# Patient Record
Sex: FEMALE | Race: WHITE | NOT HISPANIC OR LATINO | Employment: OTHER | URBAN - METROPOLITAN AREA
[De-identification: names, ages, dates, MRNs, and addresses within clinical notes are randomized per-mention and may not be internally consistent; named-entity substitution may affect disease eponyms.]

---

## 2018-03-12 ENCOUNTER — APPOINTMENT (OUTPATIENT)
Dept: CT IMAGING | Facility: HOSPITAL | Age: 83
End: 2018-03-12

## 2018-03-12 ENCOUNTER — APPOINTMENT (OUTPATIENT)
Dept: GENERAL RADIOLOGY | Facility: HOSPITAL | Age: 83
End: 2018-03-12

## 2018-03-12 ENCOUNTER — APPOINTMENT (OUTPATIENT)
Dept: ULTRASOUND IMAGING | Facility: HOSPITAL | Age: 83
End: 2018-03-12

## 2018-03-12 ENCOUNTER — HOSPITAL ENCOUNTER (INPATIENT)
Facility: HOSPITAL | Age: 83
LOS: 11 days | Discharge: NURSING, REHAB OR LONG-TERM CARE | End: 2018-03-23
Attending: EMERGENCY MEDICINE | Admitting: HOSPITALIST

## 2018-03-12 DIAGNOSIS — Z74.09 IMPAIRED FUNCTIONAL MOBILITY, BALANCE, GAIT, AND ENDURANCE: ICD-10-CM

## 2018-03-12 DIAGNOSIS — F03.91 DEMENTIA WITH BEHAVIORAL DISTURBANCE, UNSPECIFIED DEMENTIA TYPE: ICD-10-CM

## 2018-03-12 DIAGNOSIS — N28.9 RENAL INSUFFICIENCY: ICD-10-CM

## 2018-03-12 DIAGNOSIS — E86.0 DEHYDRATION: Primary | ICD-10-CM

## 2018-03-12 PROBLEM — E87.1 HYPONATREMIA: Status: ACTIVE | Noted: 2018-03-12

## 2018-03-12 PROBLEM — I10 BENIGN ESSENTIAL HYPERTENSION: Status: ACTIVE | Noted: 2018-03-12

## 2018-03-12 PROBLEM — N18.9 ACUTE ON CHRONIC RENAL FAILURE (HCC): Status: ACTIVE | Noted: 2018-03-12

## 2018-03-12 PROBLEM — N17.9 ACUTE ON CHRONIC RENAL FAILURE (HCC): Status: ACTIVE | Noted: 2018-03-12

## 2018-03-12 PROBLEM — R63.4 WEIGHT LOSS, ABNORMAL: Status: ACTIVE | Noted: 2018-03-12

## 2018-03-12 PROBLEM — E11.9 TYPE 2 DIABETES MELLITUS WITHOUT COMPLICATION (HCC): Status: ACTIVE | Noted: 2018-03-12

## 2018-03-12 PROBLEM — R13.10 TROUBLE SWALLOWING: Status: ACTIVE | Noted: 2018-03-12

## 2018-03-12 PROBLEM — E87.6 HYPOKALEMIA: Status: ACTIVE | Noted: 2018-03-12

## 2018-03-12 LAB
ALBUMIN SERPL-MCNC: 4.1 G/DL (ref 3.5–5.2)
ALBUMIN/GLOB SERPL: 1 G/DL
ALP SERPL-CCNC: 97 U/L (ref 39–117)
ALT SERPL W P-5'-P-CCNC: 20 U/L (ref 1–33)
ANION GAP SERPL CALCULATED.3IONS-SCNC: 29.4 MMOL/L
AST SERPL-CCNC: 27 U/L (ref 1–32)
BACTERIA UR QL AUTO: NORMAL /HPF
BASOPHILS # BLD AUTO: 0.01 10*3/MM3 (ref 0–0.2)
BASOPHILS NFR BLD AUTO: 0.1 % (ref 0–1.5)
BILIRUB SERPL-MCNC: 0.8 MG/DL (ref 0.1–1.2)
BILIRUB UR QL STRIP: NEGATIVE
BUN BLD-MCNC: 87 MG/DL (ref 8–23)
BUN/CREAT SERPL: 33.7 (ref 7–25)
CALCIUM SPEC-SCNC: 9.9 MG/DL (ref 8.6–10.5)
CHLORIDE SERPL-SCNC: 74 MMOL/L (ref 98–107)
CLARITY UR: CLEAR
CO2 SERPL-SCNC: 28.6 MMOL/L (ref 22–29)
COLOR UR: YELLOW
CREAT BLD-MCNC: 2.58 MG/DL (ref 0.57–1)
DEPRECATED RDW RBC AUTO: 46.9 FL (ref 37–54)
EOSINOPHIL # BLD AUTO: 0 10*3/MM3 (ref 0–0.7)
EOSINOPHIL NFR BLD AUTO: 0 % (ref 0.3–6.2)
ERYTHROCYTE [DISTWIDTH] IN BLOOD BY AUTOMATED COUNT: 13.8 % (ref 11.7–13)
GFR SERPL CREATININE-BSD FRML MDRD: 18 ML/MIN/1.73
GLOBULIN UR ELPH-MCNC: 4.1 GM/DL
GLUCOSE BLD-MCNC: 318 MG/DL (ref 65–99)
GLUCOSE UR STRIP-MCNC: NEGATIVE MG/DL
HCT VFR BLD AUTO: 50.3 % (ref 35.6–45.5)
HGB BLD-MCNC: 17.2 G/DL (ref 11.9–15.5)
HGB UR QL STRIP.AUTO: ABNORMAL
HYALINE CASTS UR QL AUTO: NORMAL /LPF
IMM GRANULOCYTES # BLD: 0.02 10*3/MM3 (ref 0–0.03)
IMM GRANULOCYTES NFR BLD: 0.1 % (ref 0–0.5)
KETONES UR QL STRIP: ABNORMAL
LEUKOCYTE ESTERASE UR QL STRIP.AUTO: NEGATIVE
LYMPHOCYTES # BLD AUTO: 0.8 10*3/MM3 (ref 0.9–4.8)
LYMPHOCYTES NFR BLD AUTO: 5.4 % (ref 19.6–45.3)
MCH RBC QN AUTO: 32.1 PG (ref 26.9–32)
MCHC RBC AUTO-ENTMCNC: 34.2 G/DL (ref 32.4–36.3)
MCV RBC AUTO: 93.8 FL (ref 80.5–98.2)
MONOCYTES # BLD AUTO: 0.61 10*3/MM3 (ref 0.2–1.2)
MONOCYTES NFR BLD AUTO: 4.1 % (ref 5–12)
NEUTROPHILS # BLD AUTO: 13.4 10*3/MM3 (ref 1.9–8.1)
NEUTROPHILS NFR BLD AUTO: 90.3 % (ref 42.7–76)
NITRITE UR QL STRIP: NEGATIVE
PH UR STRIP.AUTO: 5.5 [PH] (ref 5–8)
PLATELET # BLD AUTO: 293 10*3/MM3 (ref 140–500)
PMV BLD AUTO: 11.3 FL (ref 6–12)
POTASSIUM BLD-SCNC: 3 MMOL/L (ref 3.5–5.2)
PROT SERPL-MCNC: 8.2 G/DL (ref 6–8.5)
PROT UR QL STRIP: NEGATIVE
RBC # BLD AUTO: 5.36 10*6/MM3 (ref 3.9–5.2)
RBC # UR: NORMAL /HPF
REF LAB TEST METHOD: NORMAL
SODIUM BLD-SCNC: 132 MMOL/L (ref 136–145)
SP GR UR STRIP: 1.01 (ref 1–1.03)
SQUAMOUS #/AREA URNS HPF: NORMAL /HPF
TROPONIN T SERPL-MCNC: 0.02 NG/ML (ref 0–0.03)
URATE SERPL-MCNC: 22.7 MG/DL (ref 2.4–5.7)
UROBILINOGEN UR QL STRIP: ABNORMAL
WBC NRBC COR # BLD: 14.84 10*3/MM3 (ref 4.5–10.7)
WBC UR QL AUTO: NORMAL /HPF

## 2018-03-12 PROCEDURE — 84550 ASSAY OF BLOOD/URIC ACID: CPT | Performed by: HOSPITALIST

## 2018-03-12 PROCEDURE — 81001 URINALYSIS AUTO W/SCOPE: CPT | Performed by: EMERGENCY MEDICINE

## 2018-03-12 PROCEDURE — 99285 EMERGENCY DEPT VISIT HI MDM: CPT

## 2018-03-12 PROCEDURE — 80053 COMPREHEN METABOLIC PANEL: CPT | Performed by: EMERGENCY MEDICINE

## 2018-03-12 PROCEDURE — 76775 US EXAM ABDO BACK WALL LIM: CPT

## 2018-03-12 PROCEDURE — 84484 ASSAY OF TROPONIN QUANT: CPT | Performed by: EMERGENCY MEDICINE

## 2018-03-12 PROCEDURE — 85025 COMPLETE CBC W/AUTO DIFF WBC: CPT | Performed by: EMERGENCY MEDICINE

## 2018-03-12 PROCEDURE — 70450 CT HEAD/BRAIN W/O DYE: CPT

## 2018-03-12 PROCEDURE — 93010 ELECTROCARDIOGRAM REPORT: CPT | Performed by: INTERNAL MEDICINE

## 2018-03-12 PROCEDURE — 71046 X-RAY EXAM CHEST 2 VIEWS: CPT

## 2018-03-12 PROCEDURE — 93005 ELECTROCARDIOGRAM TRACING: CPT | Performed by: EMERGENCY MEDICINE

## 2018-03-12 RX ORDER — ASPIRIN 81 MG/1
81 TABLET ORAL DAILY
Status: DISCONTINUED | OUTPATIENT
Start: 2018-03-12 | End: 2018-03-23 | Stop reason: HOSPADM

## 2018-03-12 RX ORDER — BISACODYL 10 MG
10 SUPPOSITORY, RECTAL RECTAL DAILY PRN
Status: DISCONTINUED | OUTPATIENT
Start: 2018-03-12 | End: 2018-03-23 | Stop reason: HOSPADM

## 2018-03-12 RX ORDER — ACETAMINOPHEN 325 MG/1
650 TABLET ORAL EVERY 4 HOURS PRN
Status: DISCONTINUED | OUTPATIENT
Start: 2018-03-12 | End: 2018-03-23 | Stop reason: HOSPADM

## 2018-03-12 RX ORDER — LEVOTHYROXINE SODIUM 0.05 MG/1
50 TABLET ORAL DAILY
Status: DISCONTINUED | OUTPATIENT
Start: 2018-03-12 | End: 2018-03-23 | Stop reason: HOSPADM

## 2018-03-12 RX ORDER — SODIUM CHLORIDE 0.9 % (FLUSH) 0.9 %
1-10 SYRINGE (ML) INJECTION AS NEEDED
Status: DISCONTINUED | OUTPATIENT
Start: 2018-03-12 | End: 2018-03-23 | Stop reason: HOSPADM

## 2018-03-12 RX ORDER — BISACODYL 5 MG/1
5 TABLET, DELAYED RELEASE ORAL DAILY PRN
Status: DISCONTINUED | OUTPATIENT
Start: 2018-03-12 | End: 2018-03-23 | Stop reason: HOSPADM

## 2018-03-12 RX ORDER — ONDANSETRON 2 MG/ML
4 INJECTION INTRAMUSCULAR; INTRAVENOUS EVERY 6 HOURS PRN
Status: DISCONTINUED | OUTPATIENT
Start: 2018-03-12 | End: 2018-03-23 | Stop reason: HOSPADM

## 2018-03-12 RX ORDER — SODIUM CHLORIDE 9 MG/ML
100 INJECTION, SOLUTION INTRAVENOUS CONTINUOUS
Status: DISCONTINUED | OUTPATIENT
Start: 2018-03-12 | End: 2018-03-13

## 2018-03-12 RX ORDER — MEMANTINE HYDROCHLORIDE 10 MG/1
10 TABLET ORAL DAILY
Status: DISCONTINUED | OUTPATIENT
Start: 2018-03-12 | End: 2018-03-23 | Stop reason: HOSPADM

## 2018-03-12 RX ORDER — DONEPEZIL HYDROCHLORIDE 10 MG/1
10 TABLET, FILM COATED ORAL NIGHTLY
COMMUNITY
End: 2022-08-02

## 2018-03-12 RX ORDER — INDAPAMIDE 2.5 MG/1
2.5 TABLET, FILM COATED ORAL EVERY MORNING
Status: DISCONTINUED | OUTPATIENT
Start: 2018-03-13 | End: 2018-03-13

## 2018-03-12 RX ORDER — OXYBUTYNIN CHLORIDE 5 MG/1
5 TABLET, EXTENDED RELEASE ORAL
COMMUNITY
End: 2018-03-23 | Stop reason: HOSPADM

## 2018-03-12 RX ORDER — DONEPEZIL HYDROCHLORIDE 10 MG/1
10 TABLET, FILM COATED ORAL NIGHTLY
Status: DISCONTINUED | OUTPATIENT
Start: 2018-03-12 | End: 2018-03-23 | Stop reason: HOSPADM

## 2018-03-12 RX ORDER — POTASSIUM CHLORIDE 20 MEQ/1
20 TABLET, EXTENDED RELEASE ORAL 3 TIMES DAILY
COMMUNITY
End: 2018-03-23 | Stop reason: HOSPADM

## 2018-03-12 RX ORDER — ONDANSETRON 4 MG/1
4 TABLET, ORALLY DISINTEGRATING ORAL EVERY 6 HOURS PRN
Status: DISCONTINUED | OUTPATIENT
Start: 2018-03-12 | End: 2018-03-23 | Stop reason: HOSPADM

## 2018-03-12 RX ORDER — ONDANSETRON 4 MG/1
4 TABLET, FILM COATED ORAL EVERY 6 HOURS PRN
Status: DISCONTINUED | OUTPATIENT
Start: 2018-03-12 | End: 2018-03-23 | Stop reason: HOSPADM

## 2018-03-12 RX ADMIN — SODIUM CHLORIDE 1000 ML: 9 INJECTION, SOLUTION INTRAVENOUS at 14:19

## 2018-03-12 RX ADMIN — SODIUM CHLORIDE 100 ML/HR: 9 INJECTION, SOLUTION INTRAVENOUS at 18:46

## 2018-03-12 NOTE — NURSING NOTE
Called Dr. Zamora regarding low potassium level. He wanted to just watch it and not treat it for now since creatinine elevated.

## 2018-03-12 NOTE — H&P
Name: Sam Flores ADMIT: 3/12/2018   : 1934  PCP: Joann Prabhakar MD    MRN: [id number] LOS: 0 days   AGE/SEX: 83 y.o. female  ROOM: P491/1     Chief Complaint   Patient presents with   • Altered Mental Status     FAMILY REPORTS PT IS BECOMING MORE CONFUSED, NOT EATING AND UNABLE TO CONTROL HER BLADDER; PT WITH NO COMPLAINTS AND DOES NOT KNOW WHY SHE IS HERE       Subjective   Patient is a 83 y.o. female who presents to [facility] with the above chief complaint.  She is known to our service from past admissions for palpitations osteoporosis with vertebral compression fractures.  She presents today for dehydration and weight loss.  Patient has dementia and can't really tell me why she's come to the hospital most of the history is obtained from her son is at the bedside.  Essentially her great niece came to the house to check on her today and noticed that she was very weak and more confused.  She had trouble getting up and moving around.  She also had mean anything since yesterday.  She's been incontinent and not really able to do some of her ADLs and not nearly as independent and she's been over the last couple years over the last week this is greatly declined.  His been no fevers or chills she's lost about 10 pounds in the last 2 weeks.  Family's been increasingly concerned over the last couple months regarding her poor by mouth intake.  The patient herself says she just can't swallow things.  She can't tell if it hurts when she swallows or there is pain there or she feels like she is choking when she swallows she says she just doesn't remember.  She is a little bit of a cough right now it's nonproductive denies any fevers or chills.    Altered Mental Status         Past Medical History:   Diagnosis Date   • Dementia    • Diabetes mellitus    • Disease of thyroid gland    • Emphysema of lung    • Hyperlipidemia    • Hypertension    • Lumbar compression fracture 2016   •  Osteoporosis    • S/P kyphoplasty 2016    L1 and L4, 2016     Past Surgical History:   Procedure Laterality Date   •  SECTION     • KYPHOPLASTY      T12 and L2   • KYPHOPLASTY N/A 2016    Procedure: KYPHOPLASTY LUMBAR 1 AND LUMBAR 4;  Surgeon: Triston Marinelli IV, MD;  Location: [facility] ;  Service:    • MASTECTOMY       History reviewed. No pertinent family history.  Social History   Substance Use Topics   • Smoking status: Never Smoker   • Smokeless tobacco: Not on file   • Alcohol use No     Prescriptions Prior to Admission   Medication Sig Dispense Refill Last Dose   • aspirin 81 MG EC tablet Take 81 mg by mouth Daily.   2016 at Unknown time   • donepezil (ARICEPT) 10 MG tablet Take 10 mg by mouth Every Night.      • indapamide (LOZOL) 2.5 MG tablet Take 2.5 mg by mouth Every Morning.   2016 at Unknown time   • levothyroxine (SYNTHROID, LEVOTHROID) 50 MCG tablet Take 50 mcg by mouth Daily.   2016 at Unknown time   • Linaclotide (LINZESS) 145 MCG capsule Take 145 mcg by mouth Every Morning.   2016 at Unknown time   • memantine (NAMENDA) 10 MG tablet Take 10 mg by mouth 2 (Two) Times a Day.   2016 at Unknown time   • oxybutynin XL (DITROPAN-XL) 5 MG 24 hr tablet Take 5 mg by mouth every night at bedtime.      • potassium chloride (KLOR-CON) 20 MEQ CR tablet Take 20 mEq by mouth 3 (Three) Times a Day.      • simvastatin (ZOCOR) 20 MG tablet Take 20 mg by mouth Every Night.   2016 at Unknown time   • potassium chloride (K-DUR) 10 MEQ CR tablet Take 10 mEq by mouth 3 (Three) Times a Day.   2016 at Unknown time   • SITagliptin (JANUVIA) 50 MG tablet Take 50 mg by mouth Daily.   2016 at Unknown time     Allergies:  Penicillins    Review of Systems   Unable to perform ROS: Dementia        Objective    Vital Signs  Temp:  [96.8 °F (36 °C)-97.4 °F (36.3 °C)] 96.8 °F (36 °C)  Heart Rate:  [74-84] 77  Resp:  [16-18] 18  BP:  (110-119)/(65-71) 110/65  SpO2:  [94 %-100 %] 100 %  on   ;   Device (Oxygen Therapy): room air  Body mass index is 18.18 kg/m².    Physical Exam   Constitutional: No distress.   Frail elderly cachectic   HENT:   Head: Normocephalic and atraumatic.   Nose: Nose normal.   Eyes: Conjunctivae and EOM are normal.   Neck: Neck supple. No JVD present.   Cardiovascular: Normal rate, regular rhythm and normal heart sounds.    Pulmonary/Chest: Effort normal and breath sounds normal. No respiratory distress.   Abdominal: Soft. Bowel sounds are normal. She exhibits no distension.   Musculoskeletal: Normal range of motion. She exhibits no edema.   Neurological: She is alert.   Skin: Skin is warm and dry. Capillary refill takes less than 2 seconds.   Psychiatric: She has a normal mood and affect. Her behavior is normal.       Results Review:   I reviewed the patient's new clinical results.    Results from last 7 days  Lab Units 03/12/18  1304   WBC 10*3/mm3 14.84*   HEMOGLOBIN g/dL 17.2*   PLATELETS 10*3/mm3 293     Results from last 7 days  Lab Units 03/12/18  1304   SODIUM mmol/L 132*   POTASSIUM mmol/L 3.0*   CHLORIDE mmol/L 74*   CO2 mmol/L 28.6   BUN mg/dL 87*   CREATININE mg/dL 2.58*   GLUCOSE mg/dL 318*   ALBUMIN g/dL 4.10   BILIRUBIN mg/dL 0.8   ALK PHOS U/L 97   AST (SGOT) U/L 27   ALT (SGPT) U/L 20   Estimated Creatinine Clearance: 10.6 mL/min (by C-G formula based on SCr of 2.58 mg/dL (H)).  Results from last 7 days  Lab Units 03/12/18  1304   TROPONIN T ng/mL 0.020         Invalid input(s): LDLCALC  Results from last 7 days  Lab Units 03/12/18  1418   NITRITE UA  Negative   WBC UA /HPF 0-2   BACTERIA UA /HPF None Seen   SQUAM EPITHEL UA /HPF 0-2     US Renal Bilateral   Final Result   No hydronephrosis or echogenic nephrolithiasis.           This report was finalized on 3/12/2018 4:28 PM by Dr. Blaise Witt MD.          CT Head Without Contrast   Final Result   1.  There is no evidence for acute intracranial  pathology.   2.  Changes of inflammatory paranasal sinus disease along with   postsurgical changes within the paranasal sinuses are as discussed in   detail above.       Radiation dose reduction techniques were utilized, including automated   exposure control and exposure modulation based on body size.       This report was finalized on 3/12/2018 2:59 PM by Dr. Elbert Astorga MD.          XR Chest 2 View   Final Result   Small likely atelectasis or scar at the left base. Tortuous   aorta. Follow-up as clinically indicated.       This report was finalized on 3/12/2018 1:24 PM by Dr. Blaise Witt MD.            Assessment/Plan     Active Problems:    Dehydration    Acute on chronic renal failure    Type 2 diabetes mellitus without complication    Benign essential hypertension    Weight loss, abnormal    Trouble swallowing      Assessment & Plan  At this point she's got acute on chronic renal failure with dehydration.  We'll start her on IV fluids and aggressively hydrate.  She failed her bedside swallow study in the emergency room and has been having swallowing issues at home.  Her dementia makes it difficult to figure out exactly what's going on with her swallowing issues.  We'll have speech therapy see her and determine a diet.  Until then we'll keep her nothing by mouth.  She's lost some weight but compared to her last hospitalization about 2 years ago she's only down 3 pounds but according to family she's lost nearly 10 last 2 weeks.  This likely secondary to poor oral intake as well as other issues.  She does have a history cancer in the past there hasn't been any signs or symptoms of this presently except for the weight loss.  Workup for renal failure and have nephrology evaluate.  We'll have speech therapy see for the swallowing issues and possibly get GI involved to do a barium esophagram if needed.  Otherwise she has response to IV fluids and physical therapy and follow-up.  We discussed her advanced  directives and goals of care.  Her goals of care in line with quality and comfort.  She wishes to be a DNR with no aggressive measures.  No plans for artificial nutrition.  Only thing she okayed was blood products and antibiotics if needed.      I discussed the patients findings and my recommendations with patient, family and nursing staff.    Raciel Zamora MD  [facility]  03/12/18  4:42 PM

## 2018-03-12 NOTE — PLAN OF CARE
Problem: Patient Care Overview  Goal: Plan of Care Review  Outcome: Ongoing (interventions implemented as appropriate)   03/12/18 1540   Coping/Psychosocial   Plan of Care Reviewed With patient;family   Plan of Care Review   Progress no change   OTHER   Outcome Summary pt admitted to [facility]. pt started on IV fluids. pt confused and forgetful, but pleasant. pt asking for water. pt unable to drink fluids until speech sees. pt has a nonproductive cough. denies pain. hold on k+ replacement d/t renal fx for now.     Goal: Individualization and Mutuality  Outcome: Ongoing (interventions implemented as appropriate)    Goal: Discharge Needs Assessment  Outcome: Ongoing (interventions implemented as appropriate)    Goal: Interprofessional Rounds/Family Conf  Outcome: Ongoing (interventions implemented as appropriate)      Problem: Fall Risk (Adult)  Goal: Identify Related Risk Factors and Signs and Symptoms  Outcome: Ongoing (interventions implemented as appropriate)    Goal: Absence of Fall  Outcome: Ongoing (interventions implemented as appropriate)      Problem: Skin Injury Risk (Adult)  Goal: Identify Related Risk Factors and Signs and Symptoms  Outcome: Ongoing (interventions implemented as appropriate)    Goal: Skin Health and Integrity  Outcome: Ongoing (interventions implemented as appropriate)      Problem: Fluid Volume Deficit (Adult)  Goal: Identify Related Risk Factors and Signs and Symptoms  Outcome: Ongoing (interventions implemented as appropriate)    Goal: Optimal Fluid Balance  Outcome: Ongoing (interventions implemented as appropriate)

## 2018-03-12 NOTE — ED PROVIDER NOTES
EMERGENCY DEPARTMENT ENCOUNTER       CHIEF COMPLAINT  Chief Complaint: Altered Mental Status  History given by: Paramedics, Patient  History limited by: Patient's mental status change   Room Number:   PMD: Joann Prabhakar MD      HPI:  Per paramedics per family, pt has h/o dementia. Pt presents to the ED with confusion worsening from pt's baseline over the past several days. Pt has also had decreased appetite/PO intake and has been incontinent of urine. Pt denies chest pain, dyspnea, headache, and focal weakness/numbness. The exact time of onset of pt's symptoms is unknown. There are no other complaints at this time.         Altered Mental Status   Presenting symptoms: confusion    Severity:  Moderate  Most recent episode:  Today  Episode history:  Continuous  Duration: worsening from baseline over the past several days; the exact time of onset of pt's symptoms is unknown.  Timing:  Constant  Progression:  Worsening  Context: dementia    Associated symptoms: bladder incontinence    Associated symptoms: no headaches and no weakness          PAST MEDICAL HISTORY  Active Ambulatory Problems     Diagnosis Date Noted   • Lumbar compression fracture 2016   • Osteoporosis 2016     Resolved Ambulatory Problems     Diagnosis Date Noted   • Fall 2016   • Acute kidney injury superimposed on chronic kidney disease 2016   • Rhabdomyolysis 2016   • Slow transit constipation 2016   • S/P kyphoplasty 2016     Past Medical History:   Diagnosis Date   • Dementia    • Diabetes mellitus    • Disease of thyroid gland    • Emphysema of lung    • Hyperlipidemia    • Hypertension    • Lumbar compression fracture 2016   • Osteoporosis    • S/P kyphoplasty 2016         PAST SURGICAL HISTORY  Past Surgical History:   Procedure Laterality Date   •  SECTION     • KYPHOPLASTY      T12 and L2   • KYPHOPLASTY N/A 2016    Procedure: KYPHOPLASTY LUMBAR 1 AND LUMBAR 4;   Surgeon: Triston Marinelli IV, MD;  Location: [facility] OR 18/19;  Service:    • MASTECTOMY           FAMILY HISTORY  History reviewed. No pertinent family history.      SOCIAL HISTORY  Social History     Social History   • Marital status:      Spouse name: N/A   • Number of children: N/A   • Years of education: N/A     Occupational History   • Not on file.     Social History Main Topics   • Smoking status: Never Smoker   • Smokeless tobacco: Not on file   • Alcohol use No   • Drug use: Unknown   • Sexual activity: Not on file     Other Topics Concern   • Not on file     Social History Narrative   • No narrative on file         ALLERGIES  Penicillins        REVIEW OF SYSTEMS  Review of Systems   Unable to perform ROS: Mental status change   Constitutional: Positive for appetite change (decreased appetite/PO intake).   Respiratory: Negative for shortness of breath.    Cardiovascular: Negative for chest pain.   Genitourinary: Positive for bladder incontinence.   Neurological: Negative for weakness, numbness and headaches.   Psychiatric/Behavioral: Positive for confusion.            PHYSICAL EXAM  ED Triage Vitals   Temp Heart Rate Resp BP SpO2   03/12/18 1238 03/12/18 1230 03/12/18 1238 03/12/18 1230 03/12/18 1230   97.4 °F (36.3 °C) 84 16 114/70 96 %      Temp src Heart Rate Source Patient Position BP Location FiO2 (%)   03/12/18 1238 03/12/18 1512 03/12/18 1402 03/12/18 1402 --   Oral Monitor Lying Right arm          Physical Exam   Constitutional: No distress.   HENT:   Head: Normocephalic.   Mouth/Throat: Mucous membranes are dry.   Eyes: EOM are normal. Pupils are equal, round, and reactive to light.   Neck: Normal range of motion. Neck supple.   Cardiovascular: Normal rate, regular rhythm and normal heart sounds.    Pulmonary/Chest: Effort normal and breath sounds normal. No respiratory distress. She has no decreased breath sounds. She has no wheezes. She has no rhonchi. She has no rales.   Abdominal:  Soft. There is no tenderness. There is no rebound and no guarding.   Musculoskeletal: Normal range of motion.   Pt moves all extremities.    Neurological: She is alert. She has normal sensation. She is disoriented (to place and time).   Skin: Skin is warm and dry.   Psychiatric: Mood and affect normal.   Nursing note and vitals reviewed.          LAB RESULTS  Recent Results (from the past 24 hour(s))   Comprehensive Metabolic Panel    Collection Time: 03/12/18  1:04 PM   Result Value Ref Range    Glucose 318 (H) 65 - 99 mg/dL    BUN 87 (H) 8 - 23 mg/dL    Creatinine 2.58 (H) 0.57 - 1.00 mg/dL    Sodium 132 (L) 136 - 145 mmol/L    Potassium 3.0 (L) 3.5 - 5.2 mmol/L    Chloride 74 (L) 98 - 107 mmol/L    CO2 28.6 22.0 - 29.0 mmol/L    Calcium 9.9 8.6 - 10.5 mg/dL    Total Protein 8.2 6.0 - 8.5 g/dL    Albumin 4.10 3.50 - 5.20 g/dL    ALT (SGPT) 20 1 - 33 U/L    AST (SGOT) 27 1 - 32 U/L    Alkaline Phosphatase 97 39 - 117 U/L    Total Bilirubin 0.8 0.1 - 1.2 mg/dL    eGFR Non African Amer 18 (L) >60 mL/min/1.73    Globulin 4.1 gm/dL    A/G Ratio 1.0 g/dL    BUN/Creatinine Ratio 33.7 (H) 7.0 - 25.0    Anion Gap 29.4 mmol/L   Troponin    Collection Time: 03/12/18  1:04 PM   Result Value Ref Range    Troponin T 0.020 0.000 - 0.030 ng/mL   CBC Auto Differential    Collection Time: 03/12/18  1:04 PM   Result Value Ref Range    WBC 14.84 (H) 4.50 - 10.70 10*3/mm3    RBC 5.36 (H) 3.90 - 5.20 10*6/mm3    Hemoglobin 17.2 (H) 11.9 - 15.5 g/dL    Hematocrit 50.3 (H) 35.6 - 45.5 %    MCV 93.8 80.5 - 98.2 fL    MCH 32.1 (H) 26.9 - 32.0 pg    MCHC 34.2 32.4 - 36.3 g/dL    RDW 13.8 (H) 11.7 - 13.0 %    RDW-SD 46.9 37.0 - 54.0 fl    MPV 11.3 6.0 - 12.0 fL    Platelets 293 140 - 500 10*3/mm3    Neutrophil % 90.3 (H) 42.7 - 76.0 %    Lymphocyte % 5.4 (L) 19.6 - 45.3 %    Monocyte % 4.1 (L) 5.0 - 12.0 %    Eosinophil % 0.0 (L) 0.3 - 6.2 %    Basophil % 0.1 0.0 - 1.5 %    Immature Grans % 0.1 0.0 - 0.5 %    Neutrophils, Absolute 13.40 (H)  1.90 - 8.10 10*3/mm3    Lymphocytes, Absolute 0.80 (L) 0.90 - 4.80 10*3/mm3    Monocytes, Absolute 0.61 0.20 - 1.20 10*3/mm3    Eosinophils, Absolute 0.00 0.00 - 0.70 10*3/mm3    Basophils, Absolute 0.01 0.00 - 0.20 10*3/mm3    Immature Grans, Absolute 0.02 0.00 - 0.03 10*3/mm3   Urinalysis With / Culture If Indicated - Urine, Catheter    Collection Time: 03/12/18  2:18 PM   Result Value Ref Range    Color, UA Yellow Yellow, Straw    Appearance, UA Clear Clear    pH, UA 5.5 5.0 - 8.0    Specific Gravity, UA 1.015 1.005 - 1.030    Glucose, UA Negative Negative    Ketones, UA 15 mg/dL (1+) (A) Negative    Bilirubin, UA Negative Negative    Blood, UA Trace (A) Negative    Protein, UA Negative Negative    Leuk Esterase, UA Negative Negative    Nitrite, UA Negative Negative    Urobilinogen, UA 1.0 E.U./dL 0.2 - 1.0 E.U./dL   Urinalysis, Microscopic Only - Urine, Clean Catch    Collection Time: 03/12/18  2:18 PM   Result Value Ref Range    RBC, UA 0-2 None Seen, 0-2 /HPF    WBC, UA 0-2 None Seen, 0-2 /HPF    Bacteria, UA None Seen None Seen /HPF    Squamous Epithelial Cells, UA 0-2 None Seen, 0-2 /HPF    Hyaline Casts, UA 3-6 None Seen /LPF    Methodology Automated Microscopy        Ordered the above labs and reviewed the results.        RADIOLOGY  CT Head Without Contrast   Preliminary Result   1.  There is no evidence for acute intracranial pathology.   2.  Changes of inflammatory paranasal sinus disease along with   postsurgical changes within the paranasal sinuses are as discussed in   detail above.       Radiation dose reduction techniques were utilized, including automated   exposure control and exposure modulation based on body size.    Interpreted by radiologist. Discussed with radiologist. Independently viewed by me.             XR Chest 2 View   Final Result   Small likely atelectasis or scar at the left base. Tortuous   aorta. Follow-up as clinically indicated.       This report was finalized on 3/12/2018 1:24  PM by Dr. Blaise Witt MD.    Interpreted by radiologist. Independently viewed by me.                Ordered the above noted radiological studies. Reviewed by me in PACS.       PROCEDURES  Procedures    EKG:           EKG time: 13:38  Rhythm/Rate: NSR rate 76  1st degree AV block  QRS, axis: Normal QRS   ST and T waves: Normal ST     Interpreted Contemporaneously by me, independently viewed  Similar compared to prior 11/30/16          PROGRESS AND CONSULTS  ED Course   Comment By Time   2:35 PM  Patient with dementia that is now not eating or drinking.  Appears dehydrated here with elevated BUN.  Given fluids.  Family understands that disposition may need to be to someplace other than home. Reji Tello MD 03/12 1435     12:43 PM:  Blood work, UA, CXR, CT Head, and EKG ordered for further evaluation. Pt is not a candidate for tPA as the exact time of onset of pt's symptoms is unknown.     2:16 PM:  Rechecked pt. Pt's family is at pt's bedside. Informed pt's family that pt's creatinine is 2.58 and BUN is 87 today. Pt appears to be dehydrated. Pt's CT Head is negative acute. Awaiting UA results. However, need for pt's admission to the hospital for further evaluation and management of pt's renal insufficiency. Pt's family agrees with plan. Decision time to admit: Now.     2:18 PM:  Placed call to A for admission. IV fluids ordered to hydrate pt.     2:34 PM:  Discussed case with Dr. Zamora, hospitalist. He will admit pt to a med/surg bed.               MEDICAL DECISION MAKING        MDM  Number of Diagnoses or Management Options     Amount and/or Complexity of Data Reviewed  Clinical lab tests: ordered and reviewed (BUN is 87. Creatinine is 2.58. )  Tests in the radiology section of CPT®: ordered and reviewed (CT Head:  1.  There is no evidence for acute intracranial pathology.  2.  Changes of inflammatory paranasal sinus disease along with  postsurgical changes within the paranasal sinuses are as  discussed in  detail above.)  Tests in the medicine section of CPT®: ordered and reviewed (EKG interpreted.   )  Discussion of test results with the performing providers: yes (CT Head results d/w radiologist.   )  Obtain history from someone other than the patient: yes (Paramedics provide significant history. )  Discuss the patient with other providers: yes (Case d/w Dr. Zamora, hospitalist, who will admit pt to a med/surg bed.   )    Patient Progress  Patient progress: stable             DIAGNOSIS  Final diagnoses:   Dehydration   Renal insufficiency   Dementia with behavioral disturbance, unspecified dementia type         DISPOSITION  Pt admitted to med/surg.      ADMISSION    Discussed treatment plan and reason for admission with pt/family and admitting physician.  Pt/family voiced understanding of the plan for admission for further testing/treatment as needed.                 Latest Documented Vital Signs:  As of 3:14 PM  BP- 110/65 HR- 77 Temp- 96.8 °F (36 °C) (Tympanic) O2 sat- 100%        --  Documentation assistance provided by carter Oswald for Dr. Elisha MD.  Information recorded by the scribe was done at my direction and has been verified and validated by me.            Rosita Oswald  03/12/18 1525       Reji Tello MD  03/12/18 1527

## 2018-03-12 NOTE — PROGRESS NOTES
Clinical Pharmacy Services: Medication History    Sam Flores is a 83 y.o. female presenting to [facility] for   Chief Complaint   Patient presents with   • Altered Mental Status     FAMILY REPORTS PT IS BECOMING MORE CONFUSED, NOT EATING AND UNABLE TO CONTROL HER BLADDER; PT WITH NO COMPLAINTS AND DOES NOT KNOW WHY SHE IS HERE       She  has a past medical history of Dementia; Diabetes mellitus; Disease of thyroid gland; Emphysema of lung; Hyperlipidemia; Hypertension; Lumbar compression fracture (11/30/2016); Osteoporosis; and S/P kyphoplasty (12/5/2016).    Allergies as of 03/12/2018 - Reviewed 03/12/2018   Allergen Reaction Noted   • Penicillins  11/30/2016       Medication information was obtained from: Family, medication list  Pharmacy and Phone Number: [facility]    Prior to Admission Medications     Prescriptions Last Dose Informant Patient Reported? Taking?    aspirin 81 MG EC tablet  Family Member Yes Yes    Take 81 mg by mouth Daily.    donepezil (ARICEPT) 10 MG tablet  Family Member Yes Yes    Take 10 mg by mouth Every Night.    indapamide (LOZOL) 2.5 MG tablet  Family Member Yes Yes    Take 2.5 mg by mouth Every Morning.    levothyroxine (SYNTHROID, LEVOTHROID) 50 MCG tablet  Family Member Yes Yes    Take 50 mcg by mouth Daily.    Linaclotide (LINZESS) 145 MCG capsule  Family Member Yes Yes    Take 145 mcg by mouth Every Morning.    memantine (NAMENDA) 10 MG tablet  Family Member Yes Yes    Take 10 mg by mouth 2 (Two) Times a Day.    oxybutynin XL (DITROPAN-XL) 5 MG 24 hr tablet  Family Member Yes Yes    Take 5 mg by mouth every night at bedtime.    potassium chloride (K-DUR) 10 MEQ CR tablet  Family Member Yes No    Take 10 mEq by mouth 3 (Three) Times a Day.    potassium chloride (KLOR-CON) 20 MEQ CR tablet  Family Member Yes Yes    Take 20 mEq by mouth 3 (Three) Times a Day.    simvastatin (ZOCOR) 20 MG tablet  Family Member Yes Yes    Take 20 mg by mouth Every Night.    SITagliptin  (JANUVIA) 50 MG tablet  Family Member Yes No    Take 50 mg by mouth Daily.            Medication notes: Removed Januvia and added Donepezil per medication list provided by family.    This medication list is complete to the best of my knowledge as of 3/12/2018    Please call if questions.    Kristine Corcoran, Medication History Technician  3/12/2018 4:21 PM

## 2018-03-13 ENCOUNTER — APPOINTMENT (OUTPATIENT)
Dept: GENERAL RADIOLOGY | Facility: HOSPITAL | Age: 83
End: 2018-03-13

## 2018-03-13 LAB
ANION GAP SERPL CALCULATED.3IONS-SCNC: 18.3 MMOL/L
ANION GAP SERPL CALCULATED.3IONS-SCNC: 20.2 MMOL/L
BUN BLD-MCNC: 60 MG/DL (ref 8–23)
BUN BLD-MCNC: 69 MG/DL (ref 8–23)
BUN/CREAT SERPL: 32.8 (ref 7–25)
BUN/CREAT SERPL: 37.1 (ref 7–25)
CALCIUM SPEC-SCNC: 8.5 MG/DL (ref 8.6–10.5)
CALCIUM SPEC-SCNC: 8.9 MG/DL (ref 8.6–10.5)
CHLORIDE SERPL-SCNC: 89 MMOL/L (ref 98–107)
CHLORIDE SERPL-SCNC: 90 MMOL/L (ref 98–107)
CO2 SERPL-SCNC: 29.8 MMOL/L (ref 22–29)
CO2 SERPL-SCNC: 30.7 MMOL/L (ref 22–29)
CREAT BLD-MCNC: 1.83 MG/DL (ref 0.57–1)
CREAT BLD-MCNC: 1.86 MG/DL (ref 0.57–1)
CREAT UR-MCNC: 25.5 MG/DL
DEPRECATED RDW RBC AUTO: 45.1 FL (ref 37–54)
EOSINOPHIL SPEC QL MICRO: 0 % EOS/100 CELLS (ref 0–0)
ERYTHROCYTE [DISTWIDTH] IN BLOOD BY AUTOMATED COUNT: 13.3 % (ref 11.7–13)
GFR SERPL CREATININE-BSD FRML MDRD: 26 ML/MIN/1.73
GFR SERPL CREATININE-BSD FRML MDRD: 26 ML/MIN/1.73
GLUCOSE BLD-MCNC: 123 MG/DL (ref 65–99)
GLUCOSE BLD-MCNC: 237 MG/DL (ref 65–99)
HCT VFR BLD AUTO: 43.8 % (ref 35.6–45.5)
HGB BLD-MCNC: 14.5 G/DL (ref 11.9–15.5)
MAGNESIUM SERPL-MCNC: 2.3 MG/DL (ref 1.6–2.4)
MCH RBC QN AUTO: 31 PG (ref 26.9–32)
MCHC RBC AUTO-ENTMCNC: 33.1 G/DL (ref 32.4–36.3)
MCV RBC AUTO: 93.8 FL (ref 80.5–98.2)
OSMOLALITY UR: 384 MOSM/KG
PHOSPHATE SERPL-MCNC: 2.8 MG/DL (ref 2.5–4.5)
PLATELET # BLD AUTO: 253 10*3/MM3 (ref 140–500)
PMV BLD AUTO: 10.8 FL (ref 6–12)
POTASSIUM BLD-SCNC: 2.2 MMOL/L (ref 3.5–5.2)
POTASSIUM BLD-SCNC: 2.5 MMOL/L (ref 3.5–5.2)
RBC # BLD AUTO: 4.67 10*6/MM3 (ref 3.9–5.2)
SODIUM BLD-SCNC: 138 MMOL/L (ref 136–145)
SODIUM BLD-SCNC: 140 MMOL/L (ref 136–145)
SODIUM UR-SCNC: 79 MMOL/L
UUN 24H UR-MCNC: 443 MG/DL
WBC NRBC COR # BLD: 14.25 10*3/MM3 (ref 4.5–10.7)

## 2018-03-13 PROCEDURE — 25810000003 SODIUM CHLORIDE 0.9 % WITH KCL 20 MEQ 20-0.9 MEQ/L-% SOLUTION: Performed by: INTERNAL MEDICINE

## 2018-03-13 PROCEDURE — 84100 ASSAY OF PHOSPHORUS: CPT | Performed by: HOSPITALIST

## 2018-03-13 PROCEDURE — 84300 ASSAY OF URINE SODIUM: CPT | Performed by: HOSPITALIST

## 2018-03-13 PROCEDURE — 25010000002 POTASSIUM CHLORIDE PER 2 MEQ OF POTASSIUM: Performed by: HOSPITALIST

## 2018-03-13 PROCEDURE — 25010000003 POTASSIUM CHLORIDE 10 MEQ/100ML SOLUTION: Performed by: HOSPITALIST

## 2018-03-13 PROCEDURE — 83935 ASSAY OF URINE OSMOLALITY: CPT | Performed by: HOSPITALIST

## 2018-03-13 PROCEDURE — 80048 BASIC METABOLIC PNL TOTAL CA: CPT | Performed by: HOSPITALIST

## 2018-03-13 PROCEDURE — 74230 X-RAY XM SWLNG FUNCJ C+: CPT

## 2018-03-13 PROCEDURE — 87205 SMEAR GRAM STAIN: CPT | Performed by: HOSPITALIST

## 2018-03-13 PROCEDURE — 82570 ASSAY OF URINE CREATININE: CPT | Performed by: HOSPITALIST

## 2018-03-13 PROCEDURE — 80048 BASIC METABOLIC PNL TOTAL CA: CPT | Performed by: INTERNAL MEDICINE

## 2018-03-13 PROCEDURE — 92611 MOTION FLUOROSCOPY/SWALLOW: CPT | Performed by: SPEECH-LANGUAGE PATHOLOGIST

## 2018-03-13 PROCEDURE — 97162 PT EVAL MOD COMPLEX 30 MIN: CPT

## 2018-03-13 PROCEDURE — 85027 COMPLETE CBC AUTOMATED: CPT | Performed by: HOSPITALIST

## 2018-03-13 PROCEDURE — 84540 ASSAY OF URINE/UREA-N: CPT | Performed by: HOSPITALIST

## 2018-03-13 PROCEDURE — 92610 EVALUATE SWALLOWING FUNCTION: CPT | Performed by: SPEECH-LANGUAGE PATHOLOGIST

## 2018-03-13 PROCEDURE — 83735 ASSAY OF MAGNESIUM: CPT | Performed by: HOSPITALIST

## 2018-03-13 PROCEDURE — 97110 THERAPEUTIC EXERCISES: CPT

## 2018-03-13 PROCEDURE — 36415 COLL VENOUS BLD VENIPUNCTURE: CPT | Performed by: HOSPITALIST

## 2018-03-13 RX ORDER — POTASSIUM CHLORIDE 7.45 MG/ML
10 INJECTION INTRAVENOUS
Status: COMPLETED | OUTPATIENT
Start: 2018-03-13 | End: 2018-03-13

## 2018-03-13 RX ORDER — SODIUM CHLORIDE AND POTASSIUM CHLORIDE 150; 900 MG/100ML; MG/100ML
100 INJECTION, SOLUTION INTRAVENOUS CONTINUOUS
Status: DISCONTINUED | OUTPATIENT
Start: 2018-03-13 | End: 2018-03-13

## 2018-03-13 RX ORDER — SODIUM CHLORIDE AND POTASSIUM CHLORIDE 150; 900 MG/100ML; MG/100ML
50 INJECTION, SOLUTION INTRAVENOUS CONTINUOUS
Status: DISCONTINUED | OUTPATIENT
Start: 2018-03-13 | End: 2018-03-15

## 2018-03-13 RX ADMIN — POTASSIUM CHLORIDE AND SODIUM CHLORIDE 100 ML/HR: 900; 150 INJECTION, SOLUTION INTRAVENOUS at 17:14

## 2018-03-13 RX ADMIN — POTASSIUM CHLORIDE 10 MEQ: 2 INJECTION, SOLUTION, CONCENTRATE INTRAVENOUS at 12:38

## 2018-03-13 RX ADMIN — SODIUM CHLORIDE 100 ML/HR: 9 INJECTION, SOLUTION INTRAVENOUS at 07:42

## 2018-03-13 RX ADMIN — POTASSIUM CHLORIDE 10 MEQ: 2 INJECTION, SOLUTION, CONCENTRATE INTRAVENOUS at 10:31

## 2018-03-13 RX ADMIN — POTASSIUM CHLORIDE 10 MEQ: 2 INJECTION, SOLUTION, CONCENTRATE INTRAVENOUS at 14:07

## 2018-03-13 RX ADMIN — BARIUM SULFATE 65 ML: 960 POWDER, FOR SUSPENSION ORAL at 15:05

## 2018-03-13 RX ADMIN — BARIUM SULFATE 4 ML: 980 POWDER, FOR SUSPENSION ORAL at 15:05

## 2018-03-13 RX ADMIN — POTASSIUM CHLORIDE 10 MEQ: 2 INJECTION, SOLUTION, CONCENTRATE INTRAVENOUS at 16:18

## 2018-03-13 RX ADMIN — DONEPEZIL HYDROCHLORIDE 10 MG: 10 TABLET, FILM COATED ORAL at 22:27

## 2018-03-13 NOTE — PLAN OF CARE
Problem: Patient Care Overview  Goal: Plan of Care Review   03/13/18 7747   Coping/Psychosocial   Plan of Care Reviewed With patient   OTHER   Outcome Summary Patient is a pleasant 83 y.o. female who presents with impaired functional mobility and endurance. Admitted worsening confusion and dehydration. Patient lives at home alone, uses a SPC and RW, and no prior use of AD. Assist x 1 required for all mobility. Ambulated 10' with RW- rest breaks required as patient fatigued quickly. Patient will benefit from skilled PT services acutely to address deficits as able and improve level of independence.

## 2018-03-13 NOTE — CONSULTS
Adult Nutrition  Assessment/PES    Patient Name:  Sam Flores  YOB: 1934  MRN: [id number]  Admit Date:  3/12/2018    Assessment Date:  3/13/2018    Comments:  Nursing consult due to weight loss and poor appetite.     Pt seen & assessed - MSA completed, meets criteria for severe malnutrition based on physical exam, weight status/loss and energy intake.     Pt agreeable to anything chocolate as far as supplements, and will start these as soon as PO diet ordered. She is hungry and thirsty, and eager to start eating. VFSS pending to determine safest diet.     RD following.      Reason for Assessment    Reason For Assessment nurse/nurse practitioner consult    Diagnosis neurologic conditions;other (see comments)   dehydration    Identified At Risk by Screening Criteria difficulty chewing/swallowing           Adult Nutrition Assessment     Row Name 03/13/18 1043       Nutrition Prescription PO    Current PO Diet NPO;Other (comment)   ice chips ok'd by SLP       Nutrient/Fluid Evaluation    Additional Documentation Fluid Intake Evaluation (Group)       Fluid Intake Evaluation    IV Fluid (mL) 2400       Malnutrition Severity Assessment    Malnutrition Type Chronic Illness Malnutrition       Physical Signs of Malnutrition (Chronic)    Muscle Wasting Severe   temporal scooping, prominent clavicle and acronmion bones; depressed area between thumb & forefinger; very thin posterior calf region    Fat Loss Severe   orbital region depression, upper arm under tricep with loose skin and evaulators fingers touched when skin pinched. Deferred thoracic/lumbar eval.     Fluid Accumulation None       Weight Status (Chronic)    BMI Mild (<19)    Weight Loss Severe (>5% / 1 mo)   9% wt loss over 2 weeks    Energy Intake Severe (< or equal to 50% / > or equal to 1 mo)       Criteria Met (Must meet criteria for severity in at least 2 of these categories: M Wasting, Fat Loss, Fluid, Secondary Signs, Wt. Status, Intake)     Patient meets criteria for  Severe malnutrition    Row Name 03/13/18 1041       Physical Findings    Overall Physical Appearance generalized wasting;loss of subcutaneous fat;loss of muscle mass;underweight    Skin poor skin integrity/turgor;other (see comments)   no pressure injury       Calculation Measurements    Weight Used For Calculations 41.3 kg (91 lb)       Estimated/Assessed Needs    Additional Documentation Fluid Requirements (Group);Protein Requirements (Group);Calorie Requirements (Group)       Calorie Requirements    Estimated Calorie Need Method kcal/kg    Estimated Calorie Requirement Comment 5607-4704 kcal       KCAL/KG    14 Kcal/Kg (kcal) 577.88    15 Kcal/Kg (kcal) 619.16    18 Kcal/Kg (kcal) 742.99    20 Kcal/Kg (kcal) 825.54    25 Kcal/Kg (kcal) 1031.93    30 Kcal/Kg (kcal) 1238.31    35 Kcal/Kg (kcal) 1444.7    40 Kcal/Kg (kcal) 1651.08    45 Kcal/Kg (kcal) 1857.47    50 Kcal/Kg (kcal) 2063.85       [place]-St. Jeor Equation    RMR ([place]-St. Jeor Equation) 773.4       Protein Requirements    Est Protein Requirement Amount (gms/kg) 1.2 gm protein    Estimated Protein Requirements (gms/day) 49.53       Fluid Requirements    Estimated Fluid Requirements (mL/day) 1236    Estimated Fluid Requirement Method RDA Method    RDA Method (mL) 1236    Lit-Segar Method (over 20 kg) 2325.54    Row Name 03/13/18 1032       Labs/Procedures/Meds    Lab Results Reviewed reviewed, pertinent    Lab Results Comments BMP c/w dehydration    Row Name 03/13/18 1031       Admit Weight    Admit Weight 41.3 kg (91 lb)       Usual Body Weight (UBW)    Usual Body Weight 45.4 kg (100 lb)   pt confirms    % of Usual Body Weight Assessment 85-95% - mild deficit    Weight Loss unintentional    Weight Loss Time Frame 2 weeks (~ 10 lb)       Body Mass Index (BMI)    BMI Assessment BMI 18.5-24.9: normal    Row Name 03/13/18 1028       Nutrition/Diet History    Typical Food/Fluid Intake Not eating or drinking well with  progressive swallowing difficulty over past 2 weeks per family info. Failed nsg BSE in ER. SLP eval just done at bedside per RN and VFSS to be done later today. Has some esophageal issues.     Food Preferences Likes sweets, chocolate especially. Pt says she's so hungry, anything sounds good right now.     Supplemental Drinks/Foods/Additives Willing to drink ritu shakes - will get Ensure enlive & Mighty shakes sent up when NPO lifted    Vitamin/Mineral/Herbal Supplements none    Functional Status not able to prepare meals    Factors Affecting Nutritional Intake difficulty/impaired swallowing;impaired cognitive status/motor control;other (see comments)   pt has underlying dementia; noted that they do not want aggressive tx, including artificial nutrition    Row Name 03/13/18 1026                          Row Name 03/13/18 0541       Anthropometrics    Weight 41.7 kg (91 lb 14.4 oz)          Problem/Interventions:        Problem 1     Row Name 03/13/18 1054       Nutrition Diagnoses Problem 1    Problem 1 Malnutrition    Etiology (related to) Medical Diagnosis;Functional Diagnosis    Neurological Dementia    Functional Diagnosis Dysphagia    Signs/Symptoms (evidenced by) SLP/Swallow eval;Unintended Weight Change;BMI    BMI 18 - 18.9    Unintended Weight Change Loss    Number of Pounds Lost 10 lb    Weight loss time period 2 wk    Swallow eval status Pending    Type of SLP Evaluation VFSS                    Intervention Goal     Row Name 03/13/18 1055       Intervention Goal    General Maintain nutrition    PO Initiate feeding;Tolerate PO;PO intake (%)    PO Intake % 65 %    Weight Appropriate weight gain            Nutrition Intervention     Row Name 03/13/18 1055       Nutrition Intervention    RD/Tech Action Interview for preference;Encourage intake;Care plan reviewd;Follow Tx progress;Recommend/ordered    Recommended/Ordered Supplement            Nutrition Prescription     Row Name 03/13/18 1057       Nutrition  Prescription PO    PO Prescription Begin/change supplement    Supplement Ensure Enlive;Mighty Shake   chocolate!    Supplement Frequency 2 times a day    New PO Prescription Ordered? Yes   will send once cleared for PO diet            Education/Evaluation     Row Name 03/13/18 1056       Education    Education Will Instruct as appropriate       Monitor/Evaluation    Monitor Per protocol        Electronically signed by:  Kaycee Story RD  03/13/18 10:56 AM

## 2018-03-13 NOTE — THERAPY EVALUATION
Acute Care - Speech Language Pathology   Swallow Initial Evaluation [facility]     Patient Name: Sam Flores  : 1934  MRN: [id number]  Today's Date: 3/13/2018        Referring Physician: Noah      Admit Date: 3/12/2018    SPEECH-LANGUAGE PATHOLOGY EVALUATION - SWALLOW - completed at 1000  Subjective: The patient was seen on this date for a Clinical Swallow evaluation.  Patient was alert and cooperative.  Objective: Textures given included ice, thin liquid and puree consistency.  Assessment: Difficulties were noted with thin liquid and puree consistency.  Approximately 5 minutes following 3 bites of puree pt with wet cough, question esophageal backflow.  Observations:  throat clear and cough  SLP Findings:  Patient presents with suspected oropharyngeal dysphagia, with esophageal component.   Recommendations: Diet Textures: NPO.  Medications should be taken by alternate means. May have ice between meals after oral care, under staff or family supervision and with the recommended strategies for safe swallowing.   Recommended Strategies:  Oral care PRN.  Other Recommended Evaluations: VFSS          Visit Dx:     ICD-10-CM ICD-9-CM   1. Dehydration E86.0 276.51   2. Renal insufficiency N28.9 593.9   3. Dementia with behavioral disturbance, unspecified dementia type F03.91 294.21   4. Impaired functional mobility, balance, gait, and endurance Z74.09 V49.89     Patient Active Problem List   Diagnosis   • Lumbar compression fracture   • Osteoporosis   • Dehydration   • Acute on chronic renal failure   • Type 2 diabetes mellitus without complication   • Benign essential hypertension   • Weight loss, abnormal   • Trouble swallowing   • Hyponatremia   • Hypokalemia     Past Medical History:   Diagnosis Date   • Dementia    • Diabetes mellitus    • Disease of thyroid gland    • Emphysema of lung    • Hyperlipidemia    • Hypertension    • Lumbar compression fracture 2016   • Osteoporosis    • S/P  kyphoplasty 2016    L1 and L4, 2016     Past Surgical History:   Procedure Laterality Date   •  SECTION     • KYPHOPLASTY      T12 and L2   • KYPHOPLASTY N/A 2016    Procedure: KYPHOPLASTY LUMBAR 1 AND LUMBAR 4;  Surgeon: Triston Marinelli IV, MD;  Location: [facility] ;  Service:    • MASTECTOMY            SWALLOW EVALUATION (last 72 hours)      SLP Adult Swallow Evaluation     Row Name 18 1000                   Rehab Evaluation    Document Type evaluation  -SA        Symptoms Noted During/After Treatment none  -SA           General Information    Patient Profile Reviewed yes  -SA        Pertinent History Of Current Problem --   not eating.  dementia, GERD. pt c/o food sticking  -SA        Current Method of Nutrition NPO  -SA        Prior Level of Function-Swallowing no diet consistency restrictions;concerns regarding malnutrition  -SA        Plans/Goals Discussed with patient  -SA        Barriers to Rehab cognitive status  -SA        Patient's Goals for Discharge return to PO diet  -SA           Pain Assessment    Additional Documentation Pain Scale: Numbers Pre/Post-Treatment (Group)  -SA           Pain Scale: Numbers Pre/Post-Treatment    Pain Scale: Numbers, Pretreatment 0/10 - no pain  -SA           Oral Motor and Function    Dentition Assessment natural, present and adequate  -SA        Secretion Management WNL/WFL  -SA           Oral Musculature and Cranial Nerve Assessment    Oral Motor General Assessment generalized oral motor weakness  -SA           Clinical Impression    SLP Swallowing Diagnosis suspected pharyngeal dysfunction;other (see comments)   ? esophageal dysfxn  -SA        Functional Impact risk of aspiration/pneumonia;risk of malnutrition;risk of dehydration  -SA        Rehab Potential/Prognosis, Swallowing adequate, monitor progress closely  -SA        Criteria for Skilled Therapeutic Interventions Met demonstrates skilled criteria  -SA            Recommendations    Therapy Frequency (SLP) PRN  -        Predicted Duration Therapy Intervention (Days) until discharge  -        SLP Diet Recommendation NPO  -        Recommended Diagnostics VFSS (MBS)  -        SLP Rec. for Method of Medication Administration meds via alternate route  -        Anticipated Dischage Disposition unknown  -          User Key  (r) = Recorded By, (t) = Taken By, (c) = Cosigned By    Initials Name Effective Dates    SA Yaneli Hatfield MS CCC-SLP 03/07/18 -         EDUCATION  The patient has been educated in the following areas:   Dysphagia (Swallowing Impairment) NPO rationale.    SLP Recommendation and Plan  SLP Swallowing Diagnosis: suspected pharyngeal dysfunction, other (see comments) (? esophageal dysfxn)  SLP Diet Recommendation: NPO           Recommended Diagnostics: VFSS ([facility]  Criteria for Skilled Therapeutic Interventions Met: demonstrates skilled criteria  Anticipated Dischage Disposition: unknown  Rehab Potential/Prognosis, Swallowing: adequate, monitor progress closely  Therapy Frequency (SLP): PRN  Predicted Duration Therapy Intervention (Days): until discharge       Plan of Care Reviewed With: patient  Plan of Care Review  Plan of Care Reviewed With: patient  Outcome Summary: BSE showing s/s w/ thin.  Pt c/o 'sticking', ? esophageal problems.  VFSS today at 1430           SLP Outcome Measures (last 72 hours)      SLP Outcome Measures     Row Name 03/13/18 1000             SLP Outcome Measures    Outcome Measure Used? Adult NOMS  [facility]         FCM Scores    Swallowing FCM Score 1  -        User Key  (r) = Recorded By, (t) = Taken By, (c) = Cosigned By    Initials Name Effective Dates    SA Yaneli Hatfield MS CCC-SLP 03/07/18 -            Time Calculation:         Time Calculation- SLP     Row Name 03/13/18 1643             Time Calculation- SLP    SLP Start Time 1000  -      SLP Stop Time 1045  -      SLP Time Calculation (min) 45 min  -      SLP Received  On 03/13/18  -        User Key  (r) = Recorded By, (t) = Taken By, (c) = Cosigned By    Initials Name Provider Type    SA Yaneli Hatfield MS CCC-SLP Speech and Language Pathologist          Therapy Charges for Today     Code Description Service Date Service Provider Modifiers Qty    [id number]  ST EVAL ORAL PHARYNG SWALLOW 3 3/13/2018 Yaneli Hatfield MS CCC-SLP  1               Yaneli Hatfield MS CCC-SLP  3/13/2018

## 2018-03-13 NOTE — PROGRESS NOTES
"     LOS: 1 day     Name: Sam Flores  Age/Sex: 83 y.o. female  :  1934        PCP: Joann Prabhakar MD    Subjective   \" I Feel fine honey can I go home\"  No issues overnight rested well still NPO awaitng speech eval.    General: No Fever or Chills, Cardiac: No Chest Pain or Palpitations, Resp: No Cough or SOA, GI: No Nausea, Vomiting, or Diarrhea and Other: No bleeding      aspirin 81 mg Oral Daily   donepezil 10 mg Oral Nightly   indapamide 2.5 mg Oral QAM   levothyroxine 50 mcg Oral Daily   memantine 10 mg Oral Daily       sodium chloride 100 mL/hr Last Rate: 100 mL/hr (18 0742)       Objective   Vital Signs  Temp:  [96.8 °F (36 °C)-97.5 °F (36.4 °C)] 97.4 °F (36.3 °C)  Heart Rate:  [63-84] 64  Resp:  [16-18] 16  BP: ()/(49-71) 102/60  Body mass index is 18.56 kg/m².    Intake/Output Summary (Last 24 hours) at 18 0810  Last data filed at 18 0637   Gross per 24 hour   Intake             1534 ml   Output                0 ml   Net             1534 ml       Physical Exam   Constitutional: No distress.   Frail elderly    HENT:   Head: Normocephalic and atraumatic.   Eyes: Conjunctivae and EOM are normal.   Neck: Neck supple. No JVD present.   Cardiovascular: Normal rate, regular rhythm and normal heart sounds.    Pulmonary/Chest: Effort normal and breath sounds normal. No respiratory distress.   Abdominal: Soft. Bowel sounds are normal. She exhibits no distension.   Musculoskeletal: Normal range of motion. She exhibits no edema or deformity.   Neurological: She is alert.   Skin: Skin is warm and dry.   Psychiatric: She has a normal mood and affect. Her behavior is normal.   Nursing note and vitals reviewed.        Results Review:       I reviewed the patient's new clinical results.    Results from last 7 days  Lab Units 18  0613 18  1304   WBC 10*3/mm3 14.25* 14.84*   HEMOGLOBIN g/dL 14.5 17.2*   PLATELETS 10*3/mm3 253 293       Results from last 7 days  Lab " Units 03/13/18  0737 03/13/18  0613 03/12/18  1304   SODIUM mmol/L 140  --  132*   POTASSIUM mmol/L 2.2*  --  3.0*   CHLORIDE mmol/L 90*  --  74*   CO2 mmol/L 29.8*  --  28.6   BUN mg/dL 69*  --  87*   CREATININE mg/dL 1.86*  --  2.58*   CALCIUM mg/dL 8.5*  --  9.9   MAGNESIUM mg/dL  --  2.3  --    PHOSPHORUS mg/dL  --  2.8  --    Estimated Creatinine Clearance: 10.9 mL/min (by C-G formula based on SCr of 2.58 mg/dL (H)).  Lab Results   Component Value Date    HGBA1C 8.2 (H) 09/22/2014   No results found for: POCGLU      Assessment/Plan   Active Problems:    Dehydration    Acute on chronic renal failure    Type 2 diabetes mellitus without complication    Benign essential hypertension    Weight loss, abnormal    Trouble swallowing    Hyponatremia    Hypokalemia      PLAN  - uric acid elevated consistent with significant volume depletion, Cr improving with fluids  - FeNa 6%, don't think this is accurate would expect with significant vol depletion urine sodium would be quite low  - replace K, Na has normalized  - await slp eval, may need GI but given her frailty not sure how aggressive to be here.  Likely to need some form of imaging to evaluate      Disposition  Probably SNF at [place], but if unable to swallow safetly could be hospice vs skilled palliative at [place]      Raciel Zamora MD  [facility]  03/13/18  8:10 AM

## 2018-03-13 NOTE — PLAN OF CARE
Problem: Nutrition, Imbalanced: Inadequate Oral Intake (Adult)  Goal: Identify Related Risk Factors and Signs and Symptoms  Outcome: Ongoing (interventions implemented as appropriate)   03/13/18 1101   Nutrition, Imbalanced: Inadequate Oral Intake (Adult)   Related Risk Factors (Nutrition Imbalance, Inadequate Oral Intake) appetite decreased;chronic illness/infection;eating difficulty   Signs and Symptoms (Nutrition Imbalance, Inadequate Oral Intake: Signs and Symptoms) loss of subcutaneous fat;muscle mass/strength decreased;weakness/lethargy;weight decreased (percent weight loss, percent usual body weight, body mass index less than 18.5) (Adults)     Goal: Improved Oral Intake  Outcome: Ongoing (interventions implemented as appropriate)   03/13/18 1101   Nutrition, Imbalanced: Inadequate Oral Intake (Adult)   Improved Oral Intake making progress toward outcome     Goal: Prevent Further Weight Loss  Outcome: Ongoing (interventions implemented as appropriate)   03/13/18 1101   Nutrition, Imbalanced: Inadequate Oral Intake (Adult)   Prevent Further Weight Loss making progress toward outcome

## 2018-03-13 NOTE — MBS/VFSS/FEES
Acute Care - Speech Language Pathology   Swallow Initial Evaluation [facility]     Patient Name: Sam Flores  : 1934  MRN: [id number]  Today's Date: 3/13/2018        Referring Physician: Noah      Admit Date: 3/12/2018  SPEECH-LANGUAGE PATHOLOGY EVALUTION - VFSS  Subjective: The patient was seen on this date for a VFSS(Videofluoroscopic Swallowing Study).  Patient was alert and cooperative.    Objective: Risks/benefits were reviewed with the patient, and consent was obtained. The study was completed with SLP present and Radiologist review. The patient was seen in lateral view(s). Textures given included thin liquid, nectar thick liquid, puree consistency, mechanical soft consistency and regular consistency.  Assessment: Pt demonstrates a mild oropharyngeal dysphagia.  Premature spillage with no penetration or aspiration.  Reduced anterior hyoid movement with prominent cricopharyngeus noted.  Delayed completion of deflection of tip of epiglottis.  Trace to mild diffuse pharyngeal residue observed.  Esophageal scan showed slow clearance of esophagus with characteristics of diffuse esophageal spasms.    SLP Findings: Patient presents with mild oropharyngeal dysphagia with esophageal component  Recommendations: Diet Textures: thin liquid, mechanical soft consistency with no mixed textures food. Medications should be taken whole or crushed with thin liquids or puree.   Recommended Strategies: Upright for PO, small bites and sips, may use straw and supervision with all PO. Oral care before breakfast, after all meals and PRN.  Consider multiple small meals.  If coughing during/after meals noted, consider dedicated esophageal studies.          Visit Dx:     ICD-10-CM ICD-9-CM   1. Dehydration E86.0 276.51   2. Renal insufficiency N28.9 593.9   3. Dementia with behavioral disturbance, unspecified dementia type F03.91 294.21   4. Impaired functional mobility, balance, gait, and endurance Z74.09 V49.89      Patient Active Problem List   Diagnosis   • Lumbar compression fracture   • Osteoporosis   • Dehydration   • Acute on chronic renal failure   • Type 2 diabetes mellitus without complication   • Benign essential hypertension   • Weight loss, abnormal   • Trouble swallowing   • Hyponatremia   • Hypokalemia     Past Medical History:   Diagnosis Date   • Dementia    • Diabetes mellitus    • Disease of thyroid gland    • Emphysema of lung    • Hyperlipidemia    • Hypertension    • Lumbar compression fracture 2016   • Osteoporosis    • S/P kyphoplasty 2016    L1 and L4, 2016     Past Surgical History:   Procedure Laterality Date   •  SECTION     • KYPHOPLASTY      T12 and L2   • KYPHOPLASTY N/A 2016    Procedure: KYPHOPLASTY LUMBAR 1 AND LUMBAR 4;  Surgeon: Triston Marinelli IV, MD;  Location: [facility] ;  Service:    • MASTECTOMY            SWALLOW EVALUATION (last 72 hours)      SLP Adult Swallow Evaluation     Row Name 18 1530 18 1000                Rehab Evaluation    Document Type evaluation  -SA evaluation  -SA       Symptoms Noted During/After Treatment none  -SA none  -SA          General Information    Patient Profile Reviewed yes  -SA yes  -SA       Pertinent History Of Current Problem --   not eating, c/o food sticking  -SA --   not eating.  dementia, GERD. pt c/o food sticking  -SA       Current Method of Nutrition NPO  -SA NPO  -SA       Prior Level of Function-Swallowing  -- no diet consistency restrictions;concerns regarding malnutrition  -SA       Plans/Goals Discussed with patient  -SA patient  -SA       Barriers to Rehab cognitive status  -SA cognitive status  -SA       Patient's Goals for Discharge return to PO diet  -SA return to PO diet  -SA          Pain Assessment    Additional Documentation Pain Scale: Numbers Pre/Post-Treatment (Group)  -SA Pain Scale: Numbers Pre/Post-Treatment (Group)  -SA          Pain Scale: Numbers Pre/Post-Treatment     Pain Scale: Numbers, Pretreatment 0/10 - no pain  -SA 0/10 - no pain  -SA          Oral Motor and Function    Dentition Assessment  -- natural, present and adequate  -       Secretion Management  -- WNL/WFL  -          Oral Musculature and Cranial Nerve Assessment    Oral Motor General Assessment  -- generalized oral motor weakness  -          SLP Communication to Radiology    Summary Statement Pt demonstrates a mild oropharyngeal dysphagia.  Premature spillage with no penetration or aspiration.  Reduced anterior hyoid movement with prominent cricopharyngeus noted.  Delayed completion of deflection of tip of epiglottis.  Trace to mild diffuse pharyngeal residue observed.  Esophageal scan showed slow clearance of esophagus with characteristics of diffuse esophageal spasms.   -  --          Clinical Impression    SLP Swallowing Diagnosis  -- suspected pharyngeal dysfunction;other (see comments)   ? esophageal dysfxn  -       Functional Impact  -- risk of aspiration/pneumonia;risk of malnutrition;risk of dehydration  -       Rehab Potential/Prognosis, Swallowing  -- adequate, monitor progress closely  -       Criteria for Skilled Therapeutic Interventions Met  -- demonstrates skilled criteria  -          Recommendations    Therapy Frequency (SLP)  -- PRN  -       Predicted Duration Therapy Intervention (Days)  -- until discharge  -       SLP Diet Recommendation  -- NPO  -       Recommended Diagnostics  -- VFSS (MBS)  -       SLP Rec. for Method of Medication Administration  -- meds via alternate route  -       Anticipated Dischage Disposition  -- unknown  -         User Key  (r) = Recorded By, (t) = Taken By, (c) = Cosigned By    Initials Name Effective Dates     Yaneli Hatfield MS CCC-SLP 03/07/18 -         EDUCATION  The patient has been educated in the following areas:   Dysphagia (Swallowing Impairment) Modified Diet Instruction.    SLP Recommendation and Plan  SLP Swallowing  Diagnosis: suspected pharyngeal dysfunction, other (see comments) (? esophageal dysfxn)  SLP Diet Recommendation: NPO           Recommended Diagnostics: VFSS (MBS)  Criteria for Skilled Therapeutic Interventions Met: demonstrates skilled criteria  Anticipated Dischage Disposition: unknown  Rehab Potential/Prognosis, Swallowing: adequate, monitor progress closely  Therapy Frequency (SLP): PRN  Predicted Duration Therapy Intervention (Days): until discharge       Plan of Care Reviewed With: patient  Plan of Care Review  Plan of Care Reviewed With: patient  Outcome Summary: VFSS completed.  No penetration/aspiration.  Trace- mild residue.  Slow clearance of esophagus, ? diffuse spasms.  Rec mech soft/thin liquids when upright.           SLP Outcome Measures (last 72 hours)      SLP Outcome Measures     Row Name 03/13/18 1530 03/13/18 1000          SLP Outcome Measures    Outcome Measure Used? Adult NOMS  -SA Adult NOMS  -SA        FCM Scores    Swallowing FCM Score 4  -SA 1  -SA       User Key  (r) = Recorded By, (t) = Taken By, (c) = Cosigned By    Initials Name Effective Dates     Yaneli Hatfield MS CCC-SLP 03/07/18 -            Time Calculation:         Time Calculation- SLP     Row Name 03/13/18 1657 03/13/18 1643          Time Calculation- SLP    SLP Start Time 1445  -SA 1000  -SA     SLP Stop Time 1545  -SA 1045  -SA     SLP Time Calculation (min) 60 min  -SA 45 min  -SA     SLP Received On 03/13/18  - 03/13/18  -       User Key  (r) = Recorded By, (t) = Taken By, (c) = Cosigned By    Initials Name Provider Type     Yaneli Hatfield MS CCC-SLP Speech and Language Pathologist          Therapy Charges for Today     Code Description Service Date Service Provider Modifiers Qty    08577486814 HC ST EVAL ORAL PHARYNG SWALLOW 3 3/13/2018 Yaneli Hatfield MS CCC-SLP GN 1    63234603833 HC ST MOTION FLUORO EVAL SWALLOW 4 3/13/2018 Yaneli Hatfield MS CCC-SLP GN 1               Yaneli Hatfield MS CCC-SLP  3/13/2018

## 2018-03-13 NOTE — CONSULTS
Referring Provider: Dr. Zamora  Reason for Consultation: [place]    Subjective     Chief complaint   Chief Complaint   Patient presents with   • Altered Mental Status     FAMILY REPORTS PT IS BECOMING MORE CONFUSED, NOT EATING AND UNABLE TO CONTROL HER BLADDER; PT WITH NO COMPLAINTS AND DOES NOT KNOW WHY SHE IS HERE       History of present illness:    83-year-old woman with history of dementia who was admitted with worsening renal function and generalized weakness.  According to her and to her son she has not been eating well.  She denied vomiting or diarrhea.  She is not hungry.  He has no particular renal history in the past.  No history of stones or gross hematuria.  She is feeling still about the same.  She is being evaluated for dysphagia.  Her complaints are nonspecific.  Particularly she denied shortness of breath, chest pain and abdominal pain.  No family history of kidney disease.    Past Medical History:   Diagnosis Date   • Dementia    • Diabetes mellitus    • Disease of thyroid gland    • Emphysema of lung    • Hyperlipidemia    • Hypertension    • Lumbar compression fracture 2016   • Osteoporosis    • S/P kyphoplasty 2016    L1 and L4, 2016     Past Surgical History:   Procedure Laterality Date   •  SECTION     • KYPHOPLASTY      T12 and L2   • KYPHOPLASTY N/A 2016    Procedure: KYPHOPLASTY LUMBAR 1 AND LUMBAR 4;  Surgeon: Triston Marinelli IV, MD;  Location: [facility] OR ;  Service:    • MASTECTOMY       History reviewed. No pertinent family history.    Social History   Substance Use Topics   • Smoking status: Never Smoker   • Smokeless tobacco: Never Used   • Alcohol use No     Prescriptions Prior to Admission   Medication Sig Dispense Refill Last Dose   • aspirin 81 MG EC tablet Take 81 mg by mouth Daily.   3/12/2018 at Unknown time   • donepezil (ARICEPT) 10 MG tablet Take 10 mg by mouth Every Night.   Past Week at Unknown time   • indapamide (LOZOL) 2.5 MG  "tablet Take 2.5 mg by mouth Every Morning.   Past Week at Unknown time   • levothyroxine (SYNTHROID, LEVOTHROID) 50 MCG tablet Take 50 mcg by mouth Daily.   Past Week at Unknown time   • Linaclotide (LINZESS) 145 MCG capsule Take 145 mcg by mouth Every Morning.   Past Week at Unknown time   • memantine (NAMENDA) 10 MG tablet Take 10 mg by mouth 2 (Two) Times a Day.   Past Week at Unknown time   • oxybutynin XL (DITROPAN-XL) 5 MG 24 hr tablet Take 5 mg by mouth every night at bedtime.   Past Week at Unknown time   • potassium chloride (KLOR-CON) 20 MEQ CR tablet Take 20 mEq by mouth 3 (Three) Times a Day.   Past Week at Unknown time   • simvastatin (ZOCOR) 20 MG tablet Take 20 mg by mouth Every Night.   3/11/2018 at Unknown time   • potassium chloride (K-DUR) 10 MEQ CR tablet Take 20 mEq by mouth 3 (Three) Times a Day.   11/30/2016 at Unknown time   • SITagliptin (JANUVIA) 50 MG tablet Take 50 mg by mouth Daily.   Unknown at Unknown time     Allergies:  Penicillins    Review of Systems  Review of system is negative other than what's mentioned in the history of present illness.  I'm not sure her history is as tolerated due to her dementia.    Objective     Vital Signs  Temp:  [96.7 °F (35.9 °C)-97.5 °F (36.4 °C)] 96.7 °F (35.9 °C)  Heart Rate:  [63-69] 69  Resp:  [16] 16  BP: ()/(49-63) 116/62    Flowsheet Rows    Flowsheet Row First Filed Value   Admission Height 149.9 cm (59\") Documented at 03/12/2018 1238   Admission Weight 40.8 kg (90 lb) Documented at 03/12/2018 1305           I/O this shift:  In: -   Out: 310 [Urine:310]  I/O last 3 completed shifts:  In: 1534 [I.V.:1534]  Out: -     Intake/Output Summary (Last 24 hours) at 03/13/18 1606  Last data filed at 03/13/18 0901   Gross per 24 hour   Intake             1534 ml   Output              310 ml   Net             1224 ml       Physical Exam:  No distress.  Sitting at bedside.  Conversant and pleasant.  Oral mucosa is moist.  No JVD or lymphadenopathy.  " Having bouts of cough with white sputum production.  Chest coarse bilaterally.  No wheezing.  Heart is regular rate and rhythm.  Markedly kyphotic spine.  Abdomen is soft, nontender, nondistended.  I did not appreciate any organomegaly.  Extremities without edema or calf tenderness.  Skin is warm and dry.    Results Review:  Results for orders placed or performed during the hospital encounter of 03/12/18   Eosinophil Smear - Urine, Urine, Clean Catch   Result Value Ref Range    Eosinophil Smear 0 0 - 0 % EOS/100 Cells   Comprehensive Metabolic Panel   Result Value Ref Range    Glucose 318 (H) 65 - 99 mg/dL    BUN 87 (H) 8 - 23 mg/dL    Creatinine 2.58 (H) 0.57 - 1.00 mg/dL    Sodium 132 (L) 136 - 145 mmol/L    Potassium 3.0 (L) 3.5 - 5.2 mmol/L    Chloride 74 (L) 98 - 107 mmol/L    CO2 28.6 22.0 - 29.0 mmol/L    Calcium 9.9 8.6 - 10.5 mg/dL    Total Protein 8.2 6.0 - 8.5 g/dL    Albumin 4.10 3.50 - 5.20 g/dL    ALT (SGPT) 20 1 - 33 U/L    AST (SGOT) 27 1 - 32 U/L    Alkaline Phosphatase 97 39 - 117 U/L    Total Bilirubin 0.8 0.1 - 1.2 mg/dL    eGFR Non African Amer 18 (L) >60 mL/min/1.73    Globulin 4.1 gm/dL    A/G Ratio 1.0 g/dL    BUN/Creatinine Ratio 33.7 (H) 7.0 - 25.0    Anion Gap 29.4 mmol/L   Troponin   Result Value Ref Range    Troponin T 0.020 0.000 - 0.030 ng/mL   Urinalysis With / Culture If Indicated - Urine, Catheter   Result Value Ref Range    Color, UA Yellow Yellow, Straw    Appearance, UA Clear Clear    pH, UA 5.5 5.0 - 8.0    Specific Gravity, UA 1.015 1.005 - 1.030    Glucose, UA Negative Negative    Ketones, UA 15 mg/dL (1+) (A) Negative    Bilirubin, UA Negative Negative    Blood, UA Trace (A) Negative    Protein, UA Negative Negative    Leuk Esterase, UA Negative Negative    Nitrite, UA Negative Negative    Urobilinogen, UA 1.0 E.U./dL 0.2 - 1.0 E.U./dL   CBC Auto Differential   Result Value Ref Range    WBC 14.84 (H) 4.50 - 10.70 10*3/mm3    RBC 5.36 (H) 3.90 - 5.20 10*6/mm3    Hemoglobin  17.2 (H) 11.9 - 15.5 g/dL    Hematocrit 50.3 (H) 35.6 - 45.5 %    MCV 93.8 80.5 - 98.2 fL    MCH 32.1 (H) 26.9 - 32.0 pg    MCHC 34.2 32.4 - 36.3 g/dL    RDW 13.8 (H) 11.7 - 13.0 %    RDW-SD 46.9 37.0 - 54.0 fl    MPV 11.3 6.0 - 12.0 fL    Platelets 293 140 - 500 10*3/mm3    Neutrophil % 90.3 (H) 42.7 - 76.0 %    Lymphocyte % 5.4 (L) 19.6 - 45.3 %    Monocyte % 4.1 (L) 5.0 - 12.0 %    Eosinophil % 0.0 (L) 0.3 - 6.2 %    Basophil % 0.1 0.0 - 1.5 %    Immature Grans % 0.1 0.0 - 0.5 %    Neutrophils, Absolute 13.40 (H) 1.90 - 8.10 10*3/mm3    Lymphocytes, Absolute 0.80 (L) 0.90 - 4.80 10*3/mm3    Monocytes, Absolute 0.61 0.20 - 1.20 10*3/mm3    Eosinophils, Absolute 0.00 0.00 - 0.70 10*3/mm3    Basophils, Absolute 0.01 0.00 - 0.20 10*3/mm3    Immature Grans, Absolute 0.02 0.00 - 0.03 10*3/mm3   Urinalysis, Microscopic Only - Urine, Clean Catch   Result Value Ref Range    RBC, UA 0-2 None Seen, 0-2 /HPF    WBC, UA 0-2 None Seen, 0-2 /HPF    Bacteria, UA None Seen None Seen /HPF    Squamous Epithelial Cells, UA 0-2 None Seen, 0-2 /HPF    Hyaline Casts, UA 3-6 None Seen /LPF    Methodology Automated Microscopy    Creatinine, Urine, Random - Urine, Clean Catch   Result Value Ref Range    Creatinine, Urine 25.5 mg/dL   Sodium, Urine, Random - Urine, Clean Catch   Result Value Ref Range    Sodium, Urine 79 mmol/L   Osmolality, Urine - Urine, Clean Catch   Result Value Ref Range    Osmolality, Urine 384 mOsm/kg   Urea Nitrogen, Urine - Urine, Clean Catch   Result Value Ref Range    Urea Nitrogen, Urine 443 mg/dL   Uric Acid   Result Value Ref Range    Uric Acid 22.7 (H) 2.4 - 5.7 mg/dL   Basic Metabolic Panel   Result Value Ref Range    Glucose 123 (H) 65 - 99 mg/dL    BUN 69 (H) 8 - 23 mg/dL    Creatinine 1.86 (H) 0.57 - 1.00 mg/dL    Sodium 140 136 - 145 mmol/L    Potassium 2.2 (C) 3.5 - 5.2 mmol/L    Chloride 90 (L) 98 - 107 mmol/L    CO2 29.8 (H) 22.0 - 29.0 mmol/L    Calcium 8.5 (L) 8.6 - 10.5 mg/dL    eGFR Non African  Amer 26 (L) >60 mL/min/1.73    BUN/Creatinine Ratio 37.1 (H) 7.0 - 25.0    Anion Gap 20.2 mmol/L   CBC (No Diff)   Result Value Ref Range    WBC 14.25 (H) 4.50 - 10.70 10*3/mm3    RBC 4.67 3.90 - 5.20 10*6/mm3    Hemoglobin 14.5 11.9 - 15.5 g/dL    Hematocrit 43.8 35.6 - 45.5 %    MCV 93.8 80.5 - 98.2 fL    MCH 31.0 26.9 - 32.0 pg    MCHC 33.1 32.4 - 36.3 g/dL    RDW 13.3 (H) 11.7 - 13.0 %    RDW-SD 45.1 37.0 - 54.0 fl    MPV 10.8 6.0 - 12.0 fL    Platelets 253 140 - 500 10*3/mm3   Magnesium   Result Value Ref Range    Magnesium 2.3 1.6 - 2.4 mg/dL   Phosphorus   Result Value Ref Range    Phosphorus 2.8 2.5 - 4.5 mg/dL     Imaging Results (last 72 hours)     Procedure Component Value Units Date/Time    FL Video Swallow With Speech [[id number]] Updated:  03/13/18 1518    US Renal Bilateral [[id number]] Collected:  03/12/18 1627     Updated:  03/12/18 1631    Narrative:       US RENAL BILATERAL-     INDICATIONS: Acute kidney injury     TECHNIQUE: ULTRASOUND OF THE KIDNEYS AND URINARY BLADDER.     COMPARISON: None available           FINDINGS:     Assessment is limited by body habitus     The right kidney measures 7.3 centimeters, the left kidney measures 7.2  centimeters.     No renal lesion is identified. No hydronephrosis or echogenic  nephrolithiasis.     Left ureteral jet was observed. No right ureteral jet was observed  during the exam. The urinary bladder otherwise appears unremarkable.       Impression:       No hydronephrosis or echogenic nephrolithiasis.        This report was finalized on 3/12/2018 4:28 PM by Dr. Blaise Witt MD.       CT Head Without Contrast [[id number]] Collected:  03/12/18 1422     Updated:  03/12/18 1502    Narrative:       CT SCAN OF THE HEAD WITHOUT CONTRAST     HISTORY:  Confusion.     CT scan of the head was obtained with 3 mm axial images. No intravenous  contrast was administered.     FINDINGS:     The ventricles, sulci, and cisterns are age appropriate. There are mild  changes  of chronic small vessel ischemic phenomena. Atherosclerotic  changes are appreciated within the intracranial vasculature.     The patient is status post bilateral partial ethmoidectomies.  Additionally, the patient is status post bilateral sphenoid osteotomies.  Mucus retention cyst is identified within the right ethmoid air cells.  Mucosal thickening is identified within the left maxillary sinus. The  patient is status post bilateral medial maxillectomies.       Impression:       1.  There is no evidence for acute intracranial pathology.  2.  Changes of inflammatory paranasal sinus disease along with  postsurgical changes within the paranasal sinuses are as discussed in  detail above.     Radiation dose reduction techniques were utilized, including automated  exposure control and exposure modulation based on body size.     This report was finalized on 3/12/2018 2:59 PM by Dr. Elbert Astorga MD.       XR Chest 2 View [[id number]] Collected:  03/12/18 1322     Updated:  03/12/18 1327    Narrative:       XR CHEST 2 VW-     HISTORY: Female who is 83 years-old,  short of breath     TECHNIQUE: Frontal and lateral views of the chest     COMPARISON: 11/30/2016     FINDINGS: Heart size is normal. Aorta is tortuous, calcified. Pulmonary  vasculature is unremarkable. Small likely atelectasis or scar at the  left base. No focal pulmonary consolidation, pleural effusion, or  pneumothorax. A T6 compression fracture is a change from the prior exam,  age-indeterminate, correlate clinically (if further evaluation is  indicated, MRI could be considered). Chronic changes of the spine are  also demonstrated.       Impression:       Small likely atelectasis or scar at the left base. Tortuous  aorta. Follow-up as clinically indicated.     This report was finalized on 3/12/2018 1:24 PM by Dr. Blaise Witt MD.                 aspirin 81 mg Oral Daily   donepezil 10 mg Oral Nightly   levothyroxine 50 mcg Oral Daily   memantine 10 mg  Oral Daily       custom IV infusion builder        Assessment/Plan     Active Problems:    Dehydration    Acute on chronic renal failure    Type 2 diabetes mellitus without complication    Benign essential hypertension    Weight loss, abnormal    Trouble swallowing    Hyponatremia    Hypokalemia    - Acute kidney injury most likely secondary to volume depletion and poor by mouth intake.  Seem to be already better with IV fluid.  I went ahead and change her IV fluid to normal saline with 20 meq potassium chloride at 100 cc an hour.  We'll reevaluate in a.m.  Renal ultrasound noted.  Urinalysis with some ketones.  -Severe hypokalemia, worse with indapamide, on hold.  Potassium is being replaced.  Magnesium was okay.  BMP tonight at 5 PM recheck in a.m.   - Bronchitis, per primary.  - Poor by mouth intake and generalized weakness, per primary.  - Dysphagia.  - Dementia.  Further recommendations depend on her progress.  Discussed with her son at bedside.  Will follow.  Thank you very much.    Robin Youssef MD  03/13/18  4:06 PM

## 2018-03-13 NOTE — PLAN OF CARE
Problem: Patient Care Overview  Goal: Plan of Care Review  Outcome: Ongoing (interventions implemented as appropriate)   03/13/18 1801   Coping/Psychosocial   Plan of Care Reviewed With patient   Plan of Care Review   Progress no change   OTHER   Outcome Summary Pt is pleasantly confused. Pt had family visiting intermittently throughout the day. Diet advanced per SLP recommendations post video swallow evaluation. IV fluids adjusted and four runs of potassium given with AM potassium critical. Pt having low urine output despite IV fluids, but PVR and bladder scan readings not meeting urinary retention guidelines per the algorithm. Will continue to monitor.      Goal: Individualization and Mutuality  Outcome: Ongoing (interventions implemented as appropriate)    Goal: Discharge Needs Assessment  Outcome: Ongoing (interventions implemented as appropriate)    Goal: Interprofessional Rounds/Family Conf  Outcome: Ongoing (interventions implemented as appropriate)      Problem: Fall Risk (Adult)  Goal: Identify Related Risk Factors and Signs and Symptoms  Outcome: Outcome(s) achieved Date Met: 03/13/18    Goal: Absence of Fall  Outcome: Ongoing (interventions implemented as appropriate)      Problem: Skin Injury Risk (Adult)  Goal: Identify Related Risk Factors and Signs and Symptoms  Outcome: Outcome(s) achieved Date Met: 03/13/18    Goal: Skin Health and Integrity  Outcome: Ongoing (interventions implemented as appropriate)      Problem: Fluid Volume Deficit (Adult)  Goal: Identify Related Risk Factors and Signs and Symptoms  Outcome: Outcome(s) achieved Date Met: 03/13/18    Goal: Optimal Fluid Balance  Outcome: Ongoing (interventions implemented as appropriate)      Problem: Nutrition, Imbalanced: Inadequate Oral Intake (Adult)  Goal: Identify Related Risk Factors and Signs and Symptoms  Outcome: Outcome(s) achieved Date Met: 03/13/18    Goal: Improved Oral Intake  Outcome: Ongoing (interventions implemented as  appropriate)    Goal: Prevent Further Weight Loss  Outcome: Ongoing (interventions implemented as appropriate)

## 2018-03-13 NOTE — PROGRESS NOTES
Malnutrition Severity Assessment    Patient Name:  Sam Flores  YOB: 1934  MRN: [id number]  Admit Date:  3/12/2018    Patient meets criteria for : Severe malnutrition    Comments:  Meets criteria for severe malnutrition based on nutrition focus physical exam as detailed below, weight status (down 9% in 2 weeks) and energy intake. Family reported very little PO food/fluid over 2 weeks; there is some swallowing impairment noted which is being worked up. SLP to perform VFSS later today. Pt tells me it feels like something is stuck in the bottom of her throat or top of chest that she can't get up.     RD to follow. Full assessment in separate note.     Malnutrition Type: Chronic Illness Malnutrition     Malnutrition Type (last 8 hours)      Malnutrition Severity Assessment     Row Name 03/13/18 1043       Malnutrition Severity Assessment    Malnutrition Type Chronic Illness Malnutrition    Row Name 03/13/18 1043       Physical Signs of Malnutrition (Chronic)    Muscle Wasting Severe   temporal scooping, prominent clavicle and acronmion bones; depressed area between thumb & forefinger; very thin posterior calf region    Fat Loss Severe   orbital region depression, upper arm under tricep with loose skin and evaulators fingers touched when skin pinched. Deferred thoracic/lumbar eval.     Fluid Accumulation None    Row Name 03/13/18 1043       Weight Status (Chronic)    BMI Mild (<19)    Weight Loss Severe (>5% / 1 mo)   9% wt loss over 2 weeks    Row Name 03/13/18 1043       Energy Intake Status (Chronic)    Energy Intake Severe (< or equal to 50% / > or equal to 1 mo)    Row Name 03/13/18 1043       Criteria Met (Must meet criteria for severity in at least 2 of these categories: M Wasting, Fat Loss, Fluid, Secondary Signs, Wt. Status, Intake)    Patient meets criteria for  Severe malnutrition          Electronically signed by:  Kaycee Story RD  03/13/18 10:50 AM

## 2018-03-13 NOTE — PROGRESS NOTES
Discharge Planning Assessment  [facility]     Patient Name: Sam Flores  MRN: [id number]  Today's Date: 3/13/2018    Admit Date: 3/12/2018          Discharge Needs Assessment     Row Name 03/13/18 1728       Transition Planning    Patient/Family Anticipated Services at Transition skilled nursing    Transportation Anticipated --   the daughter states she can  her if appropriate or have her go by ambulance       Discharge Needs Assessment    Concerns to be Addressed basic needs;decision making;discharge planning;home safety    Discharge Facility/Level of Care Needs nursing facility, skilled   [facility] is the family preference    Offered/Gave Vendor List yes    Current Discharge Risk lives alone    Discharge Coordination/Progress [facility] skilled bed at discharge    Row Name 03/13/18 1720       Living Environment    Lives With alone    Current Living Arrangements home/apartment/condo   condo on main floor    Duration at Residence long time    Primary Care Provided by self   some family is over there every day at least two hours a day    Provides Primary Care For no one, unable/limited ability to care for self    Caregiving Concerns not eating or drinking started a couple weeks ago    Family Caregiver if Needed child(yecenia), adult    Family Caregiver Names all of the family    Quality of Family Relationships helpful;involved;supportive    Able to Return to Prior Arrangements yes    Living Arrangement Comments family requesting [facility] skilled rehab       Discharge Needs Assessment    Equipment Currently Used at Home walker, standard;walker, rolling;cane, quad;shower chair;commode;grab bar;glucometer    Equipment Needed After Discharge none            Discharge Plan     Row Name 03/13/18 173       Plan    Plan [facility] skilled bed by either ambulance vs. private auto    Patient/Family in Agreement with Plan yes    Plan Comments Spoke with Mariela Wukayla/daughter/POA by phone [phone] and she  states that they would like for their mother to go to [facility] for short term rehab. Reviewed the face sheet with the daughter also and made the corrections to Cesar Flores/son/POA also [phone]. Placed call to Goldie with [facility] and she will evaluate and follow. Javier RN, CCP.        Destination     Service Request Status Selected Specialties Address Phone Number Fax Number    [facility] Pending - Request Sent N/A [address] [place] [address] [phone]      Durable Medical Equipment     No service coordination in this encounter.      Dialysis/Infusion     No service coordination in this encounter.      Home Medical Care     No service coordination in this encounter.      Social Care     No service coordination in this encounter.                Demographic Summary     Row Name 03/13/18 1719       General Information    Admission Type inpatient    Arrived From home    Required Notices Provided Important Message from Medicare    Referral Source admission list    Reason for Consult discharge planning;decision making    Preferred Language English       Contact Information    Permission Granted to Share Info With     Contact Information Obtained for             Functional Status     Row Name 03/13/18 1730       Functional Status    Usual Activity Tolerance moderate   only uses a quad cane at home per daughter    Current Activity Tolerance fair       Functional Status, IADL    Medications assistive equipment and person    Meal Preparation completely dependent    Housekeeping completely dependent    Laundry completely dependent    Shopping completely dependent    IADL Comments The family assist her in her bath and bringing meals daily. The family also keeps her condo clean. MAGALY Lance       Mental Status    General Appearance WDL WDL       Mental Status Summary    Recent Changes in Mental Status/Cognitive Functioning unable  to assess            Psychosocial    No documentation.           Abuse/Neglect    No documentation.           Legal     Row Name 03/13/18 1734       Financial/Legal    Source of Income social security    Who Manages Finances if Patient Unable The patient has two POA's Mariela Tucker/daughter/POA and Cesar Flores/son/POA. Requested a copy of the POA document. MAGALY Lance RN, CCP            Substance Abuse    No documentation.           Patient Forms     Row Name 03/13/18 1734       Patient Forms    Provider Choice List Delivered    Delivered to Support person   daughter/Mariela          Eulalia Lance RN

## 2018-03-13 NOTE — PLAN OF CARE
Problem: Patient Care Overview  Goal: Plan of Care Review   03/13/18 1642   Coping/Psychosocial   Plan of Care Reviewed With patient   OTHER   Outcome Summary BSE showing s/s w/ thin. Pt c/o 'sticking', ? esophageal problems. VFSS today at 1430

## 2018-03-13 NOTE — THERAPY EVALUATION
Acute Care - Physical Therapy Initial Evaluation  [facility]     Patient Name: Sam Flores  : 1934  MRN: [id number]  Today's Date: 3/13/2018      Date of Referral to PT: 18  Referring Physician: Noah      Admit Date: 3/12/2018    Visit Dx:     ICD-10-CM ICD-9-CM   1. Dehydration E86.0 276.51   2. Renal insufficiency N28.9 593.9   3. Dementia with behavioral disturbance, unspecified dementia type F03.91 294.21   4. Impaired functional mobility, balance, gait, and endurance Z74.09 V49.89     Patient Active Problem List   Diagnosis   • Lumbar compression fracture   • Osteoporosis   • Dehydration   • Acute on chronic renal failure   • Type 2 diabetes mellitus without complication   • Benign essential hypertension   • Weight loss, abnormal   • Trouble swallowing   • Hyponatremia   • Hypokalemia     Past Medical History:   Diagnosis Date   • Dementia    • Diabetes mellitus    • Disease of thyroid gland    • Emphysema of lung    • Hyperlipidemia    • Hypertension    • Lumbar compression fracture 2016   • Osteoporosis    • S/P kyphoplasty 2016    L1 and L4, 2016     Past Surgical History:   Procedure Laterality Date   •  SECTION     • KYPHOPLASTY      T12 and L2   • KYPHOPLASTY N/A 2016    Procedure: KYPHOPLASTY LUMBAR 1 AND LUMBAR 4;  Surgeon: Triston Marinelli IV, MD;  Location: [facility] ;  Service:    • MASTECTOMY          PT ASSESSMENT (last 72 hours)      Physical Therapy Evaluation     Row Name 18 1400          PT Evaluation Time/Intention    Subjective Information complains of;weakness;fatigue  -MA     Document Type evaluation  -MA     Mode of Treatment physical therapy  -MA     Patient Effort adequate  -MA     Symptoms Noted During/After Treatment fatigue  -MA     Row Name 18 1400          General Information    Patient Profile Reviewed? yes  -MA     Referring Physician Noah  -MA     Patient Observations alert;agree to therapy  -MA      General Observations of Patient Supine in bed with HOB elevated, exit alarm, no acute distress noted at rest  -MA     Prior Level of Function independent:;all household mobility  -MA     Equipment Currently Used at Home --   Patient unsure if she uses a SPC or RW  -MA     Pertinent History of Current Functional Problem Admission for worsening confusion and dehydration  -MA     Existing Precautions/Restrictions fall  -MA     Limitations/Impairments safety/cognitive  -MA     Risks Reviewed patient:  -MA     Benefits Reviewed patient:  -MA     Barriers to Rehab cognitive status;previous functional deficit  -MA     Row Name 03/13/18 1400          Cognitive Assessment/Intervention- PT/OT    Orientation Status (Cognition) oriented to;person;place   dementia  -MA     Follows Commands (Cognition) follows one step commands;50-74% accuracy;delayed response/completion;increased processing time needed;initiation impaired;repetition of directions required;physical/tactile prompts required  -MA     Safety Deficit (Cognitive) at risk behavior observed;awareness of need for assistance  -MA     Personal Safety Interventions fall prevention program maintained;gait belt;nonskid shoes/slippers when out of bed  -MA     Row Name 03/13/18 1400          Safety Issues, Functional Mobility    Safety Issues Affecting Function (Mobility) at risk behavior observed;awareness of need for assistance  -MA     Impairments Affecting Function (Mobility) strength;endurance/activity tolerance;balance  -MA     Row Name 03/13/18 1400          Bed Mobility Assessment/Treatment    Bed Mobility Assessment/Treatment supine-sit;sit-supine  -MA     Supine-Sit [place] (Bed Mobility) minimum assist (75% patient effort)  -MA     Sit-Supine [place] (Bed Mobility) minimum assist (75% patient effort)  -MA     Bed Mobility, Safety Issues decreased use of arms for pushing/pulling;decreased use of legs for bridging/pushing;impaired trunk control for bed  mobility  -MA     Assistive Device (Bed Mobility) bed rails;head of bed elevated  -MA     Row Name 03/13/18 1400          Transfer Assessment/Treatment    Transfer Assessment/Treatment sit-stand transfer;stand-sit transfer  -MA     Comment (Transfers) Cues for hand placement  -MA     Sit-Stand [place] (Transfers) contact guard;minimum assist (75% patient effort)  -MA     Stand-Sit [place] (Transfers) contact guard  -MA     Row Name 03/13/18 1400          Sit-Stand Transfer    Assistive Device (Sit-Stand Transfers) walker, front-wheeled  -MA     Row Name 03/13/18 1400          Stand-Sit Transfer    Assistive Device (Stand-Sit Transfers) walker, front-wheeled  -MA     Row Name 03/13/18 1400          Gait/Stairs Assessment/Training    [place] Level (Gait) minimum assist (75% patient effort)  -MA     Assistive Device (Gait) walker, front-wheeled  -MA     Distance in Feet (Gait) 10   3 trials  -MA     Pattern (Gait) swing-to  -MA     Deviations/Abnormal Patterns (Gait) base of support, narrow;gait speed decreased;olvin decreased  -MA     Comment (Gait/Stairs) Distance limited 2' fatigue- 3 rest breaks required during ambulation, fatigues quickly  -MA     Row Name 03/13/18 1400          General ROM    GENERAL ROM COMMENTS B LE WFL  -MA     Row Name 03/13/18 1400          General Assessment (Manual Muscle Testing)    Comment, General Manual Muscle Testing (MMT) Assessment B LE MMT 4-/5 grossly  -MA     Row Name 03/13/18 1400          Therapeutic Exercise    Therapeutic Exercise seated, lower extremities  -MA     Row Name 03/13/18 1400          Lower Extremity Seated Therapeutic Exercise    Performed, Seated Lower Extremity (Therapeutic Exercise) LAQ (long arc quad), knee extension;hip flexion/extension  -MA     Sets/Reps Detail, Seated Lower Extremity (Therapeutic Exercise) 10  -MA     Row Name 03/13/18 1400          Balance    Balance static standing balance;static sitting balance  -MA     Row Name  03/13/18 1400          Static Sitting Balance    Level of [place] (Unsupported Sitting, Static Balance) contact guard assist  -MA     Sitting Position (Unsupported Sitting, Static Balance) sitting on edge of bed  -MA     Level of [place] (Supported Sitting, Static Balance) contact guard assist  -MA     Sitting Position (Supported Sitting, Static Balance) sitting on edge of bed  -MA     Row Name 03/13/18 1400          Static Standing Balance    Level of [place] (Supported Standing, Static Balance) minimal assist, 75% patient effort  -MA     Time Able to Maintain Position (Supported Standing, Static Balance) 30 to 45 seconds  -MA     Row Name 03/13/18 1400          Vision Assessment/Intervention    Visual Impairment/Limitations WFL  -MA     Row Name 03/13/18 1400          Pain Assessment    Additional Documentation --   denies  -MA     Row Name 03/13/18 1400          Plan of Care Review    Plan of Care Reviewed With parent  -MA     Row Name 03/13/18 1400          Physical Therapy Clinical Impression    Date of Referral to PT 03/13/18  -MA     PT Diagnosis (PT Clinical Impression) impaired funct mobility  -MA     Criteria for Skilled Interventions Met (PT Clinical Impression) yes;treatment indicated  -MA     Pathology/Pathophysiology Noted (Describe Specifically for Each System) musculoskeletal  -MA     Impairments Found (describe specific impairments) aerobic capacity/endurance;ergonomics and body mechanics;gait, locomotion, and balance  -MA     Rehab Potential (PT Clinical Summary) fair, will monitor progress closely  -MA     Care Plan Review (PT) patient/other agree to care plan  -MA     Row Name 03/13/18 1400          Vital Signs    O2 Delivery Pre Treatment room air  -MA     O2 Delivery Post Treatment room air  -MA     Row Name 03/13/18 1400          Physical Therapy Goals    Bed Mobility Goal Selection (PT) bed mobility, PT goal 1  -MA     Transfer Goal Selection (PT) transfer, PT goal 1  -MA      Gait Training Goal Selection (PT) gait training, PT goal 1  -MA     Row Name 03/13/18 1400          Bed Mobility Goal 1 (PT)    Activity/Assistive Device (Bed Mobility Goal 1, PT) bed mobility activities, all  -MA     [place] Level/Cues Needed (Bed Mobility Goal 1, PT) independent  -MA     Time Frame (Bed Mobility Goal 1, PT) 1 week  -MA     Row Name 03/13/18 1400          Transfer Goal 1 (PT)    Activity/Assistive Device (Transfer Goal 1, PT) transfers, all  -MA     [place] Level/Cues Needed (Transfer Goal 1, PT) supervision required  -MA     Time Frame (Transfer Goal 1, PT) 1 week  -MA     Row Name 03/13/18 1400          Gait Training Goal 1 (PT)    [place] Level (Gait Training Goal 1, PT) supervision required  -MA     Distance (Gait Goal 1, PT) 150  -MA     Time Frame (Gait Training Goal 1, PT) 1 week  -MA     Row Name 03/13/18 1400          Positioning and Restraints    Pre-Treatment Position in bed  -MA     Post Treatment Position other   with transporter  -MA       User Key  (r) = Recorded By, (t) = Taken By, (c) = Cosigned By    Initials Name Provider Type    MARBELLA Stout, PT Physical Therapist          Physical Therapy Education     Title: PT OT SLP Therapies (Active)     Topic: Physical Therapy (Active)     Point: Mobility training (Active)    Learning Progress Summary     Learner Status Readiness Method Response Comment Documented by    Patient Active Acceptance E NR  MA 03/13/18 1441          Point: Body mechanics (Active)    Learning Progress Summary     Learner Status Readiness Method Response Comment Documented by    Patient Active Acceptance E NR  MA 03/13/18 1441          Point: Precautions (Active)    Learning Progress Summary     Learner Status Readiness Method Response Comment Documented by    Patient Active Acceptance E NR  MA 03/13/18 1441                      User Key     Initials Effective Dates Name Provider Type [facility] 03/07/18 -  Angelica Stout, PT  Physical Therapist PT                PT Recommendation and Plan  Planned Therapy Interventions (PT Eval): balance training, bed mobility training, gait training, home exercise program, patient/family education, postural re-education, strengthening, transfer training  Therapy Frequency (PT Clinical Impression): daily  Plan of Care Reviewed With: patient  Outcome Summary: Patient is a pleasant 83 y.o. female who presents with impaired functional mobility and endurance. Admitted worsening confusion and dehydration. Patient lives at home alone, uses a SPC and RW, and no prior use of AD. Assist x 1 required for all mobility. Ambulated 10' with RW- rest breaks required as patient fatigued quickly. Patient will benefit from skilled PT services acutely to address deficits as able and improve level of independence.  Plan of Care Reviewed With: patient          Outcome Measures     Row Name 03/13/18 1400             How much help from another person do you currently need...    Turning from your back to your side while in flat bed without using bedrails? 3  -MA      Moving from lying on back to sitting on the side of a flat bed without bedrails? 3  -MA      Moving to and from a bed to a chair (including a wheelchair)? 3  -MA      Standing up from a chair using your arms (e.g., wheelchair, bedside chair)? 3  -MA      Climbing 3-5 steps with a railing? 2  -MA      To walk in hospital room? 2  -MA      AM-PAC 6 Clicks Score 16  -MA         Functional Assessment    Outcome Measure Options AM-PAC 6 Clicks Basic Mobility (PT)  -MA        User Key  (r) = Recorded By, (t) = Taken By, (c) = Cosigned By    Initials Name Provider Type    MARBELLA Stout, PT Physical Therapist           Time Calculation:         PT Charges     Row Name 03/13/18 1426             Time Calculation    Start Time 1410  -MA      Stop Time 1426  -MA      Time Calculation (min) 16 min  -MA      PT Received On 03/13/18  -MA      PT - Next Appointment  03/14/18  -MA      PT Goal Re-Cert Due Date 03/20/18  -MA        User Key  (r) = Recorded By, (t) = Taken By, (c) = Cosigned By    Initials Name Provider Type    MARBELLA Stout PT Physical Therapist          Therapy Charges for Today     Code Description Service Date Service Provider Modifiers Qty    89702898032 HC PT EVAL MOD COMPLEXITY 2 3/13/2018 Angelica Stout, PT GP 1    66653016165 HC PT THER PROC EA 15 MIN 3/13/2018 Angelica Stout, PT GP 1          PT G-Codes  Outcome Measure Options: AM-PAC 6 Clicks Basic Mobility (PT)      Angelica Stout, PT  3/13/2018

## 2018-03-13 NOTE — PLAN OF CARE
Problem: Patient Care Overview  Goal: Plan of Care Review   03/13/18 4111   Coping/Psychosocial   Plan of Care Reviewed With patient   OTHER   Outcome Summary VFSS completed. No penetration/aspiration. Trace- mild residue. Slow clearance of esophagus, ? diffuse spasms. Rec mech soft/thin liquids when upright.

## 2018-03-13 NOTE — PLAN OF CARE
Problem: Patient Care Overview  Goal: Plan of Care Review  Outcome: Ongoing (interventions implemented as appropriate)   03/12/18 1859 03/12/18 2105 03/13/18 0553   Coping/Psychosocial   Plan of Care Reviewed With --  patient;family --    Plan of Care Review   Progress no change --  --    OTHER   Outcome Summary --  --  Patient is up with assist x2. Patient is pleasantly confused. Patient is NPO until speech sees her. No complaints. Patient was incontinent of stools. Hold K+ replacement d/t renal function levels for now. Will continue to monitor vital signs, labs and comfort.       Problem: Skin Injury Risk (Adult)  Goal: Identify Related Risk Factors and Signs and Symptoms  Outcome: Ongoing (interventions implemented as appropriate)    Goal: Skin Health and Integrity  Outcome: Ongoing (interventions implemented as appropriate)      Problem: Fluid Volume Deficit (Adult)  Goal: Identify Related Risk Factors and Signs and Symptoms  Outcome: Ongoing (interventions implemented as appropriate)    Goal: Optimal Fluid Balance  Outcome: Ongoing (interventions implemented as appropriate)

## 2018-03-13 NOTE — DISCHARGE PLACEMENT REQUEST
"Lisa Flores (83 y.o. Female)     Date of Birth Social Security Number Address Home Phone MRN    1934  [address] [address] [phone] [id number]    Jewish Marital Status          None        Admission Date Admission Type Admitting Provider Attending Provider Department, Room/Bed    3/12/18 Emergency Raciel Zamora MD Richards, Stephen J, MD [address], P491/1    Discharge Date Discharge Disposition Discharge Destination                       Attending Provider:  Raciel Zamora MD    Allergies:  Penicillins    Isolation:  None   Infection:  None   Code Status:  Conditional    Ht:  149.9 cm (59\")   Wt:  41.7 kg (91 lb 14.4 oz)    Admission Cmt:  None   Principal Problem:  None                Active Insurance as of 3/12/2018     Primary Coverage     Payor Plan Insurance Group Employer/Plan Group    MEDICARE MEDICARE A & B      Payor Plan Address Payor Plan Phone Number Effective From Effective To    [address] [phone] 10/1/1999     [place] [address]       Subscriber Name Subscriber Birth Date Member ID       LISA FLORES 1934 [id number]           Secondary Coverage     Payor Plan Insurance Group Employer/Plan Group    ANTHEM BLUE CROSS ANTHEM BLUE CROSS BLUE SHIELD [facility] [id number]     Payor Plan Address Payor Plan Phone Number Effective From Effective To    [address] [phone] 10/1/2015     [place] [address]       Subscriber Name Subscriber Birth Date Member ID       LISA FLORES 1934 [id number]                 Emergency Contacts      (Rel.) Home Phone Work Phone Mobile Phone    Mariela Tucker (Daughter) [phone] -- [phone]    MarkTrevon [phone] -- [phone]    MarkCesar dick [phone] -- [phone]              "

## 2018-03-14 ENCOUNTER — APPOINTMENT (OUTPATIENT)
Dept: GENERAL RADIOLOGY | Facility: HOSPITAL | Age: 83
End: 2018-03-14
Attending: HOSPITALIST

## 2018-03-14 LAB
ALBUMIN SERPL-MCNC: 2.3 G/DL (ref 3.5–5.2)
ANION GAP SERPL CALCULATED.3IONS-SCNC: 12.4 MMOL/L
BASOPHILS # BLD AUTO: 0.01 10*3/MM3 (ref 0–0.2)
BASOPHILS NFR BLD AUTO: 0.1 % (ref 0–1.5)
BUN BLD-MCNC: 42 MG/DL (ref 8–23)
BUN/CREAT SERPL: 30.2 (ref 7–25)
CALCIUM SPEC-SCNC: 7.6 MG/DL (ref 8.6–10.5)
CHLORIDE SERPL-SCNC: 94 MMOL/L (ref 98–107)
CO2 SERPL-SCNC: 27.6 MMOL/L (ref 22–29)
CREAT BLD-MCNC: 1.39 MG/DL (ref 0.57–1)
DEPRECATED RDW RBC AUTO: 45.4 FL (ref 37–54)
EOSINOPHIL # BLD AUTO: 0.07 10*3/MM3 (ref 0–0.7)
EOSINOPHIL NFR BLD AUTO: 0.7 % (ref 0.3–6.2)
ERYTHROCYTE [DISTWIDTH] IN BLOOD BY AUTOMATED COUNT: 13.4 % (ref 11.7–13)
GFR SERPL CREATININE-BSD FRML MDRD: 36 ML/MIN/1.73
GLUCOSE BLD-MCNC: 131 MG/DL (ref 65–99)
HCT VFR BLD AUTO: 36.1 % (ref 35.6–45.5)
HGB BLD-MCNC: 12.1 G/DL (ref 11.9–15.5)
IMM GRANULOCYTES # BLD: 0.02 10*3/MM3 (ref 0–0.03)
IMM GRANULOCYTES NFR BLD: 0.2 % (ref 0–0.5)
LYMPHOCYTES # BLD AUTO: 1.71 10*3/MM3 (ref 0.9–4.8)
LYMPHOCYTES NFR BLD AUTO: 18 % (ref 19.6–45.3)
MAGNESIUM SERPL-MCNC: 1.8 MG/DL (ref 1.6–2.4)
MCH RBC QN AUTO: 31.3 PG (ref 26.9–32)
MCHC RBC AUTO-ENTMCNC: 33.5 G/DL (ref 32.4–36.3)
MCV RBC AUTO: 93.3 FL (ref 80.5–98.2)
MONOCYTES # BLD AUTO: 0.83 10*3/MM3 (ref 0.2–1.2)
MONOCYTES NFR BLD AUTO: 8.7 % (ref 5–12)
NEUTROPHILS # BLD AUTO: 6.87 10*3/MM3 (ref 1.9–8.1)
NEUTROPHILS NFR BLD AUTO: 72.3 % (ref 42.7–76)
PHOSPHATE SERPL-MCNC: 1.9 MG/DL (ref 2.5–4.5)
PLATELET # BLD AUTO: 189 10*3/MM3 (ref 140–500)
PMV BLD AUTO: 10.7 FL (ref 6–12)
POTASSIUM BLD-SCNC: 2.1 MMOL/L (ref 3.5–5.2)
POTASSIUM BLD-SCNC: 3.8 MMOL/L (ref 3.5–5.2)
RBC # BLD AUTO: 3.87 10*6/MM3 (ref 3.9–5.2)
SODIUM BLD-SCNC: 134 MMOL/L (ref 136–145)
WBC NRBC COR # BLD: 9.51 10*3/MM3 (ref 4.5–10.7)

## 2018-03-14 PROCEDURE — 84132 ASSAY OF SERUM POTASSIUM: CPT | Performed by: HOSPITALIST

## 2018-03-14 PROCEDURE — 71045 X-RAY EXAM CHEST 1 VIEW: CPT

## 2018-03-14 PROCEDURE — 97110 THERAPEUTIC EXERCISES: CPT

## 2018-03-14 PROCEDURE — 25810000003 SODIUM CHLORIDE 0.9 % WITH KCL 20 MEQ 20-0.9 MEQ/L-% SOLUTION: Performed by: INTERNAL MEDICINE

## 2018-03-14 PROCEDURE — 80069 RENAL FUNCTION PANEL: CPT | Performed by: INTERNAL MEDICINE

## 2018-03-14 PROCEDURE — 85025 COMPLETE CBC W/AUTO DIFF WBC: CPT | Performed by: HOSPITALIST

## 2018-03-14 PROCEDURE — 83735 ASSAY OF MAGNESIUM: CPT | Performed by: INTERNAL MEDICINE

## 2018-03-14 RX ORDER — POTASSIUM CHLORIDE 1.5 G/1.77G
40 POWDER, FOR SOLUTION ORAL AS NEEDED
Status: DISCONTINUED | OUTPATIENT
Start: 2018-03-14 | End: 2018-03-18

## 2018-03-14 RX ORDER — POTASSIUM CHLORIDE 7.45 MG/ML
10 INJECTION INTRAVENOUS
Status: DISCONTINUED | OUTPATIENT
Start: 2018-03-14 | End: 2018-03-14

## 2018-03-14 RX ORDER — POTASSIUM CHLORIDE 750 MG/1
40 CAPSULE, EXTENDED RELEASE ORAL AS NEEDED
Status: DISCONTINUED | OUTPATIENT
Start: 2018-03-14 | End: 2018-03-18

## 2018-03-14 RX ORDER — POTASSIUM CHLORIDE 7.45 MG/ML
10 INJECTION INTRAVENOUS
Status: DISCONTINUED | OUTPATIENT
Start: 2018-03-14 | End: 2018-03-18

## 2018-03-14 RX ADMIN — POTASSIUM CHLORIDE 40 MEQ: 750 CAPSULE, EXTENDED RELEASE ORAL at 16:00

## 2018-03-14 RX ADMIN — POTASSIUM CHLORIDE 40 MEQ: 750 CAPSULE, EXTENDED RELEASE ORAL at 18:48

## 2018-03-14 RX ADMIN — ASPIRIN 81 MG: 81 TABLET ORAL at 10:30

## 2018-03-14 RX ADMIN — DONEPEZIL HYDROCHLORIDE 10 MG: 10 TABLET, FILM COATED ORAL at 20:18

## 2018-03-14 RX ADMIN — POTASSIUM CHLORIDE 40 MEQ: 750 CAPSULE, EXTENDED RELEASE ORAL at 11:01

## 2018-03-14 RX ADMIN — LEVOTHYROXINE SODIUM 50 MCG: 50 TABLET ORAL at 10:29

## 2018-03-14 RX ADMIN — POTASSIUM CHLORIDE AND SODIUM CHLORIDE 100 ML/HR: 900; 150 INJECTION, SOLUTION INTRAVENOUS at 03:59

## 2018-03-14 RX ADMIN — MEMANTINE HYDROCHLORIDE 10 MG: 10 TABLET, FILM COATED ORAL at 10:29

## 2018-03-14 NOTE — PROGRESS NOTES
Discharge Planning Assessment  [facility]     Patient Name: Sam Flores  MRN: [id number]  Today's Date: 3/14/2018    Admit Date: 3/12/2018          Discharge Needs Assessment    No documentation.             Discharge Plan     Row Name 03/14/18 1611       Plan    Plan Comments Will need to call Trevon Flores if discharged on Friday and she does not qualify for an ambulance. MAGALY Lance RN, CCP.     Row Name 03/14/18 141       Plan    Plan Comments Per Goldie with [facility] they can accept on Friday to a skilled bed. MAGALY Lance RN, CCP        Destination - Selection Complete     Service Request Status Selected Specialties Address Phone Number Fax Number    [facility] Selected Skilled Nursing Facility [address] [address] [phone]      Durable Medical Equipment     No service coordination in this encounter.      Dialysis/Infusion     No service coordination in this encounter.      Home Medical Care     No service coordination in this encounter.      Social Care     No service coordination in this encounter.                Demographic Summary    No documentation.           Functional Status    No documentation.           Psychosocial    No documentation.           Abuse/Neglect    No documentation.           Legal    No documentation.           Substance Abuse    No documentation.           Patient Forms    No documentation.         Eulalia Lance RN

## 2018-03-14 NOTE — PROGRESS NOTES
Adult Nutrition  Assessment/PES    Patient Name:  Sam Flores  YOB: 1934  MRN: [id number]  Admit Date:  3/12/2018    Assessment Date:  3/14/2018    Comments:  Initiated supplements pt agreed to try on 3/13 now that diet advanced. Still not eating much but fluid intake improving. Continue to monitor.           Adult Nutrition Assessment     Row Name 03/14/18 1100       Reason for Assessment    Reason For Assessment follow-up protocol       Nutrition/Diet History    Typical Food/Fluid Intake per graphics, didn't eat much of the solids off her meal trays last 2 meals    Supplemental Drinks/Foods/Additives will start chocolate flavored supplements per pt request/agreement now that diet initiated       Diagnostic Tests/Procedures    Diagnostic Test/Procedure Reviewed reviewed, pertinent    Diagnostic Test/Procedures Comments VFSS done 3/13 after my consult - diet upgraded, tolerating/eating well       Physical Findings    Overall Physical Appearance generalized wasting;loss of muscle mass;loss of subcutaneous fat    Skin poor skin integrity/turgor       Nutrition Prescription PO    Current PO Diet Soft Texture    Texture Ground    Common Modifiers Cardiac;Consistent Carbohydrate       PO Evaluation    % PO Intake --   good fluid intake noted but not eating much solid food          Problem/Interventions:  malnutrition          Intervention Goal     Row Name 03/14/18 1132       Intervention Goal    General Maintain nutrition    PO Tolerate PO;Increase intake;PO intake (%)    PO Intake % 65 %    Weight Appropriate weight gain              Nutrition Prescription     Row Name 03/14/18 1132       Nutrition Prescription PO    PO Prescription Begin/change supplement    Supplement Ensure Enlive;Mighty Shake    Supplement Frequency 2 times a day    New PO Prescription Ordered? Yes            Education/Evaluation     Row Name 03/14/18 1132       Monitor/Evaluation    Monitor Per protocol        Electronically  signed by:  Kaycee Story RD  03/14/18 11:32 AM

## 2018-03-14 NOTE — PROGRESS NOTES
"DAILY PROGRESS NOTE  [facility]    Patient Identification:  Name: Sam Flores  Age: 83 y.o.  Sex: female  :  1934  MRN: [id number]         Primary Care Physician: Joann Prabhakar MD    Subjective:  Interval History:She feels better today.    Objective:    Scheduled Meds:  aspirin 81 mg Oral Daily   donepezil 10 mg Oral Nightly   levothyroxine 50 mcg Oral Daily   memantine 10 mg Oral Daily     Continuous Infusions:  sodium chloride 0.9 % with KCl 20 mEq 100 mL/hr Last Rate: Stopped (18 0400)       Vital signs in last 24 hours:  Temp:  [96.6 °F (35.9 °C)-98.6 °F (37 °C)] 96.6 °F (35.9 °C)  Heart Rate:  [56-73] 56  Resp:  [14-18] 14  BP: ()/(41-76) 100/76    Intake/Output:    Intake/Output Summary (Last 24 hours) at 18 1136  Last data filed at 18 1100   Gross per 24 hour   Intake           4298.6 ml   Output                0 ml   Net           4298.6 ml       Exam:  /76 (BP Location: Right arm, Patient Position: Sitting)   Pulse 56   Temp 96.6 °F (35.9 °C) (Oral)   Resp 14   Ht 149.9 cm (59\")   Wt 41.7 kg (91 lb 14.4 oz)   SpO2 95%   BMI 18.56 kg/m²     General Appearance:    Alert, cooperative, no distress   Head:    Normocephalic, without obvious abnormality, atraumatic   Eyes:       Throat:   Lips, tongue, gums normal   Neck:   Supple, symmetrical, trachea midline, no JVD   Lungs:     rhonchi bilaterally, respirations unlabored   Chest Wall:    No tenderness or deformity    Heart:    Regular rate and rhythm, S1 and S2 normal, no murmur,no  Rub or gallop   Abdomen:     Soft, non-tender, bowel sounds active, no masses, no organomegaly    Extremities:   Extremities normal, atraumatic, no cyanosis or edema   Pulses:      Skin:   Skin is warm and dry,  no rashes or palpable lesions   Neurologic:   no focal deficits noted      [unfilled]  Data Review:    Results from last 7 days  Lab Units 18  0552 18  1753 18  0737   SODIUM " mmol/L 134* 138 140   POTASSIUM mmol/L 2.1* 2.5* 2.2*   CHLORIDE mmol/L 94* 89* 90*   CO2 mmol/L 27.6 30.7* 29.8*   BUN mg/dL 42* 60* 69*   CREATININE mg/dL 1.39* 1.83* 1.86*   GLUCOSE mg/dL 131* 237* 123*   CALCIUM mg/dL 7.6* 8.9 8.5*       Results from last 7 days  Lab Units 03/14/18  0552 03/13/18  0613 03/12/18  1304   WBC 10*3/mm3 9.51 14.25* 14.84*   HEMOGLOBIN g/dL 12.1 14.5 17.2*   HEMATOCRIT % 36.1 43.8 50.3*   PLATELETS 10*3/mm3 189 253 293               Lab Results  Lab Value Date/Time   TROPONINT 0.020 03/12/2018 1304   TROPONINT <0.010 11/30/2016 1946           Results from last 7 days  Lab Units 03/12/18  1304   ALK PHOS U/L 97   BILIRUBIN mg/dL 0.8   ALT (SGPT) U/L 20   AST (SGOT) U/L 27             No results found for: POCGLU        Patient Active Problem List   Diagnosis Code   • Lumbar compression fracture S32.000A   • Osteoporosis M81.0   • Dehydration E86.0   • Acute on chronic renal failure N17.9, N18.9   • Type 2 diabetes mellitus without complication E11.9   • Benign essential hypertension I10   • Weight loss, abnormal R63.4   • Trouble swallowing R13.10   • Hyponatremia E87.1   • Hypokalemia E87.6       Assessment:  Active Hospital Problems (** Indicates Principal Problem)    Diagnosis Date Noted   • Dehydration [E86.0] 03/12/2018   • Acute on chronic renal failure [N17.9, N18.9] 03/12/2018   • Type 2 diabetes mellitus without complication [E11.9] 03/12/2018   • Benign essential hypertension [I10] 03/12/2018   • Weight loss, abnormal [R63.4] 03/12/2018   • Trouble swallowing [R13.10] 03/12/2018   • Hyponatremia [E87.1] 03/12/2018   • Hypokalemia [E87.6] 03/12/2018      Resolved Hospital Problems    Diagnosis Date Noted Date Resolved   No resolved problems to display.       Plan:  Continue IVF, replace K.  Dysphagia diet. ??? Vestal poor. Repeat CXR.    Kp Sky MD  3/14/2018  11:36 AM

## 2018-03-14 NOTE — PROGRESS NOTES
Discharge Planning Assessment  [facility]     Patient Name: Sam Flores  MRN: [id number]  Today's Date: 3/14/2018    Admit Date: 3/12/2018          Discharge Needs Assessment    No documentation.             Discharge Plan     Row Name 03/14/18 1411       Plan    Plan Comments Per Goldie with [facility] they can accept on Friday to a skilled bed. MAGALY Lance RN, CCP        Destination - Selection Complete     Service Request Status Selected Specialties Address Phone Number Fax Number    [facility] Selected Skilled Nursing Facility [address] [address] [phone]      Durable Medical Equipment     No service coordination in this encounter.      Dialysis/Infusion     No service coordination in this encounter.      Home Medical Care     No service coordination in this encounter.      Social Care     No service coordination in this encounter.                Demographic Summary    No documentation.           Functional Status    No documentation.           Psychosocial    No documentation.           Abuse/Neglect    No documentation.           Legal    No documentation.           Substance Abuse    No documentation.           Patient Forms    No documentation.         Eulalia Lance RN

## 2018-03-14 NOTE — PLAN OF CARE
Problem: Nutrition, Imbalanced: Inadequate Oral Intake (Adult)  Intervention: Promote/Optimize Nutrition   03/14/18 1133   Nutrition Interventions   Oral Nutrition Promotion calorie dense liquids provided

## 2018-03-14 NOTE — PROGRESS NOTES
"   LOS: 2 days    Patient Care Team:  Joann Prabhakar MD as PCP - General (Family Medicine)    Chief Complaint:    Chief Complaint   Patient presents with   • Altered Mental Status     FAMILY REPORTS PT IS BECOMING MORE CONFUSED, NOT EATING AND UNABLE TO CONTROL HER BLADDER; PT WITH NO COMPLAINTS AND DOES NOT KNOW WHY SHE IS HERE     Follow UP Matthew  Subjective     Interval History:     Follow up Matthew. Confused.  Eating better.  Had mashed potatoes, meat, ensure. Congested cough.     Review of Systems:   Not reliable.     Objective     Vital Signs  Temp:  [96.6 °F (35.9 °C)-98.6 °F (37 °C)] 96.6 °F (35.9 °C)  Heart Rate:  [56-73] 56  Resp:  [14-18] 14  BP: ()/(41-76) 100/76    Flowsheet Rows    Flowsheet Row First Filed Value   Admission Height 149.9 cm (59\") Documented at 03/12/2018 1238   Admission Weight 40.8 kg (90 lb) Documented at 03/12/2018 1305          I/O this shift:  In: 360 [P.O.:360]  Out: -   I/O last 3 completed shifts:  In: 5472.6 [P.O.:2020; I.V.:3452.6]  Out: 310 [Urine:310]    Intake/Output Summary (Last 24 hours) at 03/14/18 1342  Last data filed at 03/14/18 1100   Gross per 24 hour   Intake           4298.6 ml   Output                0 ml   Net           4298.6 ml       Physical Exam:  Elderly Frail WF.  Sitting in chair.  Heart RRR no s3 or rub. Lungs upper airway rhonchi, no wheezes.  Abd + bs soft, nontender. Ext trace edema lower ext.       Results Review:      Results from last 7 days  Lab Units 03/14/18  0552 03/13/18  1753 03/13/18  0737 03/12/18  1304   SODIUM mmol/L 134* 138 140 132*   POTASSIUM mmol/L 2.1* 2.5* 2.2* 3.0*   CHLORIDE mmol/L 94* 89* 90* 74*   CO2 mmol/L 27.6 30.7* 29.8* 28.6   BUN mg/dL 42* 60* 69* 87*   CREATININE mg/dL 1.39* 1.83* 1.86* 2.58*   CALCIUM mg/dL 7.6* 8.9 8.5* 9.9   BILIRUBIN mg/dL  --   --   --  0.8   ALK PHOS U/L  --   --   --  97   ALT (SGPT) U/L  --   --   --  20   AST (SGOT) U/L  --   --   --  27   GLUCOSE mg/dL 131* 237* 123* 318* "       Estimated Creatinine Clearance: 20.2 mL/min (by C-G formula based on SCr of 1.39 mg/dL (H)).      Results from last 7 days  Lab Units 03/14/18  0552 03/13/18  0613   MAGNESIUM mg/dL 1.8 2.3   PHOSPHORUS mg/dL 1.9* 2.8         Results from last 7 days  Lab Units 03/12/18  1304   URIC ACID mg/dL 22.7*         Results from last 7 days  Lab Units 03/14/18  0552 03/13/18  0613 03/12/18  1304   WBC 10*3/mm3 9.51 14.25* 14.84*   HEMOGLOBIN g/dL 12.1 14.5 17.2*   PLATELETS 10*3/mm3 189 253 293               Imaging Results (last 24 hours)     Procedure Component Value Units Date/Time    FL Video Swallow With Speech [[id number]] Updated:  03/13/18 1518          aspirin 81 mg Oral Daily   donepezil 10 mg Oral Nightly   levothyroxine 50 mcg Oral Daily   memantine 10 mg Oral Daily       sodium chloride 0.9 % with KCl 20 mEq 100 mL/hr Last Rate: Stopped (03/14/18 0400)       Medication Review:   Current Facility-Administered Medications   Medication Dose Route Frequency Provider Last Rate Last Dose   • acetaminophen (TYLENOL) tablet 650 mg  650 mg Oral Q4H PRN Raciel Zamora MD       • aspirin EC tablet 81 mg  81 mg Oral Daily Raciel Zamora MD   81 mg at 03/14/18 1030   • bisacodyl (DULCOLAX) EC tablet 5 mg  5 mg Oral Daily PRN Raciel Zamora MD       • bisacodyl (DULCOLAX) suppository 10 mg  10 mg Rectal Daily PRN Raciel Zamora MD       • donepezil (ARICEPT) tablet 10 mg  10 mg Oral Nightly Raciel Zamora MD   10 mg at 03/13/18 2227   • levothyroxine (SYNTHROID, LEVOTHROID) tablet 50 mcg  50 mcg Oral Daily Raciel Zamora MD   50 mcg at 03/14/18 1029   • memantine (NAMENDA) tablet 10 mg  10 mg Oral Daily Raciel Zamora MD   10 mg at 03/14/18 1029   • ondansetron (ZOFRAN) tablet 4 mg  4 mg Oral Q6H PRN Raciel Zamora MD        Or   • ondansetron ODT (ZOFRAN-ODT) disintegrating tablet 4 mg  4 mg Oral Q6H PRN Raciel Zamora MD        Or   • ondansetron (ZOFRAN) injection 4 mg  4  mg Intravenous Q6H PRN Raciel Zamora MD       • potassium chloride (MICRO-K) CR capsule 40 mEq  40 mEq Oral PRN Kp Sky MD   40 mEq at 03/14/18 1101    Or   • potassium chloride (KLOR-CON) packet 40 mEq  40 mEq Oral PRN Kp Sky MD        Or   • potassium chloride 10 mEq in 100 mL IVPB  10 mEq Intravenous Q1H PRN Kp Sky MD       • sodium chloride 0.9 % flush 1-10 mL  1-10 mL Intravenous PRN Raciel Zamora MD       • sodium chloride 0.9 % with KCl 20 mEq/L infusion  100 mL/hr Intravenous Continuous Robin Youssef MD   Stopped at 03/14/18 0400       Assessment/Plan   1. [place].  Improving.  Volume status better with IVF.    2. Profound hypokalemia.  Not clear where loss is.  Replace per protocol.  Mag ok, not having diarrhea.   3. DM2  4. Dementia  5. Dysphagia  6. Hypophosphatemia. Replace po .   7. Hyperuricemia.   8. Hypothyroid on replacement. Check TSH.       Sruthi Nieves MD  03/14/18  1:42 PM

## 2018-03-14 NOTE — PLAN OF CARE
Problem: Patient Care Overview  Goal: Plan of Care Review  Outcome: Ongoing (interventions implemented as appropriate)   03/14/18 6735   Coping/Psychosocial   Plan of Care Reviewed With patient   Plan of Care Review   Progress improving   OTHER   Outcome Summary client admit on 3/12 r/t dehydration. client eros any pain or discomfort, is pleasantly confused, adn cooperative with care. incontinent of B&B, wears brief, assist x1 with ADL's and turning and repositioning. IV fluids infusing per orders, client tolerating well. VSS stable, will continue to monitor.      Goal: Individualization and Mutuality  Outcome: Ongoing (interventions implemented as appropriate)    Goal: Discharge Needs Assessment  Outcome: Ongoing (interventions implemented as appropriate)      Problem: Fall Risk (Adult)  Goal: Absence of Fall  Outcome: Ongoing (interventions implemented as appropriate)      Problem: Skin Injury Risk (Adult)  Goal: Skin Health and Integrity  Outcome: Ongoing (interventions implemented as appropriate)      Problem: Fluid Volume Deficit (Adult)  Goal: Optimal Fluid Balance  Outcome: Ongoing (interventions implemented as appropriate)      Problem: Nutrition, Imbalanced: Inadequate Oral Intake (Adult)  Goal: Improved Oral Intake  Outcome: Ongoing (interventions implemented as appropriate)    Goal: Prevent Further Weight Loss  Outcome: Ongoing (interventions implemented as appropriate)

## 2018-03-14 NOTE — PLAN OF CARE
Problem: Patient Care Overview  Goal: Plan of Care Review  Outcome: Ongoing (interventions implemented as appropriate)   03/14/18 8320   Coping/Psychosocial   Plan of Care Reviewed With patient   Plan of Care Review   Progress improving   OTHER   Outcome Summary replaced patient's potassium x3 per protocol. patient up to chair today and did well. incont of urine. patient AOx1. will conitnue to monitor patient and treat according to MD orders.       Problem: Fall Risk (Adult)  Goal: Absence of Fall  Outcome: Ongoing (interventions implemented as appropriate)      Problem: Skin Injury Risk (Adult)  Goal: Skin Health and Integrity  Outcome: Ongoing (interventions implemented as appropriate)      Problem: Fluid Volume Deficit (Adult)  Goal: Optimal Fluid Balance  Outcome: Ongoing (interventions implemented as appropriate)      Problem: Nutrition, Imbalanced: Inadequate Oral Intake (Adult)  Goal: Improved Oral Intake  Outcome: Ongoing (interventions implemented as appropriate)    Goal: Prevent Further Weight Loss  Outcome: Ongoing (interventions implemented as appropriate)

## 2018-03-14 NOTE — THERAPY TREATMENT NOTE
Acute Care - Physical Therapy Treatment Note  [facility]     Patient Name: Sam Flores  : 1934  MRN: [id number]  Today's Date: 3/14/2018     Date of Referral to PT: 18  Referring Physician: Noah    Admit Date: 3/12/2018    Visit Dx:    ICD-10-CM ICD-9-CM   1. Dehydration E86.0 276.51   2. Renal insufficiency N28.9 593.9   3. Dementia with behavioral disturbance, unspecified dementia type F03.91 294.21   4. Impaired functional mobility, balance, gait, and endurance Z74.09 V49.89     Patient Active Problem List   Diagnosis   • Lumbar compression fracture   • Osteoporosis   • Dehydration   • Acute on chronic renal failure   • Type 2 diabetes mellitus without complication   • Benign essential hypertension   • Weight loss, abnormal   • Trouble swallowing   • Hyponatremia   • Hypokalemia       Therapy Treatment    Therapy Treatment / Health Promotion    Treatment Time/Intention  Discipline: physical therapy assistant (Gilberto Maravilla PTA)  Document Type: therapy note (daily note) (Gilberto Maravilla PTA)  Subjective Information: complains of, fatigue (Gilberto Maravilla PTA)  Mode of Treatment: physical therapy (Gilberto Maravilla PTA)  Patient Effort: adequate (Gilberto Maravilla PTA)  Existing Precautions/Restrictions: fall (Gilberto Maravilla PTA)  Plan of Care Review  Plan of Care Reviewed With: patient (Gilberto Maravilla PTA)    Vitals/Pain/Safety  Pain Assessment  Additional Documentation: Pain Scale: Numbers Pre/Post-Treatment (Group) (Gilberto Maravilla PTA)  Pain Scale: Numbers Pre/Post-Treatment  Pain Scale: Numbers, Pretreatment: 0/10 - no pain (Gilberto Maravilla PTA)  Positioning and Restraints  Pre-Treatment Position: in bed (Gilberto Maravilla PTA)  Post Treatment Position: bed (Gilberto Maravilla PTA)  In Bed: notified nsg, grisel, call light within reach, encouraged to call for assist, with family/caregiver (Gilberto Maravilla PTA)    Mobility,ADL,Motor,  Modality  Bed Mobility Assessment/Treatment  Bed Mobility Assessment/Treatment: supine-sit, sit-supine (Gilberto Maravilla, PTA)  Supine-Sit Silvis (Bed Mobility): minimum assist (75% patient effort) (Gilberto Maravilla, PTA)  Sit-Supine Silvis (Bed Mobility): minimum assist (75% patient effort) (Castellahoracio Maravilla, PTA)  Bed Mobility, Safety Issues: decreased use of arms for pushing/pulling, decreased use of legs for bridging/pushing, impaired trunk control for bed mobility (Gilberto Maravilla, PTA)  Assistive Device (Bed Mobility): bed rails, head of bed elevated (Gilberto Maravilla, PTA)  Transfer Assessment/Treatment  Transfer Assessment/Treatment: sit-stand transfer, stand-sit transfer (Gilberto Maravilla, PTA)  Sit-Stand Transfer  Sit-Stand Silvis (Transfers): contact guard, minimum assist (75% patient effort) (Gilberto Maravilla, PTA)  Assistive Device (Sit-Stand Transfers): walker, front-wheeled (Gilberto Maravilla, PTA)  Stand-Sit Transfer  Stand-Sit Silvis (Transfers): contact guard (Gilberto Maravilla, PTA)  Assistive Device (Stand-Sit Transfers): walker, front-wheeled (Castella P Taiwo, PTA)  Gait/Stairs Assessment/Training  Silvis Level (Gait): minimum assist (75% patient effort) (Gilberto Maravilla, PTA)  Assistive Device (Gait): walker, front-wheeled (Gilberto Maravilla, PTA)  Distance in Feet (Gait): 30 (Gilberto Maravilla, PTA)  Deviations/Abnormal Patterns (Gait): base of support, narrow, gait speed decreased, olvin decreased (Gilberto Maravilla, PTA)                 ROM/MMT             Sensory, Edema, Orthotics          Cognition, Communication, Swallow  Cognitive Assessment/Intervention- PT/OT  Orientation Status (Cognition): oriented to, person, place (Gilberto Maravilla, PTA)  Follows Commands (Cognition): follows one step commands, 50-74% accuracy, delayed response/completion, increased processing time needed, initiation impaired, repetition of directions  required, physical/tactile prompts required (Gilberto Maravilla, PTA)  Safety Deficit (Cognitive): at risk behavior observed (Gilberto Maravilla, PTA)  Personal Safety Interventions: fall prevention program maintained, gait belt, muscle strengthening facilitated, nonskid shoes/slippers when out of bed (Gilberto Maravilla, PTA)    Outcome Summary               PT Rehab Goals     Row Name 03/13/18 1400             Bed Mobility Goal 1 (PT)    Activity/Assistive Device (Bed Mobility Goal 1, PT) bed mobility activities, all  -MA      [place] Level/Cues Needed (Bed Mobility Goal 1, PT) independent  -MA      Time Frame (Bed Mobility Goal 1, PT) 1 week  -MA         Transfer Goal 1 (PT)    Activity/Assistive Device (Transfer Goal 1, PT) transfers, all  -MA      [place] Level/Cues Needed (Transfer Goal 1, PT) supervision required  -MA      Time Frame (Transfer Goal 1, PT) 1 week  -MA         Gait Training Goal 1 (PT)    [place] Level (Gait Training Goal 1, PT) supervision required  -MA      Distance (Gait Goal 1, PT) 150  -MA      Time Frame (Gait Training Goal 1, PT) 1 week  -MA        User Key  (r) = Recorded By, (t) = Taken By, (c) = Cosigned By    Initials Name Provider Type    MARBELLA Stout, PT Physical Therapist          Physical Therapy Education     Title: PT OT SLP Therapies (Done)     Topic: Physical Therapy (Done)     Point: Mobility training (Done)    Learning Progress Summary     Learner Status Readiness Method Response Comment Documented by    Patient Done Acceptance EREBECA DU CW 03/14/18 1502     Active Acceptance E NR  MA 03/13/18 1441          Point: Home exercise program (Done)    Learning Progress Summary     Learner Status Readiness Method Response Comment Documented by    Patient Done Acceptance REBECA FRAIRE DU CW 03/14/18 1502          Point: Body mechanics (Done)    Learning Progress Summary     Learner Status Readiness Method Response Comment Documented by    Patient Done  Acceptance E,KRISTEN HENRY   03/14/18 1502     Active Acceptance E NR  MA 03/13/18 1441          Point: Precautions (Done)    Learning Progress Summary     Learner Status Readiness Method Response Comment Documented by    Patient Done Acceptance E,KRISTEN HENRY   03/14/18 1502     Active Acceptance E NR  MA 03/13/18 1441                      User Key     Initials Effective Dates Name Provider Type [facility] 03/07/18 -  Angelica Stout, PT Physical Therapist PT     03/07/18 -  Gilberto Maravilla PTA Physical Therapy Assistant PT                    PT Recommendation and Plan     Plan of Care Reviewed With: patient  Progress: improving  Outcome Summary: Pt increasing with activity tolerance and amb distance with RWX and transfer safety          Outcome Measures     Row Name 03/14/18 1500 03/13/18 1400          How much help from another person do you currently need...    Turning from your back to your side while in flat bed without using bedrails? 3  -CW 3  -MA     Moving from lying on back to sitting on the side of a flat bed without bedrails? 3  -CW 3  -MA     Moving to and from a bed to a chair (including a wheelchair)? 3  -CW 3  -MA     Standing up from a chair using your arms (e.g., wheelchair, bedside chair)? 3  -CW 3  -MA     Climbing 3-5 steps with a railing? 2  -CW 2  -MA     To walk in hospital room? 3  -CW 2  -MA     AM-PAC 6 Clicks Score 17  -CW 16  -MA        Functional Assessment    Outcome Measure Options AM-PAC 6 Clicks Basic Mobility (PT)  -CW AM-PAC 6 Clicks Basic Mobility (PT)  -MA       User Key  (r) = Recorded By, (t) = Taken By, (c) = Cosigned By    Initials Name Provider Type    MA Angelica Stout, PT Physical Therapist    CW Gilberto Maravilla, GILLIAN Physical Therapy Assistant           Time Calculation:         PT Charges     Row Name 03/14/18 1503             Time Calculation    Start Time 1446  -CW      Stop Time 1503  -CW      Time Calculation (min) 17 min  -CW      PT Received On  03/14/18  -CARLITOS      PT - Next Appointment 03/15/18  -CARLITOS        User Key  (r) = Recorded By, (t) = Taken By, (c) = Cosigned By    Initials Name Provider Type    CW Gilberto Maravilla PTA Physical Therapy Assistant          Therapy Charges for Today     Code Description Service Date Service Provider Modifiers Qty    [id number] HC PT THER PROC EA 15 MIN 3/14/2018 Gilberto Maravilla PTA GP 1          PT G-Codes  Outcome Measure Options: AM-PAC 6 Clicks Basic Mobility (PT)    Gilberto Maravilla PTA  3/14/2018

## 2018-03-15 PROBLEM — J69.0 ASPIRATION PNEUMONIA (HCC): Status: ACTIVE | Noted: 2018-03-15

## 2018-03-15 LAB
ANION GAP SERPL CALCULATED.3IONS-SCNC: 11.4 MMOL/L
BASOPHILS # BLD AUTO: 0.01 10*3/MM3 (ref 0–0.2)
BASOPHILS NFR BLD AUTO: 0.1 % (ref 0–1.5)
BUN BLD-MCNC: 29 MG/DL (ref 8–23)
BUN/CREAT SERPL: 22.3 (ref 7–25)
CALCIUM SPEC-SCNC: 7.6 MG/DL (ref 8.6–10.5)
CHLORIDE SERPL-SCNC: 100 MMOL/L (ref 98–107)
CO2 SERPL-SCNC: 24.6 MMOL/L (ref 22–29)
CREAT BLD-MCNC: 1.3 MG/DL (ref 0.57–1)
DEPRECATED RDW RBC AUTO: 47.3 FL (ref 37–54)
EOSINOPHIL # BLD AUTO: 0.15 10*3/MM3 (ref 0–0.7)
EOSINOPHIL NFR BLD AUTO: 1.5 % (ref 0.3–6.2)
ERYTHROCYTE [DISTWIDTH] IN BLOOD BY AUTOMATED COUNT: 13.6 % (ref 11.7–13)
GFR SERPL CREATININE-BSD FRML MDRD: 39 ML/MIN/1.73
GLUCOSE BLD-MCNC: 223 MG/DL (ref 65–99)
HCT VFR BLD AUTO: 37.4 % (ref 35.6–45.5)
HGB BLD-MCNC: 12.3 G/DL (ref 11.9–15.5)
IMM GRANULOCYTES # BLD: 0.03 10*3/MM3 (ref 0–0.03)
IMM GRANULOCYTES NFR BLD: 0.3 % (ref 0–0.5)
LYMPHOCYTES # BLD AUTO: 1.56 10*3/MM3 (ref 0.9–4.8)
LYMPHOCYTES NFR BLD AUTO: 15.2 % (ref 19.6–45.3)
MAGNESIUM SERPL-MCNC: 1.7 MG/DL (ref 1.6–2.4)
MCH RBC QN AUTO: 31.4 PG (ref 26.9–32)
MCHC RBC AUTO-ENTMCNC: 32.9 G/DL (ref 32.4–36.3)
MCV RBC AUTO: 95.4 FL (ref 80.5–98.2)
MONOCYTES # BLD AUTO: 0.73 10*3/MM3 (ref 0.2–1.2)
MONOCYTES NFR BLD AUTO: 7.1 % (ref 5–12)
NEUTROPHILS # BLD AUTO: 7.81 10*3/MM3 (ref 1.9–8.1)
NEUTROPHILS NFR BLD AUTO: 75.8 % (ref 42.7–76)
PHOSPHATE SERPL-MCNC: 0.9 MG/DL (ref 2.5–4.5)
PLATELET # BLD AUTO: 204 10*3/MM3 (ref 140–500)
PMV BLD AUTO: 10.9 FL (ref 6–12)
POTASSIUM BLD-SCNC: 3.4 MMOL/L (ref 3.5–5.2)
RBC # BLD AUTO: 3.92 10*6/MM3 (ref 3.9–5.2)
SODIUM BLD-SCNC: 136 MMOL/L (ref 136–145)
TSH SERPL DL<=0.05 MIU/L-ACNC: 1.76 MIU/ML (ref 0.27–4.2)
WBC NRBC COR # BLD: 10.29 10*3/MM3 (ref 4.5–10.7)

## 2018-03-15 PROCEDURE — 84132 ASSAY OF SERUM POTASSIUM: CPT | Performed by: HOSPITALIST

## 2018-03-15 PROCEDURE — 84443 ASSAY THYROID STIM HORMONE: CPT | Performed by: INTERNAL MEDICINE

## 2018-03-15 PROCEDURE — 25010000002 POTASSIUM CHLORIDE PER 2 MEQ OF POTASSIUM: Performed by: HOSPITALIST

## 2018-03-15 PROCEDURE — 94799 UNLISTED PULMONARY SVC/PX: CPT

## 2018-03-15 PROCEDURE — 85025 COMPLETE CBC W/AUTO DIFF WBC: CPT | Performed by: HOSPITALIST

## 2018-03-15 PROCEDURE — 83735 ASSAY OF MAGNESIUM: CPT | Performed by: INTERNAL MEDICINE

## 2018-03-15 PROCEDURE — 97110 THERAPEUTIC EXERCISES: CPT

## 2018-03-15 PROCEDURE — 80048 BASIC METABOLIC PNL TOTAL CA: CPT | Performed by: HOSPITALIST

## 2018-03-15 PROCEDURE — 25810000003 SODIUM CHLORIDE 0.9 % WITH KCL 20 MEQ 20-0.9 MEQ/L-% SOLUTION: Performed by: INTERNAL MEDICINE

## 2018-03-15 PROCEDURE — 84100 ASSAY OF PHOSPHORUS: CPT | Performed by: INTERNAL MEDICINE

## 2018-03-15 RX ORDER — IPRATROPIUM BROMIDE AND ALBUTEROL SULFATE 2.5; .5 MG/3ML; MG/3ML
3 SOLUTION RESPIRATORY (INHALATION)
Status: DISCONTINUED | OUTPATIENT
Start: 2018-03-15 | End: 2018-03-23 | Stop reason: HOSPADM

## 2018-03-15 RX ORDER — CLINDAMYCIN PHOSPHATE 300 MG/50ML
300 INJECTION INTRAVENOUS EVERY 8 HOURS
Status: COMPLETED | OUTPATIENT
Start: 2018-03-15 | End: 2018-03-22

## 2018-03-15 RX ADMIN — POTASSIUM CHLORIDE 10 MEQ: 2 INJECTION, SOLUTION, CONCENTRATE INTRAVENOUS at 16:01

## 2018-03-15 RX ADMIN — CLINDAMYCIN PHOSPHATE 300 MG: 6 INJECTION, SOLUTION INTRAMUSCULAR; INTRAVENOUS at 21:13

## 2018-03-15 RX ADMIN — POTASSIUM CHLORIDE 10 MEQ: 2 INJECTION, SOLUTION, CONCENTRATE INTRAVENOUS at 18:45

## 2018-03-15 RX ADMIN — POTASSIUM & SODIUM PHOSPHATES POWDER PACK 280-160-250 MG 1 PACKET: 280-160-250 PACK at 07:52

## 2018-03-15 RX ADMIN — ASPIRIN 81 MG: 81 TABLET ORAL at 09:33

## 2018-03-15 RX ADMIN — POTASSIUM CHLORIDE AND SODIUM CHLORIDE 100 ML/HR: 900; 150 INJECTION, SOLUTION INTRAVENOUS at 04:06

## 2018-03-15 RX ADMIN — LEVOTHYROXINE SODIUM 50 MCG: 50 TABLET ORAL at 09:33

## 2018-03-15 RX ADMIN — CLINDAMYCIN PHOSPHATE 300 MG: 6 INJECTION, SOLUTION INTRAMUSCULAR; INTRAVENOUS at 14:14

## 2018-03-15 RX ADMIN — POTASSIUM CHLORIDE 10 MEQ: 2 INJECTION, SOLUTION, CONCENTRATE INTRAVENOUS at 17:36

## 2018-03-15 RX ADMIN — DONEPEZIL HYDROCHLORIDE 10 MG: 10 TABLET, FILM COATED ORAL at 21:13

## 2018-03-15 RX ADMIN — IPRATROPIUM BROMIDE AND ALBUTEROL SULFATE 3 ML: .5; 3 SOLUTION RESPIRATORY (INHALATION) at 21:05

## 2018-03-15 RX ADMIN — POTASSIUM CHLORIDE 10 MEQ: 2 INJECTION, SOLUTION, CONCENTRATE INTRAVENOUS at 14:48

## 2018-03-15 RX ADMIN — IPRATROPIUM BROMIDE AND ALBUTEROL SULFATE 3 ML: .5; 3 SOLUTION RESPIRATORY (INHALATION) at 12:42

## 2018-03-15 RX ADMIN — MEMANTINE HYDROCHLORIDE 10 MG: 10 TABLET, FILM COATED ORAL at 09:33

## 2018-03-15 RX ADMIN — POTASSIUM & SODIUM PHOSPHATES POWDER PACK 280-160-250 MG 1 PACKET: 280-160-250 PACK at 17:13

## 2018-03-15 NOTE — PROGRESS NOTES
"   LOS: 3 days    Patient Care Team:  Joann Prabhakar MD as PCP - General (Family Medicine)    Chief Complaint:    Chief Complaint   Patient presents with   • Altered Mental Status     FAMILY REPORTS PT IS BECOMING MORE CONFUSED, NOT EATING AND UNABLE TO CONTROL HER BLADDER; PT WITH NO COMPLAINTS AND DOES NOT KNOW WHY SHE IS HERE     Follow UP Matthew  Subjective     Interval History:     Follow up Matthew. Confused.  Eating better.   Congested cough.     Review of Systems:   Not reliable.     Objective     Vital Signs  Temp:  [96.7 °F (35.9 °C)-98.9 °F (37.2 °C)] 98.9 °F (37.2 °C)  Heart Rate:  [61-98] 98  Resp:  [12-16] 12  BP: ()/(55-81) 122/81    Flowsheet Rows    Flowsheet Row First Filed Value   Admission Height 149.9 cm (59\") Documented at 03/12/2018 1238   Admission Weight 40.8 kg (90 lb) Documented at 03/12/2018 1305          I/O this shift:  In: 100 [P.O.:100]  Out: -   I/O last 3 completed shifts:  In: 4835 [P.O.:2840; I.V.:1995]  Out: 0     Intake/Output Summary (Last 24 hours) at 03/15/18 1335  Last data filed at 03/15/18 0932   Gross per 24 hour   Intake             1990 ml   Output                0 ml   Net             1990 ml       Physical Exam:  Elderly Frail WF. Heart RRR no s3 or rub. Lungs upper airway rhonchi, no wheezes.  Abd + bs soft, nontender. Ext trace edema lower ext.       Results Review:      Results from last 7 days  Lab Units 03/15/18  0555 03/14/18  2245 03/14/18  0552 03/13/18  1753  03/12/18  1304   SODIUM mmol/L 136  --  134* 138  < > 132*   POTASSIUM mmol/L 3.4* 3.8 2.1* 2.5*  < > 3.0*   CHLORIDE mmol/L 100  --  94* 89*  < > 74*   CO2 mmol/L 24.6  --  27.6 30.7*  < > 28.6   BUN mg/dL 29*  --  42* 60*  < > 87*   CREATININE mg/dL 1.30*  --  1.39* 1.83*  < > 2.58*   CALCIUM mg/dL 7.6*  --  7.6* 8.9  < > 9.9   BILIRUBIN mg/dL  --   --   --   --   --  0.8   ALK PHOS U/L  --   --   --   --   --  97   ALT (SGPT) U/L  --   --   --   --   --  20   AST (SGOT) U/L  --   --   --   -- "   --  27   GLUCOSE mg/dL 223*  --  131* 237*  < > 318*   < > = values in this interval not displayed.    Estimated Creatinine Clearance: 21.6 mL/min (by C-G formula based on SCr of 1.3 mg/dL (H)).      Results from last 7 days  Lab Units 03/15/18  0555 03/14/18  0552 03/13/18  0613   MAGNESIUM mg/dL 1.7 1.8 2.3   PHOSPHORUS mg/dL 0.9* 1.9* 2.8         Results from last 7 days  Lab Units 03/12/18  1304   URIC ACID mg/dL 22.7*         Results from last 7 days  Lab Units 03/15/18  0555 03/14/18  0552 03/13/18  0613 03/12/18  1304   WBC 10*3/mm3 10.29 9.51 14.25* 14.84*   HEMOGLOBIN g/dL 12.3 12.1 14.5 17.2*   PLATELETS 10*3/mm3 204 189 253 293               Imaging Results (last 24 hours)     Procedure Component Value Units Date/Time    FL Video Swallow With Speech [[id number]] Collected:  03/15/18 1306     Updated:  03/15/18 1310    Narrative:       Video swallowing examination     FLUOROSCOPY TIME: 3 minutes 49 seconds, 8 images.     Clinical: Dysphasia     FINDINGS: Patient demonstrates a mild oropharyngeal dysphagia. Premature  spillage with no penetration or aspiration. Reduced anterior hyoid  movement with prominent cricopharyngeus noted. Delayed completion of  deflection of tip of epiglottis. Trace to mild diffuse pharyngeal  residue observed. Esophageal scan showed slow clearance of esophagus  with characteristics of diffuse esophageal spasms.      Refer to full speech pathology evaluation.     This report was finalized on 3/15/2018 1:07 PM by Dr. Keenan Arias MD.       XR Chest 1 View [[id number]] Collected:  03/14/18 1442     Updated:  03/14/18 1454    Narrative:       AP CHEST     HISTORY: Cough, aspiration.     COMPARISON: 2-view chest 03/12/2018.     FINDINGS: Lung volumes are markedly diminished limiting evaluation.  There is perihilar vascular crowding. Hazy opacity overlies the lung  base suspected representing a combination of pleural effusions and  atelectasis and there could be associated basilar  infiltrates. Recommend  follow-up exam when patient can tolerate.     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 There is retained barium  within colonic loops in the upper abdomen. Multilevel kyphoplasties have  been performed.     This report was finalized on 3/14/2018 2:51 PM by Dr. Jimbo Lopez MD.             aspirin 81 mg Oral Daily   clindamycin 300 mg Intravenous Q8H   donepezil 10 mg Oral Nightly   ipratropium-albuterol 3 mL Nebulization 4x Daily - RT   levothyroxine 50 mcg Oral Daily   memantine 10 mg Oral Daily   potassium & sodium phosphates 1 packet Oral BID AC          Medication Review:   Current Facility-Administered Medications   Medication Dose Route Frequency Provider Last Rate Last Dose   • acetaminophen (TYLENOL) tablet 650 mg  650 mg Oral Q4H PRN Raciel Zamora MD       • aspirin EC tablet 81 mg  81 mg Oral Daily Raciel Zamora MD   81 mg at 03/15/18 0933   • bisacodyl (DULCOLAX) EC tablet 5 mg  5 mg Oral Daily PRN Raciel Zamora MD       • bisacodyl (DULCOLAX) suppository 10 mg  10 mg Rectal Daily PRN Raciel Zamora MD       • clindamycin (CLEOCIN) IVPB 300 mg  300 mg Intravenous Q8H Kp Sky MD       • donepezil (ARICEPT) tablet 10 mg  10 mg Oral Nightly Raciel Zamora MD   10 mg at 03/14/18 2018   • ipratropium-albuterol (DUO-NEB) nebulizer solution 3 mL  3 mL Nebulization 4x Daily - RT Kp Sky MD   3 mL at 03/15/18 1242   • levothyroxine (SYNTHROID, LEVOTHROID) tablet 50 mcg  50 mcg Oral Daily Raciel Zamora MD   50 mcg at 03/15/18 0933   • memantine (NAMENDA) tablet 10 mg  10 mg Oral Daily Raciel Zamora MD   10 mg at 03/15/18 0933   • ondansetron (ZOFRAN) tablet 4 mg  4 mg Oral Q6H PRN Raciel Zamora MD        Or   • ondansetron ODT (ZOFRAN-ODT) disintegrating tablet 4 mg  4 mg Oral Q6H PRN Raciel Zamora MD        Or   • ondansetron (ZOFRAN) injection 4 mg  4 mg Intravenous Q6H PRN Raciel Zamora MD       • potassium  & sodium phosphates (PHOS-NAK) oral packet  1 packet Oral BID AC Sruthi Nieves MD   1 packet at 03/15/18 0752   • potassium chloride (MICRO-K) CR capsule 40 mEq  40 mEq Oral PRN Kp Sky MD   40 mEq at 03/14/18 1848    Or   • potassium chloride (KLOR-CON) packet 40 mEq  40 mEq Oral PRN Kp Sky MD        Or   • potassium chloride 10 mEq in 100 mL IVPB  10 mEq Intravenous Q1H PRN Kp Sky MD       • sodium chloride 0.9 % flush 1-10 mL  1-10 mL Intravenous PRN Raciel Zamora MD           Assessment/Plan   1. [place].  Improving.  Volume status better . Dc IVF    2. Profound hypokalemia.  Not clear where loss is.  Replace per protocol.  3. DM2  4. Dementia  5. Dysphagia  6. Hypophosphatemia. Replace po .   7. Hyperuricemia.   8. Hypothyroid on replacement. Compensated.     Sruthi Nieves MD  03/15/18  1:35 PM

## 2018-03-15 NOTE — PLAN OF CARE
Problem: Patient Care Overview  Goal: Plan of Care Review  Outcome: Ongoing (interventions implemented as appropriate)   03/15/18 4622   Coping/Psychosocial   Plan of Care Reviewed With patient   Plan of Care Review   Progress improving   OTHER   Outcome Summary No changes during night. VSS. Slept off and on during night. Will continue to follow.

## 2018-03-15 NOTE — PROGRESS NOTES
"DAILY PROGRESS NOTE  [facility]    Patient Identification:  Name: Sam Flores  Age: 83 y.o.  Sex: female  :  1934  MRN: [id number]         Primary Care Physician: Joann Prabhakar MD    Subjective:  Interval History:She feels better today. Still coughing a lot.    Objective:    Scheduled Meds:    aspirin 81 mg Oral Daily   donepezil 10 mg Oral Nightly   levothyroxine 50 mcg Oral Daily   memantine 10 mg Oral Daily   potassium & sodium phosphates 1 packet Oral BID AC     Continuous Infusions:    sodium chloride 0.9 % with KCl 20 mEq 100 mL/hr Last Rate: 100 mL/hr (03/15/18 0406)       Vital signs in last 24 hours:  Temp:  [96.7 °F (35.9 °C)-98.9 °F (37.2 °C)] 98.9 °F (37.2 °C)  Heart Rate:  [61-98] 98  Resp:  [12-16] 12  BP: ()/(55-81) 122/81    Intake/Output:    Intake/Output Summary (Last 24 hours) at 03/15/18 1123  Last data filed at 03/15/18 0932   Gross per 24 hour   Intake              ml   Output                0 ml   Net              ml       Exam:  /81 (BP Location: Right arm, Patient Position: Sitting)   Pulse 98   Temp 98.9 °F (37.2 °C) (Oral)   Resp 12   Ht 149.9 cm (59\")   Wt 41.7 kg (91 lb 14.4 oz)   SpO2 98%   BMI 18.56 kg/m²     General Appearance:    Alert, cooperative, no distress   Head:    Normocephalic, without obvious abnormality, atraumatic   Eyes:       Throat:   Lips, tongue, gums normal   Neck:   Supple, symmetrical, trachea midline, no JVD   Lungs:     rhonchi bilaterally, respirations unlabored   Chest Wall:    No tenderness or deformity    Heart:    Regular rate and rhythm, S1 and S2 normal, no murmur,no  Rub or gallop   Abdomen:     Soft, non-tender, bowel sounds active, no masses, no organomegaly    Extremities:   Extremities normal, atraumatic, no cyanosis or edema   Pulses:      Skin:   Skin is warm and dry,  no rashes or palpable lesions   Neurologic:   no focal deficits noted      [unfilled]  Data Review:    Results from " last 7 days  Lab Units 03/15/18  0555 03/14/18  2245 03/14/18  0552 03/13/18  1753   SODIUM mmol/L 136  --  134* 138   POTASSIUM mmol/L 3.4* 3.8 2.1* 2.5*   CHLORIDE mmol/L 100  --  94* 89*   CO2 mmol/L 24.6  --  27.6 30.7*   BUN mg/dL 29*  --  42* 60*   CREATININE mg/dL 1.30*  --  1.39* 1.83*   GLUCOSE mg/dL 223*  --  131* 237*   CALCIUM mg/dL 7.6*  --  7.6* 8.9       Results from last 7 days  Lab Units 03/15/18  0555 03/14/18  0552 03/13/18  0613   WBC 10*3/mm3 10.29 9.51 14.25*   HEMOGLOBIN g/dL 12.3 12.1 14.5   HEMATOCRIT % 37.4 36.1 43.8   PLATELETS 10*3/mm3 204 189 253       Results from last 7 days  Lab Units 03/15/18  0555   TSH mIU/mL 1.760           Lab Results  Lab Value Date/Time   TROPONINT 0.020 03/12/2018 1304   TROPONINT <0.010 11/30/2016 1946           Results from last 7 days  Lab Units 03/12/18  1304   ALK PHOS U/L 97   BILIRUBIN mg/dL 0.8   ALT (SGPT) U/L 20   AST (SGOT) U/L 27       Results from last 7 days  Lab Units 03/15/18  0555   TSH mIU/mL 1.760         No results found for: POCGLU        Patient Active Problem List   Diagnosis Code   • Lumbar compression fracture S32.000A   • Osteoporosis M81.0   • Dehydration E86.0   • Acute on chronic renal failure N17.9, N18.9   • Type 2 diabetes mellitus without complication E11.9   • Benign essential hypertension I10   • Weight loss, abnormal R63.4   • Trouble swallowing R13.10   • Hyponatremia E87.1   • Hypokalemia E87.6   • Aspiration pneumonia J69.0       Assessment:  Active Hospital Problems (** Indicates Principal Problem)    Diagnosis Date Noted   • **Dehydration [E86.0] 03/12/2018   • Aspiration pneumonia [J69.0] 03/15/2018   • Acute on chronic renal failure [N17.9, N18.9] 03/12/2018   • Type 2 diabetes mellitus without complication [E11.9] 03/12/2018   • Benign essential hypertension [I10] 03/12/2018   • Weight loss, abnormal [R63.4] 03/12/2018   • Trouble swallowing [R13.10] 03/12/2018   • Hyponatremia [E87.1] 03/12/2018   • Hypokalemia  [E87.6] 03/12/2018      Resolved Hospital Problems    Diagnosis Date Noted Date Resolved   No resolved problems to display.       Plan:  Continue IVF decrease rate, replace K.  Dysphagia diet. ??? [place] poor. Antibiotics.    Kp Sky MD  3/15/2018  11:23 AM

## 2018-03-15 NOTE — PLAN OF CARE
Problem: Patient Care Overview  Goal: Plan of Care Review  Outcome: Ongoing (interventions implemented as appropriate)   03/15/18 1280   Coping/Psychosocial   Plan of Care Reviewed With patient   Plan of Care Review   Progress no change   OTHER   Outcome Summary No acute changes throughout shift. Potassium currently being replaced per protocol. IV fluids discontinued per the nephrologist. Oral hydration status improving. Will continue to monitor.      Goal: Individualization and Mutuality  Outcome: Ongoing (interventions implemented as appropriate)    Goal: Discharge Needs Assessment  Outcome: Ongoing (interventions implemented as appropriate)    Goal: Interprofessional Rounds/Family Conf  Outcome: Ongoing (interventions implemented as appropriate)      Problem: Fall Risk (Adult)  Goal: Absence of Fall  Outcome: Ongoing (interventions implemented as appropriate)      Problem: Skin Injury Risk (Adult)  Goal: Skin Health and Integrity  Outcome: Ongoing (interventions implemented as appropriate)      Problem: Fluid Volume Deficit (Adult)  Goal: Optimal Fluid Balance  Outcome: Ongoing (interventions implemented as appropriate)      Problem: Nutrition, Imbalanced: Inadequate Oral Intake (Adult)  Goal: Improved Oral Intake  Outcome: Ongoing (interventions implemented as appropriate)    Goal: Prevent Further Weight Loss  Outcome: Ongoing (interventions implemented as appropriate)

## 2018-03-15 NOTE — THERAPY TREATMENT NOTE
Acute Care - Physical Therapy Treatment Note  [facility]     Patient Name: Sam Flores  : 1934  MRN: [id number]  Today's Date: 3/15/2018     Date of Referral to PT: 18  Referring Physician: Noah    Admit Date: 3/12/2018    Visit Dx:    ICD-10-CM ICD-9-CM   1. Dehydration E86.0 276.51   2. Renal insufficiency N28.9 593.9   3. Dementia with behavioral disturbance, unspecified dementia type F03.91 294.21   4. Impaired functional mobility, balance, gait, and endurance Z74.09 V49.89     Patient Active Problem List   Diagnosis   • Lumbar compression fracture   • Osteoporosis   • Dehydration   • Acute on chronic renal failure   • Type 2 diabetes mellitus without complication   • Benign essential hypertension   • Weight loss, abnormal   • Trouble swallowing   • Hyponatremia   • Hypokalemia   • Aspiration pneumonia       Therapy Treatment    Therapy Treatment / Health Promotion    Treatment Time/Intention  Discipline: physical therapy assistant (Gilberto Maravilla PTA)  Document Type: therapy note (daily note) (Gilberto Maravilla PTA)  Subjective Information: complains of, weakness, fatigue (Gilberto Maravilla PTA)  Mode of Treatment: physical therapy (Gilberto Maravilla PTA)  Patient Effort: good (Gilberto Maravilla PTA)  Existing Precautions/Restrictions: fall (Gilberto Maravilla PTA)  Plan of Care Review  Plan of Care Reviewed With: patient (Gilberto Maravilla PTA)    Vitals/Pain/Safety  Pain Assessment  Additional Documentation: Pain Scale: Numbers Pre/Post-Treatment (Group) (Gilberto Maravilla PTA)  Pain Scale: Numbers Pre/Post-Treatment  Pain Scale: Numbers, Pretreatment: 0/10 - no pain (Gilberto Maravilla PTA)  Positioning and Restraints  Pre-Treatment Position: in bed (Gilberto Maravilla PTA)  Post Treatment Position: chair (Gilberto Maravilla PTA)  In Chair: notified nsg, reclined, call light within reach, encouraged to call for assist, exit alarm on (Gilberto Maravilla  PTA)    Mobility,ADL,Motor, Modality  Bed Mobility Assessment/Treatment  Bed Mobility Assessment/Treatment: supine-sit (Gilberto Maravilla, PTA)  Supine-Sit [place] (Bed Mobility): minimum assist (75% patient effort) (Gilberto Maravilla, PTA)  Transfer Assessment/Treatment  Transfer Assessment/Treatment: sit-stand transfer, stand-sit transfer (Gilberto Maravilla, PTA)  Sit-Stand Transfer  Sit-Stand [place] (Transfers): contact guard (Gilberto Maravilla, PTA)  Assistive Device (Sit-Stand Transfers): walker, front-wheeled ([place] P Taiwo, PTA)  Stand-Sit Transfer  Stand-Sit [place] (Transfers): contact guard ([place] P Taiwo, PTA)  Assistive Device (Stand-Sit Transfers): walker, front-wheeled (Gilberto P Taiwo, PTA)  Gait/Stairs Assessment/Training  [place] Level (Gait): contact guard, minimum assist (75% patient effort) (Gilberto Maravilla, PTA)  Assistive Device (Gait): walker, front-wheeled ([place] Maravilla, PTA)  Distance in Feet (Gait): 80 (Gilberto Maravilla, PTA)  Deviations/Abnormal Patterns (Gait): olvin decreased, stride length decreased (Gilberto Maravilla, PTA)                 ROM/MMT             Sensory, Edema, Orthotics          Cognition, Communication, Swallow  Cognitive Assessment/Intervention- PT/OT  Orientation Status (Cognition): oriented to, person, place (Gilberto Maravilla, PTA)  Follows Commands (Cognition): follows one step commands, 50-74% accuracy, delayed response/completion, increased processing time needed, initiation impaired, repetition of directions required, physical/tactile prompts required (Gilberto Maravilla, PTA)  Safety Deficit (Cognitive): at risk behavior observed (Gilberto Maravilla, PTA)  Personal Safety Interventions: fall prevention program maintained, gait belt, muscle strengthening facilitated, nonskid shoes/slippers when out of bed (Gilberto Maravilla, PTA)    Outcome Summary               PT Rehab Goals     Row Name 03/13/18 1400              Bed Mobility Goal 1 (PT)    Activity/Assistive Device (Bed Mobility Goal 1, PT) bed mobility activities, all  -MA      [place] Level/Cues Needed (Bed Mobility Goal 1, PT) independent  -MA      Time Frame (Bed Mobility Goal 1, PT) 1 week  -MA         Transfer Goal 1 (PT)    Activity/Assistive Device (Transfer Goal 1, PT) transfers, all  -MA      [place] Level/Cues Needed (Transfer Goal 1, PT) supervision required  -MA      Time Frame (Transfer Goal 1, PT) 1 week  -MA         Gait Training Goal 1 (PT)    [place] Level (Gait Training Goal 1, PT) supervision required  -MA      Distance (Gait Goal 1, PT) 150  -MA      Time Frame (Gait Training Goal 1, PT) 1 week  -MA        User Key  (r) = Recorded By, (t) = Taken By, (c) = Cosigned By    Initials Name Provider Type    MARBELLA Stout, PT Physical Therapist          Physical Therapy Education     Title: PT OT SLP Therapies (Done)     Topic: Physical Therapy (Done)     Point: Mobility training (Done)    Learning Progress Summary     Learner Status Readiness Method Response Comment Documented by    Patient Done Acceptance E,TB KRISTEN HERNANDEZ 03/15/18 1544     Done Acceptance E,TB KRISTEN HERNANDEZ 03/14/18 1502     Active Acceptance E NR  MA 03/13/18 1441          Point: Home exercise program (Done)    Learning Progress Summary     Learner Status Readiness Method Response Comment Documented by    Patient Done Acceptance E,TB VUKRISTEN 03/15/18 1544     Done Acceptance E,KRISTEN HENRY 03/14/18 1502          Point: Body mechanics (Done)    Learning Progress Summary     Learner Status Readiness Method Response Comment Documented by    Patient Done Acceptance E,TB VUKRISTEN   03/15/18 1544     Done Acceptance E,TB VUKRISTEN 03/14/18 1502     Active Acceptance E NR  MA 03/13/18 1441          Point: Precautions (Done)    Learning Progress Summary     Learner Status Readiness Method Response Comment Documented by    Patient Done Acceptance E,TB KRISTEN HERNANDEZ 03/15/18 1544      Done Acceptance E,TB VU,DU   03/14/18 1502     Active Acceptance E NR  MA 03/13/18 1441                      User Key     Initials Effective Dates Name Provider Type Discipline    MA 03/07/18 -  Angelica Stout, PT Physical Therapist PT     03/07/18 -  Gilberto Maravilla PTA Physical Therapy Assistant PT                    PT Recommendation and Plan     Plan of Care Reviewed With: patient  Progress: improving  Outcome Summary: Pt is increasing with strength and bed mobility to get to EOB and then to transfer to RWX and [facility] with vc's for safety          Outcome Measures     Row Name 03/15/18 1500 03/14/18 1500 03/13/18 1400       How much help from another person do you currently need...    Turning from your back to your side while in flat bed without using bedrails? 3  -CW 3  -CW 3  -MA    Moving from lying on back to sitting on the side of a flat bed without bedrails? 3  -CW 3  -CW 3  -MA    Moving to and from a bed to a chair (including a wheelchair)? 3  -CW 3  -CW 3  -MA    Standing up from a chair using your arms (e.g., wheelchair, bedside chair)? 3  -CW 3  -CW 3  -MA    Climbing 3-5 steps with a railing? 2  -CW 2  -CW 2  -MA    To walk in hospital room? 3  -CW 3  -CW 2  -MA    AM-PAC 6 Clicks Score 17  -CW 17  -CW 16  -MA       Functional Assessment    Outcome Measure Options AM-PAC 6 Clicks Basic Mobility (PT)  -CW AM-PAC 6 Clicks Basic Mobility (PT)  -CW AM-PAC 6 Clicks Basic Mobility (PT)  -MA      User Key  (r) = Recorded By, (t) = Taken By, (c) = Cosigned By    Initials Name Provider Type    MA Angelica Stout, PT Physical Therapist    CW Gilberto Maravilla, GILLIAN Physical Therapy Assistant           Time Calculation:         PT Charges     Row Name 03/15/18 1546             Time Calculation    Start Time 1528  -CW      Stop Time 1546  -CW      Time Calculation (min) 18 min  -CW      PT Received On 03/15/18  -CW      PT - Next Appointment 03/16/18  -CW        User Key  (r) = Recorded By, (t) =  Taken By, (c) = Cosigned By    Initials Name Provider Type    CW Gilberto Maravilla PTA Physical Therapy Assistant          Therapy Charges for Today     Code Description Service Date Service Provider Modifiers Qty    32448216457 HC PT THER PROC EA 15 MIN 3/14/2018 Gilberto Maravilla PTA GP 1    68735728519 HC PT THER PROC EA 15 MIN 3/15/2018 Gilberto Maravilla PTA GP 1          PT G-Codes  Outcome Measure Options: AM-PAC 6 Clicks Basic Mobility (PT)    Gilberto Maravilla PTA  3/15/2018

## 2018-03-16 LAB
ALBUMIN SERPL-MCNC: 2.6 G/DL (ref 3.5–5.2)
ANION GAP SERPL CALCULATED.3IONS-SCNC: 10.8 MMOL/L
BASOPHILS # BLD AUTO: 0.01 10*3/MM3 (ref 0–0.2)
BASOPHILS NFR BLD AUTO: 0.1 % (ref 0–1.5)
BUN BLD-MCNC: 22 MG/DL (ref 8–23)
BUN/CREAT SERPL: 18.8 (ref 7–25)
CALCIUM SPEC-SCNC: 7.4 MG/DL (ref 8.6–10.5)
CHLORIDE SERPL-SCNC: 95 MMOL/L (ref 98–107)
CO2 SERPL-SCNC: 27.2 MMOL/L (ref 22–29)
CREAT BLD-MCNC: 1.17 MG/DL (ref 0.57–1)
DEPRECATED RDW RBC AUTO: 47.2 FL (ref 37–54)
EOSINOPHIL # BLD AUTO: 0.17 10*3/MM3 (ref 0–0.7)
EOSINOPHIL NFR BLD AUTO: 1.5 % (ref 0.3–6.2)
ERYTHROCYTE [DISTWIDTH] IN BLOOD BY AUTOMATED COUNT: 13.8 % (ref 11.7–13)
GFR SERPL CREATININE-BSD FRML MDRD: 44 ML/MIN/1.73
GLUCOSE BLD-MCNC: 154 MG/DL (ref 65–99)
HCT VFR BLD AUTO: 36.7 % (ref 35.6–45.5)
HGB BLD-MCNC: 12.3 G/DL (ref 11.9–15.5)
IMM GRANULOCYTES # BLD: 0.04 10*3/MM3 (ref 0–0.03)
IMM GRANULOCYTES NFR BLD: 0.3 % (ref 0–0.5)
LYMPHOCYTES # BLD AUTO: 1.67 10*3/MM3 (ref 0.9–4.8)
LYMPHOCYTES NFR BLD AUTO: 14.4 % (ref 19.6–45.3)
MCH RBC QN AUTO: 31.6 PG (ref 26.9–32)
MCHC RBC AUTO-ENTMCNC: 33.5 G/DL (ref 32.4–36.3)
MCV RBC AUTO: 94.3 FL (ref 80.5–98.2)
MONOCYTES # BLD AUTO: 1.18 10*3/MM3 (ref 0.2–1.2)
MONOCYTES NFR BLD AUTO: 10.2 % (ref 5–12)
NEUTROPHILS # BLD AUTO: 8.49 10*3/MM3 (ref 1.9–8.1)
NEUTROPHILS NFR BLD AUTO: 73.5 % (ref 42.7–76)
PHOSPHATE SERPL-MCNC: 1.6 MG/DL (ref 2.5–4.5)
PLATELET # BLD AUTO: 191 10*3/MM3 (ref 140–500)
PMV BLD AUTO: 10.4 FL (ref 6–12)
POTASSIUM BLD-SCNC: 3.4 MMOL/L (ref 3.5–5.2)
POTASSIUM BLD-SCNC: 3.7 MMOL/L (ref 3.5–5.2)
RBC # BLD AUTO: 3.89 10*6/MM3 (ref 3.9–5.2)
SODIUM BLD-SCNC: 133 MMOL/L (ref 136–145)
WBC NRBC COR # BLD: 11.56 10*3/MM3 (ref 4.5–10.7)

## 2018-03-16 PROCEDURE — 94799 UNLISTED PULMONARY SVC/PX: CPT

## 2018-03-16 PROCEDURE — 80069 RENAL FUNCTION PANEL: CPT | Performed by: HOSPITALIST

## 2018-03-16 PROCEDURE — 25010000002 POTASSIUM CHLORIDE PER 2 MEQ OF POTASSIUM: Performed by: HOSPITALIST

## 2018-03-16 PROCEDURE — 97110 THERAPEUTIC EXERCISES: CPT

## 2018-03-16 PROCEDURE — 85025 COMPLETE CBC W/AUTO DIFF WBC: CPT | Performed by: HOSPITALIST

## 2018-03-16 RX ORDER — SENNA AND DOCUSATE SODIUM 50; 8.6 MG/1; MG/1
2 TABLET, FILM COATED ORAL NIGHTLY
Status: DISCONTINUED | OUTPATIENT
Start: 2018-03-16 | End: 2018-03-23 | Stop reason: HOSPADM

## 2018-03-16 RX ORDER — GUAIFENESIN/DEXTROMETHORPHAN 100-10MG/5
5 SYRUP ORAL EVERY 8 HOURS PRN
Status: DISCONTINUED | OUTPATIENT
Start: 2018-03-16 | End: 2018-03-23 | Stop reason: HOSPADM

## 2018-03-16 RX ADMIN — MEMANTINE HYDROCHLORIDE 10 MG: 10 TABLET, FILM COATED ORAL at 08:21

## 2018-03-16 RX ADMIN — POLYETHYLENE GLYCOL (3350) 17 G: 17 POWDER, FOR SOLUTION ORAL at 14:09

## 2018-03-16 RX ADMIN — DOCUSATE SODIUM -SENNOSIDES 2 TABLET: 50; 8.6 TABLET, COATED ORAL at 20:43

## 2018-03-16 RX ADMIN — ASPIRIN 81 MG: 81 TABLET ORAL at 08:21

## 2018-03-16 RX ADMIN — DONEPEZIL HYDROCHLORIDE 10 MG: 10 TABLET, FILM COATED ORAL at 20:43

## 2018-03-16 RX ADMIN — POTASSIUM CHLORIDE 10 MEQ: 2 INJECTION, SOLUTION, CONCENTRATE INTRAVENOUS at 06:11

## 2018-03-16 RX ADMIN — CLINDAMYCIN PHOSPHATE 300 MG: 6 INJECTION, SOLUTION INTRAMUSCULAR; INTRAVENOUS at 12:12

## 2018-03-16 RX ADMIN — CLINDAMYCIN PHOSPHATE 300 MG: 6 INJECTION, SOLUTION INTRAMUSCULAR; INTRAVENOUS at 04:23

## 2018-03-16 RX ADMIN — IPRATROPIUM BROMIDE AND ALBUTEROL SULFATE 3 ML: .5; 3 SOLUTION RESPIRATORY (INHALATION) at 21:12

## 2018-03-16 RX ADMIN — LEVOTHYROXINE SODIUM 50 MCG: 50 TABLET ORAL at 08:21

## 2018-03-16 RX ADMIN — IPRATROPIUM BROMIDE AND ALBUTEROL SULFATE 3 ML: .5; 3 SOLUTION RESPIRATORY (INHALATION) at 15:47

## 2018-03-16 RX ADMIN — IPRATROPIUM BROMIDE AND ALBUTEROL SULFATE 3 ML: .5; 3 SOLUTION RESPIRATORY (INHALATION) at 13:08

## 2018-03-16 RX ADMIN — CLINDAMYCIN PHOSPHATE 300 MG: 6 INJECTION, SOLUTION INTRAMUSCULAR; INTRAVENOUS at 20:43

## 2018-03-16 NOTE — PROGRESS NOTES
"DAILY PROGRESS NOTE  [facility]    Patient Identification:  Name: Sam Flores  Age: 83 y.o.  Sex: female  :  1934  MRN: [id number]         Primary Care Physician: Joann Prabhakar MD    Subjective:  Interval History:She feels better today. Still coughing a lot.    Objective:    Scheduled Meds:    aspirin 81 mg Oral Daily   clindamycin 300 mg Intravenous Q8H   donepezil 10 mg Oral Nightly   ipratropium-albuterol 3 mL Nebulization 4x Daily - RT   levothyroxine 50 mcg Oral Daily   memantine 10 mg Oral Daily     Continuous Infusions:       Vital signs in last 24 hours:  Temp:  [96.8 °F (36 °C)-99.2 °F (37.3 °C)] 97.9 °F (36.6 °C)  Heart Rate:  [82-94] 87  Resp:  [12-18] 14  BP: ()/(40-76) 84/53    Intake/Output:    Intake/Output Summary (Last 24 hours) at 18 1231  Last data filed at 18 0820   Gross per 24 hour   Intake           2721.7 ml   Output                0 ml   Net           2721.7 ml       Exam:  BP (!) 84/53 (BP Location: Left arm, Patient Position: Lying)   Pulse 87   Temp 97.9 °F (36.6 °C) (Oral)   Resp 14   Ht 149.9 cm (59\")   Wt 41.7 kg (91 lb 14.4 oz)   SpO2 93%   BMI 18.56 kg/m²     General Appearance:    Alert, cooperative, no distress   Head:    Normocephalic, without obvious abnormality, atraumatic   Eyes:       Throat:   Lips, tongue, gums normal   Neck:   Supple, symmetrical, trachea midline, no JVD   Lungs:     rhonchi bilaterally, respirations unlabored   Chest Wall:    No tenderness or deformity    Heart:    Regular rate and rhythm, S1 and S2 normal, no murmur,no  Rub or gallop   Abdomen:     Soft, non-tender, bowel sounds active, no masses, no organomegaly    Extremities:   Extremities normal, atraumatic, no cyanosis or edema   Pulses:      Skin:   Skin is warm and dry,  no rashes or palpable lesions   Neurologic:   no focal deficits noted      [unfilled]  Data Review:    Results from last 7 days  Lab Units 18  0656 " 03/15/18  2345 03/15/18  0555  03/14/18  0552   SODIUM mmol/L 133*  --  136  --  134*   POTASSIUM mmol/L 3.7 3.4* 3.4*  < > 2.1*   CHLORIDE mmol/L 95*  --  100  --  94*   CO2 mmol/L 27.2  --  24.6  --  27.6   BUN mg/dL 22  --  29*  --  42*   CREATININE mg/dL 1.17*  --  1.30*  --  1.39*   GLUCOSE mg/dL 154*  --  223*  --  131*   CALCIUM mg/dL 7.4*  --  7.6*  --  7.6*   < > = values in this interval not displayed.    Results from last 7 days  Lab Units 03/16/18  0656 03/15/18  0555 03/14/18  0552   WBC 10*3/mm3 11.56* 10.29 9.51   HEMOGLOBIN g/dL 12.3 12.3 12.1   HEMATOCRIT % 36.7 37.4 36.1   PLATELETS 10*3/mm3 191 204 189       Results from last 7 days  Lab Units 03/15/18  0555   TSH mIU/mL 1.760           Lab Results  Lab Value Date/Time   TROPONINT 0.020 03/12/2018 1304   TROPONINT <0.010 11/30/2016 1946           Results from last 7 days  Lab Units 03/12/18  1304   ALK PHOS U/L 97   BILIRUBIN mg/dL 0.8   ALT (SGPT) U/L 20   AST (SGOT) U/L 27       Results from last 7 days  Lab Units 03/15/18  0555   TSH mIU/mL 1.760         No results found for: POCGLU        Patient Active Problem List   Diagnosis Code   • Lumbar compression fracture S32.000A   • Osteoporosis M81.0   • Dehydration E86.0   • Acute on chronic renal failure N17.9, N18.9   • Type 2 diabetes mellitus without complication E11.9   • Benign essential hypertension I10   • Weight loss, abnormal R63.4   • Trouble swallowing R13.10   • Hyponatremia E87.1   • Hypokalemia E87.6   • Aspiration pneumonia J69.0       Assessment:  Active Hospital Problems (** Indicates Principal Problem)    Diagnosis Date Noted   • **Dehydration [E86.0] 03/12/2018   • Aspiration pneumonia [J69.0] 03/15/2018   • Acute on chronic renal failure [N17.9, N18.9] 03/12/2018   • Type 2 diabetes mellitus without complication [E11.9] 03/12/2018   • Benign essential hypertension [I10] 03/12/2018   • Weight loss, abnormal [R63.4] 03/12/2018   • Trouble swallowing [R13.10] 03/12/2018   •  Hyponatremia [E87.1] 03/12/2018   • Hypokalemia [E87.6] 03/12/2018      Resolved Hospital Problems    Diagnosis Date Noted Date Resolved   No resolved problems to display.       Plan:  Continue IVF , replace K.  Dysphagia diet. ??? [place] poor. Antibiotics.    Kp Sky MD  3/16/2018  12:31 PM

## 2018-03-16 NOTE — PLAN OF CARE
Problem: Patient Care Overview  Goal: Plan of Care Review  Outcome: Ongoing (interventions implemented as appropriate)   03/16/18 3359   Coping/Psychosocial   Plan of Care Reviewed With patient   OTHER   Outcome Summary Pt able to ambulate short distance w/ PT. Further mobility limited due to fatigue and weakness. Cueing required for safety and to stay close to AD.

## 2018-03-16 NOTE — THERAPY TREATMENT NOTE
Acute Care - Physical Therapy Treatment Note  [facility]     Patient Name: Sam Flores  : 1934  MRN: [id number]  Today's Date: 3/16/2018     Date of Referral to PT: 18  Referring Physician: Noah    Admit Date: 3/12/2018    Visit Dx:    ICD-10-CM ICD-9-CM   1. Dehydration E86.0 276.51   2. Renal insufficiency N28.9 593.9   3. Dementia with behavioral disturbance, unspecified dementia type F03.91 294.21   4. Impaired functional mobility, balance, gait, and endurance Z74.09 V49.89     Patient Active Problem List   Diagnosis   • Lumbar compression fracture   • Osteoporosis   • Dehydration   • Acute on chronic renal failure   • Type 2 diabetes mellitus without complication   • Benign essential hypertension   • Weight loss, abnormal   • Trouble swallowing   • Hyponatremia   • Hypokalemia   • Aspiration pneumonia       Therapy Treatment    Therapy Treatment / Health Promotion    Treatment Time/Intention  Discipline: physical therapy assistant (Jillian Byrne PTA)  Document Type: therapy note (daily note) (Jillian Byrne PTA)  Subjective Information: no complaints (Jillian Byrne PTA)  Patient/Family Observations: Upon PT entering room, pt was found completely saturated w/ red marks on back from not being moved. Called out for NA and notified RN Christophe.   (Jillian Byrne PTA)  Patient Effort: fair (Jillian yBrne PTA)  Existing Precautions/Restrictions: fall (Jillian Byrne PTA)  Plan of Care Review  Plan of Care Reviewed With: patient (Jillian Byrne PTA)    Vitals/Pain/Safety  Pain Scale: Numbers Pre/Post-Treatment  Pain Scale: Numbers, Pretreatment: 0/10 - no pain (Jillian Byrne PTA)  Safety Issues, Functional Mobility  Safety Issues Affecting Function (Mobility): at risk behavior observed, insight into deficits/self awareness, judgment, safety precaution awareness, safety precautions follow-through/compliance (Jillian Byrne PTA)  Impairments Affecting Function (Mobility):  cognition, endurance/activity tolerance, strength (Jillian Byrne, PTA)  Positioning and Restraints  Pre-Treatment Position: in bed (Jillian Byrne PTA)  Post Treatment Position: chair (Jillian Byrne PTA)  In Chair: reclined, call light within reach, encouraged to call for assist, exit alarm on, notified nsg, with nsg (NA present) (Jillian Byrne, PTA)    Mobility,ADL,Motor, Modality  Bed Mobility Assessment/Treatment  Supine-Sit [place] (Bed Mobility): minimum assist (75% patient effort), verbal cues, nonverbal cues (demo/gesture) (Jillian Byrne, PTA)  Sit-Supine [place] (Bed Mobility): not tested (Pt up in chair) (Jillian Byrne PTA)  Transfer Assessment/Treatment  Transfer Assessment/Treatment: sit-stand transfer, stand-sit transfer (Jillian Byrne, PTA)  Sit-Stand Transfer  Sit-Stand [place] (Transfers): contact guard, verbal cues (Jillian Byrne PTA)  Assistive Device (Sit-Stand Transfers): walker, front-wheeled (Jillian Byrne, PTA)  Stand-Sit Transfer  Stand-Sit [place] (Transfers): contact guard, 2 person assist, verbal cues (Jillian Byrne, PTA)  Assistive Device (Stand-Sit Transfers): walker, front-wheeled (Jillian Byrne, PTA)  Gait/Stairs Assessment/Training  [place] Level (Gait): contact guard, 2 person assist, verbal cues (Jillian Byrne, PTA)  Assistive Device (Gait): walker, front-wheeled (Jillian Byrne, PTA)  Distance in Feet (Gait): 20 (Jillian Byrne, PTA)  Pattern (Gait): swing-to (Jillian Byrne, PTA)  Deviations/Abnormal Patterns (Gait): olvin decreased, gait speed decreased, stride length decreased (forward flexed/kyphotic posture) (Jillian Byrne, PTA)  Comment (Gait/Stairs): Declined further ambulation due to fatigue (Jillian Byrne, PTA)                 ROM/MMT             Sensory, Edema, Orthotics          Cognition, Communication, Swallow  Cognitive Assessment/Intervention- PT/OT  Orientation Status (Cognition): oriented to, person  (Jillian Byrne, GILLIAN)  Follows Commands (Cognition): 75-90% accuracy, physical/tactile prompts required, verbal cues/prompting required (Jillian Byrne PTA)  Safety Deficit (Cognitive): moderate deficit, at risk behavior observed, safety precautions awareness (Jillian Byrne, GILLIAN)  Personal Safety Interventions: fall prevention program maintained, gait belt, nonskid shoes/slippers when out of bed (Jillian Byrne PTA)    Outcome Summary               PT Rehab Goals     Row Name 03/13/18 1400             Bed Mobility Goal 1 (PT)    Activity/Assistive Device (Bed Mobility Goal 1, PT) bed mobility activities, all  -MA      [place] Level/Cues Needed (Bed Mobility Goal 1, PT) independent  -MA      Time Frame (Bed Mobility Goal 1, PT) 1 week  -MA         Transfer Goal 1 (PT)    Activity/Assistive Device (Transfer Goal 1, PT) transfers, all  -MA      [place] Level/Cues Needed (Transfer Goal 1, PT) supervision required  -MA      Time Frame (Transfer Goal 1, PT) 1 week  -MA         Gait Training Goal 1 (PT)    [place] Level (Gait Training Goal 1, PT) supervision required  -MA      Distance (Gait Goal 1, PT) 150  -MA      Time Frame (Gait Training Goal 1, PT) 1 week  -MA        User Key  (r) = Recorded By, (t) = Taken By, (c) = Cosigned By    Initials Name Provider Type    MARBELLA Stout, PT Physical Therapist          Physical Therapy Education     Title: PT OT SLP Therapies (Active)     Topic: Physical Therapy (Active)     Point: Mobility training (Active)    Learning Progress Summary     Learner Status Readiness Method Response Comment Documented by    Patient Active Acceptance E,REBECA,D NR  RW 03/16/18 1643     Done Acceptance REBECA FRAIRE DU CW 03/15/18 1544     Done Acceptance REBECA FRAIRE DU CW 03/14/18 1502     Active Acceptance E NR  MA 03/13/18 1441          Point: Home exercise program (Done)    Learning Progress Summary     Learner Status Readiness Method Response Comment Documented by    Patient  Done Acceptance E,TB VU,DU  CW 03/15/18 1544     Done Acceptance E,TB VU,DU  CW 03/14/18 1502          Point: Body mechanics (Active)    Learning Progress Summary     Learner Status Readiness Method Response Comment Documented by    Patient Active Acceptance E,TB,D NR   03/16/18 1643     Done Acceptance E,TB VU,DU  CW 03/15/18 1544     Done Acceptance E,TB VU,DU  CW 03/14/18 1502     Active Acceptance E NR  MA 03/13/18 1441          Point: Precautions (Active)    Learning Progress Summary     Learner Status Readiness Method Response Comment Documented by    Patient Active Acceptance E,TB,D NR   03/16/18 1643     Done Acceptance E,TB VU,DU  CW 03/15/18 1544     Done Acceptance E,TB VU,DU  CW 03/14/18 1502     Active Acceptance E NR  MA 03/13/18 1441                      User Key     Initials Effective Dates Name Provider Type Discipline     03/07/18 -  Jillian Byrne, PTA Physical Therapy Assistant PT    MA 03/07/18 -  Angelica Stout, PT Physical Therapist PT     03/07/18 -  Gilberto Maravilla, GILLIAN Physical Therapy Assistant PT                    PT Recommendation and Plan     Plan of Care Reviewed With: patient  Outcome Summary: Pt able to ambulate short distance w/ PT. Further mobility limited due to fatigue and weakness. Cueing required for safety and to stay close to AD.          Outcome Measures     Row Name 03/16/18 1635 03/15/18 1500 03/14/18 1500       How much help from another person do you currently need...    Turning from your back to your side while in flat bed without using bedrails? 3  -RW 3  -CW 3  -CW    Moving from lying on back to sitting on the side of a flat bed without bedrails? 3  -RW 3  -CW 3  -CW    Moving to and from a bed to a chair (including a wheelchair)? 3  -RW 3  -CW 3  -CW    Standing up from a chair using your arms (e.g., wheelchair, bedside chair)? 3  -RW 3  -CW 3  -CW    Climbing 3-5 steps with a railing? 2  -RW 2  -CW 2  -CW    To walk in hospital room? 3  -RW 3  -CW  3  -CW    AM-PAC 6 Clicks Score 17  -RW 17  -CW 17  -CW       Functional Assessment    Outcome Measure Options AM-PAC 6 Clicks Basic Mobility (PT)  -RW AM-PAC 6 Clicks Basic Mobility (PT)  -CW AM-PAC 6 Clicks Basic Mobility (PT)  -CW      User Key  (r) = Recorded By, (t) = Taken By, (c) = Cosigned By    Initials Name Provider Type     Jillian Byrne PTA Physical Therapy Assistant     Gilberto Maravilla PTA Physical Therapy Assistant           Time Calculation:         PT Charges     Row Name 03/16/18 1631             Time Calculation    Start Time 1628  -RW      Stop Time 1642  -RW      Time Calculation (min) 14 min  -RW      PT Received On 03/16/18  -RW      PT - Next Appointment 03/17/18  -        User Key  (r) = Recorded By, (t) = Taken By, (c) = Cosigned By    Initials Name Provider Type     Jillian Byrne PTA Physical Therapy Assistant          Therapy Charges for Today     Code Description Service Date Service Provider Modifiers Qty    72166759262 HC PT THER PROC EA 15 MIN 3/16/2018 Jillian Byrne PTA GP 1    95462856031 HC PT THER SUPP EA 15 MIN 3/16/2018 Jillian Byrne PTA GP 1          PT G-Codes  Outcome Measure Options: AM-PAC 6 Clicks Basic Mobility (PT)    Jillian Byrne PTA  3/16/2018

## 2018-03-16 NOTE — PLAN OF CARE
Problem: Patient Care Overview  Goal: Plan of Care Review  Outcome: Ongoing (interventions implemented as appropriate)   03/15/18 1816 03/15/18 2105 03/16/18 0638   Coping/Psychosocial   Plan of Care Reviewed With --  patient --    Plan of Care Review   Progress no change --  --    OTHER   Outcome Summary --  --  Patient is on potassium protocol. No family at bedside. Encouraged to used incentive spirometer. Patient started having productive cough. BP is on the low side this morning. Will continue to monitor vital signs and comfort.     Goal: Individualization and Mutuality  Outcome: Ongoing (interventions implemented as appropriate)    Goal: Discharge Needs Assessment  Outcome: Ongoing (interventions implemented as appropriate)      Problem: Fall Risk (Adult)  Goal: Absence of Fall  Outcome: Ongoing (interventions implemented as appropriate)      Problem: Skin Injury Risk (Adult)  Goal: Skin Health and Integrity  Outcome: Ongoing (interventions implemented as appropriate)      Problem: Fluid Volume Deficit (Adult)  Goal: Optimal Fluid Balance  Outcome: Ongoing (interventions implemented as appropriate)      Problem: Nutrition, Imbalanced: Inadequate Oral Intake (Adult)  Goal: Improved Oral Intake  Outcome: Ongoing (interventions implemented as appropriate)    Goal: Prevent Further Weight Loss  Outcome: Ongoing (interventions implemented as appropriate)

## 2018-03-16 NOTE — PROGRESS NOTES
"   LOS: 4 days    Patient Care Team:  Joann Prabhakar MD as PCP - General (Family Medicine)    Chief Complaint:    Chief Complaint   Patient presents with   • Altered Mental Status     FAMILY REPORTS PT IS BECOMING MORE CONFUSED, NOT EATING AND UNABLE TO CONTROL HER BLADDER; PT WITH NO COMPLAINTS AND DOES NOT KNOW WHY SHE IS HERE     Follow UP Matthew  Subjective     Interval History:     Follow up Matthew.   Eating better. Congested cough.   No distress.     Review of Systems:   Not reliable.     Objective     Vital Signs  Temp:  [96.8 °F (36 °C)-99.2 °F (37.3 °C)] 97.3 °F (36.3 °C)  Heart Rate:  [81-97] 95  Resp:  [14-20] 20  BP: ()/(40-62) 104/61    Flowsheet Rows    Flowsheet Row First Filed Value   Admission Height 149.9 cm (59\") Documented at 03/12/2018 1238   Admission Weight 40.8 kg (90 lb) Documented at 03/12/2018 1305          I/O this shift:  In: 764.9 [P.O.:540; I.V.:224.9]  Out: -   I/O last 3 completed shifts:  In: 3940 [P.O.:1760; I.V.:2025; IV Piggyback:155]  Out: 0     Intake/Output Summary (Last 24 hours) at 03/16/18 1801  Last data filed at 03/16/18 1751   Gross per 24 hour   Intake           1159.9 ml   Output                0 ml   Net           1159.9 ml       Physical Exam:  Elderly Frail WF.   Heart RRR no s3 or rub.   Lungs upper airway rhonchi, no wheezes.    Abd + bs soft, nontender.   Ext no edema lower ext.    Skin warm and dry.   Sitting in chair. No distress.     Results Review:      Results from last 7 days  Lab Units 03/16/18  0656 03/15/18  2345 03/15/18  0555  03/14/18  0552  03/12/18  1304   SODIUM mmol/L 133*  --  136  --  134*  < > 132*   POTASSIUM mmol/L 3.7 3.4* 3.4*  < > 2.1*  < > 3.0*   CHLORIDE mmol/L 95*  --  100  --  94*  < > 74*   CO2 mmol/L 27.2  --  24.6  --  27.6  < > 28.6   BUN mg/dL 22  --  29*  --  42*  < > 87*   CREATININE mg/dL 1.17*  --  1.30*  --  1.39*  < > 2.58*   CALCIUM mg/dL 7.4*  --  7.6*  --  7.6*  < > 9.9   BILIRUBIN mg/dL  --   --   --   --   --  "  --  0.8   ALK PHOS U/L  --   --   --   --   --   --  97   ALT (SGPT) U/L  --   --   --   --   --   --  20   AST (SGOT) U/L  --   --   --   --   --   --  27   GLUCOSE mg/dL 154*  --  223*  --  131*  < > 318*   < > = values in this interval not displayed.    Estimated Creatinine Clearance: 24 mL/min (by C-G formula based on SCr of 1.17 mg/dL (H)).      Results from last 7 days  Lab Units 03/16/18  0656 03/15/18  0555 03/14/18  0552 03/13/18  0613   MAGNESIUM mg/dL  --  1.7 1.8 2.3   PHOSPHORUS mg/dL 1.6* 0.9* 1.9* 2.8         Results from last 7 days  Lab Units 03/12/18  1304   URIC ACID mg/dL 22.7*         Results from last 7 days  Lab Units 03/16/18  0656 03/15/18  0555 03/14/18  0552 03/13/18  0613 03/12/18  1304   WBC 10*3/mm3 11.56* 10.29 9.51 14.25* 14.84*   HEMOGLOBIN g/dL 12.3 12.3 12.1 14.5 17.2*   PLATELETS 10*3/mm3 191 204 189 253 293               Imaging Results (last 24 hours)     ** No results found for the last 24 hours. **          aspirin 81 mg Oral Daily   clindamycin 300 mg Intravenous Q8H   donepezil 10 mg Oral Nightly   ipratropium-albuterol 3 mL Nebulization 4x Daily - RT   levothyroxine 50 mcg Oral Daily   memantine 10 mg Oral Daily   polyethylene glycol 17 g Oral Daily   sennosides-docusate sodium 2 tablet Oral Nightly          Medication Review:   Current Facility-Administered Medications   Medication Dose Route Frequency Provider Last Rate Last Dose   • acetaminophen (TYLENOL) tablet 650 mg  650 mg Oral Q4H PRN Raciel Zamora MD       • aspirin EC tablet 81 mg  81 mg Oral Daily Raciel Zamora MD   81 mg at 03/16/18 0821   • bisacodyl (DULCOLAX) EC tablet 5 mg  5 mg Oral Daily PRN Raciel Zamora MD       • bisacodyl (DULCOLAX) suppository 10 mg  10 mg Rectal Daily PRN Raciel Zamora MD       • clindamycin (CLEOCIN) IVPB 300 mg  300 mg Intravenous Q8H Kp Sky MD   300 mg at 03/16/18 1212   • donepezil (ARICEPT) tablet 10 mg  10 mg Oral Nightly Raciel Zamora MD    10 mg at 03/15/18 2113   • ipratropium-albuterol (DUO-NEB) nebulizer solution 3 mL  3 mL Nebulization 4x Daily - RT Kp Sky MD   3 mL at 03/16/18 1547   • levothyroxine (SYNTHROID, LEVOTHROID) tablet 50 mcg  50 mcg Oral Daily Raciel Zamora MD   50 mcg at 03/16/18 0821   • memantine (NAMENDA) tablet 10 mg  10 mg Oral Daily Raciel Zamora MD   10 mg at 03/16/18 0821   • ondansetron (ZOFRAN) tablet 4 mg  4 mg Oral Q6H PRN Raciel Zamora MD        Or   • ondansetron ODT (ZOFRAN-ODT) disintegrating tablet 4 mg  4 mg Oral Q6H PRN Raciel Zamora MD        Or   • ondansetron (ZOFRAN) injection 4 mg  4 mg Intravenous Q6H PRN Raciel Zamora MD       • polyethylene glycol 3350 powder (packet)  17 g Oral Daily Kp Sky MD   17 g at 03/16/18 1409   • potassium chloride (MICRO-K) CR capsule 40 mEq  40 mEq Oral PRN Kp Sky MD   40 mEq at 03/14/18 1848    Or   • potassium chloride (KLOR-CON) packet 40 mEq  40 mEq Oral PRN Kp kSy MD        Or   • potassium chloride 10 mEq in 100 mL IVPB  10 mEq Intravenous Q1H PRN Kp Sky  mL/hr at 03/16/18 0611 10 mEq at 03/16/18 0611   • sennosides-docusate sodium (SENOKOT-S) 8.6-50 MG tablet 2 tablet  2 tablet Oral Nightly Kp Sky MD       • sodium chloride 0.9 % flush 1-10 mL  1-10 mL Intravenous PRN Raciel Zamora MD           Assessment/Plan   1. [place].  Improving.  Volume status better . off IVF, tolerating po.  2. Profound hypokalemia.  Not clear where loss is. Better with replacement. per protocol.    3. DM2  4. Dementia  5. Dysphagia  6. Hypophosphatemia. Replaced po .   7. Hyperuricemia.   8. Hypothyroid on replacement. Compensated.   Will follow.     Robin Youssef MD  03/16/18  6:01 PM

## 2018-03-16 NOTE — PLAN OF CARE
Problem: Patient Care Overview  Goal: Plan of Care Review  Outcome: Ongoing (interventions implemented as appropriate)   03/16/18 1810   Coping/Psychosocial   Plan of Care Reviewed With patient   Plan of Care Review   Progress no change   OTHER   Outcome Summary First dose of miralax given for constipation. Pt worked with PT and participated in care. Pt continues to have productive cough. Pt had an episode of low BP that rebounded without intervention. Will continue to monitor.      Goal: Individualization and Mutuality  Outcome: Ongoing (interventions implemented as appropriate)    Goal: Discharge Needs Assessment  Outcome: Ongoing (interventions implemented as appropriate)    Goal: Interprofessional Rounds/Family Conf  Outcome: Ongoing (interventions implemented as appropriate)      Problem: Fall Risk (Adult)  Goal: Absence of Fall  Outcome: Ongoing (interventions implemented as appropriate)      Problem: Skin Injury Risk (Adult)  Goal: Skin Health and Integrity  Outcome: Ongoing (interventions implemented as appropriate)      Problem: Fluid Volume Deficit (Adult)  Goal: Optimal Fluid Balance  Outcome: Ongoing (interventions implemented as appropriate)      Problem: Nutrition, Imbalanced: Inadequate Oral Intake (Adult)  Goal: Improved Oral Intake  Outcome: Ongoing (interventions implemented as appropriate)    Goal: Prevent Further Weight Loss  Outcome: Ongoing (interventions implemented as appropriate)

## 2018-03-17 LAB
ALBUMIN SERPL-MCNC: 2.8 G/DL (ref 3.5–5.2)
ANION GAP SERPL CALCULATED.3IONS-SCNC: 11.4 MMOL/L
BASOPHILS # BLD AUTO: 0.02 10*3/MM3 (ref 0–0.2)
BASOPHILS NFR BLD AUTO: 0.2 % (ref 0–1.5)
BUN BLD-MCNC: 23 MG/DL (ref 8–23)
BUN/CREAT SERPL: 19.2 (ref 7–25)
CALCIUM SPEC-SCNC: 8 MG/DL (ref 8.6–10.5)
CHLORIDE SERPL-SCNC: 95 MMOL/L (ref 98–107)
CO2 SERPL-SCNC: 26.6 MMOL/L (ref 22–29)
CREAT BLD-MCNC: 1.2 MG/DL (ref 0.57–1)
DEPRECATED RDW RBC AUTO: 45.8 FL (ref 37–54)
EOSINOPHIL # BLD AUTO: 0.4 10*3/MM3 (ref 0–0.7)
EOSINOPHIL NFR BLD AUTO: 3.1 % (ref 0.3–6.2)
ERYTHROCYTE [DISTWIDTH] IN BLOOD BY AUTOMATED COUNT: 13.6 % (ref 11.7–13)
GFR SERPL CREATININE-BSD FRML MDRD: 43 ML/MIN/1.73
GLUCOSE BLD-MCNC: 171 MG/DL (ref 65–99)
HCT VFR BLD AUTO: 37 % (ref 35.6–45.5)
HGB BLD-MCNC: 12.6 G/DL (ref 11.9–15.5)
IMM GRANULOCYTES # BLD: 0.04 10*3/MM3 (ref 0–0.03)
IMM GRANULOCYTES NFR BLD: 0.3 % (ref 0–0.5)
LYMPHOCYTES # BLD AUTO: 2.46 10*3/MM3 (ref 0.9–4.8)
LYMPHOCYTES NFR BLD AUTO: 18.8 % (ref 19.6–45.3)
MAGNESIUM SERPL-MCNC: 1.7 MG/DL (ref 1.6–2.4)
MCH RBC QN AUTO: 31.5 PG (ref 26.9–32)
MCHC RBC AUTO-ENTMCNC: 34.1 G/DL (ref 32.4–36.3)
MCV RBC AUTO: 92.5 FL (ref 80.5–98.2)
MONOCYTES # BLD AUTO: 1.26 10*3/MM3 (ref 0.2–1.2)
MONOCYTES NFR BLD AUTO: 9.6 % (ref 5–12)
NEUTROPHILS # BLD AUTO: 8.91 10*3/MM3 (ref 1.9–8.1)
NEUTROPHILS NFR BLD AUTO: 68 % (ref 42.7–76)
PHOSPHATE SERPL-MCNC: 2.6 MG/DL (ref 2.5–4.5)
PLATELET # BLD AUTO: 210 10*3/MM3 (ref 140–500)
PMV BLD AUTO: 10.5 FL (ref 6–12)
POTASSIUM BLD-SCNC: 3.4 MMOL/L (ref 3.5–5.2)
RBC # BLD AUTO: 4 10*6/MM3 (ref 3.9–5.2)
SODIUM BLD-SCNC: 133 MMOL/L (ref 136–145)
WBC NRBC COR # BLD: 13.09 10*3/MM3 (ref 4.5–10.7)

## 2018-03-17 PROCEDURE — 80069 RENAL FUNCTION PANEL: CPT | Performed by: HOSPITALIST

## 2018-03-17 PROCEDURE — 97110 THERAPEUTIC EXERCISES: CPT

## 2018-03-17 PROCEDURE — 84132 ASSAY OF SERUM POTASSIUM: CPT | Performed by: HOSPITALIST

## 2018-03-17 PROCEDURE — 94799 UNLISTED PULMONARY SVC/PX: CPT

## 2018-03-17 PROCEDURE — 85025 COMPLETE CBC W/AUTO DIFF WBC: CPT | Performed by: HOSPITALIST

## 2018-03-17 PROCEDURE — 83735 ASSAY OF MAGNESIUM: CPT | Performed by: INTERNAL MEDICINE

## 2018-03-17 RX ORDER — POTASSIUM CHLORIDE 750 MG/1
40 CAPSULE, EXTENDED RELEASE ORAL DAILY
Status: DISCONTINUED | OUTPATIENT
Start: 2018-03-17 | End: 2018-03-18

## 2018-03-17 RX ADMIN — POTASSIUM CHLORIDE 40 MEQ: 750 CAPSULE, EXTENDED RELEASE ORAL at 13:30

## 2018-03-17 RX ADMIN — CLINDAMYCIN PHOSPHATE 300 MG: 6 INJECTION, SOLUTION INTRAMUSCULAR; INTRAVENOUS at 14:36

## 2018-03-17 RX ADMIN — CLINDAMYCIN PHOSPHATE 300 MG: 6 INJECTION, SOLUTION INTRAMUSCULAR; INTRAVENOUS at 22:09

## 2018-03-17 RX ADMIN — IPRATROPIUM BROMIDE AND ALBUTEROL SULFATE 3 ML: .5; 3 SOLUTION RESPIRATORY (INHALATION) at 10:36

## 2018-03-17 RX ADMIN — GUAIFENESIN AND DEXTROMETHORPHAN 5 ML: 100; 10 SYRUP ORAL at 19:21

## 2018-03-17 RX ADMIN — POLYETHYLENE GLYCOL (3350) 17 G: 17 POWDER, FOR SOLUTION ORAL at 08:19

## 2018-03-17 RX ADMIN — CLINDAMYCIN PHOSPHATE 300 MG: 6 INJECTION, SOLUTION INTRAMUSCULAR; INTRAVENOUS at 05:44

## 2018-03-17 RX ADMIN — LEVOTHYROXINE SODIUM 50 MCG: 50 TABLET ORAL at 08:19

## 2018-03-17 RX ADMIN — ACETAMINOPHEN 650 MG: 325 TABLET ORAL at 23:15

## 2018-03-17 RX ADMIN — POTASSIUM CHLORIDE 40 MEQ: 750 CAPSULE, EXTENDED RELEASE ORAL at 19:22

## 2018-03-17 RX ADMIN — GUAIFENESIN AND DEXTROMETHORPHAN 5 ML: 100; 10 SYRUP ORAL at 01:39

## 2018-03-17 RX ADMIN — IPRATROPIUM BROMIDE AND ALBUTEROL SULFATE 3 ML: .5; 3 SOLUTION RESPIRATORY (INHALATION) at 15:12

## 2018-03-17 RX ADMIN — DONEPEZIL HYDROCHLORIDE 10 MG: 10 TABLET, FILM COATED ORAL at 21:11

## 2018-03-17 RX ADMIN — DOCUSATE SODIUM -SENNOSIDES 2 TABLET: 50; 8.6 TABLET, COATED ORAL at 21:11

## 2018-03-17 RX ADMIN — MEMANTINE HYDROCHLORIDE 10 MG: 10 TABLET, FILM COATED ORAL at 08:19

## 2018-03-17 RX ADMIN — ASPIRIN 81 MG: 81 TABLET ORAL at 08:19

## 2018-03-17 RX ADMIN — IPRATROPIUM BROMIDE AND ALBUTEROL SULFATE 3 ML: .5; 3 SOLUTION RESPIRATORY (INHALATION) at 19:36

## 2018-03-17 RX ADMIN — IPRATROPIUM BROMIDE AND ALBUTEROL SULFATE 3 ML: .5; 3 SOLUTION RESPIRATORY (INHALATION) at 06:56

## 2018-03-17 NOTE — PLAN OF CARE
Problem: Patient Care Overview  Goal: Plan of Care Review  Outcome: Ongoing (interventions implemented as appropriate)   03/16/18 1810 03/16/18 2043 03/17/18 0547   Coping/Psychosocial   Plan of Care Reviewed With --  patient --    Plan of Care Review   Progress no change --  --    OTHER   Outcome Summary --  --  Patient is encouraged to use her incentive spirometer. Patient is on IV antibiotics. Patient was medicated with PRN Robitussin for her cough. Patient's BP runs low. Will continue to monitor vital signs, labs and comfort.     Goal: Individualization and Mutuality  Outcome: Ongoing (interventions implemented as appropriate)    Goal: Discharge Needs Assessment  Outcome: Ongoing (interventions implemented as appropriate)      Problem: Fall Risk (Adult)  Goal: Absence of Fall  Outcome: Ongoing (interventions implemented as appropriate)      Problem: Skin Injury Risk (Adult)  Goal: Skin Health and Integrity  Outcome: Ongoing (interventions implemented as appropriate)      Problem: Fluid Volume Deficit (Adult)  Goal: Optimal Fluid Balance  Outcome: Ongoing (interventions implemented as appropriate)      Problem: Nutrition, Imbalanced: Inadequate Oral Intake (Adult)  Goal: Improved Oral Intake  Outcome: Ongoing (interventions implemented as appropriate)    Goal: Prevent Further Weight Loss  Outcome: Ongoing (interventions implemented as appropriate)

## 2018-03-17 NOTE — PLAN OF CARE
Problem: Patient Care Overview  Goal: Plan of Care Review  Outcome: Ongoing (interventions implemented as appropriate)   03/17/18 1812   Coping/Psychosocial   Plan of Care Reviewed With patient   Plan of Care Review   Progress no change   OTHER   Outcome Summary encouraged IS, got a shower, sat up in chair today, continue antibx and encourage po intake will monitor closely     Goal: Individualization and Mutuality  Outcome: Ongoing (interventions implemented as appropriate)      Problem: Fall Risk (Adult)  Goal: Absence of Fall  Outcome: Ongoing (interventions implemented as appropriate)      Problem: Skin Injury Risk (Adult)  Goal: Skin Health and Integrity  Outcome: Ongoing (interventions implemented as appropriate)      Problem: Fluid Volume Deficit (Adult)  Goal: Optimal Fluid Balance  Outcome: Ongoing (interventions implemented as appropriate)      Problem: Nutrition, Imbalanced: Inadequate Oral Intake (Adult)  Goal: Improved Oral Intake  Outcome: Ongoing (interventions implemented as appropriate)

## 2018-03-17 NOTE — PROGRESS NOTES
"DAILY PROGRESS NOTE  [facility]    Patient Identification:  Name: Sam Flores  Age: 83 y.o.  Sex: female  :  1934  MRN: [id number]         Primary Care Physician: Joann Prabhakar MD    Subjective:  Interval History:She feels better today. Still coughing a lot.    Objective:    Scheduled Meds:    aspirin 81 mg Oral Daily   clindamycin 300 mg Intravenous Q8H   donepezil 10 mg Oral Nightly   ipratropium-albuterol 3 mL Nebulization 4x Daily - RT   levothyroxine 50 mcg Oral Daily   memantine 10 mg Oral Daily   polyethylene glycol 17 g Oral Daily   sennosides-docusate sodium 2 tablet Oral Nightly     Continuous Infusions:       Vital signs in last 24 hours:  Temp:  [97.3 °F (36.3 °C)-98.9 °F (37.2 °C)] 97.3 °F (36.3 °C)  Heart Rate:  [] 91  Resp:  [16-20] 18  BP: (104-128)/(61-76) 112/68    Intake/Output:    Intake/Output Summary (Last 24 hours) at 18 1200  Last data filed at 18 1751   Gross per 24 hour   Intake            593.2 ml   Output                0 ml   Net            593.2 ml       Exam:  /68 (BP Location: Left arm, Patient Position: Lying)   Pulse 91   Temp 97.3 °F (36.3 °C) (Oral)   Resp 18   Ht 149.9 cm (59\")   Wt 41.7 kg (91 lb 14.4 oz)   SpO2 93%   BMI 18.56 kg/m²     General Appearance:    Alert, cooperative, no distress   Head:    Normocephalic, without obvious abnormality, atraumatic   Eyes:       Throat:   Lips, tongue, gums normal   Neck:   Supple, symmetrical, trachea midline, no JVD   Lungs:     rhonchi bilaterally, respirations unlabored   Chest Wall:    No tenderness or deformity    Heart:    Regular rate and rhythm, S1 and S2 normal, no murmur,no  Rub or gallop   Abdomen:     Soft, non-tender, bowel sounds active, no masses, no organomegaly    Extremities:   Extremities normal, atraumatic, no cyanosis or edema   Pulses:      Skin:   Skin is warm and dry,  no rashes or palpable lesions   Neurologic:   no focal deficits noted    "   [unfilled]  Data Review:    Results from last 7 days  Lab Units 03/17/18  0739 03/16/18  0656 03/15/18  2345 03/15/18  0555   SODIUM mmol/L 133* 133*  --  136   POTASSIUM mmol/L 3.4* 3.7 3.4* 3.4*   CHLORIDE mmol/L 95* 95*  --  100   CO2 mmol/L 26.6 27.2  --  24.6   BUN mg/dL 23 22  --  29*   CREATININE mg/dL 1.20* 1.17*  --  1.30*   GLUCOSE mg/dL 171* 154*  --  223*   CALCIUM mg/dL 8.0* 7.4*  --  7.6*       Results from last 7 days  Lab Units 03/17/18  0740 03/16/18  0656 03/15/18  0555   WBC 10*3/mm3 13.09* 11.56* 10.29   HEMOGLOBIN g/dL 12.6 12.3 12.3   HEMATOCRIT % 37.0 36.7 37.4   PLATELETS 10*3/mm3 210 191 204       Results from last 7 days  Lab Units 03/15/18  0555   TSH mIU/mL 1.760           Lab Results  Lab Value Date/Time   TROPONINT 0.020 03/12/2018 1304   TROPONINT <0.010 11/30/2016 1946           Results from last 7 days  Lab Units 03/12/18  1304   ALK PHOS U/L 97   BILIRUBIN mg/dL 0.8   ALT (SGPT) U/L 20   AST (SGOT) U/L 27       Results from last 7 days  Lab Units 03/15/18  0555   TSH mIU/mL 1.760         No results found for: POCGLU        Patient Active Problem List   Diagnosis Code   • Lumbar compression fracture S32.000A   • Osteoporosis M81.0   • Dehydration E86.0   • Acute on chronic renal failure N17.9, N18.9   • Type 2 diabetes mellitus without complication E11.9   • Benign essential hypertension I10   • Weight loss, abnormal R63.4   • Trouble swallowing R13.10   • Hyponatremia E87.1   • Hypokalemia E87.6   • Aspiration pneumonia J69.0       Assessment:  Active Hospital Problems (** Indicates Principal Problem)    Diagnosis Date Noted   • **Dehydration [E86.0] 03/12/2018   • Aspiration pneumonia [J69.0] 03/15/2018   • Acute on chronic renal failure [N17.9, N18.9] 03/12/2018   • Type 2 diabetes mellitus without complication [E11.9] 03/12/2018   • Benign essential hypertension [I10] 03/12/2018   • Weight loss, abnormal [R63.4] 03/12/2018   • Trouble swallowing [R13.10] 03/12/2018   •  Hyponatremia [E87.1] 03/12/2018   • Hypokalemia [E87.6] 03/12/2018      Resolved Hospital Problems    Diagnosis Date Noted Date Resolved   No resolved problems to display.       Plan:  Off IVF , replace K.  Dysphagia diet. ??? [place] poor. Antibiotics.    Kp Sky MD  3/17/2018  12:00 PM

## 2018-03-17 NOTE — THERAPY TREATMENT NOTE
Acute Care - Physical Therapy Treatment Note  [facility]     Patient Name: Sam Flores  : 1934  MRN: [id number]  Today's Date: 3/17/2018     Date of Referral to PT: 18  Referring Physician: Noah    Admit Date: 3/12/2018    Visit Dx:    ICD-10-CM ICD-9-CM   1. Dehydration E86.0 276.51   2. Renal insufficiency N28.9 593.9   3. Dementia with behavioral disturbance, unspecified dementia type F03.91 294.21   4. Impaired functional mobility, balance, gait, and endurance Z74.09 V49.89     Patient Active Problem List   Diagnosis   • Lumbar compression fracture   • Osteoporosis   • Dehydration   • Acute on chronic renal failure   • Type 2 diabetes mellitus without complication   • Benign essential hypertension   • Weight loss, abnormal   • Trouble swallowing   • Hyponatremia   • Hypokalemia   • Aspiration pneumonia       Therapy Treatment    Therapy Treatment / Health Promotion    Treatment Time/Intention  Discipline: physical therapy assistant (Ayde Chu PTA)  Document Type: therapy note (daily note) (Ayde Chu PTA)  Subjective Information: complains of, weakness, fatigue (Ayde Cuh PTA)  Total Minutes, Physical Therapy Treatment: 15 (Ayde Chu PTA)  Therapy Frequency (PT Clinical Impression): daily (Ayde Chu PTA)  Patient Effort: good (Ayde Chu PTA)  Patient Response to Treatment: good (Ayde Chu PTA)    Vitals/Pain/Safety  Vital Signs  O2 Delivery Pre Treatment: room air (Ayde Chu PTA)  O2 Delivery Post Treatment: room air (Ayde Chu PTA)  Pain Scale: Numbers Pre/Post-Treatment  Pain Scale: Numbers, Pretreatment: 0/10 - no pain (Ayde Chu PTA)  Positioning and Restraints  Pre-Treatment Position: sitting in chair/recliner (Ayde Chu PTA)  Post Treatment Position: bed (Ayde Chu PTA)  In Bed: supine, call light within reach, exit alarm on (Ayde Chu PTA)    Mobility,ADL,Motor, Modality  Bed Mobility  Assessment/Treatment  Comment (Bed Mobility): deferred, up in chair (Ayde Chu PTA)  Transfer Assessment/Treatment  Transfer Assessment/Treatment: sit-stand transfer (Ayde Chu PTA)  Sit-Stand Transfer  Sit-Stand [place] (Transfers): minimum assist (75% patient effort) (Ayde Chu PTA)  Gait/Stairs Assessment/Training  [place] Level (Gait): minimum assist (75% patient effort) (Ayde Chu PTA)  Assistive Device (Gait): walker, front-wheeled (Ayde Chu PTA)  Distance in Feet (Gait): 30 (Ayde Chu PTA)  Deviations/Abnormal Patterns (Gait): gait speed decreased (FF pos;ture with chronic spine changes) (Ayde Chu PTA)                 ROM/MMT             Sensory, Edema, Orthotics          Cognition, Communication, Swallow  Cognitive Assessment/Intervention- PT/OT  Orientation Status (Cognition): oriented to, person, place (Ayde Chu PTA)  Follows Commands (Cognition): follows one step commands, over 90% accuracy (Ayde Chu PTA)  Safety Deficit (Cognitive): mild deficit (Ayde Chu PTA)  Personal Safety Interventions: fall prevention program maintained (Ayde Chu PTA)    Outcome Summary               PT Rehab Goals     Row Name 03/17/18 1300 03/13/18 1400          Bed Mobility Goal 1 (PT)    Activity/Assistive Device (Bed Mobility Goal 1, PT) bed mobility activities, all  -SI bed mobility activities, all  -MA     [place] Level/Cues Needed (Bed Mobility Goal 1, PT)  -- independent  -MA     Time Frame (Bed Mobility Goal 1, PT)  -- 1 week  -MA        Transfer Goal 1 (PT)    Activity/Assistive Device (Transfer Goal 1, PT)  -- transfers, all  -MA     [place] Level/Cues Needed (Transfer Goal 1, PT)  -- supervision required  -MA     Time Frame (Transfer Goal 1, PT)  -- 1 week  -MA        Gait Training Goal 1 (PT)    [place] Level (Gait Training Goal 1, PT)  -- supervision required  -MA     Distance (Gait Goal 1, PT) 35 feet progressing  -SI  150  -MA     Time Frame (Gait Training Goal 1, PT)  -- 1 week  -MA       User Key  (r) = Recorded By, (t) = Taken By, (c) = Cosigned By    Initials Name Provider Type    MARYANN Chu, PTA Physical Therapy Assistant    MARBELLA Stout, PT Physical Therapist          Physical Therapy Education     Title: PT OT SLP Therapies (Active)     Topic: Physical Therapy (Active)     Point: Mobility training (Active)    Learning Progress Summary     Learner Status Readiness Method Response Comment Documented by    Patient Active Acceptance E,TB NR positioning for comfort SI 03/17/18 1357     Active Acceptance E,TB,D NR   03/16/18 1643     Done Acceptance E,TB VU,DU  CW 03/15/18 1544     Done Acceptance E,TB VU,DU  CW 03/14/18 1502     Active Acceptance E NR  MA 03/13/18 1441    Family Active Acceptance E,TB NR positioning for comfort SI 03/17/18 1357          Point: Home exercise program (Active)    Learning Progress Summary     Learner Status Readiness Method Response Comment Documented by    Patient Active Acceptance E,TB NR positioning for comfort SI 03/17/18 1357     Done Acceptance E,TB VU,DU  CW 03/15/18 1544     Done Acceptance E,TB VU,DU  CW 03/14/18 1502    Family Active Acceptance E,TB NR positioning for comfort SI 03/17/18 1357          Point: Body mechanics (Active)    Learning Progress Summary     Learner Status Readiness Method Response Comment Documented by    Patient Active Acceptance E,TB NR positioning for comfort SI 03/17/18 1357     Active Acceptance E,TB,D NR   03/16/18 1643     Done Acceptance E,TB VU,DU  CW 03/15/18 1544     Done Acceptance E,TB VU,DU  CW 03/14/18 1502     Active Acceptance E NR  MA 03/13/18 1441    Family Active Acceptance E,TB NR positioning for comfort SI 03/17/18 1357          Point: Precautions (Active)    Learning Progress Summary     Learner Status Readiness Method Response Comment Documented by    Patient Active Acceptance E,TB NR positioning for comfort SI 03/17/18  1357     Active Acceptance E,TB,D NR   03/16/18 1643     Done Acceptance E,TB VU,DU  CW 03/15/18 1544     Done Acceptance E,TB VU,DU  CW 03/14/18 1502     Active Acceptance E NR  MA 03/13/18 1441    Family Active Acceptance E,TB NR positioning for comfort  03/17/18 1357                      User Key     Initials Effective Dates Name Provider Type Discipline     05/18/15 -  Ayde Chu, PTA Physical Therapy Assistant PT     03/07/18 -  Jillian Byrne, PTA Physical Therapy Assistant PT    MA 03/07/18 -  Angelica Stout, PT Physical Therapist PT     03/07/18 -  Gilberto Maravilla, PTA Physical Therapy Assistant PT                    PT Recommendation and Plan  Therapy Frequency (PT Clinical Impression): daily             Outcome Measures     Row Name 03/17/18 1400 03/16/18 1635 03/15/18 1500       How much help from another person do you currently need...    Turning from your back to your side while in flat bed without using bedrails? 3  -SI 3  -RW 3  -CW    Moving from lying on back to sitting on the side of a flat bed without bedrails? 3  -SI 3  -RW 3  -CW    Moving to and from a bed to a chair (including a wheelchair)? 3  -SI 3  -RW 3  -CW    Standing up from a chair using your arms (e.g., wheelchair, bedside chair)? 3  -SI 3  -RW 3  -CW    Climbing 3-5 steps with a railing? 2  -SI 2  -RW 2  -CW    To walk in hospital room? 3  -SI 3  -RW 3  -CW    AM-PAC 6 Clicks Score 17  -SI 17  -RW 17  -CW       Functional Assessment    Outcome Measure Options AM-PAC 6 Clicks Basic Mobility (PT)  -SI AM-PAC 6 Clicks Basic Mobility (PT)  -RW AM-PAC 6 Clicks Basic Mobility (PT)  -CW    Row Name 03/14/18 1500             How much help from another person do you currently need...    Turning from your back to your side while in flat bed without using bedrails? 3  -CW      Moving from lying on back to sitting on the side of a flat bed without bedrails? 3  -CW      Moving to and from a bed to a chair (including a  wheelchair)? 3  -CW      Standing up from a chair using your arms (e.g., wheelchair, bedside chair)? 3  -CW      Climbing 3-5 steps with a railing? 2  -CW      To walk in hospital room? 3  -CW      AM-PAC 6 Clicks Score 17  -CW         Functional Assessment    Outcome Measure Options AM-PAC 6 Clicks Basic Mobility (PT)  -CW        User Key  (r) = Recorded By, (t) = Taken By, (c) = Cosigned By    Initials Name Provider Type    SI Ayde Chu PTA Physical Therapy Assistant    JAVIER Byrne PTA Physical Therapy Assistant    CARLITOS Maravilla, GILLIAN Physical Therapy Assistant           Time Calculation:         PT Charges     Row Name 03/17/18 1400             Time Calculation    Start Time 1330  -SI      Stop Time 1345  -SI      Time Calculation (min) 15 min  -SI      PT Received On 03/17/18  -SI      PT - Next Appointment 03/18/18  -SI        User Key  (r) = Recorded By, (t) = Taken By, (c) = Cosigned By    Initials Name Provider Type    MARYANN Chu PTA Physical Therapy Assistant          Therapy Charges for Today     Code Description Service Date Service Provider Modifiers Qty    76094582501 HC PT THER PROC EA 15 MIN 3/17/2018 Ayde Chu PTA GP 1          PT G-Codes  Outcome Measure Options: AM-PAC 6 Clicks Basic Mobility (PT)    Ayde Chu PTA  3/17/2018

## 2018-03-17 NOTE — PROGRESS NOTES
"   LOS: 5 days    Patient Care Team:  Joann Prabhakar MD as PCP - General (Family Medicine)    Chief Complaint:    Chief Complaint   Patient presents with   • Altered Mental Status     FAMILY REPORTS PT IS BECOMING MORE CONFUSED, NOT EATING AND UNABLE TO CONTROL HER BLADDER; PT WITH NO COMPLAINTS AND DOES NOT KNOW WHY SHE IS HERE     Follow UP Matthew  Subjective     Interval History:     Follow up Matthew. Confused.  Eating. Bowels moved. Just had shower.    Congested cough.     Review of Systems:   Not reliable.     Objective     Vital Signs  Temp:  [97.3 °F (36.3 °C)-98.9 °F (37.2 °C)] 97.3 °F (36.3 °C)  Heart Rate:  [] 91  Resp:  [16-20] 18  BP: (104-128)/(61-76) 112/68    Flowsheet Rows    Flowsheet Row First Filed Value   Admission Height 149.9 cm (59\") Documented at 03/12/2018 1238   Admission Weight 40.8 kg (90 lb) Documented at 03/12/2018 1305          No intake/output data recorded.  I/O last 3 completed shifts:  In: 1159.9 [P.O.:780; I.V.:224.9; IV Piggyback:155]  Out: 0     Intake/Output Summary (Last 24 hours) at 03/17/18 1227  Last data filed at 03/16/18 1751   Gross per 24 hour   Intake            593.2 ml   Output                0 ml   Net            593.2 ml       Physical Exam:  Elderly Frail WF. Heart RRR no s3 or rub. Lungs upper airway rhonchi, no wheezes. Less congested.  Abd + bs soft, nontender. Ext trace edema lower ext.       Results Review:      Results from last 7 days  Lab Units 03/17/18  0739 03/16/18  0656 03/15/18  2345 03/15/18  0555  03/12/18  1304   SODIUM mmol/L 133* 133*  --  136  < > 132*   POTASSIUM mmol/L 3.4* 3.7 3.4* 3.4*  < > 3.0*   CHLORIDE mmol/L 95* 95*  --  100  < > 74*   CO2 mmol/L 26.6 27.2  --  24.6  < > 28.6   BUN mg/dL 23 22  --  29*  < > 87*   CREATININE mg/dL 1.20* 1.17*  --  1.30*  < > 2.58*   CALCIUM mg/dL 8.0* 7.4*  --  7.6*  < > 9.9   BILIRUBIN mg/dL  --   --   --   --   --  0.8   ALK PHOS U/L  --   --   --   --   --  97   ALT (SGPT) U/L  --   --   --  "  --   --  20   AST (SGOT) U/L  --   --   --   --   --  27   GLUCOSE mg/dL 171* 154*  --  223*  < > 318*   < > = values in this interval not displayed.    Estimated Creatinine Clearance: 23.4 mL/min (by C-G formula based on SCr of 1.2 mg/dL (H)).      Results from last 7 days  Lab Units 03/17/18  0739 03/16/18  0656 03/15/18  0555 03/14/18  0552   MAGNESIUM mg/dL 1.7  --  1.7 1.8   PHOSPHORUS mg/dL 2.6 1.6* 0.9* 1.9*         Results from last 7 days  Lab Units 03/12/18  1304   URIC ACID mg/dL 22.7*         Results from last 7 days  Lab Units 03/17/18  0740 03/16/18  0656 03/15/18  0555 03/14/18  0552 03/13/18  0613   WBC 10*3/mm3 13.09* 11.56* 10.29 9.51 14.25*   HEMOGLOBIN g/dL 12.6 12.3 12.3 12.1 14.5   PLATELETS 10*3/mm3 210 191 204 189 253               Imaging Results (last 24 hours)     ** No results found for the last 24 hours. **          aspirin 81 mg Oral Daily   clindamycin 300 mg Intravenous Q8H   donepezil 10 mg Oral Nightly   ipratropium-albuterol 3 mL Nebulization 4x Daily - RT   levothyroxine 50 mcg Oral Daily   memantine 10 mg Oral Daily   polyethylene glycol 17 g Oral Daily   sennosides-docusate sodium 2 tablet Oral Nightly          Medication Review:   Current Facility-Administered Medications   Medication Dose Route Frequency Provider Last Rate Last Dose   • acetaminophen (TYLENOL) tablet 650 mg  650 mg Oral Q4H PRN Raciel Zamora MD       • aspirin EC tablet 81 mg  81 mg Oral Daily Raciel Zamora MD   81 mg at 03/17/18 0819   • bisacodyl (DULCOLAX) EC tablet 5 mg  5 mg Oral Daily PRN Raciel Zamora MD       • bisacodyl (DULCOLAX) suppository 10 mg  10 mg Rectal Daily PRN Raciel Zamora MD       • clindamycin (CLEOCIN) IVPB 300 mg  300 mg Intravenous Q8H Kp Sky MD   300 mg at 03/17/18 0544   • donepezil (ARICEPT) tablet 10 mg  10 mg Oral Nightly Raciel Zamora MD   10 mg at 03/16/18 2043   • guaifenesin-dextromethorphan (ROBITUSSIN DM) 100-10 MG/5ML syrup 5 mL  5 mL  Oral Q8H PRN Aaron Castillo MD   5 mL at 03/17/18 0139   • ipratropium-albuterol (DUO-NEB) nebulizer solution 3 mL  3 mL Nebulization 4x Daily - RT Kp Sky MD   3 mL at 03/17/18 1036   • levothyroxine (SYNTHROID, LEVOTHROID) tablet 50 mcg  50 mcg Oral Daily Raciel Zamora MD   50 mcg at 03/17/18 0819   • memantine (NAMENDA) tablet 10 mg  10 mg Oral Daily Raciel Zamora MD   10 mg at 03/17/18 0819   • ondansetron (ZOFRAN) tablet 4 mg  4 mg Oral Q6H PRN Raciel Zamora MD        Or   • ondansetron ODT (ZOFRAN-ODT) disintegrating tablet 4 mg  4 mg Oral Q6H PRN Raciel Zamora MD        Or   • ondansetron (ZOFRAN) injection 4 mg  4 mg Intravenous Q6H PRN Raciel Zamora MD       • polyethylene glycol 3350 powder (packet)  17 g Oral Daily Kp Sky MD   17 g at 03/17/18 0819   • potassium chloride (MICRO-K) CR capsule 40 mEq  40 mEq Oral PRN Kp Sky MD   40 mEq at 03/14/18 1848    Or   • potassium chloride (KLOR-CON) packet 40 mEq  40 mEq Oral PRN Kp Sky MD        Or   • potassium chloride 10 mEq in 100 mL IVPB  10 mEq Intravenous Q1H PRN Kp Sky  mL/hr at 03/16/18 0611 10 mEq at 03/16/18 0611   • sennosides-docusate sodium (SENOKOT-S) 8.6-50 MG tablet 2 tablet  2 tablet Oral Nightly Kp Sky MD   2 tablet at 03/16/18 2043   • sodium chloride 0.9 % flush 1-10 mL  1-10 mL Intravenous PRN Raciel Zamora MD           Assessment/Plan   1. [place].  Stable.  2. Profound hypokalemia.  Not clear where loss is.  Continue to Replace per protocol.  Start scheduled replacement.    3. DM2  4. Dementia  5. Dysphagia  6. Hypophosphatemia. Replete now.  7. Hyperuricemia.   8. Hypothyroid on replacement. Compensated.     Sruthi Nieves MD  03/17/18  12:27 PM

## 2018-03-18 LAB
ANION GAP SERPL CALCULATED.3IONS-SCNC: 12.3 MMOL/L
BASOPHILS # BLD AUTO: 0.02 10*3/MM3 (ref 0–0.2)
BASOPHILS NFR BLD AUTO: 0.2 % (ref 0–1.5)
BUN BLD-MCNC: 36 MG/DL (ref 8–23)
BUN/CREAT SERPL: 28.8 (ref 7–25)
CALCIUM SPEC-SCNC: 8.6 MG/DL (ref 8.6–10.5)
CHLORIDE SERPL-SCNC: 97 MMOL/L (ref 98–107)
CO2 SERPL-SCNC: 23.7 MMOL/L (ref 22–29)
CREAT BLD-MCNC: 1.25 MG/DL (ref 0.57–1)
DEPRECATED RDW RBC AUTO: 48.7 FL (ref 37–54)
EOSINOPHIL # BLD AUTO: 0.37 10*3/MM3 (ref 0–0.7)
EOSINOPHIL NFR BLD AUTO: 2.9 % (ref 0.3–6.2)
ERYTHROCYTE [DISTWIDTH] IN BLOOD BY AUTOMATED COUNT: 14.2 % (ref 11.7–13)
GFR SERPL CREATININE-BSD FRML MDRD: 41 ML/MIN/1.73
GLUCOSE BLD-MCNC: 252 MG/DL (ref 65–99)
HCT VFR BLD AUTO: 40.4 % (ref 35.6–45.5)
HGB BLD-MCNC: 13.3 G/DL (ref 11.9–15.5)
IMM GRANULOCYTES # BLD: 0.04 10*3/MM3 (ref 0–0.03)
IMM GRANULOCYTES NFR BLD: 0.3 % (ref 0–0.5)
LYMPHOCYTES # BLD AUTO: 1.11 10*3/MM3 (ref 0.9–4.8)
LYMPHOCYTES NFR BLD AUTO: 8.6 % (ref 19.6–45.3)
MCH RBC QN AUTO: 31.4 PG (ref 26.9–32)
MCHC RBC AUTO-ENTMCNC: 32.9 G/DL (ref 32.4–36.3)
MCV RBC AUTO: 95.3 FL (ref 80.5–98.2)
MONOCYTES # BLD AUTO: 1.33 10*3/MM3 (ref 0.2–1.2)
MONOCYTES NFR BLD AUTO: 10.3 % (ref 5–12)
NEUTROPHILS # BLD AUTO: 10.05 10*3/MM3 (ref 1.9–8.1)
NEUTROPHILS NFR BLD AUTO: 77.7 % (ref 42.7–76)
PLATELET # BLD AUTO: 255 10*3/MM3 (ref 140–500)
PMV BLD AUTO: 10.6 FL (ref 6–12)
POTASSIUM BLD-SCNC: 4.9 MMOL/L (ref 3.5–5.2)
POTASSIUM BLD-SCNC: 5.3 MMOL/L (ref 3.5–5.2)
RBC # BLD AUTO: 4.24 10*6/MM3 (ref 3.9–5.2)
SODIUM BLD-SCNC: 133 MMOL/L (ref 136–145)
WBC NRBC COR # BLD: 12.92 10*3/MM3 (ref 4.5–10.7)

## 2018-03-18 PROCEDURE — 63710000001 DIPHENHYDRAMINE PER 50 MG: Performed by: INTERNAL MEDICINE

## 2018-03-18 PROCEDURE — 25010000002 ONDANSETRON PER 1 MG: Performed by: HOSPITALIST

## 2018-03-18 PROCEDURE — 85025 COMPLETE CBC W/AUTO DIFF WBC: CPT | Performed by: HOSPITALIST

## 2018-03-18 PROCEDURE — 97110 THERAPEUTIC EXERCISES: CPT

## 2018-03-18 PROCEDURE — 94799 UNLISTED PULMONARY SVC/PX: CPT

## 2018-03-18 PROCEDURE — 80048 BASIC METABOLIC PNL TOTAL CA: CPT | Performed by: HOSPITALIST

## 2018-03-18 RX ORDER — DIPHENHYDRAMINE HCL 25 MG
25 CAPSULE ORAL NIGHTLY PRN
Status: DISCONTINUED | OUTPATIENT
Start: 2018-03-18 | End: 2018-03-23 | Stop reason: HOSPADM

## 2018-03-18 RX ORDER — LACTULOSE 10 G/15ML
10 SOLUTION ORAL
Status: DISCONTINUED | OUTPATIENT
Start: 2018-03-19 | End: 2018-03-23 | Stop reason: HOSPADM

## 2018-03-18 RX ADMIN — CLINDAMYCIN PHOSPHATE 300 MG: 6 INJECTION, SOLUTION INTRAMUSCULAR; INTRAVENOUS at 04:14

## 2018-03-18 RX ADMIN — IPRATROPIUM BROMIDE AND ALBUTEROL SULFATE 3 ML: .5; 3 SOLUTION RESPIRATORY (INHALATION) at 19:57

## 2018-03-18 RX ADMIN — DOCUSATE SODIUM -SENNOSIDES 2 TABLET: 50; 8.6 TABLET, COATED ORAL at 20:30

## 2018-03-18 RX ADMIN — LEVOTHYROXINE SODIUM 50 MCG: 50 TABLET ORAL at 09:37

## 2018-03-18 RX ADMIN — DIPHENHYDRAMINE HYDROCHLORIDE 25 MG: 25 CAPSULE ORAL at 21:47

## 2018-03-18 RX ADMIN — GUAIFENESIN AND DEXTROMETHORPHAN 5 ML: 100; 10 SYRUP ORAL at 04:14

## 2018-03-18 RX ADMIN — CLINDAMYCIN PHOSPHATE 300 MG: 6 INJECTION, SOLUTION INTRAMUSCULAR; INTRAVENOUS at 13:08

## 2018-03-18 RX ADMIN — CLINDAMYCIN PHOSPHATE 300 MG: 6 INJECTION, SOLUTION INTRAMUSCULAR; INTRAVENOUS at 20:31

## 2018-03-18 RX ADMIN — ONDANSETRON 4 MG: 2 INJECTION INTRAMUSCULAR; INTRAVENOUS at 06:22

## 2018-03-18 RX ADMIN — DONEPEZIL HYDROCHLORIDE 10 MG: 10 TABLET, FILM COATED ORAL at 20:30

## 2018-03-18 RX ADMIN — MEMANTINE HYDROCHLORIDE 10 MG: 10 TABLET, FILM COATED ORAL at 09:37

## 2018-03-18 RX ADMIN — ASPIRIN 81 MG: 81 TABLET ORAL at 09:37

## 2018-03-18 NOTE — PLAN OF CARE
Problem: Patient Care Overview  Goal: Plan of Care Review  Outcome: Ongoing (interventions implemented as appropriate)   03/17/18 1812 03/17/18 2006 03/18/18 0528   Coping/Psychosocial   Plan of Care Reviewed With --  patient --    Plan of Care Review   Progress no change --  --    OTHER   Outcome Summary --  --  Patient was medicated PRN cough med. Patient is on IV antibiotics. Patient is encouraged to use incentive spirometer. PT works with patient. Patient had a slight fever last night and Tylenol was given with good result. Daughter came to visit her last night. Will continue to monitor vital signs and comfort.     Goal: Individualization and Mutuality  Outcome: Ongoing (interventions implemented as appropriate)    Goal: Discharge Needs Assessment  Outcome: Ongoing (interventions implemented as appropriate)      Problem: Fall Risk (Adult)  Goal: Absence of Fall  Outcome: Ongoing (interventions implemented as appropriate)      Problem: Skin Injury Risk (Adult)  Goal: Skin Health and Integrity  Outcome: Ongoing (interventions implemented as appropriate)      Problem: Fluid Volume Deficit (Adult)  Goal: Optimal Fluid Balance  Outcome: Ongoing (interventions implemented as appropriate)      Problem: Nutrition, Imbalanced: Inadequate Oral Intake (Adult)  Goal: Improved Oral Intake  Outcome: Ongoing (interventions implemented as appropriate)    Goal: Prevent Further Weight Loss  Outcome: Ongoing (interventions implemented as appropriate)

## 2018-03-18 NOTE — THERAPY TREATMENT NOTE
Acute Care - Physical Therapy Treatment Note  [facility]     Patient Name: Sam Flores  : 1934  MRN: [id number]  Today's Date: 3/18/2018     Date of Referral to PT: 18  Referring Physician: Noah    Admit Date: 3/12/2018    Visit Dx:    ICD-10-CM ICD-9-CM   1. Dehydration E86.0 276.51   2. Renal insufficiency N28.9 593.9   3. Dementia with behavioral disturbance, unspecified dementia type F03.91 294.21   4. Impaired functional mobility, balance, gait, and endurance Z74.09 V49.89     Patient Active Problem List   Diagnosis   • Lumbar compression fracture   • Osteoporosis   • Dehydration   • Acute on chronic renal failure   • Type 2 diabetes mellitus without complication   • Benign essential hypertension   • Weight loss, abnormal   • Trouble swallowing   • Hyponatremia   • Hypokalemia   • Aspiration pneumonia       Therapy Treatment    Therapy Treatment / Health Promotion    Treatment Time/Intention  Discipline: physical therapy assistant (Ayde Chu PTA)  Document Type: therapy note (daily note) (Ayde Chu PTA)  Subjective Information: complains of, weakness, fatigue (Ayde Chu PTA)  Therapy Frequency (PT Clinical Impression): daily (Ayde Chu PTA)  Patient Response to Treatment: poor, had BM upon standing (Ayde Chu PTA)    Vitals/Pain/Safety  Vital Signs  O2 Delivery Pre Treatment: room air (Ayde Chu PTA)    Mobility,ADL,Motor, Modality  Bed Mobility Assessment/Treatment  Bed Mobility Assessment/Treatment: bed mobility (all) activities (Ayde Chu PTA)  [place] Level (Bed Mobility): moderate assist (50% patient effort) (Ayde Chu PTA)  Supine-Sit [place] (Bed Mobility): moderate assist (50% patient effort) (Ayde Chu PTA)  Sit-Supine [place] (Bed Mobility): moderate assist (50% patient effort) (Ayde Chu PTA)  Sit-Stand Transfer  Sit-Stand [place] (Transfers): moderate assist (50% patient effort) (Ayde Chu  PTA)  Assistive Device (Sit-Stand Transfers): walker, front-wheeled (Ayde Chu, PTA)  Stand-Sit Transfer  Stand-Sit [place] (Transfers): moderate assist (50% patient effort) (Ayde Luroni, PTA)  Assistive Device (Stand-Sit Transfers): walker, front-wheeled (Ayde Chu, PTA)  Gait/Stairs Assessment/Training  [place] Level (Gait): moderate assist (50% patient effort), 2 person assist (Ayde Luroni, PTA)  Assistive Device (Gait): walker, front-wheeled (Ayde Chu, PTA)  Distance in Feet (Gait): 0 (had BM and needed to be cleaned) (Ayde SPAULDING Vlad, PTA)                 ROM/MMT             Sensory, Edema, Orthotics          Cognition, Communication, Swallow  Cognitive Assessment/Intervention- PT/OT  Orientation Status (Cognition): oriented to, person (Ayde Chu, PTA)  Follows Commands (Cognition): follows one step commands (Ayde SPAULDING Vlad PTA)  Cognitive Assessment Intervention- SLP  Cognitive Function (Cognition): mild impairment (Ayde Luroni PTA)    Outcome Summary               PT Rehab Goals     Row Name 03/17/18 1300 03/13/18 1400          Bed Mobility Goal 1 (PT)    Activity/Assistive Device (Bed Mobility Goal 1, PT) bed mobility activities, all  -SI bed mobility activities, all  -MA     [place] Level/Cues Needed (Bed Mobility Goal 1, PT)  -- independent  -MA     Time Frame (Bed Mobility Goal 1, PT)  -- 1 week  -MA        Transfer Goal 1 (PT)    Activity/Assistive Device (Transfer Goal 1, PT)  -- transfers, all  -MA     [place] Level/Cues Needed (Transfer Goal 1, PT)  -- supervision required  -MA     Time Frame (Transfer Goal 1, PT)  -- 1 week  -MA        Gait Training Goal 1 (PT)    [place] Level (Gait Training Goal 1, PT)  -- supervision required  -MA     Distance (Gait Goal 1, PT) 35 feet progressing  -  -MA     Time Frame (Gait Training Goal 1, PT)  -- 1 week  -MA       User Key  (r) = Recorded By, (t) = Taken By, (c) = Cosigned By    Initials Name  Provider Type    SI Aydealvaro Chu, PTA Physical Therapy Assistant    MARBELLA Stout, PT Physical Therapist          Physical Therapy Education     Title: PT OT SLP Therapies (Active)     Topic: Physical Therapy (Active)     Point: Mobility training (Active)    Learning Progress Summary     Learner Status Readiness Method Response Comment Documented by    Patient Active Acceptance E,TB NR positioning for comfort SI 03/17/18 1357     Active Acceptance E,TB,D NR   03/16/18 1643     Done Acceptance E,TB VU,DU  CW 03/15/18 1544     Done Acceptance E,TB VU,DU  CW 03/14/18 1502     Active Acceptance E NR  MA 03/13/18 1441    Family Active Acceptance E,TB NR positioning for comfort SI 03/17/18 1357          Point: Home exercise program (Active)    Learning Progress Summary     Learner Status Readiness Method Response Comment Documented by    Patient Active Acceptance E,TB NR positioning for comfort SI 03/17/18 1357     Done Acceptance E,TB VU,DU  CW 03/15/18 1544     Done Acceptance E,TB VU,DU  CW 03/14/18 1502    Family Active Acceptance E,TB NR positioning for comfort SI 03/17/18 1357          Point: Body mechanics (Active)    Learning Progress Summary     Learner Status Readiness Method Response Comment Documented by    Patient Active Acceptance E,TB NR positioning for comfort SI 03/17/18 1357     Active Acceptance E,TB,D NR   03/16/18 1643     Done Acceptance E,TB VU,DU  CW 03/15/18 1544     Done Acceptance E,TB VU,DU  CW 03/14/18 1502     Active Acceptance E NR  MA 03/13/18 1441    Family Active Acceptance E,TB NR positioning for comfort SI 03/17/18 1357          Point: Precautions (Active)    Learning Progress Summary     Learner Status Readiness Method Response Comment Documented by    Patient Active Acceptance E,TB NR positioning for comfort SI 03/17/18 1357     Active Acceptance E,TB,D NR   03/16/18 1643     Done Acceptance E,TB VU,DU  CW 03/15/18 1544     Done Acceptance E,TB VU,DU  CW 03/14/18 1502      Active Acceptance E NR  MA 03/13/18 1441    Family Active Acceptance E,TB NR positioning for comfort SI 03/17/18 1357                      User Key     Initials Effective Dates Name Provider Type Discipline    SI 05/18/15 -  Ayde Chu, PTA Physical Therapy Assistant PT    RW 03/07/18 -  Jillian Byrne PTA Physical Therapy Assistant PT    MA 03/07/18 -  Angelica Stout, PT Physical Therapist PT     03/07/18 -  Gilberto Maravilla, GILLIAN Physical Therapy Assistant PT                    PT Recommendation and Plan  Therapy Frequency (PT Clinical Impression): daily             Outcome Measures     Row Name 03/18/18 1600 03/17/18 1400 03/16/18 1635       How much help from another person do you currently need...    Turning from your back to your side while in flat bed without using bedrails? 3  -SI 3  -SI 3  -RW    Moving from lying on back to sitting on the side of a flat bed without bedrails? 2  -SI 3  -SI 3  -RW    Moving to and from a bed to a chair (including a wheelchair)? 2  -SI 3  -SI 3  -RW    Standing up from a chair using your arms (e.g., wheelchair, bedside chair)? 2  -SI 3  -SI 3  -RW    Climbing 3-5 steps with a railing? 2  -SI 2  -SI 2  -RW    To walk in hospital room? 2  -SI 3  -SI 3  -RW    AM-PAC 6 Clicks Score 13  -SI 17  -SI 17  -RW       Functional Assessment    Outcome Measure Options AM-PAC 6 Clicks Basic Mobility (PT)  -SI AM-PAC 6 Clicks Basic Mobility (PT)  -SI AM-PAC 6 Clicks Basic Mobility (PT)  -RW      User Key  (r) = Recorded By, (t) = Taken By, (c) = Cosigned By    Initials Name Provider Type    SI Ayde Chu, GILLIAN Physical Therapy Assistant     Jillian Byrne, GILLIAN Physical Therapy Assistant           Time Calculation:         PT Charges     Row Name 03/18/18 1629             Time Calculation    Start Time 1615  -SI      Stop Time 1630  -SI      Time Calculation (min) 15 min  -SI      PT Received On 03/18/18  -SI      PT - Next Appointment 03/19/18  -SI        User Key   (r) = Recorded By, (t) = Taken By, (c) = Cosigned By    Initials Name Provider Type    SI Ayde Chu PTA Physical Therapy Assistant          Therapy Charges for Today     Code Description Service Date Service Provider Modifiers Qty    28358899112 HC PT THER PROC EA 15 MIN 3/17/2018 Ayde Chu PTA GP 1    46918709468 HC PT THER PROC EA 15 MIN 3/18/2018 Ayde Chu PTA GP 1    79160388590 HC PT THER SUPP EA 15 MIN 3/18/2018 Ayde Chu PTA GP 1          PT G-Codes  Outcome Measure Options: AM-PAC 6 Clicks Basic Mobility (PT)    Ayde Chu PTA  3/18/2018

## 2018-03-18 NOTE — PLAN OF CARE
Problem: Patient Care Overview  Goal: Plan of Care Review  Outcome: Ongoing (interventions implemented as appropriate)   03/18/18 0512   Coping/Psychosocial   Plan of Care Reviewed With patient   OTHER   Outcome Summary pt resting in bed states she feels more tired today and just wants to get some rest but agreed to work with PT. Will monitor     Goal: Individualization and Mutuality  Outcome: Ongoing (interventions implemented as appropriate)      Problem: Fall Risk (Adult)  Goal: Absence of Fall  Outcome: Ongoing (interventions implemented as appropriate)      Problem: Skin Injury Risk (Adult)  Goal: Skin Health and Integrity  Outcome: Ongoing (interventions implemented as appropriate)      Problem: Fluid Volume Deficit (Adult)  Goal: Optimal Fluid Balance  Outcome: Ongoing (interventions implemented as appropriate)

## 2018-03-18 NOTE — PROGRESS NOTES
"   LOS: 6 days    Patient Care Team:  Joann Prabhakar MD as PCP - General (Family Medicine)    Chief Complaint:    Chief Complaint   Patient presents with   • Altered Mental Status     FAMILY REPORTS PT IS BECOMING MORE CONFUSED, NOT EATING AND UNABLE TO CONTROL HER BLADDER; PT WITH NO COMPLAINTS AND DOES NOT KNOW WHY SHE IS HERE     Follow UP Matthew  Subjective     Interval History:     Follow up Matthew. Confused.  Eating. Bowels moving . Still coughing.     Review of Systems:   Not reliable.     Objective     Vital Signs  Temp:  [97.2 °F (36.2 °C)-100.5 °F (38.1 °C)] 98.1 °F (36.7 °C)  Heart Rate:  [] 95  Resp:  [16-20] 16  BP: ()/(48-85) 137/85    Flowsheet Rows    Flowsheet Row First Filed Value   Admission Height 149.9 cm (59\") Documented at 03/12/2018 1238   Admission Weight 40.8 kg (90 lb) Documented at 03/12/2018 1305          No intake/output data recorded.  I/O last 3 completed shifts:  In: 290 [P.O.:240; IV Piggyback:50]  Out: -     Intake/Output Summary (Last 24 hours) at 03/18/18 1209  Last data filed at 03/18/18 0414   Gross per 24 hour   Intake              290 ml   Output                0 ml   Net              290 ml       Physical Exam:  Elderly Frail WF. Heart RRR no s3 or rub. Lungs upper airway rhonchi, no wheezes. Less congested.  Abd + bs soft, nontender. Ext trace edema lower ext.       Results Review:      Results from last 7 days  Lab Units 03/18/18  0617 03/17/18  2338 03/17/18  0739 03/16/18  0656  03/12/18  1304   SODIUM mmol/L 133*  --  133* 133*  < > 132*   POTASSIUM mmol/L 5.3* 4.9 3.4* 3.7  < > 3.0*   CHLORIDE mmol/L 97*  --  95* 95*  < > 74*   CO2 mmol/L 23.7  --  26.6 27.2  < > 28.6   BUN mg/dL 36*  --  23 22  < > 87*   CREATININE mg/dL 1.25*  --  1.20* 1.17*  < > 2.58*   CALCIUM mg/dL 8.6  --  8.0* 7.4*  < > 9.9   BILIRUBIN mg/dL  --   --   --   --   --  0.8   ALK PHOS U/L  --   --   --   --   --  97   ALT (SGPT) U/L  --   --   --   --   --  20   AST (SGOT) U/L  --   " --   --   --   --  27   GLUCOSE mg/dL 252*  --  171* 154*  < > 318*   < > = values in this interval not displayed.    Estimated Creatinine Clearance: 22.4 mL/min (by C-G formula based on SCr of 1.25 mg/dL (H)).      Results from last 7 days  Lab Units 03/17/18  0739 03/16/18  0656 03/15/18  0555 03/14/18  0552   MAGNESIUM mg/dL 1.7  --  1.7 1.8   PHOSPHORUS mg/dL 2.6 1.6* 0.9* 1.9*         Results from last 7 days  Lab Units 03/12/18  1304   URIC ACID mg/dL 22.7*         Results from last 7 days  Lab Units 03/18/18  0617 03/17/18  0740 03/16/18  0656 03/15/18  0555 03/14/18  0552   WBC 10*3/mm3 12.92* 13.09* 11.56* 10.29 9.51   HEMOGLOBIN g/dL 13.3 12.6 12.3 12.3 12.1   PLATELETS 10*3/mm3 255 210 191 204 189               Imaging Results (last 24 hours)     ** No results found for the last 24 hours. **          aspirin 81 mg Oral Daily   clindamycin 300 mg Intravenous Q8H   donepezil 10 mg Oral Nightly   ipratropium-albuterol 3 mL Nebulization 4x Daily - RT   levothyroxine 50 mcg Oral Daily   memantine 10 mg Oral Daily   polyethylene glycol 17 g Oral Daily   sennosides-docusate sodium 2 tablet Oral Nightly          Medication Review:   Current Facility-Administered Medications   Medication Dose Route Frequency Provider Last Rate Last Dose   • acetaminophen (TYLENOL) tablet 650 mg  650 mg Oral Q4H PRN Raciel Zamora MD   650 mg at 03/17/18 2315   • aspirin EC tablet 81 mg  81 mg Oral Daily Raciel Zamora MD   81 mg at 03/18/18 0937   • bisacodyl (DULCOLAX) EC tablet 5 mg  5 mg Oral Daily PRN Raciel Zamora MD       • bisacodyl (DULCOLAX) suppository 10 mg  10 mg Rectal Daily PRN Raciel Zamora MD       • clindamycin (CLEOCIN) IVPB 300 mg  300 mg Intravenous Q8H Kp Sky MD   300 mg at 03/18/18 5344   • donepezil (ARICEPT) tablet 10 mg  10 mg Oral Nightly Raciel Zamora MD   10 mg at 03/17/18 2111   • guaifenesin-dextromethorphan (ROBITUSSIN DM) 100-10 MG/5ML syrup 5 mL  5 mL Oral Q8H PRN  Aaron Castillo MD   5 mL at 03/18/18 0414   • ipratropium-albuterol (DUO-NEB) nebulizer solution 3 mL  3 mL Nebulization 4x Daily - RT Kp Sky MD   3 mL at 03/17/18 1936   • levothyroxine (SYNTHROID, LEVOTHROID) tablet 50 mcg  50 mcg Oral Daily Raciel Zamora MD   50 mcg at 03/18/18 0937   • memantine (NAMENDA) tablet 10 mg  10 mg Oral Daily Raciel Zamora MD   10 mg at 03/18/18 0937   • ondansetron (ZOFRAN) tablet 4 mg  4 mg Oral Q6H PRN Raciel Zamora MD        Or   • ondansetron ODT (ZOFRAN-ODT) disintegrating tablet 4 mg  4 mg Oral Q6H PRN Raciel Zamora MD        Or   • ondansetron (ZOFRAN) injection 4 mg  4 mg Intravenous Q6H PRN Raciel Zamora MD   4 mg at 03/18/18 0622   • polyethylene glycol 3350 powder (packet)  17 g Oral Daily Kp Sky MD   17 g at 03/17/18 0819   • potassium chloride (MICRO-K) CR capsule 40 mEq  40 mEq Oral PRN Kp Sky MD   40 mEq at 03/17/18 1922    Or   • potassium chloride (KLOR-CON) packet 40 mEq  40 mEq Oral PRN Kp Sky MD        Or   • potassium chloride 10 mEq in 100 mL IVPB  10 mEq Intravenous Q1H PRN Kp Sky  mL/hr at 03/16/18 0611 10 mEq at 03/16/18 0611   • sennosides-docusate sodium (SENOKOT-S) 8.6-50 MG tablet 2 tablet  2 tablet Oral Nightly Kp Sky MD   2 tablet at 03/17/18 2111   • sodium chloride 0.9 % flush 1-10 mL  1-10 mL Intravenous PRN Raciel Zamora MD           Assessment/Plan   1. [place].  Stable.  2. Profound hypokalemia.  Overcorrected now to 5.3. Dc scheduled replacement. Also a little generous due to hyperglycemia.   3. DM2. Not controlled. LHA managing.   4. Dementia  5. Dysphagia. Concern for ongoing aspiration.   6. Hypophosphatemia. Replete now.  7. Low grade fever last night.  8. Hypothyroid on replacement. Compensated.     Surthi Nieves MD  03/18/18  12:09 PM

## 2018-03-18 NOTE — PLAN OF CARE
Problem: Patient Care Overview  Goal: Plan of Care Review  Decrease endurance to activity regarding bed mobility today with PT.  Had BM when stood...

## 2018-03-18 NOTE — PROGRESS NOTES
"DAILY PROGRESS NOTE  [facility]    Patient Identification:  Name: Sma Flores  Age: 83 y.o.  Sex: female  :  1934  MRN: [id number]         Primary Care Physician: Joann Prabhakar MD    Subjective:  Interval History:She feels better today. Still coughing a lot.    Objective:    Scheduled Meds:    aspirin 81 mg Oral Daily   clindamycin 300 mg Intravenous Q8H   donepezil 10 mg Oral Nightly   ipratropium-albuterol 3 mL Nebulization 4x Daily - RT   levothyroxine 50 mcg Oral Daily   memantine 10 mg Oral Daily   polyethylene glycol 17 g Oral Daily   sennosides-docusate sodium 2 tablet Oral Nightly     Continuous Infusions:       Vital signs in last 24 hours:  Temp:  [97.2 °F (36.2 °C)-100.5 °F (38.1 °C)] 98.1 °F (36.7 °C)  Heart Rate:  [] 95  Resp:  [16-20] 16  BP: ()/(48-85) 137/85    Intake/Output:    Intake/Output Summary (Last 24 hours) at 18 1104  Last data filed at 18 0414   Gross per 24 hour   Intake              290 ml   Output                0 ml   Net              290 ml       Exam:  /85 (BP Location: Right arm, Patient Position: Lying)   Pulse 95   Temp 98.1 °F (36.7 °C) (Oral)   Resp 16   Ht 149.9 cm (59\")   Wt 41.7 kg (91 lb 14.4 oz)   SpO2 93%   BMI 18.56 kg/m²     General Appearance:    Alert, cooperative, no distress   Head:    Normocephalic, without obvious abnormality, atraumatic   Eyes:       Throat:   Lips, tongue, gums normal   Neck:   Supple, symmetrical, trachea midline, no JVD   Lungs:     rhonchi bilaterally, respirations unlabored   Chest Wall:    No tenderness or deformity    Heart:    Regular rate and rhythm, S1 and S2 normal, no murmur,no  Rub or gallop   Abdomen:     Soft, non-tender, bowel sounds active, no masses, no organomegaly    Extremities:   Extremities normal, atraumatic, no cyanosis or edema   Pulses:      Skin:   Skin is warm and dry,  no rashes or palpable lesions   Neurologic:   no focal deficits noted    "   [unfilled]  Data Review:    Results from last 7 days  Lab Units 03/18/18  0617 03/17/18  2338 03/17/18  0739 03/16/18  0656   SODIUM mmol/L 133*  --  133* 133*   POTASSIUM mmol/L 5.3* 4.9 3.4* 3.7   CHLORIDE mmol/L 97*  --  95* 95*   CO2 mmol/L 23.7  --  26.6 27.2   BUN mg/dL 36*  --  23 22   CREATININE mg/dL 1.25*  --  1.20* 1.17*   GLUCOSE mg/dL 252*  --  171* 154*   CALCIUM mg/dL 8.6  --  8.0* 7.4*       Results from last 7 days  Lab Units 03/18/18  0617 03/17/18  0740 03/16/18  0656   WBC 10*3/mm3 12.92* 13.09* 11.56*   HEMOGLOBIN g/dL 13.3 12.6 12.3   HEMATOCRIT % 40.4 37.0 36.7   PLATELETS 10*3/mm3 255 210 191       Results from last 7 days  Lab Units 03/15/18  0555   TSH mIU/mL 1.760           Lab Results  Lab Value Date/Time   TROPONINT 0.020 03/12/2018 1304   TROPONINT <0.010 11/30/2016 1946           Results from last 7 days  Lab Units 03/12/18  1304   ALK PHOS U/L 97   BILIRUBIN mg/dL 0.8   ALT (SGPT) U/L 20   AST (SGOT) U/L 27       Results from last 7 days  Lab Units 03/15/18  0555   TSH mIU/mL 1.760         No results found for: POCGLU        Patient Active Problem List   Diagnosis Code   • Lumbar compression fracture S32.000A   • Osteoporosis M81.0   • Dehydration E86.0   • Acute on chronic renal failure N17.9, N18.9   • Type 2 diabetes mellitus without complication E11.9   • Benign essential hypertension I10   • Weight loss, abnormal R63.4   • Trouble swallowing R13.10   • Hyponatremia E87.1   • Hypokalemia E87.6   • Aspiration pneumonia J69.0       Assessment:  Active Hospital Problems (** Indicates Principal Problem)    Diagnosis Date Noted   • **Dehydration [E86.0] 03/12/2018   • Aspiration pneumonia [J69.0] 03/15/2018   • Acute on chronic renal failure [N17.9, N18.9] 03/12/2018   • Type 2 diabetes mellitus without complication [E11.9] 03/12/2018   • Benign essential hypertension [I10] 03/12/2018   • Weight loss, abnormal [R63.4] 03/12/2018   • Trouble swallowing [R13.10] 03/12/2018   •  Hyponatremia [E87.1] 03/12/2018   • Hypokalemia [E87.6] 03/12/2018      Resolved Hospital Problems    Diagnosis Date Noted Date Resolved   No resolved problems to display.       Plan:  Off IVF   Dysphagia diet. ??? Jayess poor. Antibiotics.    Kp Sky MD  3/18/2018  11:04 AM

## 2018-03-19 LAB
ANION GAP SERPL CALCULATED.3IONS-SCNC: 10 MMOL/L
BASOPHILS # BLD AUTO: 0.02 10*3/MM3 (ref 0–0.2)
BASOPHILS NFR BLD AUTO: 0.3 % (ref 0–1.5)
BUN BLD-MCNC: 26 MG/DL (ref 8–23)
BUN/CREAT SERPL: 21.1 (ref 7–25)
CALCIUM SPEC-SCNC: 8.5 MG/DL (ref 8.6–10.5)
CHLORIDE SERPL-SCNC: 96 MMOL/L (ref 98–107)
CO2 SERPL-SCNC: 25 MMOL/L (ref 22–29)
CREAT BLD-MCNC: 1.23 MG/DL (ref 0.57–1)
DEPRECATED RDW RBC AUTO: 49.2 FL (ref 37–54)
EOSINOPHIL # BLD AUTO: 0.39 10*3/MM3 (ref 0–0.7)
EOSINOPHIL NFR BLD AUTO: 6.4 % (ref 0.3–6.2)
ERYTHROCYTE [DISTWIDTH] IN BLOOD BY AUTOMATED COUNT: 14.4 % (ref 11.7–13)
GFR SERPL CREATININE-BSD FRML MDRD: 42 ML/MIN/1.73
GLUCOSE BLD-MCNC: 148 MG/DL (ref 65–99)
HCT VFR BLD AUTO: 36.4 % (ref 35.6–45.5)
HGB BLD-MCNC: 12 G/DL (ref 11.9–15.5)
IMM GRANULOCYTES # BLD: 0 10*3/MM3 (ref 0–0.03)
IMM GRANULOCYTES NFR BLD: 0 % (ref 0–0.5)
LYMPHOCYTES # BLD AUTO: 1.36 10*3/MM3 (ref 0.9–4.8)
LYMPHOCYTES NFR BLD AUTO: 22.3 % (ref 19.6–45.3)
MCH RBC QN AUTO: 31.2 PG (ref 26.9–32)
MCHC RBC AUTO-ENTMCNC: 33 G/DL (ref 32.4–36.3)
MCV RBC AUTO: 94.5 FL (ref 80.5–98.2)
MONOCYTES # BLD AUTO: 0.98 10*3/MM3 (ref 0.2–1.2)
MONOCYTES NFR BLD AUTO: 16.1 % (ref 5–12)
NEUTROPHILS # BLD AUTO: 3.34 10*3/MM3 (ref 1.9–8.1)
NEUTROPHILS NFR BLD AUTO: 54.9 % (ref 42.7–76)
PLATELET # BLD AUTO: 229 10*3/MM3 (ref 140–500)
PMV BLD AUTO: 9.8 FL (ref 6–12)
POTASSIUM BLD-SCNC: 4.5 MMOL/L (ref 3.5–5.2)
RBC # BLD AUTO: 3.85 10*6/MM3 (ref 3.9–5.2)
SODIUM BLD-SCNC: 131 MMOL/L (ref 136–145)
WBC NRBC COR # BLD: 6.09 10*3/MM3 (ref 4.5–10.7)

## 2018-03-19 PROCEDURE — 63710000001 DIPHENHYDRAMINE PER 50 MG: Performed by: INTERNAL MEDICINE

## 2018-03-19 PROCEDURE — 94799 UNLISTED PULMONARY SVC/PX: CPT

## 2018-03-19 PROCEDURE — 85025 COMPLETE CBC W/AUTO DIFF WBC: CPT | Performed by: HOSPITALIST

## 2018-03-19 PROCEDURE — 97110 THERAPEUTIC EXERCISES: CPT

## 2018-03-19 PROCEDURE — 80048 BASIC METABOLIC PNL TOTAL CA: CPT | Performed by: HOSPITALIST

## 2018-03-19 RX ADMIN — DIPHENHYDRAMINE HYDROCHLORIDE 25 MG: 25 CAPSULE ORAL at 22:54

## 2018-03-19 RX ADMIN — LEVOTHYROXINE SODIUM 50 MCG: 50 TABLET ORAL at 09:35

## 2018-03-19 RX ADMIN — IPRATROPIUM BROMIDE AND ALBUTEROL SULFATE 3 ML: .5; 3 SOLUTION RESPIRATORY (INHALATION) at 12:56

## 2018-03-19 RX ADMIN — DOCUSATE SODIUM -SENNOSIDES 2 TABLET: 50; 8.6 TABLET, COATED ORAL at 21:25

## 2018-03-19 RX ADMIN — ACETAMINOPHEN 650 MG: 325 TABLET ORAL at 22:54

## 2018-03-19 RX ADMIN — ASPIRIN 81 MG: 81 TABLET ORAL at 09:35

## 2018-03-19 RX ADMIN — IPRATROPIUM BROMIDE AND ALBUTEROL SULFATE 3 ML: .5; 3 SOLUTION RESPIRATORY (INHALATION) at 09:01

## 2018-03-19 RX ADMIN — DONEPEZIL HYDROCHLORIDE 10 MG: 10 TABLET, FILM COATED ORAL at 21:25

## 2018-03-19 RX ADMIN — IPRATROPIUM BROMIDE AND ALBUTEROL SULFATE 3 ML: .5; 3 SOLUTION RESPIRATORY (INHALATION) at 16:46

## 2018-03-19 RX ADMIN — CLINDAMYCIN PHOSPHATE 300 MG: 6 INJECTION, SOLUTION INTRAMUSCULAR; INTRAVENOUS at 12:03

## 2018-03-19 RX ADMIN — IPRATROPIUM BROMIDE AND ALBUTEROL SULFATE 3 ML: .5; 3 SOLUTION RESPIRATORY (INHALATION) at 19:57

## 2018-03-19 RX ADMIN — CLINDAMYCIN PHOSPHATE 300 MG: 6 INJECTION, SOLUTION INTRAMUSCULAR; INTRAVENOUS at 03:29

## 2018-03-19 RX ADMIN — MEMANTINE HYDROCHLORIDE 10 MG: 10 TABLET, FILM COATED ORAL at 09:35

## 2018-03-19 RX ADMIN — CLINDAMYCIN PHOSPHATE 300 MG: 6 INJECTION, SOLUTION INTRAMUSCULAR; INTRAVENOUS at 21:25

## 2018-03-19 NOTE — PROGRESS NOTES
Adult Nutrition  Assessment/PES    Patient Name:  Sam Flores  YOB: 1934  MRN: [id number]  Admit Date:  3/12/2018    Assessment Date:  3/19/2018    Comments:  Intake remains on poor side, tom of solids. Continue to send supplements in hopes she will sip on these. Cont to follow.           Adult Nutrition Assessment     Row Name 03/19/18 1100       Reason for Assessment    Reason For Assessment follow-up protocol       Nutrition/Diet History    Typical Food/Fluid Intake Still eating poorly. + Cough, concerned with on-going aspiration.        Labs/Procedures/Meds    Lab Results Reviewed reviewed, pertinent    Lab Results Comments Labs still off       Diagnostic Tests/Procedures    Diagnostic Test/Procedure Reviewed reviewed, pertinent       Medications    Pertinent Medications Reviewed reviewed, pertinent       Physical Findings    Overall Physical Appearance generalized wasting    Skin --   intact       Nutrition Prescription PO    Current PO Diet Soft Texture    Texture Ground    Supplement Ensure Enlive;Mighty Shake    Common Modifiers Consistent Carbohydrate       PO Evaluation    % PO Intake Intake varies 0-100%, but usually on the poor side.     Row Name 03/19/18 0604       Anthropometrics    Weight 44.2 kg (97 lb 8 oz)          Problem/Interventions:  (malnutrition)          Intervention Goal     Row Name 03/19/18 1144       Intervention Goal    General Maintain nutrition    PO Tolerate PO;Increase intake;PO intake (%)    PO Intake % 65 %    Weight Appropriate weight gain            Nutrition Intervention     Row Name 03/19/18 1145       Nutrition Intervention    RD/Tech Action Follow Tx progress;Care plan reviewd              Education/Evaluation     Row Name 03/19/18 1145       Monitor/Evaluation    Monitor Per protocol        Electronically signed by:  Kaycee Story RD  03/19/18 11:45 AM

## 2018-03-19 NOTE — PROGRESS NOTES
"DAILY PROGRESS NOTE  [facility]    Patient Identification:  Name: Sam Flores  Age: 83 y.o.  Sex: female  :  1934  MRN: [id number]         Primary Care Physician: Joann Prabhakar MD    Subjective:  Interval History:She feels better today. Still coughing a lot. Poor appetite.    Objective:    Scheduled Meds:    aspirin 81 mg Oral Daily   clindamycin 300 mg Intravenous Q8H   donepezil 10 mg Oral Nightly   ipratropium-albuterol 3 mL Nebulization 4x Daily - RT   lactulose 10 g Oral Q48H   levothyroxine 50 mcg Oral Daily   memantine 10 mg Oral Daily   sennosides-docusate sodium 2 tablet Oral Nightly     Continuous Infusions:       Vital signs in last 24 hours:  Temp:  [97.1 °F (36.2 °C)-98.7 °F (37.1 °C)] 97.1 °F (36.2 °C)  Heart Rate:  [] 79  Resp:  [16-18] 18  BP: ()/(55-76) 96/60    Intake/Output:    Intake/Output Summary (Last 24 hours) at 18 1135  Last data filed at 18 0458   Gross per 24 hour   Intake              640 ml   Output                0 ml   Net              640 ml       Exam:  BP 96/60 (BP Location: Right arm, Patient Position: Lying)   Pulse 79   Temp 97.1 °F (36.2 °C) (Oral)   Resp 18   Ht 149.9 cm (59\")   Wt 44.2 kg (97 lb 8 oz)   SpO2 92%   BMI 19.69 kg/m²     General Appearance:    Alert, cooperative, no distress   Head:    Normocephalic, without obvious abnormality, atraumatic   Eyes:       Throat:   Lips, tongue, gums normal   Neck:   Supple, symmetrical, trachea midline, no JVD   Lungs:     rhonchi bilaterally, respirations unlabored   Chest Wall:    No tenderness or deformity    Heart:    Regular rate and rhythm, S1 and S2 normal, no murmur,no  Rub or gallop   Abdomen:     Soft, non-tender, bowel sounds active, no masses, no organomegaly    Extremities:   Extremities normal, atraumatic, no cyanosis or edema   Pulses:      Skin:   Skin is warm and dry,  no rashes or palpable lesions   Neurologic:   no focal deficits noted    "   [unfilled]  Data Review:    Results from last 7 days  Lab Units 03/19/18  0615 03/18/18  0617 03/17/18  2338 03/17/18  0739   SODIUM mmol/L 131* 133*  --  133*   POTASSIUM mmol/L 4.5 5.3* 4.9 3.4*   CHLORIDE mmol/L 96* 97*  --  95*   CO2 mmol/L 25.0 23.7  --  26.6   BUN mg/dL 26* 36*  --  23   CREATININE mg/dL 1.23* 1.25*  --  1.20*   GLUCOSE mg/dL 148* 252*  --  171*   CALCIUM mg/dL 8.5* 8.6  --  8.0*       Results from last 7 days  Lab Units 03/19/18  0615 03/18/18  0617 03/17/18  0740   WBC 10*3/mm3 6.09 12.92* 13.09*   HEMOGLOBIN g/dL 12.0 13.3 12.6   HEMATOCRIT % 36.4 40.4 37.0   PLATELETS 10*3/mm3 229 255 210       Results from last 7 days  Lab Units 03/15/18  0555   TSH mIU/mL 1.760           Lab Results  Lab Value Date/Time   TROPONINT 0.020 03/12/2018 1304   TROPONINT <0.010 11/30/2016 1946           Results from last 7 days  Lab Units 03/12/18  1304   ALK PHOS U/L 97   BILIRUBIN mg/dL 0.8   ALT (SGPT) U/L 20   AST (SGOT) U/L 27       Results from last 7 days  Lab Units 03/15/18  0555   TSH mIU/mL 1.760         No results found for: POCGLU        Patient Active Problem List   Diagnosis Code   • Lumbar compression fracture S32.000A   • Osteoporosis M81.0   • Dehydration E86.0   • Acute on chronic renal failure N17.9, N18.9   • Type 2 diabetes mellitus without complication E11.9   • Benign essential hypertension I10   • Weight loss, abnormal R63.4   • Trouble swallowing R13.10   • Hyponatremia E87.1   • Hypokalemia E87.6   • Aspiration pneumonia J69.0       Assessment:  Active Hospital Problems (** Indicates Principal Problem)    Diagnosis Date Noted   • **Dehydration [E86.0] 03/12/2018   • Aspiration pneumonia [J69.0] 03/15/2018   • Acute on chronic renal failure [N17.9, N18.9] 03/12/2018   • Type 2 diabetes mellitus without complication [E11.9] 03/12/2018   • Benign essential hypertension [I10] 03/12/2018   • Weight loss, abnormal [R63.4] 03/12/2018   • Trouble swallowing [R13.10] 03/12/2018   •  Hyponatremia [E87.1] 03/12/2018   • Hypokalemia [E87.6] 03/12/2018      Resolved Hospital Problems    Diagnosis Date Noted Date Resolved   No resolved problems to display.       Plan:  Off IVF   Dysphagia diet. ??? [place] poor. Antibiotics.    Kp Sky MD  3/19/2018  11:35 AM

## 2018-03-19 NOTE — PROGRESS NOTES
"   LOS: 7 days    Patient Care Team:  Joann Prabhakar MD as PCP - General (Family Medicine)    Chief Complaint:    Chief Complaint   Patient presents with   • Altered Mental Status     FAMILY REPORTS PT IS BECOMING MORE CONFUSED, NOT EATING AND UNABLE TO CONTROL HER BLADDER; PT WITH NO COMPLAINTS AND DOES NOT KNOW WHY SHE IS HERE     Follow UP Matthew  Subjective     Interval History:     Follow up Matthew. No appetite. No SOA or CP.     Review of Systems:   Not reliable.     Objective     Vital Signs  Temp:  [97.1 °F (36.2 °C)-98.7 °F (37.1 °C)] 97.1 °F (36.2 °C)  Heart Rate:  [] 79  Resp:  [16-18] 18  BP: ()/(55-76) 96/60    Flowsheet Rows    Flowsheet Row First Filed Value   Admission Height 149.9 cm (59\") Documented at 03/12/2018 1238   Admission Weight 40.8 kg (90 lb) Documented at 03/12/2018 1305          No intake/output data recorded.  I/O last 3 completed shifts:  In: 690 [P.O.:640; IV Piggyback:50]  Out: -     Intake/Output Summary (Last 24 hours) at 03/19/18 0922  Last data filed at 03/19/18 0458   Gross per 24 hour   Intake              640 ml   Output                0 ml   Net              640 ml       Physical Exam:  Elderly Frail WF.   Heart RRR no s3 or rub.   Lungs upper airway rhonchi, no wheezes.   Abd + bs soft, nontender.   Ext trace edema lower ext.       Results Review:      Results from last 7 days  Lab Units 03/19/18  0615 03/18/18  0617 03/17/18  2338 03/17/18  0739  03/12/18  1304   SODIUM mmol/L 131* 133*  --  133*  < > 132*   POTASSIUM mmol/L 4.5 5.3* 4.9 3.4*  < > 3.0*   CHLORIDE mmol/L 96* 97*  --  95*  < > 74*   CO2 mmol/L 25.0 23.7  --  26.6  < > 28.6   BUN mg/dL 26* 36*  --  23  < > 87*   CREATININE mg/dL 1.23* 1.25*  --  1.20*  < > 2.58*   CALCIUM mg/dL 8.5* 8.6  --  8.0*  < > 9.9   BILIRUBIN mg/dL  --   --   --   --   --  0.8   ALK PHOS U/L  --   --   --   --   --  97   ALT (SGPT) U/L  --   --   --   --   --  20   AST (SGOT) U/L  --   --   --   --   --  27   GLUCOSE " mg/dL 148* 252*  --  171*  < > 318*   < > = values in this interval not displayed.    Estimated Creatinine Clearance: 24.2 mL/min (by C-G formula based on SCr of 1.23 mg/dL (H)).      Results from last 7 days  Lab Units 03/17/18  0739 03/16/18  0656 03/15/18  0555 03/14/18  0552   MAGNESIUM mg/dL 1.7  --  1.7 1.8   PHOSPHORUS mg/dL 2.6 1.6* 0.9* 1.9*         Results from last 7 days  Lab Units 03/12/18  1304   URIC ACID mg/dL 22.7*         Results from last 7 days  Lab Units 03/19/18  0615 03/18/18  0617 03/17/18  0740 03/16/18  0656 03/15/18  0555   WBC 10*3/mm3 6.09 12.92* 13.09* 11.56* 10.29   HEMOGLOBIN g/dL 12.0 13.3 12.6 12.3 12.3   PLATELETS 10*3/mm3 229 255 210 191 204               Imaging Results (last 24 hours)     ** No results found for the last 24 hours. **          aspirin 81 mg Oral Daily   clindamycin 300 mg Intravenous Q8H   donepezil 10 mg Oral Nightly   ipratropium-albuterol 3 mL Nebulization 4x Daily - RT   lactulose 10 g Oral Q48H   levothyroxine 50 mcg Oral Daily   memantine 10 mg Oral Daily   sennosides-docusate sodium 2 tablet Oral Nightly          Medication Review:   Current Facility-Administered Medications   Medication Dose Route Frequency Provider Last Rate Last Dose   • acetaminophen (TYLENOL) tablet 650 mg  650 mg Oral Q4H PRN Raciel Zamora MD   650 mg at 03/17/18 2315   • aspirin EC tablet 81 mg  81 mg Oral Daily Raciel Zamora MD   81 mg at 03/18/18 0937   • bisacodyl (DULCOLAX) EC tablet 5 mg  5 mg Oral Daily PRN Raciel Zamora MD       • bisacodyl (DULCOLAX) suppository 10 mg  10 mg Rectal Daily PRN Raciel Zamora MD       • clindamycin (CLEOCIN) IVPB 300 mg  300 mg Intravenous Q8H Kp Sky MD   300 mg at 03/19/18 0329   • diphenhydrAMINE (BENADRYL) capsule 25 mg  25 mg Oral Nightly PRN Gerard Coyle MD   25 mg at 03/18/18 2147   • donepezil (ARICEPT) tablet 10 mg  10 mg Oral Nightly Raciel Zamora MD   10 mg at 03/18/18 2030   •  guaifenesin-dextromethorphan (ROBITUSSIN DM) 100-10 MG/5ML syrup 5 mL  5 mL Oral Q8H PRN Aaron Castillo MD   5 mL at 03/18/18 0414   • ipratropium-albuterol (DUO-NEB) nebulizer solution 3 mL  3 mL Nebulization 4x Daily - RT Kp Sky MD   3 mL at 03/19/18 0901   • lactulose (CHRONULAC) 10 GM/15ML solution 10 g  10 g Oral Q48H Sruthi Nieves MD       • levothyroxine (SYNTHROID, LEVOTHROID) tablet 50 mcg  50 mcg Oral Daily Raciel Zamora MD   50 mcg at 03/18/18 0937   • memantine (NAMENDA) tablet 10 mg  10 mg Oral Daily Raciel Zamora MD   10 mg at 03/18/18 0937   • ondansetron (ZOFRAN) tablet 4 mg  4 mg Oral Q6H PRN Raciel Zamora MD        Or   • ondansetron ODT (ZOFRAN-ODT) disintegrating tablet 4 mg  4 mg Oral Q6H PRN Raciel Zamora MD        Or   • ondansetron (ZOFRAN) injection 4 mg  4 mg Intravenous Q6H PRN Raciel Zamora MD   4 mg at 03/18/18 0622   • sennosides-docusate sodium (SENOKOT-S) 8.6-50 MG tablet 2 tablet  2 tablet Oral Nightly Kp Sky MD   2 tablet at 03/18/18 2030   • sodium chloride 0.9 % flush 1-10 mL  1-10 mL Intravenous PRN Raciel Zamora MD           Assessment/Plan   1. [place].  Stable.  2.Hypokalemia.  Not clear where loss is.  Replace as needed   3. DM2  4. Dementia  5. Dysphagia  6. Hypophosphatemia.   7. Hyperuricemia.   8. Hypothyroid on replacement. Compensated.     Major Rowley MD  03/19/18  9:22 AM

## 2018-03-19 NOTE — THERAPY TREATMENT NOTE
Acute Care - Physical Therapy Treatment Note  [facility]     Patient Name: Sam Flores  : 1934  MRN: [id number]  Today's Date: 3/19/2018     Date of Referral to PT: 18  Referring Physician: Noah    Admit Date: 3/12/2018    Visit Dx:    ICD-10-CM ICD-9-CM   1. Dehydration E86.0 276.51   2. Renal insufficiency N28.9 593.9   3. Dementia with behavioral disturbance, unspecified dementia type F03.91 294.21   4. Impaired functional mobility, balance, gait, and endurance Z74.09 V49.89     Patient Active Problem List   Diagnosis   • Lumbar compression fracture   • Osteoporosis   • Dehydration   • Acute on chronic renal failure   • Type 2 diabetes mellitus without complication   • Benign essential hypertension   • Weight loss, abnormal   • Trouble swallowing   • Hyponatremia   • Hypokalemia   • Aspiration pneumonia       Therapy Treatment    Therapy Treatment / Health Promotion    Treatment Time/Intention  Discipline: physical therapy assistant (Gilberto Maravilla PTA)  Document Type: therapy note (daily note) (Gilberto Maravilla PTA)  Subjective Information: complains of, weakness, fatigue (Gilberto Maravilla PTA)  Mode of Treatment: physical therapy (Gilberto Maravilla PTA)  Patient Effort: adequate (Gilberto Maravilla PTA)  Existing Precautions/Restrictions: fall (Gilberto Maravilla PTA)  Plan of Care Review  Plan of Care Reviewed With: patient (Gilberto Maravilla PTA)    Vitals/Pain/Safety  Vital Signs  O2 Delivery Pre Treatment: room air (Gilberto Maravilla PTA)  Pain Assessment  Additional Documentation: Pain Scale: Numbers Pre/Post-Treatment (Group) (Gilberto Maravilla PTA)  Pain Scale: Numbers Pre/Post-Treatment  Pain Scale: Numbers, Pretreatment: 0/10 - no pain (Gilberto Maravilla PTA)  Positioning and Restraints  Pre-Treatment Position: in bed (Gilberto Maravilla PTA)  Post Treatment Position: bed (Gilberto Maravilla PTA)  In Bed: notified nsg, side lying left, call light within  reach, encouraged to call for assist (Gilberto Maravilla, PTA)    Mobility,ADL,Motor, Modality  Bed Mobility Assessment/Treatment  Bed Mobility Assessment/Treatment: bed mobility (all) activities (Gilberto Maravilla, PTA)  [place] Level (Bed Mobility): moderate assist (50% patient effort) (Gilberto Maravilla, PTA)  Supine-Sit [place] (Bed Mobility): moderate assist (50% patient effort) (Gilberto Maravilla, PTA)  Sit-Supine [place] (Bed Mobility): moderate assist (50% patient effort) (Gilberto Maravilla, PTA)  Bed Mobility, Safety Issues: decreased use of arms for pushing/pulling, decreased use of legs for bridging/pushing, impaired trunk control for bed mobility (Gilberto Maravilla, PTA)  Assistive Device (Bed Mobility): bed rails, head of bed elevated (Gilberto Maravilla, PTA)  Transfer Assessment/Treatment  Transfer Assessment/Treatment: sit-stand transfer, stand-sit transfer (Gilberto Maravilla, PTA)  Sit-Stand Transfer  Sit-Stand [place] (Transfers): moderate assist (50% patient effort) (Gilberto Maravilla PTA)  Assistive Device (Sit-Stand Transfers): walker, front-wheeled (Gilberto Maravilla, PTA)  Stand-Sit Transfer  Stand-Sit [place] (Transfers): moderate assist (50% patient effort) (Gilberto Maravilla PTA)  Assistive Device (Stand-Sit Transfers): walker, front-wheeled (Gilberto Maravilla, PTA)  Gait/Stairs Assessment/Training  Gait/Stairs Assessment/Training: gait/ambulation assistive device (Gilberto Maravilla, PTA)  [place] Level (Gait): minimum assist (75% patient effort) (Gilberto Maravilla, PTA)  Assistive Device (Gait): walker, front-wheeled (Gilberto Maravilla, PTA)  Distance in Feet (Gait): 50 (Gilberto Maravilla, PTA)  Deviations/Abnormal Patterns (Gait): gait speed decreased (Gilberto Maravilla PTA)                 ROM/MMT             Sensory, Edema, Orthotics          Cognition, Communication, Swallow  Cognitive Assessment/Intervention- PT/OT  Orientation Status  (Cognition): oriented to, person (Gilberto Maravilla, PTA)  Follows Commands (Cognition): follows one step commands (Gilberto Maravilla, GILLIAN)  Safety Deficit (Cognitive): mild deficit (Gilberto Maravilla PTA)    Outcome Summary               PT Rehab Goals     Row Name 03/17/18 1300 03/13/18 1400          Bed Mobility Goal 1 (PT)    Activity/Assistive Device (Bed Mobility Goal 1, PT) bed mobility activities, all  -SI bed mobility activities, all  -MA     [place] Level/Cues Needed (Bed Mobility Goal 1, PT)  -- independent  -MA     Time Frame (Bed Mobility Goal 1, PT)  -- 1 week  -MA        Transfer Goal 1 (PT)    Activity/Assistive Device (Transfer Goal 1, PT)  -- transfers, all  -MA     [place] Level/Cues Needed (Transfer Goal 1, PT)  -- supervision required  -MA     Time Frame (Transfer Goal 1, PT)  -- 1 week  -MA        Gait Training Goal 1 (PT)    [place] Level (Gait Training Goal 1, PT)  -- supervision required  -MA     Distance (Gait Goal 1, PT) 35 feet progressing  -  -MA     Time Frame (Gait Training Goal 1, PT)  -- 1 week  -MA       User Key  (r) = Recorded By, (t) = Taken By, (c) = Cosigned By    Initials Name Provider Type    MARYANN Chu PTA Physical Therapy Assistant    MARBELLA Stout, PT Physical Therapist          Physical Therapy Education     Title: PT OT SLP Therapies (Active)     Topic: Physical Therapy (Active)     Point: Mobility training (Active)    Learning Progress Summary     Learner Status Readiness Method Response Comment Documented by    Patient Done Acceptance E,TB KRISTEN HERNANDEZ  CW 03/19/18 1617     Active Acceptance E,TB NR positioning for comfort SI 03/17/18 1357     Active Acceptance E,TB,D NR  RW 03/16/18 1643     Done Acceptance E,TB DAVID,KRISTEN  CW 03/15/18 1544     Done Acceptance E,TB KRISTEN HERNANDEZ  CW 03/14/18 1502     Active Acceptance E NR  MA 03/13/18 1441    Family Active Acceptance E,TB NR positioning for comfort SI 03/17/18 1357          Point: Home exercise  program (Active)    Learning Progress Summary     Learner Status Readiness Method Response Comment Documented by    Patient Done Acceptance E,TB VU,DU  CW 03/19/18 1617     Active Acceptance E,TB NR positioning for comfort SI 03/17/18 1357     Done Acceptance E,TB VU,DU  CW 03/15/18 1544     Done Acceptance E,TB VU,DU  CW 03/14/18 1502    Family Active Acceptance E,TB NR positioning for comfort SI 03/17/18 1357          Point: Body mechanics (Active)    Learning Progress Summary     Learner Status Readiness Method Response Comment Documented by    Patient Done Acceptance E,TB VU,DU  CW 03/19/18 1617     Active Acceptance E,TB NR positioning for comfort SI 03/17/18 1357     Active Acceptance E,TB,D NR   03/16/18 1643     Done Acceptance E,TB VU,DU  CW 03/15/18 1544     Done Acceptance E,TB VU,DU  CW 03/14/18 1502     Active Acceptance E NR  MA 03/13/18 1441    Family Active Acceptance E,TB NR positioning for comfort SI 03/17/18 1357          Point: Precautions (Active)    Learning Progress Summary     Learner Status Readiness Method Response Comment Documented by    Patient Done Acceptance E,TB VU,DU  CW 03/19/18 1617     Active Acceptance E,TB NR positioning for comfort SI 03/17/18 1357     Active Acceptance E,TB,D NR   03/16/18 1643     Done Acceptance E,TB VU,DU  CW 03/15/18 1544     Done Acceptance E,TB VU,DU  CW 03/14/18 1502     Active Acceptance E NR  MA 03/13/18 1441    Family Active Acceptance E,TB NR positioning for comfort SI 03/17/18 1357                      User Key     Initials Effective Dates Name Provider Type Discipline    SI 05/18/15 -  Ayde Chu, PTA Physical Therapy Assistant PT    RW 03/07/18 -  Jillian Byrne, PTA Physical Therapy Assistant PT    MA 03/07/18 -  Angelica Stout, PT Physical Therapist PT     03/07/18 -  Gilberto Maravilla, GILLIAN Physical Therapy Assistant PT                    PT Recommendation and Plan     Plan of Care Reviewed With: patient  Progress:  improving  Outcome Summary: Pt increased with bed mobility and activity otlerance but limited amb due to fatigue          Outcome Measures     Row Name 03/19/18 1600 03/18/18 1600 03/17/18 1400       How much help from another person do you currently need...    Turning from your back to your side while in flat bed without using bedrails? 3  -CW 3  -SI 3  -SI    Moving from lying on back to sitting on the side of a flat bed without bedrails? 3  -CW 2  -SI 3  -SI    Moving to and from a bed to a chair (including a wheelchair)? 3  -CW 2  -SI 3  -SI    Standing up from a chair using your arms (e.g., wheelchair, bedside chair)? 3  -CW 2  -SI 3  -SI    Climbing 3-5 steps with a railing? 2  -CW 2  -SI 2  -SI    To walk in hospital room? 3  -CW 2  -SI 3  -SI    AM-PAC 6 Clicks Score 17  -CW 13  -SI 17  -SI       Functional Assessment    Outcome Measure Options AM-PAC 6 Clicks Basic Mobility (PT)  -CW AM-PAC 6 Clicks Basic Mobility (PT)  -SI AM-PAC 6 Clicks Basic Mobility (PT)  -SI    Row Name 03/16/18 1635             How much help from another person do you currently need...    Turning from your back to your side while in flat bed without using bedrails? 3  -RW      Moving from lying on back to sitting on the side of a flat bed without bedrails? 3  -RW      Moving to and from a bed to a chair (including a wheelchair)? 3  -RW      Standing up from a chair using your arms (e.g., wheelchair, bedside chair)? 3  -RW      Climbing 3-5 steps with a railing? 2  -RW      To walk in hospital room? 3  -RW      AM-PAC 6 Clicks Score 17  -RW         Functional Assessment    Outcome Measure Options AM-PAC 6 Clicks Basic Mobility (PT)  -RW        User Key  (r) = Recorded By, (t) = Taken By, (c) = Cosigned By    Initials Name Provider Type    MARYANN Chu, PTA Physical Therapy Assistant    JAVIER Byrne, PTA Physical Therapy Assistant    CW Gilberto Maravilla, PTA Physical Therapy Assistant           Time Calculation:         PT  Charges     Row Name 03/19/18 1618             Time Calculation    Start Time 1605  -CW      Stop Time 1618  -CW      Time Calculation (min) 13 min  -CW      PT Received On 03/19/18  -CW      PT - Next Appointment 03/20/18  -CW        User Key  (r) = Recorded By, (t) = Taken By, (c) = Cosigned By    Initials Name Provider Type    CW Gilberto Maravilla PTA Physical Therapy Assistant          Therapy Charges for Today     Code Description Service Date Service Provider Modifiers Qty    25769921083 HC PT THER PROC EA 15 MIN 3/19/2018 Gilberto Maravilla PTA GP 1          PT G-Codes  Outcome Measure Options: AM-PAC 6 Clicks Basic Mobility (PT)    Gilberto Maravilla PTA  3/19/2018

## 2018-03-19 NOTE — PLAN OF CARE
Problem: Patient Care Overview  Goal: Plan of Care Review  Outcome: Ongoing (interventions implemented as appropriate)   03/19/18 5552   Coping/Psychosocial   Plan of Care Reviewed With patient;family   Plan of Care Review   Progress no change   OTHER   Outcome Summary Pt had poor PO intake this shift. Pt fatigued this shift. VSS. Will continue to monitor per protocol.      Goal: Individualization and Mutuality  Outcome: Ongoing (interventions implemented as appropriate)    Goal: Discharge Needs Assessment  Outcome: Ongoing (interventions implemented as appropriate)    Goal: Interprofessional Rounds/Family Conf  Outcome: Ongoing (interventions implemented as appropriate)      Problem: Fall Risk (Adult)  Goal: Absence of Fall  Outcome: Ongoing (interventions implemented as appropriate)      Problem: Skin Injury Risk (Adult)  Goal: Skin Health and Integrity  Outcome: Ongoing (interventions implemented as appropriate)      Problem: Fluid Volume Deficit (Adult)  Goal: Optimal Fluid Balance  Outcome: Ongoing (interventions implemented as appropriate)      Problem: Nutrition, Imbalanced: Inadequate Oral Intake (Adult)  Goal: Improved Oral Intake  Outcome: Ongoing (interventions implemented as appropriate)    Goal: Prevent Further Weight Loss  Outcome: Ongoing (interventions implemented as appropriate)

## 2018-03-19 NOTE — PLAN OF CARE
Problem: Patient Care Overview  Goal: Plan of Care Review   03/19/18 0520   Coping/Psychosocial   Plan of Care Reviewed With patient   Plan of Care Review   Progress no change   OTHER   Outcome Summary Pt rested well after 25mg benadryl. Very sweet lady. Continue IV antibiotics and monitor.      Goal: Individualization and Mutuality  Outcome: Ongoing (interventions implemented as appropriate)    Goal: Discharge Needs Assessment  Outcome: Ongoing (interventions implemented as appropriate)      Problem: Fall Risk (Adult)  Goal: Absence of Fall  Outcome: Ongoing (interventions implemented as appropriate)      Problem: Skin Injury Risk (Adult)  Goal: Skin Health and Integrity  Outcome: Ongoing (interventions implemented as appropriate)      Problem: Fluid Volume Deficit (Adult)  Goal: Optimal Fluid Balance  Outcome: Ongoing (interventions implemented as appropriate)      Problem: Nutrition, Imbalanced: Inadequate Oral Intake (Adult)  Goal: Improved Oral Intake  Outcome: Ongoing (interventions implemented as appropriate)    Goal: Prevent Further Weight Loss  Outcome: Ongoing (interventions implemented as appropriate)

## 2018-03-19 NOTE — PLAN OF CARE
Problem: Patient Care Overview  Goal: Plan of Care Review  Outcome: Ongoing (interventions implemented as appropriate)   03/19/18 5830   Coping/Psychosocial   Plan of Care Reviewed With patient   Plan of Care Review   Progress improving   OTHER   Outcome Summary Pt increased with bed mobility and activity tolerance but limited amb due to fatigue

## 2018-03-20 LAB
ANION GAP SERPL CALCULATED.3IONS-SCNC: 10.5 MMOL/L
BASOPHILS # BLD AUTO: 0.01 10*3/MM3 (ref 0–0.2)
BASOPHILS NFR BLD AUTO: 0.2 % (ref 0–1.5)
BUN BLD-MCNC: 29 MG/DL (ref 8–23)
BUN/CREAT SERPL: 20.4 (ref 7–25)
CALCIUM SPEC-SCNC: 8.4 MG/DL (ref 8.6–10.5)
CHLORIDE SERPL-SCNC: 95 MMOL/L (ref 98–107)
CO2 SERPL-SCNC: 25.5 MMOL/L (ref 22–29)
CREAT BLD-MCNC: 1.42 MG/DL (ref 0.57–1)
DEPRECATED RDW RBC AUTO: 49.7 FL (ref 37–54)
EOSINOPHIL # BLD AUTO: 0.32 10*3/MM3 (ref 0–0.7)
EOSINOPHIL NFR BLD AUTO: 5 % (ref 0.3–6.2)
ERYTHROCYTE [DISTWIDTH] IN BLOOD BY AUTOMATED COUNT: 14.4 % (ref 11.7–13)
GFR SERPL CREATININE-BSD FRML MDRD: 35 ML/MIN/1.73
GLUCOSE BLD-MCNC: 236 MG/DL (ref 65–99)
HCT VFR BLD AUTO: 35.1 % (ref 35.6–45.5)
HGB BLD-MCNC: 11.5 G/DL (ref 11.9–15.5)
IMM GRANULOCYTES # BLD: 0 10*3/MM3 (ref 0–0.03)
IMM GRANULOCYTES NFR BLD: 0 % (ref 0–0.5)
LYMPHOCYTES # BLD AUTO: 1.42 10*3/MM3 (ref 0.9–4.8)
LYMPHOCYTES NFR BLD AUTO: 22.2 % (ref 19.6–45.3)
MCH RBC QN AUTO: 31.1 PG (ref 26.9–32)
MCHC RBC AUTO-ENTMCNC: 32.8 G/DL (ref 32.4–36.3)
MCV RBC AUTO: 94.9 FL (ref 80.5–98.2)
MONOCYTES # BLD AUTO: 1.33 10*3/MM3 (ref 0.2–1.2)
MONOCYTES NFR BLD AUTO: 20.8 % (ref 5–12)
NEUTROPHILS # BLD AUTO: 3.31 10*3/MM3 (ref 1.9–8.1)
NEUTROPHILS NFR BLD AUTO: 51.8 % (ref 42.7–76)
PLAT MORPH BLD: NORMAL
PLATELET # BLD AUTO: 269 10*3/MM3 (ref 140–500)
PMV BLD AUTO: 9.9 FL (ref 6–12)
POTASSIUM BLD-SCNC: 4.1 MMOL/L (ref 3.5–5.2)
RBC # BLD AUTO: 3.7 10*6/MM3 (ref 3.9–5.2)
RBC MORPH BLD: NORMAL
SODIUM BLD-SCNC: 131 MMOL/L (ref 136–145)
WBC MORPH BLD: NORMAL
WBC NRBC COR # BLD: 6.39 10*3/MM3 (ref 4.5–10.7)

## 2018-03-20 PROCEDURE — 94799 UNLISTED PULMONARY SVC/PX: CPT

## 2018-03-20 PROCEDURE — 97110 THERAPEUTIC EXERCISES: CPT

## 2018-03-20 PROCEDURE — 85007 BL SMEAR W/DIFF WBC COUNT: CPT | Performed by: HOSPITALIST

## 2018-03-20 PROCEDURE — 85025 COMPLETE CBC W/AUTO DIFF WBC: CPT | Performed by: HOSPITALIST

## 2018-03-20 PROCEDURE — 80048 BASIC METABOLIC PNL TOTAL CA: CPT | Performed by: HOSPITALIST

## 2018-03-20 RX ADMIN — GUAIFENESIN AND DEXTROMETHORPHAN 5 ML: 100; 10 SYRUP ORAL at 21:43

## 2018-03-20 RX ADMIN — CLINDAMYCIN PHOSPHATE 300 MG: 6 INJECTION, SOLUTION INTRAMUSCULAR; INTRAVENOUS at 12:59

## 2018-03-20 RX ADMIN — LACTULOSE 10 G: 10 SOLUTION ORAL at 23:16

## 2018-03-20 RX ADMIN — IPRATROPIUM BROMIDE AND ALBUTEROL SULFATE 3 ML: .5; 3 SOLUTION RESPIRATORY (INHALATION) at 15:15

## 2018-03-20 RX ADMIN — IPRATROPIUM BROMIDE AND ALBUTEROL SULFATE 3 ML: .5; 3 SOLUTION RESPIRATORY (INHALATION) at 19:45

## 2018-03-20 RX ADMIN — DONEPEZIL HYDROCHLORIDE 10 MG: 10 TABLET, FILM COATED ORAL at 20:53

## 2018-03-20 RX ADMIN — DOCUSATE SODIUM -SENNOSIDES 2 TABLET: 50; 8.6 TABLET, COATED ORAL at 20:53

## 2018-03-20 RX ADMIN — IPRATROPIUM BROMIDE AND ALBUTEROL SULFATE 3 ML: .5; 3 SOLUTION RESPIRATORY (INHALATION) at 07:02

## 2018-03-20 RX ADMIN — MEMANTINE HYDROCHLORIDE 10 MG: 10 TABLET, FILM COATED ORAL at 09:56

## 2018-03-20 RX ADMIN — CLINDAMYCIN PHOSPHATE 300 MG: 6 INJECTION, SOLUTION INTRAMUSCULAR; INTRAVENOUS at 05:50

## 2018-03-20 RX ADMIN — ASPIRIN 81 MG: 81 TABLET ORAL at 09:56

## 2018-03-20 RX ADMIN — ACETAMINOPHEN 650 MG: 325 TABLET ORAL at 23:15

## 2018-03-20 RX ADMIN — LEVOTHYROXINE SODIUM 50 MCG: 50 TABLET ORAL at 09:56

## 2018-03-20 RX ADMIN — CLINDAMYCIN PHOSPHATE 300 MG: 6 INJECTION, SOLUTION INTRAMUSCULAR; INTRAVENOUS at 20:53

## 2018-03-20 NOTE — THERAPY TREATMENT NOTE
Acute Care - Physical Therapy Treatment Note  [facility]     Patient Name: Sam Flores  : 1934  MRN: [id number]  Today's Date: 3/20/2018     Date of Referral to PT: 18  Referring Physician: Noah    Admit Date: 3/12/2018    Visit Dx:    ICD-10-CM ICD-9-CM   1. Dehydration E86.0 276.51   2. Renal insufficiency N28.9 593.9   3. Dementia with behavioral disturbance, unspecified dementia type F03.91 294.21   4. Impaired functional mobility, balance, gait, and endurance Z74.09 V49.89     Patient Active Problem List   Diagnosis   • Lumbar compression fracture   • Osteoporosis   • Dehydration   • Acute on chronic renal failure   • Type 2 diabetes mellitus without complication   • Benign essential hypertension   • Weight loss, abnormal   • Trouble swallowing   • Hyponatremia   • Hypokalemia   • Aspiration pneumonia       Therapy Treatment    Therapy Treatment / Health Promotion    Treatment Time/Intention  Discipline: physical therapist (18 : Traci Rowell PT)  Document Type: therapy note (daily note) (18 : Traci Rowell PT)  Subjective Information: complains of, fatigue (18 : Traci Rowell PT)  Patient/Family Observations: Pt supine in bed in no acute distress; family present at bedside (18 : Traci Rowell PT)  Patient Effort: good (18 : Traci Rowell PT)  Existing Precautions/Restrictions: fall (18 : Traci Rowell PT)    Vitals/Pain/Safety  Pain Scale: Numbers Pre/Post-Treatment  Pain Scale: Numbers, Pretreatment: 0/10 - no pain (18 : Traci Rowell PT)  Positioning and Restraints  Pre-Treatment Position: in bed (18 : Traci Rowell PT)  Post Treatment Position: chair (18 : Traci Rowell PT)  In Chair: reclined, call light within reach, encouraged to call for assist, exit alarm on, legs elevated, RUE elevated, LUE elevated (18 : Traci Rowell PT)    Mobility,ADL,Motor, Modality  Bed Mobility  Assessment/Treatment  Supine-Sit Christian (Bed Mobility): minimum assist (75% patient effort), verbal cues (03/20/18 1619 : Traci Rowell PT)  Assistive Device (Bed Mobility): head of bed elevated (03/20/18 1619 : Traci Rowell PT)  Sit-Stand Transfer  Sit-Stand Christian (Transfers): minimum assist (75% patient effort), verbal cues (03/20/18 1619 : Traci Rowell PT)  Assistive Device (Sit-Stand Transfers): walker, 4-wheeled (03/20/18 1619 : Traci Rowell PT)  Stand-Sit Transfer  Stand-Sit Christian (Transfers): minimum assist (75% patient effort), verbal cues (03/20/18 1619 : Traci Rowell PT)  Gait/Stairs Assessment/Training  Christian Level (Gait): contact guard, minimum assist (75% patient effort), verbal cues (03/20/18 1619 : Traci Rowell PT)  Assistive Device (Gait): walker, front-wheeled (03/20/18 1619 : Traci Rowell, PT)  Distance in Feet (Gait): 50 (03/20/18 1619 : Traci Rowell PT)  Deviations/Abnormal Patterns (Gait): olvin decreased, stride length decreased (03/20/18 1619 : Traci Rowell PT)  Bilateral Gait Deviations: heel strike decreased (03/20/18 1619 : Traci Rowell PT)  Comment (Gait/Stairs): cues to increase B step length; 2 standing rest breaks required due to fatigue (03/20/18 1619 : Traci Rowell, ABRAHAN)                 ROM/MMT             Sensory, Edema, Orthotics          Cognition, Communication, Swallow  Cognitive Assessment/Intervention- PT/OT  Orientation Status (Cognition): oriented to, person, place (03/20/18 1619 : Traci Rowell PT)  Follows Commands (Cognition): follows one step commands, over 90% accuracy (03/20/18 1619 : Traci Rowell, ABRAHAN)  Safety Deficit (Cognitive): mild deficit (03/20/18 1619 : Traci Rowell PT)  Personal Safety Interventions: fall prevention program maintained, gait belt, muscle strengthening facilitated, nonskid shoes/slippers when out of bed, supervised activity (03/20/18 1619 : Traci Rowell, PT)    Outcome Summary               PT Rehab Goals     Row Name  03/17/18 1300 03/13/18 1400          Bed Mobility Goal 1 (PT)    Activity/Assistive Device (Bed Mobility Goal 1, PT) bed mobility activities, all  -SI bed mobility activities, all  -MA     [place] Level/Cues Needed (Bed Mobility Goal 1, PT)  -- independent  -MA     Time Frame (Bed Mobility Goal 1, PT)  -- 1 week  -MA        Transfer Goal 1 (PT)    Activity/Assistive Device (Transfer Goal 1, PT)  -- transfers, all  -MA     [place] Level/Cues Needed (Transfer Goal 1, PT)  -- supervision required  -MA     Time Frame (Transfer Goal 1, PT)  -- 1 week  -MA        Gait Training Goal 1 (PT)    [place] Level (Gait Training Goal 1, PT)  -- supervision required  -MA     Distance (Gait Goal 1, PT) 35 feet progressing  -  -MA     Time Frame (Gait Training Goal 1, PT)  -- 1 week  -MA       User Key  (r) = Recorded By, (t) = Taken By, (c) = Cosigned By    Initials Name Provider Type    MARYANN Chu, PTA Physical Therapy Assistant    MARBELLA Stout, PT Physical Therapist          Physical Therapy Education     Title: PT OT SLP Therapies (Active)     Topic: Physical Therapy (Active)     Point: Mobility training (Active)    Learning Progress Summary     Learner Status Readiness Method Response Comment Documented by    Patient Done Acceptance E NR,VU  EE 03/20/18 1632     Done Acceptance E,TB VU,DU  CW 03/19/18 1617     Active Acceptance E,TB NR positioning for comfort SI 03/17/18 1357     Active Acceptance E,TB,D NR  RW 03/16/18 1643     Done Acceptance E,TB VU,DU  CW 03/15/18 1544     Done Acceptance E,TB VU,DU  CW 03/14/18 1502     Active Acceptance E NR  MA 03/13/18 1441    Family Active Acceptance E,TB NR positioning for comfort SI 03/17/18 1357          Point: Home exercise program (Active)    Learning Progress Summary     Learner Status Readiness Method Response Comment Documented by    Patient Done Acceptance E,TB VU,DU  CW 03/19/18 1617     Active Acceptance E,TB NR positioning for comfort SI  03/17/18 1357     Done Acceptance E,TB VU,DU  CW 03/15/18 1544     Done Acceptance E,TB VU,DU  CW 03/14/18 1502    Family Active Acceptance E,TB NR positioning for comfort SI 03/17/18 1357          Point: Body mechanics (Active)    Learning Progress Summary     Learner Status Readiness Method Response Comment Documented by    Patient Done Acceptance E NR,VU  EE 03/20/18 1632     Done Acceptance E,TB VU,DU  CW 03/19/18 1617     Active Acceptance E,TB NR positioning for comfort SI 03/17/18 1357     Active Acceptance E,TB,D NR   03/16/18 1643     Done Acceptance E,TB VU,DU  CW 03/15/18 1544     Done Acceptance E,TB VU,DU  CW 03/14/18 1502     Active Acceptance E NR  MA 03/13/18 1441    Family Active Acceptance E,TB NR positioning for comfort SI 03/17/18 1357          Point: Precautions (Active)    Learning Progress Summary     Learner Status Readiness Method Response Comment Documented by    Patient Done Acceptance E,TB VU,DU  CW 03/19/18 1617     Active Acceptance E,TB NR positioning for comfort SI 03/17/18 1357     Active Acceptance E,TB,D NR   03/16/18 1643     Done Acceptance E,TB VU,DU  CW 03/15/18 1544     Done Acceptance E,TB VU,DU  CW 03/14/18 1502     Active Acceptance E NR  MA 03/13/18 1441    Family Active Acceptance E,TB NR positioning for comfort SI 03/17/18 1357                      User Key     Initials Effective Dates Name Provider Type Discipline     05/18/15 -  Ayde Chu, PTA Physical Therapy Assistant PT     12/01/15 -  Traci Rowell, PT Physical Therapist PT     03/07/18 -  Jillian Byrne, PTA Physical Therapy Assistant PT    MA 03/07/18 -  Angelica Stout, PT Physical Therapist PT     03/07/18 -  Gilberto Maravilla, GILLIAN Physical Therapy Assistant PT                    PT Recommendation and Plan     Outcome Summary: Pt demonstrates gradual progress w/PT; requiring less assistance with bed mobility and transfers today. Activity limited by fatigue; multiple standing rest breaks  required during ambulation.           Outcome Measures     Row Name 03/20/18 1600 03/19/18 1600 03/18/18 1600       How much help from another person do you currently need...    Turning from your back to your side while in flat bed without using bedrails? 3  -EE 3  -CW 3  -SI    Moving from lying on back to sitting on the side of a flat bed without bedrails? 3  -EE 3  -CW 2  -SI    Moving to and from a bed to a chair (including a wheelchair)? 3  -EE 3  -CW 2  -SI    Standing up from a chair using your arms (e.g., wheelchair, bedside chair)? 3  -EE 3  -CW 2  -SI    Climbing 3-5 steps with a railing? 2  -EE 2  -CW 2  -SI    To walk in hospital room? 3  -EE 3  -CW 2  -SI    AM-PAC 6 Clicks Score 17  -EE 17  -CW 13  -SI       Functional Assessment    Outcome Measure Options AM-PAC 6 Clicks Basic Mobility (PT)  -EE AM-PAC 6 Clicks Basic Mobility (PT)  -CW AM-PAC 6 Clicks Basic Mobility (PT)  -SI      User Key  (r) = Recorded By, (t) = Taken By, (c) = Cosigned By    Initials Name Provider Type    SI Ayde Chu, GILLIAN Physical Therapy Assistant    EE Traci Rowell, PT Physical Therapist    CW Gilberto Maravilla, GILLIAN Physical Therapy Assistant           Time Calculation:         PT Charges     Row Name 03/20/18 1633             Time Calculation    Start Time 1615  -EE      Stop Time 1631  -EE      Time Calculation (min) 16 min  -EE      PT Received On 03/20/18  -EE      PT - Next Appointment 03/21/18  -EE        User Key  (r) = Recorded By, (t) = Taken By, (c) = Cosigned By    Initials Name Provider Type    EE Traci Rowell PT Physical Therapist          Therapy Charges for Today     Code Description Service Date Service Provider Modifiers Qty    48676374774 HC PT THER PROC EA 15 MIN 3/20/2018 Traci Rowell PT GP 1    19428659296 HC PT THER SUPP EA 15 MIN 3/20/2018 Traci Rowell PT GP 1          PT G-Codes  Outcome Measure Options: AM-PAC 6 Clicks Basic Mobility (PT)    Traci Rowell PT  3/20/2018

## 2018-03-20 NOTE — PROGRESS NOTES
Discharge Planning Assessment  [facility]     Patient Name: Sam Flores  MRN: [id number]  Today's Date: 3/20/2018    Admit Date: 3/12/2018          Discharge Needs Assessment    No documentation.             Discharge Plan     Row Name 03/20/18 1453       Plan    Plan Comments Reviewed the chart. No plan for discharge per MD's note. CCP will continue to follow for any needs that may arise. Per Dr. Nieves not making any clinical progress.  MAGALY Lance RN, CCP.        Destination - Selection Complete     Service Request Status Selected Specialties Address Phone Number Fax Number    [facility] Selected Skilled Nursing Facility [address] [address] [phone]      Durable Medical Equipment     No service coordination in this encounter.      Dialysis/Infusion     No service coordination in this encounter.      Home Medical Care     No service coordination in this encounter.      Social Care     No service coordination in this encounter.                Demographic Summary    No documentation.           Functional Status    No documentation.           Psychosocial    No documentation.           Abuse/Neglect    No documentation.           Legal    No documentation.           Substance Abuse    No documentation.           Patient Forms    No documentation.         Eulalia Lance RN

## 2018-03-20 NOTE — PROGRESS NOTES
"DAILY PROGRESS NOTE  [facility]    Patient Identification:  Name: Sam Flores  Age: 83 y.o.  Sex: female  :  1934  MRN: [id number]         Primary Care Physician: Joann Prabhakar MD    Subjective:  Interval History:She feels better today. Still coughing a lot. Poor appetite.  Still confused.    Objective:    Scheduled Meds:    aspirin 81 mg Oral Daily   clindamycin 300 mg Intravenous Q8H   donepezil 10 mg Oral Nightly   ipratropium-albuterol 3 mL Nebulization 4x Daily - RT   lactulose 10 g Oral Q48H   levothyroxine 50 mcg Oral Daily   memantine 10 mg Oral Daily   sennosides-docusate sodium 2 tablet Oral Nightly     Continuous Infusions:       Vital signs in last 24 hours:  Temp:  [97.9 °F (36.6 °C)-100.4 °F (38 °C)] 98 °F (36.7 °C)  Heart Rate:  [] 84  Resp:  [14-18] 16  BP: ()/(45-69) 147/69    Intake/Output:    Intake/Output Summary (Last 24 hours) at 18 1147  Last data filed at 18 1850   Gross per 24 hour   Intake              240 ml   Output                0 ml   Net              240 ml       Exam:  /69 (BP Location: Left arm, Patient Position: Lying)   Pulse 84   Temp 98 °F (36.7 °C) (Oral)   Resp 16   Ht 149.9 cm (59\")   Wt 44.2 kg (97 lb 7.8 oz)   SpO2 93%   BMI 19.69 kg/m²     General Appearance:    Alert, cooperative, no distress   Head:    Normocephalic, without obvious abnormality, atraumatic   Eyes:       Throat:   Lips, tongue, gums normal   Neck:   Supple, symmetrical, trachea midline, no JVD   Lungs:     rhonchi bilaterally, respirations unlabored   Chest Wall:    No tenderness or deformity    Heart:    Regular rate and rhythm, S1 and S2 normal, no murmur,no  Rub or gallop   Abdomen:     Soft, non-tender, bowel sounds active, no masses, no organomegaly    Extremities:   Extremities normal, atraumatic, no cyanosis or edema   Pulses:      Skin:   Skin is warm and dry,  no rashes or palpable lesions   Neurologic:   no focal " deficits noted, confused demented.      [unfilled]  Data Review:    Results from last 7 days  Lab Units 03/20/18  0629 03/19/18  0615 03/18/18  0617   SODIUM mmol/L 131* 131* 133*   POTASSIUM mmol/L 4.1 4.5 5.3*   CHLORIDE mmol/L 95* 96* 97*   CO2 mmol/L 25.5 25.0 23.7   BUN mg/dL 29* 26* 36*   CREATININE mg/dL 1.42* 1.23* 1.25*   GLUCOSE mg/dL 236* 148* 252*   CALCIUM mg/dL 8.4* 8.5* 8.6       Results from last 7 days  Lab Units 03/20/18  0629 03/19/18  0615 03/18/18  0617   WBC 10*3/mm3 6.39 6.09 12.92*   HEMOGLOBIN g/dL 11.5* 12.0 13.3   HEMATOCRIT % 35.1* 36.4 40.4   PLATELETS 10*3/mm3 269 229 255       Results from last 7 days  Lab Units 03/15/18  0555   TSH mIU/mL 1.760           Lab Results  Lab Value Date/Time   TROPONINT 0.020 03/12/2018 1304   TROPONINT <0.010 11/30/2016 1946               Invalid input(s): PROT, LABALBU    Results from last 7 days  Lab Units 03/15/18  0555   TSH mIU/mL 1.760         No results found for: POCGLU        Patient Active Problem List   Diagnosis Code   • Lumbar compression fracture S32.000A   • Osteoporosis M81.0   • Dehydration E86.0   • Acute on chronic renal failure N17.9, N18.9   • Type 2 diabetes mellitus without complication E11.9   • Benign essential hypertension I10   • Weight loss, abnormal R63.4   • Trouble swallowing R13.10   • Hyponatremia E87.1   • Hypokalemia E87.6   • Aspiration pneumonia J69.0       Assessment:  Active Hospital Problems (** Indicates Principal Problem)    Diagnosis Date Noted   • **Dehydration [E86.0] 03/12/2018   • Aspiration pneumonia [J69.0] 03/15/2018   • Acute on chronic renal failure [N17.9, N18.9] 03/12/2018   • Type 2 diabetes mellitus without complication [E11.9] 03/12/2018   • Benign essential hypertension [I10] 03/12/2018   • Weight loss, abnormal [R63.4] 03/12/2018   • Trouble swallowing [R13.10] 03/12/2018   • Hyponatremia [E87.1] 03/12/2018   • Hypokalemia [E87.6] 03/12/2018      Resolved Hospital Problems    Diagnosis Date Noted  Date Resolved   No resolved problems to display.       Plan:  Off IVF   Dysphagia diet. ??? [place] poor. Antibiotics.    Kp Sky MD  3/20/2018  11:47 AM

## 2018-03-20 NOTE — PLAN OF CARE
Problem: Patient Care Overview  Goal: Plan of Care Review   03/20/18 1632   OTHER   Outcome Summary Pt demonstrates gradual progress w/PT; requiring less assistance with bed mobility and transfers today. Activity limited by fatigue; multiple standing rest breaks required during ambulation.

## 2018-03-20 NOTE — PLAN OF CARE
Problem: Patient Care Overview  Goal: Plan of Care Review  Outcome: Ongoing (interventions implemented as appropriate)   03/20/18 8147   Coping/Psychosocial   Plan of Care Reviewed With patient;family   Plan of Care Review   Progress no change   OTHER   Outcome Summary Pt continues with poor oral intake this shift. VSS. IV abx continued. Will continue to monitor per protocol.     Goal: Individualization and Mutuality  Outcome: Ongoing (interventions implemented as appropriate)    Goal: Discharge Needs Assessment  Outcome: Ongoing (interventions implemented as appropriate)    Goal: Interprofessional Rounds/Family Conf  Outcome: Ongoing (interventions implemented as appropriate)      Problem: Fall Risk (Adult)  Goal: Absence of Fall  Outcome: Ongoing (interventions implemented as appropriate)      Problem: Skin Injury Risk (Adult)  Goal: Skin Health and Integrity  Outcome: Ongoing (interventions implemented as appropriate)      Problem: Fluid Volume Deficit (Adult)  Goal: Optimal Fluid Balance  Outcome: Ongoing (interventions implemented as appropriate)      Problem: Nutrition, Imbalanced: Inadequate Oral Intake (Adult)  Goal: Improved Oral Intake  Outcome: Ongoing (interventions implemented as appropriate)    Goal: Prevent Further Weight Loss  Outcome: Ongoing (interventions implemented as appropriate)

## 2018-03-20 NOTE — PLAN OF CARE
Problem: Patient Care Overview  Goal: Plan of Care Review  Outcome: Ongoing (interventions implemented as appropriate)   03/19/18 1634 03/19/18 2008 03/20/18 0628   Coping/Psychosocial   Plan of Care Reviewed With --  patient --    Plan of Care Review   Progress no change --  --    OTHER   Outcome Summary --  --  Patient is on IV antibiotics. Patient requested for sleeping med and Tylenol last night. Patient was able to sleep for a couple of hours. No family at bedside. Will continue to monitor vital signs, labs and comfort.     Goal: Individualization and Mutuality  Outcome: Ongoing (interventions implemented as appropriate)    Goal: Discharge Needs Assessment  Outcome: Ongoing (interventions implemented as appropriate)      Problem: Fall Risk (Adult)  Goal: Absence of Fall  Outcome: Ongoing (interventions implemented as appropriate)      Problem: Skin Injury Risk (Adult)  Goal: Skin Health and Integrity  Outcome: Ongoing (interventions implemented as appropriate)      Problem: Fluid Volume Deficit (Adult)  Goal: Optimal Fluid Balance  Outcome: Ongoing (interventions implemented as appropriate)      Problem: Nutrition, Imbalanced: Inadequate Oral Intake (Adult)  Goal: Improved Oral Intake  Outcome: Ongoing (interventions implemented as appropriate)    Goal: Prevent Further Weight Loss  Outcome: Ongoing (interventions implemented as appropriate)

## 2018-03-20 NOTE — PROGRESS NOTES
"   LOS: 8 days    Patient Care Team:  Joann Prabhakar MD as PCP - General (Family Medicine)    Chief Complaint:    Chief Complaint   Patient presents with   • Altered Mental Status     FAMILY REPORTS PT IS BECOMING MORE CONFUSED, NOT EATING AND UNABLE TO CONTROL HER BLADDER; PT WITH NO COMPLAINTS AND DOES NOT KNOW WHY SHE IS HERE     Follow UP Matthew  Subjective     Interval History:     Follow up Matthew. Confused.  Eating. Bowels moving . Still coughing.     Review of Systems:   Not reliable.     Objective     Vital Signs  Temp:  [97.9 °F (36.6 °C)-100.4 °F (38 °C)] 98 °F (36.7 °C)  Heart Rate:  [] 84  Resp:  [14-18] 16  BP: ()/(45-69) 147/69    Flowsheet Rows    Flowsheet Row First Filed Value   Admission Height 149.9 cm (59\") Documented at 03/12/2018 1238   Admission Weight 40.8 kg (90 lb) Documented at 03/12/2018 1305          No intake/output data recorded.  I/O last 3 completed shifts:  In: 780 [P.O.:780]  Out: -     Intake/Output Summary (Last 24 hours) at 03/20/18 1233  Last data filed at 03/19/18 1850   Gross per 24 hour   Intake              240 ml   Output                0 ml   Net              240 ml       Physical Exam:  Elderly Frail WF. Heart RRR no s3 or rub. Lungs upper airway rhonchi, no wheezes. More congested.  Abd + bs soft, nontender. Ext trace edema lower ext.       Results Review:      Results from last 7 days  Lab Units 03/20/18  0629 03/19/18  0615 03/18/18  0617   SODIUM mmol/L 131* 131* 133*   POTASSIUM mmol/L 4.1 4.5 5.3*   CHLORIDE mmol/L 95* 96* 97*   CO2 mmol/L 25.5 25.0 23.7   BUN mg/dL 29* 26* 36*   CREATININE mg/dL 1.42* 1.23* 1.25*   CALCIUM mg/dL 8.4* 8.5* 8.6   GLUCOSE mg/dL 236* 148* 252*       Estimated Creatinine Clearance: 20.9 mL/min (by C-G formula based on SCr of 1.42 mg/dL (H)).      Results from last 7 days  Lab Units 03/17/18  0739 03/16/18  0656 03/15/18  0555 03/14/18  0552   MAGNESIUM mg/dL 1.7  --  1.7 1.8   PHOSPHORUS mg/dL 2.6 1.6* 0.9* 1.9*        "        Results from last 7 days  Lab Units 03/20/18  0629 03/19/18  0615 03/18/18  0617 03/17/18  0740 03/16/18  0656   WBC 10*3/mm3 6.39 6.09 12.92* 13.09* 11.56*   HEMOGLOBIN g/dL 11.5* 12.0 13.3 12.6 12.3   PLATELETS 10*3/mm3 269 229 255 210 191               Imaging Results (last 24 hours)     ** No results found for the last 24 hours. **          aspirin 81 mg Oral Daily   clindamycin 300 mg Intravenous Q8H   donepezil 10 mg Oral Nightly   ipratropium-albuterol 3 mL Nebulization 4x Daily - RT   lactulose 10 g Oral Q48H   levothyroxine 50 mcg Oral Daily   memantine 10 mg Oral Daily   sennosides-docusate sodium 2 tablet Oral Nightly          Medication Review:   Current Facility-Administered Medications   Medication Dose Route Frequency Provider Last Rate Last Dose   • acetaminophen (TYLENOL) tablet 650 mg  650 mg Oral Q4H PRN Raciel Zamora MD   650 mg at 03/19/18 2254   • aspirin EC tablet 81 mg  81 mg Oral Daily Raciel Zamora MD   81 mg at 03/20/18 0956   • bisacodyl (DULCOLAX) EC tablet 5 mg  5 mg Oral Daily PRN Raciel Zamora MD       • bisacodyl (DULCOLAX) suppository 10 mg  10 mg Rectal Daily PRN Raciel Zamora MD       • clindamycin (CLEOCIN) IVPB 300 mg  300 mg Intravenous Q8H Kp Sky MD   300 mg at 03/20/18 0550   • diphenhydrAMINE (BENADRYL) capsule 25 mg  25 mg Oral Nightly PRN Gerard Coyle MD   25 mg at 03/19/18 2254   • donepezil (ARICEPT) tablet 10 mg  10 mg Oral Nightly Raciel Zamora MD   10 mg at 03/19/18 2125   • guaifenesin-dextromethorphan (ROBITUSSIN DM) 100-10 MG/5ML syrup 5 mL  5 mL Oral Q8H PRN Aaron Castillo MD   5 mL at 03/18/18 0414   • ipratropium-albuterol (DUO-NEB) nebulizer solution 3 mL  3 mL Nebulization 4x Daily - RT Kp Sky MD   3 mL at 03/20/18 0702   • lactulose (CHRONULAC) 10 GM/15ML solution 10 g  10 g Oral Q48H Sruthi Nieves MD       • levothyroxine (SYNTHROID, LEVOTHROID) tablet 50 mcg  50 mcg Oral Daily Raciel Zamora,  MD   50 mcg at 03/20/18 0956   • memantine (NAMENDA) tablet 10 mg  10 mg Oral Daily Raciel Zamora MD   10 mg at 03/20/18 0956   • ondansetron (ZOFRAN) tablet 4 mg  4 mg Oral Q6H PRN Raciel Zamora MD        Or   • ondansetron ODT (ZOFRAN-ODT) disintegrating tablet 4 mg  4 mg Oral Q6H PRN Raciel Zamora MD        Or   • ondansetron (ZOFRAN) injection 4 mg  4 mg Intravenous Q6H PRN Raciel Zamora MD   4 mg at 03/18/18 0622   • sennosides-docusate sodium (SENOKOT-S) 8.6-50 MG tablet 2 tablet  2 tablet Oral Nightly Kp Sky MD   2 tablet at 03/19/18 2125   • sodium chloride 0.9 % flush 1-10 mL  1-10 mL Intravenous PRN Raciel Zmaora MD           Assessment/Plan   1. [place].  Creatinine up today.  BP low intermittently yesterday. Does not look dry by exam. Check urine studies.   2. Profound hypokalemia. Now replete.   3. DM2. Not controlled. LHA managing.   4. Dementia  5. Dysphagia. Concern for ongoing aspiration.   6. Hypophosphatemia. Replete now.  7. Low grade fever again last night.  8. Hypothyroid on replacement. Compensated.     Not making any clinical progress.    Sruthi Nieves MD  03/20/18  12:33 PM

## 2018-03-21 LAB
ANION GAP SERPL CALCULATED.3IONS-SCNC: 14 MMOL/L
BASOPHILS # BLD AUTO: 0.02 10*3/MM3 (ref 0–0.2)
BASOPHILS NFR BLD AUTO: 0.2 % (ref 0–1.5)
BUN BLD-MCNC: 24 MG/DL (ref 8–23)
BUN/CREAT SERPL: 18.3 (ref 7–25)
CALCIUM SPEC-SCNC: 8.5 MG/DL (ref 8.6–10.5)
CHLORIDE SERPL-SCNC: 99 MMOL/L (ref 98–107)
CO2 SERPL-SCNC: 22 MMOL/L (ref 22–29)
CREAT BLD-MCNC: 1.31 MG/DL (ref 0.57–1)
DEPRECATED RDW RBC AUTO: 48.5 FL (ref 37–54)
EOSINOPHIL # BLD AUTO: 0.24 10*3/MM3 (ref 0–0.7)
EOSINOPHIL NFR BLD AUTO: 2.9 % (ref 0.3–6.2)
ERYTHROCYTE [DISTWIDTH] IN BLOOD BY AUTOMATED COUNT: 14.2 % (ref 11.7–13)
GFR SERPL CREATININE-BSD FRML MDRD: 39 ML/MIN/1.73
GLUCOSE BLD-MCNC: 170 MG/DL (ref 65–99)
HCT VFR BLD AUTO: 37.1 % (ref 35.6–45.5)
HGB BLD-MCNC: 12.2 G/DL (ref 11.9–15.5)
IMM GRANULOCYTES # BLD: 0.02 10*3/MM3 (ref 0–0.03)
IMM GRANULOCYTES NFR BLD: 0.2 % (ref 0–0.5)
LYMPHOCYTES # BLD AUTO: 2.14 10*3/MM3 (ref 0.9–4.8)
LYMPHOCYTES NFR BLD AUTO: 26.1 % (ref 19.6–45.3)
MCH RBC QN AUTO: 31.1 PG (ref 26.9–32)
MCHC RBC AUTO-ENTMCNC: 32.9 G/DL (ref 32.4–36.3)
MCV RBC AUTO: 94.6 FL (ref 80.5–98.2)
MONOCYTES # BLD AUTO: 1.16 10*3/MM3 (ref 0.2–1.2)
MONOCYTES NFR BLD AUTO: 14.1 % (ref 5–12)
NEUTROPHILS # BLD AUTO: 4.62 10*3/MM3 (ref 1.9–8.1)
NEUTROPHILS NFR BLD AUTO: 56.5 % (ref 42.7–76)
PLATELET # BLD AUTO: 307 10*3/MM3 (ref 140–500)
PMV BLD AUTO: 9.6 FL (ref 6–12)
POTASSIUM BLD-SCNC: 3.8 MMOL/L (ref 3.5–5.2)
RBC # BLD AUTO: 3.92 10*6/MM3 (ref 3.9–5.2)
SODIUM BLD-SCNC: 135 MMOL/L (ref 136–145)
WBC NRBC COR # BLD: 8.2 10*3/MM3 (ref 4.5–10.7)

## 2018-03-21 PROCEDURE — 94799 UNLISTED PULMONARY SVC/PX: CPT

## 2018-03-21 PROCEDURE — 80048 BASIC METABOLIC PNL TOTAL CA: CPT | Performed by: HOSPITALIST

## 2018-03-21 PROCEDURE — 85025 COMPLETE CBC W/AUTO DIFF WBC: CPT | Performed by: HOSPITALIST

## 2018-03-21 PROCEDURE — 97110 THERAPEUTIC EXERCISES: CPT

## 2018-03-21 RX ADMIN — ASPIRIN 81 MG: 81 TABLET ORAL at 11:10

## 2018-03-21 RX ADMIN — IPRATROPIUM BROMIDE AND ALBUTEROL SULFATE 3 ML: .5; 3 SOLUTION RESPIRATORY (INHALATION) at 20:03

## 2018-03-21 RX ADMIN — GUAIFENESIN AND DEXTROMETHORPHAN 5 ML: 100; 10 SYRUP ORAL at 20:37

## 2018-03-21 RX ADMIN — IPRATROPIUM BROMIDE AND ALBUTEROL SULFATE 3 ML: .5; 3 SOLUTION RESPIRATORY (INHALATION) at 06:55

## 2018-03-21 RX ADMIN — GUAIFENESIN AND DEXTROMETHORPHAN 5 ML: 100; 10 SYRUP ORAL at 12:33

## 2018-03-21 RX ADMIN — MEMANTINE HYDROCHLORIDE 10 MG: 10 TABLET, FILM COATED ORAL at 11:10

## 2018-03-21 RX ADMIN — LEVOTHYROXINE SODIUM 50 MCG: 50 TABLET ORAL at 11:09

## 2018-03-21 RX ADMIN — DOCUSATE SODIUM -SENNOSIDES 2 TABLET: 50; 8.6 TABLET, COATED ORAL at 20:36

## 2018-03-21 RX ADMIN — DONEPEZIL HYDROCHLORIDE 10 MG: 10 TABLET, FILM COATED ORAL at 20:36

## 2018-03-21 RX ADMIN — CLINDAMYCIN PHOSPHATE 300 MG: 6 INJECTION, SOLUTION INTRAMUSCULAR; INTRAVENOUS at 12:50

## 2018-03-21 RX ADMIN — CLINDAMYCIN PHOSPHATE 300 MG: 6 INJECTION, SOLUTION INTRAMUSCULAR; INTRAVENOUS at 04:23

## 2018-03-21 RX ADMIN — IPRATROPIUM BROMIDE AND ALBUTEROL SULFATE 3 ML: .5; 3 SOLUTION RESPIRATORY (INHALATION) at 15:37

## 2018-03-21 RX ADMIN — CLINDAMYCIN PHOSPHATE 300 MG: 6 INJECTION, SOLUTION INTRAMUSCULAR; INTRAVENOUS at 20:36

## 2018-03-21 RX ADMIN — IPRATROPIUM BROMIDE AND ALBUTEROL SULFATE 3 ML: .5; 3 SOLUTION RESPIRATORY (INHALATION) at 10:16

## 2018-03-21 NOTE — PLAN OF CARE
Problem: Patient Care Overview  Goal: Plan of Care Review   03/21/18 1144   Coping/Psychosocial   Plan of Care Reviewed With patient   Plan of Care Review   Progress improving   OTHER   Outcome Summary Improved tolerance to activity through bed mobility today although unable to increase ambulation distance. Fatigues quickly. Will continue to progress as able. Goals updated/revised as appropriate.

## 2018-03-21 NOTE — PLAN OF CARE
Problem: Patient Care Overview  Goal: Plan of Care Review  Outcome: Ongoing (interventions implemented as appropriate)   03/21/18 1800   Coping/Psychosocial   Plan of Care Reviewed With patient   Plan of Care Review   Progress improving   OTHER   Outcome Summary Pt up in chair today, continued antibiotics per md order, attemted to collect u/a but pt stolled in toilet will attempt again, denies any needs will monitor     Goal: Individualization and Mutuality  Outcome: Ongoing (interventions implemented as appropriate)      Problem: Fall Risk (Adult)  Goal: Absence of Fall  Outcome: Ongoing (interventions implemented as appropriate)      Problem: Skin Injury Risk (Adult)  Goal: Skin Health and Integrity  Outcome: Ongoing (interventions implemented as appropriate)

## 2018-03-21 NOTE — PROGRESS NOTES
"   LOS: 9 days    Patient Care Team:  Joann Prabhakar MD as PCP - General (Family Medicine)    Chief Complaint:    Chief Complaint   Patient presents with   • Altered Mental Status     FAMILY REPORTS PT IS BECOMING MORE CONFUSED, NOT EATING AND UNABLE TO CONTROL HER BLADDER; PT WITH NO COMPLAINTS AND DOES NOT KNOW WHY SHE IS HERE     Follow UP Matthew  Subjective     Interval History:     Follow up Matthew. Coughing. Unsure if she had a bm.  Thinks she is eating ok.   Review of Systems:   Not reliable.     Objective     Vital Signs  Temp:  [98.3 °F (36.8 °C)-102.6 °F (39.2 °C)] 98.3 °F (36.8 °C)  Heart Rate:  [] 90  Resp:  [16-20] 20  BP: (107-149)/(65-84) 127/79    Flowsheet Rows    Flowsheet Row First Filed Value   Admission Height 149.9 cm (59\") Documented at 03/12/2018 1238   Admission Weight 40.8 kg (90 lb) Documented at 03/12/2018 1305          I/O this shift:  In: 520 [P.O.:520]  Out: -   I/O last 3 completed shifts:  In: 980 [P.O.:980]  Out: -     Intake/Output Summary (Last 24 hours) at 03/21/18 1537  Last data filed at 03/21/18 1331   Gross per 24 hour   Intake             1140 ml   Output                0 ml   Net             1140 ml       Physical Exam:  Elderly Frail WF. Sitting up in chair. Heart RRR no s3 or rub. Lungs upper airway rhonchi, no wheezes.   Abd + bs soft, nontender. Ext trace edema lower ext.       Results Review:      Results from last 7 days  Lab Units 03/21/18  0717 03/20/18  0629 03/19/18  0615   SODIUM mmol/L 135* 131* 131*   POTASSIUM mmol/L 3.8 4.1 4.5   CHLORIDE mmol/L 99 95* 96*   CO2 mmol/L 22.0 25.5 25.0   BUN mg/dL 24* 29* 26*   CREATININE mg/dL 1.31* 1.42* 1.23*   CALCIUM mg/dL 8.5* 8.4* 8.5*   GLUCOSE mg/dL 170* 236* 148*       Estimated Creatinine Clearance: 22.7 mL/min (by C-G formula based on SCr of 1.31 mg/dL (H)).      Results from last 7 days  Lab Units 03/17/18  0739 03/16/18  0656 03/15/18  0555   MAGNESIUM mg/dL 1.7  --  1.7   PHOSPHORUS mg/dL 2.6 1.6* 0.9* "               Results from last 7 days  Lab Units 03/21/18  0717 03/20/18  0629 03/19/18  0615 03/18/18  0617 03/17/18  0740   WBC 10*3/mm3 8.20 6.39 6.09 12.92* 13.09*   HEMOGLOBIN g/dL 12.2 11.5* 12.0 13.3 12.6   PLATELETS 10*3/mm3 307 269 229 255 210               Imaging Results (last 24 hours)     ** No results found for the last 24 hours. **          aspirin 81 mg Oral Daily   clindamycin 300 mg Intravenous Q8H   donepezil 10 mg Oral Nightly   ipratropium-albuterol 3 mL Nebulization 4x Daily - RT   lactulose 10 g Oral Q48H   levothyroxine 50 mcg Oral Daily   memantine 10 mg Oral Daily   sennosides-docusate sodium 2 tablet Oral Nightly          Medication Review:   Current Facility-Administered Medications   Medication Dose Route Frequency Provider Last Rate Last Dose   • acetaminophen (TYLENOL) tablet 650 mg  650 mg Oral Q4H PRN Raciel Zamora MD   650 mg at 03/20/18 2315   • aspirin EC tablet 81 mg  81 mg Oral Daily Raciel Zamora MD   81 mg at 03/21/18 1110   • bisacodyl (DULCOLAX) EC tablet 5 mg  5 mg Oral Daily PRN Raciel Zamora MD       • bisacodyl (DULCOLAX) suppository 10 mg  10 mg Rectal Daily PRN Raciel Zamora MD       • clindamycin (CLEOCIN) IVPB 300 mg  300 mg Intravenous Q8H Kp Sky MD   300 mg at 03/21/18 1250   • diphenhydrAMINE (BENADRYL) capsule 25 mg  25 mg Oral Nightly PRN Gerard Coyle MD   25 mg at 03/19/18 2254   • donepezil (ARICEPT) tablet 10 mg  10 mg Oral Nightly Raciel Zamora MD   10 mg at 03/20/18 2053   • guaifenesin-dextromethorphan (ROBITUSSIN DM) 100-10 MG/5ML syrup 5 mL  5 mL Oral Q8H PRN Aaron Castillo MD   5 mL at 03/21/18 1233   • ipratropium-albuterol (DUO-NEB) nebulizer solution 3 mL  3 mL Nebulization 4x Daily - RT Kp Sky MD   3 mL at 03/21/18 1016   • lactulose (CHRONULAC) 10 GM/15ML solution 10 g  10 g Oral Q48H Sruthi Nieves MD   10 g at 03/20/18 2317   • levothyroxine (SYNTHROID, LEVOTHROID) tablet 50 mcg  50 mcg Oral  Daily Raciel Zamora MD   50 mcg at 03/21/18 1109   • memantine (NAMENDA) tablet 10 mg  10 mg Oral Daily Raciel Zamora MD   10 mg at 03/21/18 1110   • ondansetron (ZOFRAN) tablet 4 mg  4 mg Oral Q6H PRN Raciel Zamora MD        Or   • ondansetron ODT (ZOFRAN-ODT) disintegrating tablet 4 mg  4 mg Oral Q6H PRN Raciel Zamora MD        Or   • ondansetron (ZOFRAN) injection 4 mg  4 mg Intravenous Q6H PRN Raciel Zamora MD   4 mg at 03/18/18 0622   • sennosides-docusate sodium (SENOKOT-S) 8.6-50 MG tablet 2 tablet  2 tablet Oral Nightly Kp Sky MD   2 tablet at 03/20/18 2053   • sodium chloride 0.9 % flush 1-10 mL  1-10 mL Intravenous PRN Raciel Zamora MD           Assessment/Plan   1. [place].  Creatinine a little better today.  BP better past 24 hours. Urine studies not sent.   2. Profound hypokalemia. Now replete.   3. DM2. Not controlled. LHA managing.   4. Dementia  5. Dysphagia. Concern for ongoing aspiration.   6. Hypophosphatemia. Replete now.  7. Low grade fever again last night.  8. Hypothyroid on replacement. Compensated.     Not making any clinical progress. Rn reports Dr. Sky spoke to family. Continue current level of care.     Sruthi Nieves MD  03/21/18  3:37 PM

## 2018-03-21 NOTE — THERAPY TREATMENT NOTE
Acute Care - Physical Therapy Treatment Note  [facility]     Patient Name: Sam Flores  : 1934  MRN: [id number]  Today's Date: 3/21/2018     Date of Referral to PT: 18  Referring Physician: Noah    Admit Date: 3/12/2018    Visit Dx:    ICD-10-CM ICD-9-CM   1. Dehydration E86.0 276.51   2. Renal insufficiency N28.9 593.9   3. Dementia with behavioral disturbance, unspecified dementia type F03.91 294.21   4. Impaired functional mobility, balance, gait, and endurance Z74.09 V49.89     Patient Active Problem List   Diagnosis   • Lumbar compression fracture   • Osteoporosis   • Dehydration   • Acute on chronic renal failure   • Type 2 diabetes mellitus without complication   • Benign essential hypertension   • Weight loss, abnormal   • Trouble swallowing   • Hyponatremia   • Hypokalemia   • Aspiration pneumonia       Therapy Treatment    Therapy Treatment / Health Promotion    Treatment Time/Intention  Discipline: physical therapist (18 1105 : Angelica Stout, PT)  Document Type: therapy note (daily note) (18 1105 : Angelica Stout, PT)  Subjective Information: complains of, weakness, fatigue (18 1105 : Angelica Stout, PT)  Mode of Treatment: physical therapy (18 1105 : Angelica Stout, PT)  Patient/Family Observations: Supine in bed with HOB elevated, no acute distress noted at rest, exit alarm on (18 1105 : Angelica Stout, PT)  Care Plan Review: care plan/treatment goals reviewed, patient/other agree to care plan (18 1105 : Angelica Stout, PT)  Therapy Frequency (PT Clinical Impression): daily (18 1105 : Angelica Stout, PT)  Patient Effort: good (18 1105 : Angelica Stout, PT)  Existing Precautions/Restrictions: fall (18 1105 : Angelica Stout, PT)  Plan of Care Review  Plan of Care Reviewed With: patient (18 1144 : Angelica Stout, PT)    Vitals/Pain/Safety  Vital Signs  O2 Delivery Pre Treatment:  room air (03/21/18 1105 : Angelica Stout, PT)  O2 Delivery Post Treatment: room air (03/21/18 1105 : Angelica Stout, PT)  Pain Scale: Numbers Pre/Post-Treatment  Pain Scale: Numbers, Pretreatment: 0/10 - no pain (03/21/18 1105 : Angelica Stout, PT)  Positioning and Restraints  Pre-Treatment Position: in bed (03/21/18 1105 : Angelica Stout, PT)  Post Treatment Position: chair (03/21/18 1105 : Angelica Stout, PT)  In Chair: notified nsg, sitting, call light within reach, encouraged to call for assist, exit alarm on, legs elevated (03/21/18 1105 : Angelica Stout, PT)    Mobility,ADL,Motor, Modality  Bed Mobility Assessment/Treatment  Supine-Sit [place] (Bed Mobility): contact guard, verbal cues (03/21/18 1105 : Angelica Stout, PT)  Sit-Supine [place] (Bed Mobility): not tested (03/21/18 1105 : Angelica Stout, PT)  Assistive Device (Bed Mobility): head of bed elevated, bed rails (03/21/18 1105 : Angelica Stout, PT)  Comment (Bed Mobility): Cues for hand placement (03/21/18 1105 : Angelica Stout, PT)  Sit-Stand Transfer  Sit-Stand [place] (Transfers): minimum assist (75% patient effort), verbal cues (03/21/18 1105 : Angelica Stout, PT)  Assistive Device (Sit-Stand Transfers): walker, 4-wheeled (03/21/18 1105 : Angelica Stout, PT)  Stand-Sit Transfer  Stand-Sit [place] (Transfers): minimum assist (75% patient effort), verbal cues (03/21/18 1105 : Angelica Stout, PT)  Gait/Stairs Assessment/Training  [place] Level (Gait): contact guard, minimum assist (75% patient effort), verbal cues (03/21/18 1105 : Angelica Stout, PT)  Assistive Device (Gait): walker, front-wheeled (03/21/18 1105 : Angelica Stout, PT)  Distance in Feet (Gait): 15 (03/21/18 1105 : Angelica Stout, PT)  Pattern (Gait): swing-to (03/21/18 1105 : Angelica Stout, PT)  Deviations/Abnormal Patterns (Gait): olvin decreased, stride length decreased, base of support,  narrow (03/21/18 1105 : Angelica Stout, PT)  Bilateral Gait Deviations: heel strike decreased (03/21/18 1105 : Angelica Stout, PT)  Comment (Gait/Stairs): Disance limited 2' to fatigue today (03/21/18 1105 : Angelica Stout, PT)        Static Sitting Balance  Level of [place] (Unsupported Sitting, Static Balance): supervision (03/21/18 1105 : Angelica Stout, PT)  Sitting Position (Unsupported Sitting, Static Balance): sitting on edge of bed (03/21/18 1105 : Angelica Stout, PT)  Level of [place] (Supported Sitting, Static Balance): supervision (03/21/18 1105 : Angelica Stout, PT)  Sitting Position (Supported Sitting, Static Balance): sitting on edge of bed (03/21/18 1105 : Angelica Stout, PT)  Static Standing Balance  Level of [place] (Supported Standing, Static Balance): minimal assist, 75% patient effort (03/21/18 1105 : Angelica Stout, PT)  Time Able to Maintain Position (Supported Standing, Static Balance): 2 to 3 minutes (03/21/18 1105 : Angelica Stout, PT)        ROM/MMT             Sensory, Edema, Orthotics  Vision Assessment/Intervention  Visual Impairment/Limitations: WFL (03/21/18 1105 : Angelica Stout, PT)       Cognition, Communication, Swallow  Cognitive Assessment/Intervention- PT/OT  Orientation Status (Cognition): oriented to, person, place (03/21/18 1105 : Angelica Stout, PT)  Follows Commands (Cognition): follows one step commands, over 90% accuracy, delayed response/completion, increased processing time needed, verbal cues/prompting required (03/21/18 1105 : Angelica Stout, PT)  Safety Deficit (Cognitive): mild deficit (03/21/18 1105 : Angelica Stout, PT)  Personal Safety Interventions: fall prevention program maintained, gait belt, nonskid shoes/slippers when out of bed (03/21/18 1105 : Angelica Stout, PT)    Outcome Summary  Outcome Summary/Treatment Plan (PT)  Daily Summary of Progress (PT): progress towards functional goals  is fair (03/21/18 1105 : Angelica Stout, PT)            PT Rehab Goals     Row Name 03/17/18 1300 03/13/18 1400          Bed Mobility Goal 1 (PT)    Activity/Assistive Device (Bed Mobility Goal 1, PT) bed mobility activities, all  -SI bed mobility activities, all  -MA     [place] Level/Cues Needed (Bed Mobility Goal 1, PT) independent  -MA independent  -MA     Time Frame (Bed Mobility Goal 1, PT) 1 week  -MA 1 week  -MA     Progress/Outcomes (Bed Mobility Goal 1, PT) goal ongoing  -MA goal ongoing  -MA        Transfer Goal 1 (PT)    Activity/Assistive Device (Transfer Goal 1, PT) transfers, all  -MA transfers, all  -MA     [place] Level/Cues Needed (Transfer Goal 1, PT) supervision required  -MA supervision required  -MA     Time Frame (Transfer Goal 1, PT) 1 week  -MA 1 week  -MA     Progress/Outcome (Transfer Goal 1, PT) goal ongoing  -MA goal ongoing  -MA        Gait Training Goal 1 (PT)    [place] Level (Gait Training Goal 1, PT) supervision required  -MA supervision required  -MA     Distance (Gait Goal 1, PT) 35 feet progressing  -  -MA     Time Frame (Gait Training Goal 1, PT) 1 week  -MA 1 week  -MA     Progress/Outcome (Gait Training Goal 1, PT) goal ongoing  -MA goal ongoing  -MA       User Key  (r) = Recorded By, (t) = Taken By, (c) = Cosigned By    Initials Name Provider Type    MARYANN Chu PTA Physical Therapy Assistant    MARBELLA Stout, PT Physical Therapist          Physical Therapy Education     Title: PT OT SLP Therapies (Active)     Topic: Physical Therapy (Active)     Point: Mobility training (Active)    Learning Progress Summary     Learner Status Readiness Method Response Comment Documented by    Patient Active Acceptance E NR  MA 03/21/18 1145     Done Acceptance E NR,VU  EE 03/20/18 1632     Done Acceptance E,TB VU,DU  CW 03/19/18 1617     Active Acceptance E,TB NR positioning for comfort SI 03/17/18 1357     Active Acceptance E,TB,D NR  RW 03/16/18 1647      Done Acceptance E,TB VU,DU  CW 03/15/18 1544     Done Acceptance E,TB VU,DU  CW 03/14/18 1502     Active Acceptance E NR  MA 03/13/18 1441    Family Active Acceptance E,TB NR positioning for comfort SI 03/17/18 1357          Point: Home exercise program (Active)    Learning Progress Summary     Learner Status Readiness Method Response Comment Documented by    Patient Done Acceptance E,TB VU,DU  CW 03/19/18 1617     Active Acceptance E,TB NR positioning for comfort SI 03/17/18 1357     Done Acceptance E,TB VU,DU  CW 03/15/18 1544     Done Acceptance E,TB VU,DU  CW 03/14/18 1502    Family Active Acceptance E,TB NR positioning for comfort SI 03/17/18 1357          Point: Body mechanics (Active)    Learning Progress Summary     Learner Status Readiness Method Response Comment Documented by    Patient Active Acceptance E NR  MA 03/21/18 1145     Done Acceptance E NR,VU  EE 03/20/18 1632     Done Acceptance E,TB VU,DU  CW 03/19/18 1617     Active Acceptance E,TB NR positioning for comfort SI 03/17/18 1357     Active Acceptance E,TB,D NR   03/16/18 1643     Done Acceptance E,TB VU,DU  CW 03/15/18 1544     Done Acceptance E,TB VU,DU  CW 03/14/18 1502     Active Acceptance E NR  MA 03/13/18 1441    Family Active Acceptance E,TB NR positioning for comfort SI 03/17/18 1357          Point: Precautions (Active)    Learning Progress Summary     Learner Status Readiness Method Response Comment Documented by    Patient Active Acceptance E NR  MA 03/21/18 1145     Done Acceptance E,TB VU,DU  CW 03/19/18 1617     Active Acceptance E,TB NR positioning for comfort SI 03/17/18 1357     Active Acceptance E,TB,D NR   03/16/18 1643     Done Acceptance E,TB VU,DU  CW 03/15/18 1544     Done Acceptance E,TB VU,DU  CW 03/14/18 1502     Active Acceptance E NR  MA 03/13/18 1441    Family Active Acceptance E,TB NR positioning for comfort SI 03/17/18 1357                      User Key     Initials Effective Dates Name Provider Type  Discipline    SI 05/18/15 -  Ayde Chu, PTA Physical Therapy Assistant PT    EE 12/01/15 -  Traci Rowell, PT Physical Therapist PT    RW 03/07/18 -  Jillian Byrne, PTA Physical Therapy Assistant PT    MA 03/07/18 -  Angelica Stout, PT Physical Therapist PT    CW 03/07/18 -  Gilberto Maravilla, PTA Physical Therapy Assistant PT                    PT Recommendation and Plan  Anticipated Discharge Disposition (PT): home with home health care, skilled nursing facility (SNF) (pending patient progress made)  Planned Therapy Interventions (PT Eval): balance training, bed mobility training, gait training, home exercise program, patient/family education, postural re-education, strengthening, transfer training  Therapy Frequency (PT Clinical Impression): daily  Outcome Summary/Treatment Plan (PT)  Daily Summary of Progress (PT): progress towards functional goals is fair  Anticipated Discharge Disposition (PT): home with home health care, skilled nursing facility (SNF) (pending patient progress made)  Plan of Care Reviewed With: patient  Progress: improving  Outcome Summary: Improved tolerance to activity through bed mobility today although unable to increase ambulation distance. Fatigues quickly. Will continue to progress as able. Goals updated/revised as appropriate.          Outcome Measures     Row Name 03/21/18 1100 03/20/18 1600 03/19/18 1600       How much help from another person do you currently need...    Turning from your back to your side while in flat bed without using bedrails? 4  -MA 3  -EE 3  -CW    Moving from lying on back to sitting on the side of a flat bed without bedrails? 3  -MA 3  -EE 3  -CW    Moving to and from a bed to a chair (including a wheelchair)? 3  -MA 3  -EE 3  -CW    Standing up from a chair using your arms (e.g., wheelchair, bedside chair)? 3  -MA 3  -EE 3  -CW    Climbing 3-5 steps with a railing? 1  -MA 2  -EE 2  -CW    To walk in hospital room? 3  -MA 3  -EE 3  -CW    AM-PAC 6  Clicks Score 17  -MA 17  -EE 17  -CW       Functional Assessment    Outcome Measure Options AM-PAC 6 Clicks Basic Mobility (PT)  -MA AM-PAC 6 Clicks Basic Mobility (PT)  -EE AM-PAC 6 Clicks Basic Mobility (PT)  -CW    Row Name 03/18/18 1600             How much help from another person do you currently need...    Turning from your back to your side while in flat bed without using bedrails? 3  -SI      Moving from lying on back to sitting on the side of a flat bed without bedrails? 2  -SI      Moving to and from a bed to a chair (including a wheelchair)? 2  -SI      Standing up from a chair using your arms (e.g., wheelchair, bedside chair)? 2  -SI      Climbing 3-5 steps with a railing? 2  -SI      To walk in hospital room? 2  -SI      AM-PAC 6 Clicks Score 13  -SI         Functional Assessment    Outcome Measure Options AM-PAC 6 Clicks Basic Mobility (PT)  -SI        User Key  (r) = Recorded By, (t) = Taken By, (c) = Cosigned By    Initials Name Provider Type    MARYANN Chu, PTA Physical Therapy Assistant    EE Traci Rowell, PT Physical Therapist    MARBELLA Stout, PT Physical Therapist    CARLITOS Maravilla, PTA Physical Therapy Assistant           Time Calculation:         PT Charges     Row Name 03/21/18 1139             Time Calculation    Start Time 1105  -MA      Stop Time 1120  -MA      Time Calculation (min) 15 min  -MA      PT Received On 03/21/18  -MA      PT - Next Appointment 03/22/18  -MA      PT Goal Re-Cert Due Date 03/28/18  -MA        User Key  (r) = Recorded By, (t) = Taken By, (c) = Cosigned By    Initials Name Provider Type    MARBELLA Stout PT Physical Therapist          Therapy Charges for Today     Code Description Service Date Service Provider Modifiers Qty    91003131260 HC PT THER PROC EA 15 MIN 3/21/2018 Angelica Stout, PT GP 1    26672913030 HC PT THER SUPP EA 15 MIN 3/21/2018 Angelica Stout PT GP 1          PT G-Codes  Outcome Measure Options: AM-PAC 6  Clicks Basic Mobility (PT)    Angelica Stout, PT  3/21/2018

## 2018-03-21 NOTE — PROGRESS NOTES
"DAILY PROGRESS NOTE  [facility]    Patient Identification:  Name: Sam Flores  Age: 83 y.o.  Sex: female  :  1934  MRN: [id number]         Primary Care Physician: Joann Prabhakar MD    Subjective:  Interval History:She feels better today. Still coughing a lot. Poor appetite.  Still confused. Up in chair.    Objective:    Scheduled Meds:    aspirin 81 mg Oral Daily   clindamycin 300 mg Intravenous Q8H   donepezil 10 mg Oral Nightly   ipratropium-albuterol 3 mL Nebulization 4x Daily - RT   lactulose 10 g Oral Q48H   levothyroxine 50 mcg Oral Daily   memantine 10 mg Oral Daily   sennosides-docusate sodium 2 tablet Oral Nightly     Continuous Infusions:       Vital signs in last 24 hours:  Temp:  [99.1 °F (37.3 °C)-102.6 °F (39.2 °C)] 99.1 °F (37.3 °C)  Heart Rate:  [] 87  Resp:  [16-18] 16  BP: (106-149)/(63-84) 115/65    Intake/Output:    Intake/Output Summary (Last 24 hours) at 18 1237  Last data filed at 18 0904   Gross per 24 hour   Intake             1100 ml   Output                0 ml   Net             1100 ml       Exam:  /65 (BP Location: Right arm, Patient Position: Lying)   Pulse 87   Temp 99.1 °F (37.3 °C) (Oral)   Resp 16   Ht 149.9 cm (59\")   Wt 44.2 kg (97 lb 7.8 oz)   SpO2 91%   BMI 19.69 kg/m²     General Appearance:    Alert, cooperative, no distress   Head:    Normocephalic, without obvious abnormality, atraumatic   Eyes:       Throat:   Lips, tongue, gums normal   Neck:   Supple, symmetrical, trachea midline, no JVD   Lungs:     rhonchi bilaterally, respirations unlabored   Chest Wall:    No tenderness or deformity    Heart:    Regular rate and rhythm, S1 and S2 normal, no murmur,no  Rub or gallop   Abdomen:     Soft, non-tender, bowel sounds active, no masses, no organomegaly    Extremities:   Extremities normal, atraumatic, no cyanosis or edema   Pulses:      Skin:   Skin is warm and dry,  no rashes or palpable lesions "   Neurologic:   no focal deficits noted, confused demented.      [unfilled]  Data Review:    Results from last 7 days  Lab Units 03/21/18  0717 03/20/18  0629 03/19/18  0615   SODIUM mmol/L 135* 131* 131*   POTASSIUM mmol/L 3.8 4.1 4.5   CHLORIDE mmol/L 99 95* 96*   CO2 mmol/L 22.0 25.5 25.0   BUN mg/dL 24* 29* 26*   CREATININE mg/dL 1.31* 1.42* 1.23*   GLUCOSE mg/dL 170* 236* 148*   CALCIUM mg/dL 8.5* 8.4* 8.5*       Results from last 7 days  Lab Units 03/21/18  0717 03/20/18  0629 03/19/18  0615   WBC 10*3/mm3 8.20 6.39 6.09   HEMOGLOBIN g/dL 12.2 11.5* 12.0   HEMATOCRIT % 37.1 35.1* 36.4   PLATELETS 10*3/mm3 307 269 229       Results from last 7 days  Lab Units 03/15/18  0555   TSH mIU/mL 1.760           Lab Results  Lab Value Date/Time   TROPONINT 0.020 03/12/2018 1304   TROPONINT <0.010 11/30/2016 1946               Invalid input(s): PROT, LABALBU    Results from last 7 days  Lab Units 03/15/18  0555   TSH mIU/mL 1.760         No results found for: POCGLU        Patient Active Problem List   Diagnosis Code   • Lumbar compression fracture S32.000A   • Osteoporosis M81.0   • Dehydration E86.0   • Acute on chronic renal failure N17.9, N18.9   • Type 2 diabetes mellitus without complication E11.9   • Benign essential hypertension I10   • Weight loss, abnormal R63.4   • Trouble swallowing R13.10   • Hyponatremia E87.1   • Hypokalemia E87.6   • Aspiration pneumonia J69.0       Assessment:  Active Hospital Problems (** Indicates Principal Problem)    Diagnosis Date Noted   • **Dehydration [E86.0] 03/12/2018   • Aspiration pneumonia [J69.0] 03/15/2018   • Acute on chronic renal failure [N17.9, N18.9] 03/12/2018   • Type 2 diabetes mellitus without complication [E11.9] 03/12/2018   • Benign essential hypertension [I10] 03/12/2018   • Weight loss, abnormal [R63.4] 03/12/2018   • Trouble swallowing [R13.10] 03/12/2018   • Hyponatremia [E87.1] 03/12/2018   • Hypokalemia [E87.6] 03/12/2018      Resolved Hospital Problems     Diagnosis Date Noted Date Resolved   No resolved problems to display.       Plan:  Off IVF   Dysphagia diet. ??? [place] poor. Antibiotics.    Kp Sky MD  3/21/2018  12:37 PM

## 2018-03-21 NOTE — PLAN OF CARE
Problem: Patient Care Overview  Goal: Plan of Care Review  Outcome: Ongoing (interventions implemented as appropriate)   03/20/18 1619 03/20/18 2053 03/21/18 0538   Coping/Psychosocial   Plan of Care Reviewed With --  patient --    Plan of Care Review   Progress no change --  --    OTHER   Outcome Summary --  --  Patient was medicated PRN for fever last night. No family at bedside. Patient is on IV antibiotics. Patient is incontinent. Still need urine sample. PT is working with patient. PRN cough medicine given x1. Will continue to monitor vital signs, labs and comfort.     Goal: Individualization and Mutuality  Outcome: Ongoing (interventions implemented as appropriate)    Goal: Discharge Needs Assessment  Outcome: Ongoing (interventions implemented as appropriate)      Problem: Fall Risk (Adult)  Goal: Absence of Fall  Outcome: Ongoing (interventions implemented as appropriate)      Problem: Skin Injury Risk (Adult)  Goal: Skin Health and Integrity  Outcome: Ongoing (interventions implemented as appropriate)      Problem: Fluid Volume Deficit (Adult)  Goal: Optimal Fluid Balance  Outcome: Ongoing (interventions implemented as appropriate)      Problem: Nutrition, Imbalanced: Inadequate Oral Intake (Adult)  Goal: Improved Oral Intake  Outcome: Ongoing (interventions implemented as appropriate)    Goal: Prevent Further Weight Loss  Outcome: Ongoing (interventions implemented as appropriate)

## 2018-03-22 LAB
AMORPH URATE CRY URNS QL MICRO: ABNORMAL /HPF
ANION GAP SERPL CALCULATED.3IONS-SCNC: 12.7 MMOL/L
BACTERIA UR QL AUTO: ABNORMAL /HPF
BASOPHILS # BLD AUTO: 0.01 10*3/MM3 (ref 0–0.2)
BASOPHILS NFR BLD AUTO: 0.1 % (ref 0–1.5)
BILIRUB UR QL STRIP: NEGATIVE
BUN BLD-MCNC: 19 MG/DL (ref 8–23)
BUN/CREAT SERPL: 16.5 (ref 7–25)
CALCIUM SPEC-SCNC: 7.8 MG/DL (ref 8.6–10.5)
CHLORIDE SERPL-SCNC: 98 MMOL/L (ref 98–107)
CLARITY UR: CLEAR
CO2 SERPL-SCNC: 23.3 MMOL/L (ref 22–29)
COLOR UR: YELLOW
CREAT BLD-MCNC: 1.15 MG/DL (ref 0.57–1)
CREAT UR-MCNC: 53.2 MG/DL
DEPRECATED RDW RBC AUTO: 48.5 FL (ref 37–54)
EOSINOPHIL # BLD AUTO: 0.35 10*3/MM3 (ref 0–0.7)
EOSINOPHIL NFR BLD AUTO: 5.2 % (ref 0.3–6.2)
ERYTHROCYTE [DISTWIDTH] IN BLOOD BY AUTOMATED COUNT: 14.3 % (ref 11.7–13)
GFR SERPL CREATININE-BSD FRML MDRD: 45 ML/MIN/1.73
GLUCOSE BLD-MCNC: 120 MG/DL (ref 65–99)
GLUCOSE UR STRIP-MCNC: ABNORMAL MG/DL
HCT VFR BLD AUTO: 35.1 % (ref 35.6–45.5)
HGB BLD-MCNC: 11.5 G/DL (ref 11.9–15.5)
HGB UR QL STRIP.AUTO: ABNORMAL
HYALINE CASTS UR QL AUTO: ABNORMAL /LPF
IMM GRANULOCYTES # BLD: 0 10*3/MM3 (ref 0–0.03)
IMM GRANULOCYTES NFR BLD: 0 % (ref 0–0.5)
KETONES UR QL STRIP: NEGATIVE
LEUKOCYTE ESTERASE UR QL STRIP.AUTO: NEGATIVE
LYMPHOCYTES # BLD AUTO: 2 10*3/MM3 (ref 0.9–4.8)
LYMPHOCYTES NFR BLD AUTO: 29.5 % (ref 19.6–45.3)
MCH RBC QN AUTO: 30.9 PG (ref 26.9–32)
MCHC RBC AUTO-ENTMCNC: 32.8 G/DL (ref 32.4–36.3)
MCV RBC AUTO: 94.4 FL (ref 80.5–98.2)
MONOCYTES # BLD AUTO: 0.87 10*3/MM3 (ref 0.2–1.2)
MONOCYTES NFR BLD AUTO: 12.9 % (ref 5–12)
NEUTROPHILS # BLD AUTO: 3.54 10*3/MM3 (ref 1.9–8.1)
NEUTROPHILS NFR BLD AUTO: 52.3 % (ref 42.7–76)
NITRITE UR QL STRIP: NEGATIVE
PH UR STRIP.AUTO: 6.5 [PH] (ref 5–8)
PLATELET # BLD AUTO: 285 10*3/MM3 (ref 140–500)
PMV BLD AUTO: 9.5 FL (ref 6–12)
POTASSIUM BLD-SCNC: 3.8 MMOL/L (ref 3.5–5.2)
PROT UR QL STRIP: ABNORMAL
RBC # BLD AUTO: 3.72 10*6/MM3 (ref 3.9–5.2)
RBC # UR: ABNORMAL /HPF
REF LAB TEST METHOD: ABNORMAL
SODIUM BLD-SCNC: 134 MMOL/L (ref 136–145)
SODIUM UR-SCNC: 56 MMOL/L
SP GR UR STRIP: 1.02 (ref 1–1.03)
SQUAMOUS #/AREA URNS HPF: ABNORMAL /HPF
TRANS CELLS #/AREA URNS HPF: ABNORMAL /HPF
UROBILINOGEN UR QL STRIP: ABNORMAL
WBC NRBC COR # BLD: 6.77 10*3/MM3 (ref 4.5–10.7)
WBC UR QL AUTO: ABNORMAL /HPF

## 2018-03-22 PROCEDURE — 87086 URINE CULTURE/COLONY COUNT: CPT | Performed by: INTERNAL MEDICINE

## 2018-03-22 PROCEDURE — 94799 UNLISTED PULMONARY SVC/PX: CPT

## 2018-03-22 PROCEDURE — 63710000001 DIPHENHYDRAMINE PER 50 MG: Performed by: INTERNAL MEDICINE

## 2018-03-22 PROCEDURE — 84300 ASSAY OF URINE SODIUM: CPT | Performed by: INTERNAL MEDICINE

## 2018-03-22 PROCEDURE — 82570 ASSAY OF URINE CREATININE: CPT | Performed by: INTERNAL MEDICINE

## 2018-03-22 PROCEDURE — 85025 COMPLETE CBC W/AUTO DIFF WBC: CPT | Performed by: HOSPITALIST

## 2018-03-22 PROCEDURE — 97110 THERAPEUTIC EXERCISES: CPT

## 2018-03-22 PROCEDURE — 80048 BASIC METABOLIC PNL TOTAL CA: CPT | Performed by: HOSPITALIST

## 2018-03-22 PROCEDURE — 81001 URINALYSIS AUTO W/SCOPE: CPT | Performed by: INTERNAL MEDICINE

## 2018-03-22 RX ADMIN — IPRATROPIUM BROMIDE AND ALBUTEROL SULFATE 3 ML: .5; 3 SOLUTION RESPIRATORY (INHALATION) at 10:54

## 2018-03-22 RX ADMIN — MEMANTINE HYDROCHLORIDE 10 MG: 10 TABLET, FILM COATED ORAL at 08:26

## 2018-03-22 RX ADMIN — GUAIFENESIN AND DEXTROMETHORPHAN 5 ML: 100; 10 SYRUP ORAL at 12:22

## 2018-03-22 RX ADMIN — DONEPEZIL HYDROCHLORIDE 10 MG: 10 TABLET, FILM COATED ORAL at 21:11

## 2018-03-22 RX ADMIN — DIPHENHYDRAMINE HYDROCHLORIDE 25 MG: 25 CAPSULE ORAL at 00:44

## 2018-03-22 RX ADMIN — LACTULOSE 10 G: 10 SOLUTION ORAL at 23:13

## 2018-03-22 RX ADMIN — IPRATROPIUM BROMIDE AND ALBUTEROL SULFATE 3 ML: .5; 3 SOLUTION RESPIRATORY (INHALATION) at 21:16

## 2018-03-22 RX ADMIN — CLINDAMYCIN PHOSPHATE 300 MG: 6 INJECTION, SOLUTION INTRAMUSCULAR; INTRAVENOUS at 05:38

## 2018-03-22 RX ADMIN — DOCUSATE SODIUM -SENNOSIDES 2 TABLET: 50; 8.6 TABLET, COATED ORAL at 21:11

## 2018-03-22 RX ADMIN — IPRATROPIUM BROMIDE AND ALBUTEROL SULFATE 3 ML: .5; 3 SOLUTION RESPIRATORY (INHALATION) at 14:51

## 2018-03-22 RX ADMIN — LEVOTHYROXINE SODIUM 50 MCG: 50 TABLET ORAL at 08:26

## 2018-03-22 RX ADMIN — DIPHENHYDRAMINE HYDROCHLORIDE 25 MG: 25 CAPSULE ORAL at 23:13

## 2018-03-22 RX ADMIN — IPRATROPIUM BROMIDE AND ALBUTEROL SULFATE 3 ML: .5; 3 SOLUTION RESPIRATORY (INHALATION) at 06:56

## 2018-03-22 RX ADMIN — ASPIRIN 81 MG: 81 TABLET ORAL at 08:26

## 2018-03-22 NOTE — PROGRESS NOTES
"DAILY PROGRESS NOTE  [facility]    Patient Identification:  Name: Sam Flores  Age: 83 y.o.  Sex: female  :  1934  MRN: [id number]         Primary Care Physician: Joann Prabhakar MD    Subjective:  Interval History:She feels better today. Still coughing a lot. Poor appetite.  Still confused. Up in chair. Follow labs. ? SNU ? 1-2 days    Objective:    Scheduled Meds:    aspirin 81 mg Oral Daily   donepezil 10 mg Oral Nightly   ipratropium-albuterol 3 mL Nebulization 4x Daily - RT   lactulose 10 g Oral Q48H   levothyroxine 50 mcg Oral Daily   memantine 10 mg Oral Daily   sennosides-docusate sodium 2 tablet Oral Nightly     Continuous Infusions:       Vital signs in last 24 hours:  Temp:  [97.9 °F (36.6 °C)-98.3 °F (36.8 °C)] 97.9 °F (36.6 °C)  Heart Rate:  [77-92] 82  Resp:  [16-20] 18  BP: (120-127)/(73-79) 123/77    Intake/Output:    Intake/Output Summary (Last 24 hours) at 18 1203  Last data filed at 18 0948   Gross per 24 hour   Intake             1280 ml   Output                0 ml   Net             1280 ml       Exam:  /77 (BP Location: Right arm, Patient Position: Lying)   Pulse 82   Temp 97.9 °F (36.6 °C) (Oral)   Resp 18   Ht 149.9 cm (59\")   Wt 44.2 kg (97 lb 7.8 oz)   SpO2 95%   BMI 19.69 kg/m²     General Appearance:    Alert, cooperative, no distress   Head:    Normocephalic, without obvious abnormality, atraumatic   Eyes:       Throat:   Lips, tongue, gums normal   Neck:   Supple, symmetrical, trachea midline, no JVD   Lungs:     rhonchi bilaterally, respirations unlabored   Chest Wall:    No tenderness or deformity    Heart:    Regular rate and rhythm, S1 and S2 normal, no murmur,no  Rub or gallop   Abdomen:     Soft, non-tender, bowel sounds active, no masses, no organomegaly    Extremities:   Extremities normal, atraumatic, no cyanosis or edema   Pulses:      Skin:   Skin is warm and dry,  no rashes or palpable lesions   Neurologic:   no " focal deficits noted, confused demented.      [unfilled]  Data Review:    Results from last 7 days  Lab Units 03/22/18  0641 03/21/18  0717 03/20/18  0629   SODIUM mmol/L 134* 135* 131*   POTASSIUM mmol/L 3.8 3.8 4.1   CHLORIDE mmol/L 98 99 95*   CO2 mmol/L 23.3 22.0 25.5   BUN mg/dL 19 24* 29*   CREATININE mg/dL 1.15* 1.31* 1.42*   GLUCOSE mg/dL 120* 170* 236*   CALCIUM mg/dL 7.8* 8.5* 8.4*       Results from last 7 days  Lab Units 03/22/18  0641 03/21/18  0717 03/20/18  0629   WBC 10*3/mm3 6.77 8.20 6.39   HEMOGLOBIN g/dL 11.5* 12.2 11.5*   HEMATOCRIT % 35.1* 37.1 35.1*   PLATELETS 10*3/mm3 285 307 269               Lab Results  Lab Value Date/Time   TROPONINT 0.020 03/12/2018 1304   TROPONINT <0.010 11/30/2016 1946               Invalid input(s): PROT, LABALBU          No results found for: POCGLU        Patient Active Problem List   Diagnosis Code   • Lumbar compression fracture S32.000A   • Osteoporosis M81.0   • Dehydration E86.0   • Acute on chronic renal failure N17.9, N18.9   • Type 2 diabetes mellitus without complication E11.9   • Benign essential hypertension I10   • Weight loss, abnormal R63.4   • Trouble swallowing R13.10   • Hyponatremia E87.1   • Hypokalemia E87.6   • Aspiration pneumonia J69.0       Assessment:  Active Hospital Problems (** Indicates Principal Problem)    Diagnosis Date Noted   • **Dehydration [E86.0] 03/12/2018   • Aspiration pneumonia [J69.0] 03/15/2018   • Acute on chronic renal failure [N17.9, N18.9] 03/12/2018   • Type 2 diabetes mellitus without complication [E11.9] 03/12/2018   • Benign essential hypertension [I10] 03/12/2018   • Weight loss, abnormal [R63.4] 03/12/2018   • Trouble swallowing [R13.10] 03/12/2018   • Hyponatremia [E87.1] 03/12/2018   • Hypokalemia [E87.6] 03/12/2018      Resolved Hospital Problems    Diagnosis Date Noted Date Resolved   No resolved problems to display.       Plan:  Off IVF   Dysphagia diet. ??? [place] poor. Finished Antibiotics.    Kp ADAN  MD Jose Daniel  3/22/2018  12:03 PM

## 2018-03-22 NOTE — PLAN OF CARE
Problem: Patient Care Overview  Goal: Plan of Care Review  Outcome: Ongoing (interventions implemented as appropriate)   03/22/18 1127   Coping/Psychosocial   Plan of Care Reviewed With patient   Plan of Care Review   Progress improving   OTHER   Outcome Summary Pt increased with bed mobility and activity tolerance but fatigues with amb and gets worried about falling

## 2018-03-22 NOTE — PLAN OF CARE
Problem: Patient Care Overview  Goal: Plan of Care Review  Outcome: Ongoing (interventions implemented as appropriate)   03/22/18 8572   Coping/Psychosocial   Plan of Care Reviewed With patient   Plan of Care Review   Progress no change   OTHER   Outcome Summary pt up in chair today, resting comfortably back in bed, attemted to collect u/a but stooled in speci hat, will straight cath for speci denies any other needs will monitor     Goal: Individualization and Mutuality  Outcome: Ongoing (interventions implemented as appropriate)      Problem: Fall Risk (Adult)  Goal: Absence of Fall  Outcome: Ongoing (interventions implemented as appropriate)      Problem: Skin Injury Risk (Adult)  Goal: Skin Health and Integrity  Outcome: Ongoing (interventions implemented as appropriate)

## 2018-03-22 NOTE — PROGRESS NOTES
"   LOS: 10 days    Patient Care Team:  Joann Prabhakar MD as PCP - General (Family Medicine)    Chief Complaint:    Chief Complaint   Patient presents with   • Altered Mental Status     FAMILY REPORTS PT IS BECOMING MORE CONFUSED, NOT EATING AND UNABLE TO CONTROL HER BLADDER; PT WITH NO COMPLAINTS AND DOES NOT KNOW WHY SHE IS HERE     Follow UP Matthew  Subjective     Interval History:     Follow up Matthew. Coughing. Poor appetite. Not much progress    Review of Systems:   Not reliable.     Objective     Vital Signs  Temp:  [97.9 °F (36.6 °C)-98.7 °F (37.1 °C)] 98.7 °F (37.1 °C)  Heart Rate:  [77-93] 89  Resp:  [16-20] 18  BP: (120-124)/(72-77) 124/72    Flowsheet Rows    Flowsheet Row First Filed Value   Admission Height 149.9 cm (59\") Documented at 03/12/2018 1238   Admission Weight 40.8 kg (90 lb) Documented at 03/12/2018 1305          I/O this shift:  In: 600 [P.O.:600]  Out: -   I/O last 3 completed shifts:  In: 1040 [P.O.:1040]  Out: -     Intake/Output Summary (Last 24 hours) at 03/22/18 1606  Last data filed at 03/22/18 1354   Gross per 24 hour   Intake             1120 ml   Output                0 ml   Net             1120 ml       Physical Exam:  Elderly Frail WF. Sitting up in chair. Heart RRR no s3 or rub. Lungs upper airway rhonchi, no wheezes.   Abd + bs soft, nontender. Ext trace edema lower ext.       Results Review:      Results from last 7 days  Lab Units 03/22/18  0641 03/21/18  0717 03/20/18  0629   SODIUM mmol/L 134* 135* 131*   POTASSIUM mmol/L 3.8 3.8 4.1   CHLORIDE mmol/L 98 99 95*   CO2 mmol/L 23.3 22.0 25.5   BUN mg/dL 19 24* 29*   CREATININE mg/dL 1.15* 1.31* 1.42*   CALCIUM mg/dL 7.8* 8.5* 8.4*   GLUCOSE mg/dL 120* 170* 236*       Estimated Creatinine Clearance: 25.9 mL/min (by C-G formula based on SCr of 1.15 mg/dL (H)).      Results from last 7 days  Lab Units 03/17/18  0739 03/16/18  0656   MAGNESIUM mg/dL 1.7  --    PHOSPHORUS mg/dL 2.6 1.6*               Results from last 7 " days  Lab Units 03/22/18  0641 03/21/18  0717 03/20/18  0629 03/19/18  0615 03/18/18  0617   WBC 10*3/mm3 6.77 8.20 6.39 6.09 12.92*   HEMOGLOBIN g/dL 11.5* 12.2 11.5* 12.0 13.3   PLATELETS 10*3/mm3 285 307 269 229 255               Imaging Results (last 24 hours)     ** No results found for the last 24 hours. **          aspirin 81 mg Oral Daily   donepezil 10 mg Oral Nightly   ipratropium-albuterol 3 mL Nebulization 4x Daily - RT   lactulose 10 g Oral Q48H   levothyroxine 50 mcg Oral Daily   memantine 10 mg Oral Daily   sennosides-docusate sodium 2 tablet Oral Nightly          Medication Review:   Current Facility-Administered Medications   Medication Dose Route Frequency Provider Last Rate Last Dose   • acetaminophen (TYLENOL) tablet 650 mg  650 mg Oral Q4H PRN Raciel Zamora MD   650 mg at 03/20/18 2315   • aspirin EC tablet 81 mg  81 mg Oral Daily Raciel Zamora MD   81 mg at 03/22/18 0826   • bisacodyl (DULCOLAX) EC tablet 5 mg  5 mg Oral Daily PRN Raciel Zamora MD       • bisacodyl (DULCOLAX) suppository 10 mg  10 mg Rectal Daily PRN Raciel Zamora MD       • diphenhydrAMINE (BENADRYL) capsule 25 mg  25 mg Oral Nightly PRN Gerard Coyle MD   25 mg at 03/22/18 0044   • donepezil (ARICEPT) tablet 10 mg  10 mg Oral Nightly Raciel Zamora MD   10 mg at 03/21/18 2036   • guaifenesin-dextromethorphan (ROBITUSSIN DM) 100-10 MG/5ML syrup 5 mL  5 mL Oral Q8H PRN Aaron Castillo MD   5 mL at 03/22/18 1222   • ipratropium-albuterol (DUO-NEB) nebulizer solution 3 mL  3 mL Nebulization 4x Daily - RT Kp Sky MD   3 mL at 03/22/18 1451   • lactulose (CHRONULAC) 10 GM/15ML solution 10 g  10 g Oral Q48H Sruthi Nieves MD   10 g at 03/20/18 2316   • levothyroxine (SYNTHROID, LEVOTHROID) tablet 50 mcg  50 mcg Oral Daily Raciel Zamora MD   50 mcg at 03/22/18 0826   • memantine (NAMENDA) tablet 10 mg  10 mg Oral Daily Raciel Zamora MD   10 mg at 03/22/18 0826   • ondansetron  (ZOFRAN) tablet 4 mg  4 mg Oral Q6H PRN Raciel Zamora MD        Or   • ondansetron ODT (ZOFRAN-ODT) disintegrating tablet 4 mg  4 mg Oral Q6H PRN Raciel Zamora MD        Or   • ondansetron (ZOFRAN) injection 4 mg  4 mg Intravenous Q6H PRN Raciel Zamora MD   4 mg at 03/18/18 0622   • sennosides-docusate sodium (SENOKOT-S) 8.6-50 MG tablet 2 tablet  2 tablet Oral Nightly Kp Sky MD   2 tablet at 03/21/18 2036   • sodium chloride 0.9 % flush 1-10 mL  1-10 mL Intravenous PRN Raciel Zamora MD           Assessment/Plan   1. [place].  Creatinine a little better today.  BP better past 24 hours. Urine studies not sent.   2. Profound hypokalemia. Now replete.   3. DM2. Not controlled. [facility] managing.   4. Dementia  5. Dysphagia. Concern for ongoing aspiration.   6. Hypophosphatemia. Replete now.  7. Low grade fever again last night.  8. Hypothyroid on replacement. Compensated.     Not making any clinical progress. Continue current level of care.     Major Rowley MD  03/22/18  4:06 PM

## 2018-03-23 VITALS
SYSTOLIC BLOOD PRESSURE: 96 MMHG | BODY MASS INDEX: 19.65 KG/M2 | WEIGHT: 97.49 LBS | RESPIRATION RATE: 18 BRPM | HEART RATE: 92 BPM | TEMPERATURE: 97.2 F | HEIGHT: 59 IN | OXYGEN SATURATION: 94 % | DIASTOLIC BLOOD PRESSURE: 53 MMHG

## 2018-03-23 LAB
ANION GAP SERPL CALCULATED.3IONS-SCNC: 11.2 MMOL/L
BASOPHILS # BLD AUTO: 0.04 10*3/MM3 (ref 0–0.2)
BASOPHILS NFR BLD AUTO: 0.5 % (ref 0–1.5)
BUN BLD-MCNC: 23 MG/DL (ref 8–23)
BUN/CREAT SERPL: 18.7 (ref 7–25)
CALCIUM SPEC-SCNC: 8.3 MG/DL (ref 8.6–10.5)
CHLORIDE SERPL-SCNC: 102 MMOL/L (ref 98–107)
CO2 SERPL-SCNC: 23.8 MMOL/L (ref 22–29)
CREAT BLD-MCNC: 1.23 MG/DL (ref 0.57–1)
DEPRECATED RDW RBC AUTO: 52.9 FL (ref 37–54)
EOSINOPHIL # BLD AUTO: 0.34 10*3/MM3 (ref 0–0.7)
EOSINOPHIL NFR BLD AUTO: 4.6 % (ref 0.3–6.2)
ERYTHROCYTE [DISTWIDTH] IN BLOOD BY AUTOMATED COUNT: 15 % (ref 11.7–13)
GFR SERPL CREATININE-BSD FRML MDRD: 42 ML/MIN/1.73
GLUCOSE BLD-MCNC: 111 MG/DL (ref 65–99)
HCT VFR BLD AUTO: 35.7 % (ref 35.6–45.5)
HGB BLD-MCNC: 11.4 G/DL (ref 11.9–15.5)
IMM GRANULOCYTES # BLD: 0 10*3/MM3 (ref 0–0.03)
IMM GRANULOCYTES NFR BLD: 0 % (ref 0–0.5)
LYMPHOCYTES # BLD AUTO: 3.06 10*3/MM3 (ref 0.9–4.8)
LYMPHOCYTES NFR BLD AUTO: 41.3 % (ref 19.6–45.3)
MCH RBC QN AUTO: 31.1 PG (ref 26.9–32)
MCHC RBC AUTO-ENTMCNC: 31.9 G/DL (ref 32.4–36.3)
MCV RBC AUTO: 97.3 FL (ref 80.5–98.2)
MONOCYTES # BLD AUTO: 0.71 10*3/MM3 (ref 0.2–1.2)
MONOCYTES NFR BLD AUTO: 9.6 % (ref 5–12)
NEUTROPHILS # BLD AUTO: 3.26 10*3/MM3 (ref 1.9–8.1)
NEUTROPHILS NFR BLD AUTO: 44 % (ref 42.7–76)
NRBC BLD MANUAL-RTO: 0 /100 WBC (ref 0–0)
PLATELET # BLD AUTO: 314 10*3/MM3 (ref 140–500)
PMV BLD AUTO: 9.5 FL (ref 6–12)
POTASSIUM BLD-SCNC: 3.7 MMOL/L (ref 3.5–5.2)
RBC # BLD AUTO: 3.67 10*6/MM3 (ref 3.9–5.2)
SODIUM BLD-SCNC: 137 MMOL/L (ref 136–145)
WBC NRBC COR # BLD: 7.41 10*3/MM3 (ref 4.5–10.7)

## 2018-03-23 PROCEDURE — 80048 BASIC METABOLIC PNL TOTAL CA: CPT | Performed by: HOSPITALIST

## 2018-03-23 PROCEDURE — 85025 COMPLETE CBC W/AUTO DIFF WBC: CPT | Performed by: HOSPITALIST

## 2018-03-23 PROCEDURE — 94799 UNLISTED PULMONARY SVC/PX: CPT

## 2018-03-23 RX ADMIN — LEVOTHYROXINE SODIUM 50 MCG: 50 TABLET ORAL at 08:15

## 2018-03-23 RX ADMIN — IPRATROPIUM BROMIDE AND ALBUTEROL SULFATE 3 ML: .5; 3 SOLUTION RESPIRATORY (INHALATION) at 07:16

## 2018-03-23 RX ADMIN — GUAIFENESIN AND DEXTROMETHORPHAN 5 ML: 100; 10 SYRUP ORAL at 08:16

## 2018-03-23 RX ADMIN — ASPIRIN 81 MG: 81 TABLET ORAL at 08:15

## 2018-03-23 RX ADMIN — MEMANTINE HYDROCHLORIDE 10 MG: 10 TABLET, FILM COATED ORAL at 08:15

## 2018-03-23 RX ADMIN — IPRATROPIUM BROMIDE AND ALBUTEROL SULFATE 3 ML: .5; 3 SOLUTION RESPIRATORY (INHALATION) at 11:03

## 2018-03-23 NOTE — PROGRESS NOTES
Discharge Planning Assessment  [facility]     Patient Name: Sam Flores  MRN: [id number]  Today's Date: 3/23/2018    Admit Date: 3/12/2018          Discharge Needs Assessment    No documentation.             Discharge Plan     Row Name 03/23/18 1406       Plan    Plan Comments Mariela had her brother call and state ok to discharge and they will provide the transportation to [place]. MAGALY Lance RN, CCP.    Row Name 03/23/18 1305       Plan    Plan Comments Spoke with Goldie with [facility] and they can accept to a skilled bed today. Family/son will provide the transportation by auto. Notified RN/Ashley and gave her the discharge packet. Placed call to Mariela Wurfel/[phone] and left a message for her to call back when she had a chance today. MAGALY Lance RN, CCP.     Final Discharge Disposition Code 03 - skilled nursing facility (SNF)    Final Note [facility], skilled bed, by auto/son at 14:30. MAGALY Lance RN, CCP.         Destination - Selection Complete     Service Request Status Selected Specialties Address Phone Number Fax Number    [facility] Selected Skilled Nursing Facility [id number] [id number] [phone]      Durable Medical Equipment     No service coordination in this encounter.      Dialysis/Infusion     No service coordination in this encounter.      Home Medical Care     No service coordination in this encounter.      Social Care     No service coordination in this encounter.        Expected Discharge Date and Time     Expected Discharge Date Expected Discharge Time    Mar 23, 2018               Demographic Summary    No documentation.           Functional Status    No documentation.           Psychosocial    No documentation.           Abuse/Neglect    No documentation.           Legal    No documentation.           Substance Abuse    No documentation.           Patient Forms    No documentation.         Eulalia Lance,  RN

## 2018-03-23 NOTE — DISCHARGE SUMMARY
PHYSICIAN DISCHARGE SUMMARY                                                                        [facility]    Patient Identification:  Name: Sam Flores  Age: 83 y.o.  Sex: female  :  1934  MRN: [id number]  Primary Care Physician: Joann Prabhakar MD    Admit date: 3/12/2018  Discharge date and time:3/23/2018  Discharged Condition: fair    Discharge Diagnoses:  Active Hospital Problems (** Indicates Principal Problem)    Diagnosis Date Noted   • **Dehydration [E86.0] 2018   • Aspiration pneumonia [J69.0] 03/15/2018   • Acute on chronic renal failure [N17.9, N18.9] 2018   • Type 2 diabetes mellitus without complication [E11.9] 2018   • Benign essential hypertension [I10] 2018   • Weight loss, abnormal [R63.4] 2018   • Trouble swallowing [R13.10] 2018   • Hyponatremia [E87.1] 2018   • Hypokalemia [E87.6] 2018      Resolved Hospital Problems    Diagnosis Date Noted Date Resolved   No resolved problems to display.      Patient Active Problem List   Diagnosis Code   • Lumbar compression fracture S32.000A   • Osteoporosis M81.0   • Dehydration E86.0   • Acute on chronic renal failure N17.9, N18.9   • Type 2 diabetes mellitus without complication E11.9   • Benign essential hypertension I10   • Weight loss, abnormal R63.4   • Trouble swallowing R13.10   • Hyponatremia E87.1   • Hypokalemia E87.6   • Aspiration pneumonia J69.0       PMHX:   Past Medical History:   Diagnosis Date   • Dementia    • Diabetes mellitus    • Disease of thyroid gland    • Emphysema of lung    • Hyperlipidemia    • Hypertension    • Lumbar compression fracture 2016   • Osteoporosis    • S/P kyphoplasty 2016    L1 and L4, 2016     PSHX:   Past Surgical History:   Procedure Laterality Date   •  SECTION     • KYPHOPLASTY      T12 and L2   • KYPHOPLASTY N/A 2016     Procedure: KYPHOPLASTY LUMBAR 1 AND LUMBAR 4;  Surgeon: Triston Marinelli IV, MD;  Location: [facility] OR 18/19;  Service:    • MASTECTOMY         Hospital Course: Sam Flores   is a 83 y.o. female who presents to [facility] with Confusion and altered mental status with poor oral intake.  She is known to our service from past admissions for palpitations osteoporosis with vertebral compression fractures.  She presents today for dehydration and weight loss.  Patient has dementia and can't really tell me why she's come to the hospital most of the history is obtained from her son is at the bedside.  Essentially her great niece came to the house to check on her today and noticed that she was very weak and more confused.  She had trouble getting up and moving around.  She also had mean anything since yesterday.  She's been incontinent and not really able to do some of her ADLs and not nearly as independent and she's been over the last couple years over the last week this is greatly declined.  His been no fevers or chills she's lost about 10 pounds in the last 2 weeks.  Family's been increasingly concerned over the last couple months regarding her poor by mouth intake.  The patient herself says she just can't swallow things.  She can't tell if it hurts when she swallows or there is pain there or she feels like she is choking when she swallows she says she just doesn't remember.  She is a little bit of a cough right now it's nonproductive denies any fevers or chills.          The patient was admitted to hospital and given some IV fluid for hydration and also treated with antibiotics for some aspiration pneumonia.  She was still very confused but was starting to eat and drink more and looked well enough to go to skilled nursing facility for some rehabilitation.  She will follow-up with her primary care physician after released from rehabilitation.      Consults:     Consults     Date and Time Order Name  Status Description    3/13/2018 0030 Inpatient Consult to Nephrology Completed     3/12/2018 1418 [facility] (on-call MD unless specified) Completed           Results from last 7 days  Lab Units 03/23/18  0613   WBC 10*3/mm3 7.41   HEMOGLOBIN g/dL 11.4*   HEMATOCRIT % 35.7   PLATELETS 10*3/mm3 314       Results from last 7 days  Lab Units 03/23/18  0613   SODIUM mmol/L 137   POTASSIUM mmol/L 3.7   CHLORIDE mmol/L 102   CO2 mmol/L 23.8   BUN mg/dL 23   CREATININE mg/dL 1.23*   GLUCOSE mg/dL 111*   CALCIUM mg/dL 8.3*     Significant Diagnostic Studies:   Lab Results   Component Value Date    WBC 7.41 03/23/2018    HGB 11.4 (L) 03/23/2018    HCT 35.7 03/23/2018     03/23/2018     Lab Results   Component Value Date     03/23/2018    K 3.7 03/23/2018     03/23/2018    CO2 23.8 03/23/2018    BUN 23 03/23/2018    CREATININE 1.23 (H) 03/23/2018    GLUCOSE 111 (H) 03/23/2018     Lab Results   Component Value Date    CALCIUM 8.3 (L) 03/23/2018     No results found for: AST, ALT, ALKPHOS  No results found for: APTT, INR  Lab Results   Component Value Date    COLORU Yellow 03/22/2018    CLARITYU Clear 03/22/2018    PHUR 6.5 03/22/2018    GLUCOSEU >=1000 mg/dL (3+) (A) 03/22/2018    KETONESU Negative 03/22/2018    BLOODU Small (1+) (A) 03/22/2018    LEUKOCYTESUR Negative 03/22/2018    BILIRUBINUR Negative 03/22/2018    UROBILINOGEN 0.2 E.U./dL 03/22/2018    RBCUA 3-5 (A) 03/22/2018    WBCUA 0-2 03/22/2018    BACTERIA None Seen 03/22/2018     No results found for: TROPONINT, TROPONINI, BNP  No components found for: HGBA1C;2  No components found for: TSH;2  Imaging Results (all)     Procedure Component Value Units Date/Time    FL Video Swallow With Speech [[id number]] Collected:  03/15/18 1306     Updated:  03/15/18 1310    Narrative:       Video swallowing examination     FLUOROSCOPY TIME: 3 minutes 49 seconds, 8 images.     Clinical: Dysphasia     FINDINGS: Patient demonstrates a mild oropharyngeal dysphagia.  Premature  spillage with no penetration or aspiration. Reduced anterior hyoid  movement with prominent cricopharyngeus noted. Delayed completion of  deflection of tip of epiglottis. Trace to mild diffuse pharyngeal  residue observed. Esophageal scan showed slow clearance of esophagus  with characteristics of diffuse esophageal spasms.      Refer to full speech pathology evaluation.     This report was finalized on 3/15/2018 1:07 PM by Dr. Keenan Arias MD.       XR Chest 1 View [[id number]] Collected:  03/14/18 1442     Updated:  03/14/18 1454    Narrative:       AP CHEST     HISTORY: Cough, aspiration.     COMPARISON: 2-view chest 03/12/2018.     FINDINGS: Lung volumes are markedly diminished limiting evaluation.  There is perihilar vascular crowding. Hazy opacity overlies the lung  base suspected representing a combination of pleural effusions and  atelectasis and there could be associated basilar infiltrates. Recommend  follow-up exam when patient can tolerate.     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 There is retained barium  within colonic loops in the upper abdomen. Multilevel kyphoplasties have  been performed.     This report was finalized on 3/14/2018 2:51 PM by Dr. Jimbo Lopez MD.       US Renal Bilateral [[id number]] Collected:  03/12/18 1627     Updated:  03/12/18 1631    Narrative:       US RENAL BILATERAL-     INDICATIONS: Acute kidney injury     TECHNIQUE: ULTRASOUND OF THE KIDNEYS AND URINARY BLADDER.     COMPARISON: None available           FINDINGS:     Assessment is limited by body habitus     The right kidney measures 7.3 centimeters, the left kidney measures 7.2  centimeters.     No renal lesion is identified. No hydronephrosis or echogenic  nephrolithiasis.     Left ureteral jet was observed. No right ureteral jet was observed  during the exam. The urinary bladder otherwise appears unremarkable.       Impression:       No hydronephrosis or echogenic nephrolithiasis.        This  report was finalized on 3/12/2018 4:28 PM by Dr. Blaise Witt MD.       CT Head Without Contrast [[id number]] Collected:  03/12/18 1422     Updated:  03/12/18 1502    Narrative:       CT SCAN OF THE HEAD WITHOUT CONTRAST     HISTORY:  Confusion.     CT scan of the head was obtained with 3 mm axial images. No intravenous  contrast was administered.     FINDINGS:     The ventricles, sulci, and cisterns are age appropriate. There are mild  changes of chronic small vessel ischemic phenomena. Atherosclerotic  changes are appreciated within the intracranial vasculature.     The patient is status post bilateral partial ethmoidectomies.  Additionally, the patient is status post bilateral sphenoid osteotomies.  Mucus retention cyst is identified within the right ethmoid air cells.  Mucosal thickening is identified within the left maxillary sinus. The  patient is status post bilateral medial maxillectomies.       Impression:       1.  There is no evidence for acute intracranial pathology.  2.  Changes of inflammatory paranasal sinus disease along with  postsurgical changes within the paranasal sinuses are as discussed in  detail above.     Radiation dose reduction techniques were utilized, including automated  exposure control and exposure modulation based on body size.     This report was finalized on 3/12/2018 2:59 PM by Dr. Elbert Astorga MD.       XR Chest 2 View [[id number]] Collected:  03/12/18 1322     Updated:  03/12/18 1327    Narrative:       XR CHEST 2 VW-     HISTORY: Female who is 83 years-old,  short of breath     TECHNIQUE: Frontal and lateral views of the chest     COMPARISON: 11/30/2016     FINDINGS: Heart size is normal. Aorta is tortuous, calcified. Pulmonary  vasculature is unremarkable. Small likely atelectasis or scar at the  left base. No focal pulmonary consolidation, pleural effusion, or  pneumothorax. A T6 compression fracture is a change from the prior exam,  age-indeterminate, correlate  clinically (if further evaluation is  indicated, MRI could be considered). Chronic changes of the spine are  also demonstrated.       Impression:       Small likely atelectasis or scar at the left base. Tortuous  aorta. Follow-up as clinically indicated.     This report was finalized on 3/12/2018 1:24 PM by Dr. Blaise Witt MD.           Lab Results (last 7 days)     Procedure Component Value Units Date/Time    Urine Culture - Urine, Urine, Catheter [[id number]]  (Normal) Collected:  03/22/18 1859    Specimen:  Urine from Urine, Catheter Updated:  03/23/18 0921     Urine Culture No growth    Basic Metabolic Panel [[id number]]  (Abnormal) Collected:  03/23/18 0613    Specimen:  Blood Updated:  03/23/18 0658     Glucose 111 (H) mg/dL      BUN 23 mg/dL      Creatinine 1.23 (H) mg/dL      Sodium 137 mmol/L      Potassium 3.7 mmol/L      Chloride 102 mmol/L      CO2 23.8 mmol/L      Calcium 8.3 (L) mg/dL      eGFR Non African Amer 42 (L) mL/min/1.73      BUN/Creatinine Ratio 18.7     Anion Gap 11.2 mmol/L     Narrative:       The MDRD GFR formula is only valid for adults with stable renal function between ages 18 and 70.    CBC & Differential [[id number]] Collected:  03/23/18 0613    Specimen:  Blood Updated:  03/23/18 0648    Narrative:       The following orders were created for panel order CBC & Differential.  Procedure                               Abnormality         Status                     ---------                               -----------         ------                     CBC Auto Differential[[id number]]        Abnormal            Final result                 Please view results for these tests on the individual orders.    CBC Auto Differential [[id number]]  (Abnormal) Collected:  03/23/18 0613    Specimen:  Blood Updated:  03/23/18 0648     WBC 7.41 10*3/mm3      RBC 3.67 (L) 10*6/mm3      Hemoglobin 11.4 (L) g/dL      Hematocrit 35.7 %      MCV 97.3 fL      MCH 31.1 pg      MCHC 31.9 (L) g/dL      RDW  15.0 (H) %      RDW-SD 52.9 fl      MPV 9.5 fL      Platelets 314 10*3/mm3      Neutrophil % 44.0 %      Lymphocyte % 41.3 %      Monocyte % 9.6 %      Eosinophil % 4.6 %      Basophil % 0.5 %      Immature Grans % 0.0 %      Neutrophils, Absolute 3.26 10*3/mm3      Lymphocytes, Absolute 3.06 10*3/mm3      Monocytes, Absolute 0.71 10*3/mm3      Eosinophils, Absolute 0.34 10*3/mm3      Basophils, Absolute 0.04 10*3/mm3      Immature Grans, Absolute 0.00 10*3/mm3      nRBC 0.0 /100 WBC     Urinalysis With / Microscopic If Indicated - Urine, Catheter [[id number]]  (Abnormal) Collected:  03/22/18 1859    Specimen:  Urine from Urine, Catheter Updated:  03/22/18 2024     Color, UA Yellow     Appearance, UA Clear     pH, UA 6.5     Specific Gravity, UA 1.023     Glucose, UA >=1000 mg/dL (3+) (A)     Ketones, UA Negative     Bilirubin, UA Negative     Blood, UA Small (1+) (A)     Protein, UA 30 mg/dL (1+) (A)     Leuk Esterase, UA Negative     Nitrite, UA Negative     Urobilinogen, UA 0.2 E.U./dL    Urinalysis, Microscopic Only - Urine, Clean Catch [[id number]]  (Abnormal) Collected:  03/22/18 1859    Specimen:  Urine from Urine, Catheter Updated:  03/22/18 2024     RBC, UA 3-5 (A) /HPF      WBC, UA 0-2 /HPF      Bacteria, UA None Seen /HPF      Squamous Epithelial Cells, UA 0-2 /HPF      Transitional Epithelial Cells, UA 0-2 /HPF      Hyaline Casts, UA None Seen /LPF      Amorphous Crystals, UA Small/1+ /HPF      Methodology Manual Light Microscopy    Sodium, Urine, Random - Urine, Catheter [[id number]] Collected:  03/22/18 1859    Specimen:  Urine from Urine, Catheter Updated:  03/22/18 1939     Sodium, Urine 56 mmol/L     Narrative:       Reference intervals for random urine have not been established.  Clinical usage is dependent upon physician's interpretation in combination with other laboratory tests.     Creatinine, Urine, Random - Urine, Catheter [[id number]] Collected:  03/22/18 1859    Specimen:  Urine from Urine,  Catheter Updated:  03/22/18 1939     Creatinine, Urine 53.2 mg/dL     Narrative:       Reference intervals for random urine have not been established.  Clinical usage is dependent upon physician's interpretation in combination with other laboratory tests.     Basic Metabolic Panel [[id number]]  (Abnormal) Collected:  03/22/18 0641    Specimen:  Blood Updated:  03/22/18 0719     Glucose 120 (H) mg/dL      BUN 19 mg/dL      Creatinine 1.15 (H) mg/dL      Sodium 134 (L) mmol/L      Potassium 3.8 mmol/L      Chloride 98 mmol/L      CO2 23.3 mmol/L      Calcium 7.8 (L) mg/dL      eGFR Non African Amer 45 (L) mL/min/1.73      BUN/Creatinine Ratio 16.5     Anion Gap 12.7 mmol/L     Narrative:       The MDRD GFR formula is only valid for adults with stable renal function between ages 18 and 70.    CBC & Differential [[id number]] Collected:  03/22/18 0641    Specimen:  Blood Updated:  03/22/18 0655    Narrative:       The following orders were created for panel order CBC & Differential.  Procedure                               Abnormality         Status                     ---------                               -----------         ------                     CBC Auto Differential[[id number]]        Abnormal            Final result                 Please view results for these tests on the individual orders.    CBC Auto Differential [[id number]]  (Abnormal) Collected:  03/22/18 0641    Specimen:  Blood Updated:  03/22/18 0655     WBC 6.77 10*3/mm3      RBC 3.72 (L) 10*6/mm3      Hemoglobin 11.5 (L) g/dL      Hematocrit 35.1 (L) %      MCV 94.4 fL      MCH 30.9 pg      MCHC 32.8 g/dL      RDW 14.3 (H) %      RDW-SD 48.5 fl      MPV 9.5 fL      Platelets 285 10*3/mm3      Neutrophil % 52.3 %      Lymphocyte % 29.5 %      Monocyte % 12.9 (H) %      Eosinophil % 5.2 %      Basophil % 0.1 %      Immature Grans % 0.0 %      Neutrophils, Absolute 3.54 10*3/mm3      Lymphocytes, Absolute 2.00 10*3/mm3      Monocytes, Absolute 0.87  10*3/mm3      Eosinophils, Absolute 0.35 10*3/mm3      Basophils, Absolute 0.01 10*3/mm3      Immature Grans, Absolute 0.00 10*3/mm3     Basic Metabolic Panel [[id number]]  (Abnormal) Collected:  03/21/18 0717    Specimen:  Blood Updated:  03/21/18 0812     Glucose 170 (H) mg/dL      BUN 24 (H) mg/dL      Creatinine 1.31 (H) mg/dL      Sodium 135 (L) mmol/L      Potassium 3.8 mmol/L      Chloride 99 mmol/L      CO2 22.0 mmol/L      Calcium 8.5 (L) mg/dL      eGFR Non African Amer 39 (L) mL/min/1.73      BUN/Creatinine Ratio 18.3     Anion Gap 14.0 mmol/L     Narrative:       The MDRD GFR formula is only valid for adults with stable renal function between ages 18 and 70.    CBC & Differential [[id number]] Collected:  03/21/18 0717    Specimen:  Blood Updated:  03/21/18 0747    Narrative:       The following orders were created for panel order CBC & Differential.  Procedure                               Abnormality         Status                     ---------                               -----------         ------                     CBC Auto Differential[[id number]]        Abnormal            Final result                 Please view results for these tests on the individual orders.    CBC Auto Differential [[id number]]  (Abnormal) Collected:  03/21/18 0717    Specimen:  Blood Updated:  03/21/18 0747     WBC 8.20 10*3/mm3      RBC 3.92 10*6/mm3      Hemoglobin 12.2 g/dL      Hematocrit 37.1 %      MCV 94.6 fL      MCH 31.1 pg      MCHC 32.9 g/dL      RDW 14.2 (H) %      RDW-SD 48.5 fl      MPV 9.6 fL      Platelets 307 10*3/mm3      Neutrophil % 56.5 %      Lymphocyte % 26.1 %      Monocyte % 14.1 (H) %      Eosinophil % 2.9 %      Basophil % 0.2 %      Immature Grans % 0.2 %      Neutrophils, Absolute 4.62 10*3/mm3      Lymphocytes, Absolute 2.14 10*3/mm3      Monocytes, Absolute 1.16 10*3/mm3      Eosinophils, Absolute 0.24 10*3/mm3      Basophils, Absolute 0.02 10*3/mm3      Immature Grans, Absolute 0.02 10*3/mm3      CBC & Differential [[id number]] Collected:  03/20/18 0629    Specimen:  Blood Updated:  03/20/18 0752    Narrative:       The following orders were created for panel order CBC & Differential.  Procedure                               Abnormality         Status                     ---------                               -----------         ------                     Scan Slide[[id number]]                   Normal              Final result               CBC Auto Differential[[id number]]        Abnormal            Final result                 Please view results for these tests on the individual orders.    CBC Auto Differential [[id number]]  (Abnormal) Collected:  03/20/18 0629    Specimen:  Blood Updated:  03/20/18 0752     WBC 6.39 10*3/mm3      RBC 3.70 (L) 10*6/mm3      Hemoglobin 11.5 (L) g/dL      Hematocrit 35.1 (L) %      MCV 94.9 fL      MCH 31.1 pg      MCHC 32.8 g/dL      RDW 14.4 (H) %      RDW-SD 49.7 fl      MPV 9.9 fL      Platelets 269 10*3/mm3      Neutrophil % 51.8 %      Lymphocyte % 22.2 %      Monocyte % 20.8 (H) %      Eosinophil % 5.0 %      Basophil % 0.2 %      Immature Grans % 0.0 %      Neutrophils, Absolute 3.31 10*3/mm3      Lymphocytes, Absolute 1.42 10*3/mm3      Monocytes, Absolute 1.33 (H) 10*3/mm3      Eosinophils, Absolute 0.32 10*3/mm3      Basophils, Absolute 0.01 10*3/mm3      Immature Grans, Absolute 0.00 10*3/mm3     Scan Slide [[id number]]  (Normal) Collected:  03/20/18 0629    Specimen:  Blood Updated:  03/20/18 0752     RBC Morphology Normal     WBC Morphology Normal     Platelet Morphology Normal    Basic Metabolic Panel [[id number]]  (Abnormal) Collected:  03/20/18 0629    Specimen:  Blood Updated:  03/20/18 0721     Glucose 236 (H) mg/dL      BUN 29 (H) mg/dL      Creatinine 1.42 (H) mg/dL      Sodium 131 (L) mmol/L      Potassium 4.1 mmol/L      Chloride 95 (L) mmol/L      CO2 25.5 mmol/L      Calcium 8.4 (L) mg/dL      eGFR Non African Amer 35 (L) mL/min/1.73      BUN/Creatinine  Ratio 20.4     Anion Gap 10.5 mmol/L     Narrative:       The MDRD GFR formula is only valid for adults with stable renal function between ages 18 and 70.    Basic Metabolic Panel [[id number]]  (Abnormal) Collected:  03/19/18 0615    Specimen:  Blood Updated:  03/19/18 0703     Glucose 148 (H) mg/dL      BUN 26 (H) mg/dL      Creatinine 1.23 (H) mg/dL      Sodium 131 (L) mmol/L      Potassium 4.5 mmol/L      Chloride 96 (L) mmol/L      CO2 25.0 mmol/L      Calcium 8.5 (L) mg/dL      eGFR Non African Amer 42 (L) mL/min/1.73      BUN/Creatinine Ratio 21.1     Anion Gap 10.0 mmol/L     Narrative:       The MDRD GFR formula is only valid for adults with stable renal function between ages 18 and 70.    CBC & Differential [[id number]] Collected:  03/19/18 0615    Specimen:  Blood Updated:  03/19/18 0645    Narrative:       The following orders were created for panel order CBC & Differential.  Procedure                               Abnormality         Status                     ---------                               -----------         ------                     CBC Auto Differential[[id number]]        Abnormal            Final result                 Please view results for these tests on the individual orders.    CBC Auto Differential [[id number]]  (Abnormal) Collected:  03/19/18 0615    Specimen:  Blood Updated:  03/19/18 0645     WBC 6.09 10*3/mm3      RBC 3.85 (L) 10*6/mm3      Hemoglobin 12.0 g/dL      Hematocrit 36.4 %      MCV 94.5 fL      MCH 31.2 pg      MCHC 33.0 g/dL      RDW 14.4 (H) %      RDW-SD 49.2 fl      MPV 9.8 fL      Platelets 229 10*3/mm3      Neutrophil % 54.9 %      Lymphocyte % 22.3 %      Monocyte % 16.1 (H) %      Eosinophil % 6.4 (H) %      Basophil % 0.3 %      Immature Grans % 0.0 %      Neutrophils, Absolute 3.34 10*3/mm3      Lymphocytes, Absolute 1.36 10*3/mm3      Monocytes, Absolute 0.98 10*3/mm3      Eosinophils, Absolute 0.39 10*3/mm3      Basophils, Absolute 0.02 10*3/mm3      Immature  Grans, Absolute 0.00 10*3/mm3     Basic Metabolic Panel [[id number]]  (Abnormal) Collected:  03/18/18 0617    Specimen:  Blood Updated:  03/18/18 0716     Glucose 252 (H) mg/dL      BUN 36 (H) mg/dL      Creatinine 1.25 (H) mg/dL      Sodium 133 (L) mmol/L      Potassium 5.3 (H) mmol/L      Chloride 97 (L) mmol/L      CO2 23.7 mmol/L      Calcium 8.6 mg/dL      eGFR Non African Amer 41 (L) mL/min/1.73      BUN/Creatinine Ratio 28.8 (H)     Anion Gap 12.3 mmol/L     Narrative:       The MDRD GFR formula is only valid for adults with stable renal function between ages 18 and 70.    CBC & Differential [[id number]] Collected:  03/18/18 0617    Specimen:  Blood Updated:  03/18/18 0652    Narrative:       The following orders were created for panel order CBC & Differential.  Procedure                               Abnormality         Status                     ---------                               -----------         ------                     CBC Auto Differential[[id number]]        Abnormal            Final result                 Please view results for these tests on the individual orders.    CBC Auto Differential [[id number]]  (Abnormal) Collected:  03/18/18 0617    Specimen:  Blood Updated:  03/18/18 0652     WBC 12.92 (H) 10*3/mm3      RBC 4.24 10*6/mm3      Hemoglobin 13.3 g/dL      Hematocrit 40.4 %      MCV 95.3 fL      MCH 31.4 pg      MCHC 32.9 g/dL      RDW 14.2 (H) %      RDW-SD 48.7 fl      MPV 10.6 fL      Platelets 255 10*3/mm3      Neutrophil % 77.7 (H) %      Lymphocyte % 8.6 (L) %      Monocyte % 10.3 %      Eosinophil % 2.9 %      Basophil % 0.2 %      Immature Grans % 0.3 %      Neutrophils, Absolute 10.05 (H) 10*3/mm3      Lymphocytes, Absolute 1.11 10*3/mm3      Monocytes, Absolute 1.33 (H) 10*3/mm3      Eosinophils, Absolute 0.37 10*3/mm3      Basophils, Absolute 0.02 10*3/mm3      Immature Grans, Absolute 0.04 (H) 10*3/mm3     Potassium [[id number]]  (Normal) Collected:  03/17/18 3068    Specimen:   Blood Updated:  03/18/18 0040     Potassium 4.9 mmol/L     Renal Function Panel [[id number]]  (Abnormal) Collected:  03/17/18 0739    Specimen:  Blood Updated:  03/17/18 0829     Glucose 171 (H) mg/dL      BUN 23 mg/dL      Creatinine 1.20 (H) mg/dL      Sodium 133 (L) mmol/L      Potassium 3.4 (L) mmol/L      Chloride 95 (L) mmol/L      CO2 26.6 mmol/L      Calcium 8.0 (L) mg/dL      Albumin 2.80 (L) g/dL      Phosphorus 2.6 mg/dL      Anion Gap 11.4 mmol/L      BUN/Creatinine Ratio 19.2     eGFR Non African Amer 43 (L) mL/min/1.73     Narrative:       The MDRD GFR formula is only valid for adults with stable renal function between ages 18 and 70.    Magnesium [[id number]]  (Normal) Collected:  03/17/18 0739    Specimen:  Blood Updated:  03/17/18 0824     Magnesium 1.7 mg/dL     CBC & Differential [[id number]] Collected:  03/17/18 0740    Specimen:  Blood Updated:  03/17/18 0803    Narrative:       The following orders were created for panel order CBC & Differential.  Procedure                               Abnormality         Status                     ---------                               -----------         ------                     CBC Auto Differential[[id number]]        Abnormal            Final result                 Please view results for these tests on the individual orders.    CBC Auto Differential [[id number]]  (Abnormal) Collected:  03/17/18 0740    Specimen:  Blood Updated:  03/17/18 0803     WBC 13.09 (H) 10*3/mm3      RBC 4.00 10*6/mm3      Hemoglobin 12.6 g/dL      Hematocrit 37.0 %      MCV 92.5 fL      MCH 31.5 pg      MCHC 34.1 g/dL      RDW 13.6 (H) %      RDW-SD 45.8 fl      MPV 10.5 fL      Platelets 210 10*3/mm3      Neutrophil % 68.0 %      Lymphocyte % 18.8 (L) %      Monocyte % 9.6 %      Eosinophil % 3.1 %      Basophil % 0.2 %      Immature Grans % 0.3 %      Neutrophils, Absolute 8.91 (H) 10*3/mm3      Lymphocytes, Absolute 2.46 10*3/mm3      Monocytes, Absolute 1.26 (H) 10*3/mm3       "Eosinophils, Absolute 0.40 10*3/mm3      Basophils, Absolute 0.02 10*3/mm3      Immature Grans, Absolute 0.04 (H) 10*3/mm3         BP 96/53 (BP Location: Left arm, Patient Position: Lying)   Pulse 92   Temp 97.2 °F (36.2 °C) (Oral)   Resp 18   Ht 149.9 cm (59\")   Wt 44.2 kg (97 lb 7.8 oz)   SpO2 94%   BMI 19.69 kg/m²     Discharge Exam:  General Appearance:    Confused, cooperative, no distress                          Head:    Normocephalic, without obvious abnormality, atraumatic                          Eyes:                            Throat:   Lips, tongue, gums normal                          Neck:   Supple, symmetrical, trachea midline, no JVD                        Lungs:     Clear to auscultation bilaterally, respirations unlabored                Chest Wall:    No tenderness or deformity                        Heart:    Regular rate and rhythm, S1 and S2 normal, no murmur,no  Rub or gallop                  Abdomen:     Soft, non-tender, bowel sounds active, no masses, no organomegaly                  Extremities:   Extremities normal, atraumatic, no cyanosis or edema                             Skin:   Skin is warm and dry,  no rashes or palpable lesions                  Neurologic:   no focal deficits noted, demented     Disposition:  Skilled nursing facility    Patient Instructions:    Sam Flores   Home Medication Instructions LEVI:[id number]    Printed on:03/23/18 1250   Medication Information                      aspirin 81 MG EC tablet  Take 81 mg by mouth Daily.             donepezil (ARICEPT) 10 MG tablet  Take 10 mg by mouth Every Night.             levothyroxine (SYNTHROID, LEVOTHROID) 50 MCG tablet  Take 50 mcg by mouth Daily.             memantine (NAMENDA) 10 MG tablet  Take 10 mg by mouth 2 (Two) Times a Day.             simvastatin (ZOCOR) 20 MG tablet  Take 20 mg by mouth Every Night.             SITagliptin (JANUVIA) 50 MG tablet  Take 0.5 tablets by mouth Daily.           "     No future appointments.   Contact information for follow-up providers     Joann Prabhakar MD Follow up.    Specialty:  Family Medicine  Why:  After released from rehabilitation  Contact information:  [address]  [facility] [address]  [place] [id number]  [phone]                   Contact information for after-discharge care     Destination     [facility] Follow up.    Specialties:  Skilled Nursing Facility, Intermediate Care Facility  Contact information:  [address] [facility] [address] [phone]                           Discharge Order     Start     Ordered    03/23/18 1250  Discharge patient  Once     Expected Discharge Date:  03/23/18    Discharge Disposition:  Skilled Nursing Facility (DC - External)        03/23/18 1249          Total time spent discharging patient including evaluation,post hospitalization follow up,  medication and post hospitalization instructions and education total time exceeds 30 minutes.    Signed:  Kp Sky MD  3/23/2018  12:50 PM

## 2018-03-23 NOTE — PLAN OF CARE
Problem: Patient Care Overview  Goal: Plan of Care Review  Outcome: Ongoing (interventions implemented as appropriate)   03/22/18 1754 03/22/18 2116 03/23/18 0540   Coping/Psychosocial   Plan of Care Reviewed With --  patient --    Plan of Care Review   Progress no change --  --    OTHER   Outcome Summary --  --  Patient slept most of the night. Patient was medicated with PRN Benadryl per her request to help her sleep. No family at bedside. No complaints of pain or nausea. Will continue to monitor vitral signs, labs and comfort.     Goal: Individualization and Mutuality  Outcome: Ongoing (interventions implemented as appropriate)    Goal: Discharge Needs Assessment  Outcome: Ongoing (interventions implemented as appropriate)      Problem: Fall Risk (Adult)  Goal: Absence of Fall  Outcome: Ongoing (interventions implemented as appropriate)      Problem: Skin Injury Risk (Adult)  Goal: Skin Health and Integrity  Outcome: Ongoing (interventions implemented as appropriate)      Problem: Fluid Volume Deficit (Adult)  Goal: Optimal Fluid Balance  Outcome: Ongoing (interventions implemented as appropriate)      Problem: Nutrition, Imbalanced: Inadequate Oral Intake (Adult)  Goal: Improved Oral Intake  Outcome: Ongoing (interventions implemented as appropriate)    Goal: Prevent Further Weight Loss  Outcome: Ongoing (interventions implemented as appropriate)

## 2018-03-23 NOTE — PROGRESS NOTES
Case Management Discharge Note    Final Note: [facility], skilled bed, by auto/son at 14:30. MAGALY Lance RN, CCP.     Destination - Selection Complete     Service Request Status Selected Specialties Address Phone Number Fax Number    [facility] Selected Skilled Nursing Facility [address] [facility] [address] [phone]      Durable Medical Equipment     No service coordination in this encounter.      Dialysis/Infusion     No service coordination in this encounter.      Home Medical Care     No service coordination in this encounter.      Social Care     No service coordination in this encounter.             Final Discharge Disposition Code: 03 - skilled nursing facility (SNF)

## 2018-03-23 NOTE — PROGRESS NOTES
Discharge Planning Assessment  [facility]     Patient Name: Sam Flores  MRN: [id number]  Today's Date: 3/23/2018    Admit Date: 3/12/2018          Discharge Needs Assessment    No documentation.             Discharge Plan     Row Name 03/23/18 1301       Plan    Plan Comments Spoke with Goldie with [facility] and they can accept to a skilled bed today. Family/son will provide the transportation by auto. Notified RN/Ashley and gave her the discharge packet. Placed call to Mariela Tucker/[phone] and left a message for her to call back when she had a chance today. MAGALY Lance RN, CCP.     Final Discharge Disposition Code 03 - skilled nursing facility (SNF)    Final Note [facility], skilled bed, by auto/son at 14:30. MAGALY Lance RN, CCP.         Destination - Selection Complete     Service Request Status Selected Specialties Address Phone Number Fax Number    [facility] Selected Skilled Nursing Facility [address] [place] [address] [phone]      Durable Medical Equipment     No service coordination in this encounter.      Dialysis/Infusion     No service coordination in this encounter.      Home Medical Care     No service coordination in this encounter.      Social Care     No service coordination in this encounter.        Expected Discharge Date and Time     Expected Discharge Date Expected Discharge Time    Mar 23, 2018               Demographic Summary    No documentation.           Functional Status    No documentation.           Psychosocial    No documentation.           Abuse/Neglect    No documentation.           Legal    No documentation.           Substance Abuse    No documentation.           Patient Forms    No documentation.         Eulalia Lance RN

## 2018-03-23 NOTE — PROGRESS NOTES
"   LOS: 11 days    Patient Care Team:  Joann Prabhakar MD as PCP - General (Family Medicine)    Chief Complaint:    Chief Complaint   Patient presents with   • Altered Mental Status     FAMILY REPORTS PT IS BECOMING MORE CONFUSED, NOT EATING AND UNABLE TO CONTROL HER BLADDER; PT WITH NO COMPLAINTS AND DOES NOT KNOW WHY SHE IS HERE     Follow UP Matthew  Subjective     Interval History:     Follow up Matthew. Coughing Better.  No shortness of air or chest pain.    Review of Systems:   Not reliable.     Objective     Vital Signs  Temp:  [97.2 °F (36.2 °C)-98.7 °F (37.1 °C)] 97.2 °F (36.2 °C)  Heart Rate:  [73-93] 92  Resp:  [16-20] 18  BP: ()/(53-72) 96/53    Flowsheet Rows    Flowsheet Row First Filed Value   Admission Height 149.9 cm (59\") Documented at 03/12/2018 1238   Admission Weight 40.8 kg (90 lb) Documented at 03/12/2018 1305          No intake/output data recorded.  I/O last 3 completed shifts:  In: 900 [P.O.:900]  Out: 100 [Urine:100]    Intake/Output Summary (Last 24 hours) at 03/23/18 1234  Last data filed at 03/22/18 1900   Gross per 24 hour   Intake              540 ml   Output              100 ml   Net              440 ml       Physical Exam:  Elderly Frail WF. Sitting up in chair. Heart RRR no s3 or rub. Lungs upper airway rhonchi, no wheezes.   Abd + bs soft, nontender. Ext trace edema lower ext.       Results Review:      Results from last 7 days  Lab Units 03/23/18  0613 03/22/18  0641 03/21/18  0717   SODIUM mmol/L 137 134* 135*   POTASSIUM mmol/L 3.7 3.8 3.8   CHLORIDE mmol/L 102 98 99   CO2 mmol/L 23.8 23.3 22.0   BUN mg/dL 23 19 24*   CREATININE mg/dL 1.23* 1.15* 1.31*   CALCIUM mg/dL 8.3* 7.8* 8.5*   GLUCOSE mg/dL 111* 120* 170*       Estimated Creatinine Clearance: 24.2 mL/min (by C-G formula based on SCr of 1.23 mg/dL (H)).      Results from last 7 days  Lab Units 03/17/18  0739   MAGNESIUM mg/dL 1.7   PHOSPHORUS mg/dL 2.6               Results from last 7 days  Lab Units " 03/23/18  0613 03/22/18  0641 03/21/18  0717 03/20/18  0629 03/19/18  0615   WBC 10*3/mm3 7.41 6.77 8.20 6.39 6.09   HEMOGLOBIN g/dL 11.4* 11.5* 12.2 11.5* 12.0   PLATELETS 10*3/mm3 314 285 307 269 229               Imaging Results (last 24 hours)     ** No results found for the last 24 hours. **          aspirin 81 mg Oral Daily   donepezil 10 mg Oral Nightly   ipratropium-albuterol 3 mL Nebulization 4x Daily - RT   lactulose 10 g Oral Q48H   levothyroxine 50 mcg Oral Daily   memantine 10 mg Oral Daily   sennosides-docusate sodium 2 tablet Oral Nightly          Medication Review:   Current Facility-Administered Medications   Medication Dose Route Frequency Provider Last Rate Last Dose   • acetaminophen (TYLENOL) tablet 650 mg  650 mg Oral Q4H PRN Raciel Zamora MD   650 mg at 03/20/18 2315   • aspirin EC tablet 81 mg  81 mg Oral Daily Raciel Zamora MD   81 mg at 03/23/18 0815   • bisacodyl (DULCOLAX) EC tablet 5 mg  5 mg Oral Daily PRN Raciel Zamora MD       • bisacodyl (DULCOLAX) suppository 10 mg  10 mg Rectal Daily PRN Raciel Zamora MD       • diphenhydrAMINE (BENADRYL) capsule 25 mg  25 mg Oral Nightly PRN Gerard Coyle MD   25 mg at 03/22/18 2313   • donepezil (ARICEPT) tablet 10 mg  10 mg Oral Nightly Raciel Zamora MD   10 mg at 03/22/18 2111   • guaifenesin-dextromethorphan (ROBITUSSIN DM) 100-10 MG/5ML syrup 5 mL  5 mL Oral Q8H PRN Aaron Castillo MD   5 mL at 03/23/18 0816   • ipratropium-albuterol (DUO-NEB) nebulizer solution 3 mL  3 mL Nebulization 4x Daily - RT Kp Sky MD   3 mL at 03/23/18 1103   • lactulose (CHRONULAC) 10 GM/15ML solution 10 g  10 g Oral Q48H Sruthi Nieves MD   10 g at 03/22/18 2313   • levothyroxine (SYNTHROID, LEVOTHROID) tablet 50 mcg  50 mcg Oral Daily Raciel Zamora MD   50 mcg at 03/23/18 0815   • memantine (NAMENDA) tablet 10 mg  10 mg Oral Daily Raciel Zamora MD   10 mg at 03/23/18 0815   • ondansetron (ZOFRAN) tablet 4 mg   4 mg Oral Q6H PRN Raciel Zamora MD        Or   • ondansetron ODT (ZOFRAN-ODT) disintegrating tablet 4 mg  4 mg Oral Q6H PRN Raciel Zamora MD        Or   • ondansetron (ZOFRAN) injection 4 mg  4 mg Intravenous Q6H PRN Raciel Zamora MD   4 mg at 03/18/18 0622   • sennosides-docusate sodium (SENOKOT-S) 8.6-50 MG tablet 2 tablet  2 tablet Oral Nightly Kp Sky MD   2 tablet at 03/22/18 2111   • sodium chloride 0.9 % flush 1-10 mL  1-10 mL Intravenous PRN Raciel Zamora MD           Assessment/Plan   1. [place].  Creatinine Stable.  Continue to monitor  2. Profound hypokalemia.  Serum is stable now   3. DM2. Not controlled.   4. Dementia  5. Dysphagia. Concern for ongoing aspiration.   6. Hypophosphatemia. Replete needed  7. Low grade fever again last night.  8. Hypothyroid on replacement. Compensated.       Major Rowley MD  03/23/18  12:34 PM

## 2018-03-24 LAB — BACTERIA SPEC AEROBE CULT: NO GROWTH

## 2019-10-10 NOTE — PLAN OF CARE
Patient has an 2:00 PM depo today and needing a refill for her depo Problem: Patient Care Overview  Goal: Plan of Care Review  Outcome: Ongoing (interventions implemented as appropriate)   03/14/18 4204   Coping/Psychosocial   Plan of Care Reviewed With patient   Plan of Care Review   Progress improving   OTHER   Outcome Summary Pt increasing with activity tolerance and amb distance with RWX and transfer safety

## 2022-03-29 NOTE — PLAN OF CARE
From: Jeramy Mcmullen  [facility]  Sent: 3/28/2022 6:57 PM EDT  Subject: Medication Renewal Request    Refills have been requested for the following medications:    Other - Flonaise    Preferred pharmacy: [facility] [address] [place] [address]      Medication renewals requested in this message routed separately:     LORazepam (ATIVAN) 0 5 mg tablet Wilbur Paige, DO] Problem: Patient Care Overview  Goal: Plan of Care Review  Outcome: Ongoing (interventions implemented as appropriate)   03/21/18 1800 03/21/18 2036 03/22/18 0651   Coping/Psychosocial   Plan of Care Reviewed With --  patient --    Plan of Care Review   Progress improving --  --    OTHER   Outcome Summary --  --  Patient was medicated with benadryl PRN. Patient slept well. Patient is still coughing and Robitussin was given. No family at bedside. Patient is on IV antibiotic. Still needs urine specimen sample. Patient is incontinent. Will continue to monitor vitals signs, labs and comfort.     Goal: Individualization and Mutuality  Outcome: Ongoing (interventions implemented as appropriate)    Goal: Discharge Needs Assessment  Outcome: Ongoing (interventions implemented as appropriate)      Problem: Fall Risk (Adult)  Goal: Absence of Fall  Outcome: Ongoing (interventions implemented as appropriate)      Problem: Skin Injury Risk (Adult)  Goal: Skin Health and Integrity  Outcome: Ongoing (interventions implemented as appropriate)      Problem: Fluid Volume Deficit (Adult)  Goal: Optimal Fluid Balance  Outcome: Ongoing (interventions implemented as appropriate)      Problem: Nutrition, Imbalanced: Inadequate Oral Intake (Adult)  Goal: Improved Oral Intake  Outcome: Ongoing (interventions implemented as appropriate)    Goal: Prevent Further Weight Loss  Outcome: Ongoing (interventions implemented as appropriate)

## 2022-07-30 ENCOUNTER — APPOINTMENT (OUTPATIENT)
Dept: GENERAL RADIOLOGY | Facility: HOSPITAL | Age: 87
End: 2022-07-30

## 2022-07-30 ENCOUNTER — HOSPITAL ENCOUNTER (EMERGENCY)
Facility: HOSPITAL | Age: 87
Discharge: NURSING, REHAB OR LONG-TERM CARE | End: 2022-07-30
Attending: EMERGENCY MEDICINE | Admitting: EMERGENCY MEDICINE

## 2022-07-30 VITALS
SYSTOLIC BLOOD PRESSURE: 150 MMHG | BODY MASS INDEX: 20 KG/M2 | OXYGEN SATURATION: 97 % | TEMPERATURE: 98.2 F | WEIGHT: 99.2 LBS | HEART RATE: 74 BPM | HEIGHT: 59 IN | DIASTOLIC BLOOD PRESSURE: 73 MMHG | RESPIRATION RATE: 16 BRPM

## 2022-07-30 DIAGNOSIS — M25.511 ACUTE PAIN OF RIGHT SHOULDER: Primary | ICD-10-CM

## 2022-07-30 PROCEDURE — 99283 EMERGENCY DEPT VISIT LOW MDM: CPT

## 2022-07-30 PROCEDURE — 73030 X-RAY EXAM OF SHOULDER: CPT

## 2022-07-30 PROCEDURE — 73060 X-RAY EXAM OF HUMERUS: CPT

## 2022-07-30 RX ORDER — POTASSIUM CHLORIDE 20 MEQ/1
40 TABLET, EXTENDED RELEASE ORAL EVERY 8 HOURS
COMMUNITY
End: 2023-02-03 | Stop reason: HOSPADM

## 2022-07-30 RX ORDER — CLONIDINE HYDROCHLORIDE 0.1 MG/1
0.1 TABLET ORAL ONCE
Status: COMPLETED | OUTPATIENT
Start: 2022-07-30 | End: 2022-07-30

## 2022-07-30 RX ORDER — FUROSEMIDE 20 MG/1
20 TABLET ORAL DAILY
COMMUNITY
End: 2023-02-03 | Stop reason: HOSPADM

## 2022-07-30 RX ORDER — INSULIN GLARGINE 100 [IU]/ML
14 INJECTION, SOLUTION SUBCUTANEOUS NIGHTLY
COMMUNITY

## 2022-07-30 RX ORDER — ACETAMINOPHEN 325 MG/1
650 TABLET ORAL EVERY 6 HOURS PRN
COMMUNITY
End: 2023-01-25

## 2022-07-30 RX ORDER — PEN NEEDLE, DIABETIC, SAFETY 30 GX3/16"
NEEDLE, DISPOSABLE MISCELLANEOUS DAILY
COMMUNITY
End: 2022-08-02

## 2022-07-30 RX ADMIN — CLONIDINE HYDROCHLORIDE 0.1 MG: 0.1 TABLET ORAL at 16:42

## 2022-08-02 ENCOUNTER — HOSPITAL ENCOUNTER (INPATIENT)
Facility: HOSPITAL | Age: 87
LOS: 4 days | Discharge: NURSING, REHAB OR LONG-TERM CARE | End: 2022-08-08
Attending: EMERGENCY MEDICINE | Admitting: HOSPITALIST

## 2022-08-02 ENCOUNTER — APPOINTMENT (OUTPATIENT)
Dept: CT IMAGING | Facility: HOSPITAL | Age: 87
End: 2022-08-02

## 2022-08-02 DIAGNOSIS — S20.211A CHEST WALL HEMATOMA, RIGHT, INITIAL ENCOUNTER: Primary | ICD-10-CM

## 2022-08-02 DIAGNOSIS — D72.829 LEUKOCYTOSIS, UNSPECIFIED TYPE: ICD-10-CM

## 2022-08-02 LAB
ALBUMIN SERPL-MCNC: 3.2 G/DL (ref 3.5–5.2)
ALBUMIN/GLOB SERPL: 1.1 G/DL
ALP SERPL-CCNC: 90 U/L (ref 39–117)
ALT SERPL W P-5'-P-CCNC: 12 U/L (ref 1–33)
ANION GAP SERPL CALCULATED.3IONS-SCNC: 11.8 MMOL/L (ref 5–15)
APTT PPP: 31.5 SECONDS (ref 22.7–35.4)
AST SERPL-CCNC: 17 U/L (ref 1–32)
BASOPHILS # BLD AUTO: 0.04 10*3/MM3 (ref 0–0.2)
BASOPHILS NFR BLD AUTO: 0.2 % (ref 0–1.5)
BILIRUB SERPL-MCNC: 0.4 MG/DL (ref 0–1.2)
BUN SERPL-MCNC: 22 MG/DL (ref 8–23)
BUN/CREAT SERPL: 17.2 (ref 7–25)
CALCIUM SPEC-SCNC: 9 MG/DL (ref 8.6–10.5)
CHLORIDE SERPL-SCNC: 102 MMOL/L (ref 98–107)
CO2 SERPL-SCNC: 20.2 MMOL/L (ref 22–29)
CREAT SERPL-MCNC: 1.28 MG/DL (ref 0.57–1)
DEPRECATED RDW RBC AUTO: 45.2 FL (ref 37–54)
EGFRCR SERPLBLD CKD-EPI 2021: 40.6 ML/MIN/1.73
EOSINOPHIL # BLD AUTO: 0.01 10*3/MM3 (ref 0–0.4)
EOSINOPHIL NFR BLD AUTO: 0.1 % (ref 0.3–6.2)
ERYTHROCYTE [DISTWIDTH] IN BLOOD BY AUTOMATED COUNT: 13.3 % (ref 12.3–15.4)
GLOBULIN UR ELPH-MCNC: 3 GM/DL
GLUCOSE BLDC GLUCOMTR-MCNC: 188 MG/DL (ref 70–130)
GLUCOSE SERPL-MCNC: 282 MG/DL (ref 65–99)
HCT VFR BLD AUTO: 38.9 % (ref 34–46.6)
HGB BLD-MCNC: 12.6 G/DL (ref 12–15.9)
IMM GRANULOCYTES # BLD AUTO: 0.08 10*3/MM3 (ref 0–0.05)
IMM GRANULOCYTES NFR BLD AUTO: 0.5 % (ref 0–0.5)
INR PPP: 1.15 (ref 0.9–1.1)
LYMPHOCYTES # BLD AUTO: 1.05 10*3/MM3 (ref 0.7–3.1)
LYMPHOCYTES NFR BLD AUTO: 6.2 % (ref 19.6–45.3)
MCH RBC QN AUTO: 30.2 PG (ref 26.6–33)
MCHC RBC AUTO-ENTMCNC: 32.4 G/DL (ref 31.5–35.7)
MCV RBC AUTO: 93.3 FL (ref 79–97)
MONOCYTES # BLD AUTO: 0.83 10*3/MM3 (ref 0.1–0.9)
MONOCYTES NFR BLD AUTO: 4.9 % (ref 5–12)
NEUTROPHILS NFR BLD AUTO: 15.02 10*3/MM3 (ref 1.7–7)
NEUTROPHILS NFR BLD AUTO: 88.1 % (ref 42.7–76)
NRBC BLD AUTO-RTO: 0 /100 WBC (ref 0–0.2)
PLATELET # BLD AUTO: 395 10*3/MM3 (ref 140–450)
PMV BLD AUTO: 9.8 FL (ref 6–12)
POTASSIUM SERPL-SCNC: 4.9 MMOL/L (ref 3.5–5.2)
PROT SERPL-MCNC: 6.2 G/DL (ref 6–8.5)
PROTHROMBIN TIME: 14.6 SECONDS (ref 11.7–14.2)
RBC # BLD AUTO: 4.17 10*6/MM3 (ref 3.77–5.28)
SARS-COV-2 RNA RESP QL NAA+PROBE: NOT DETECTED
SODIUM SERPL-SCNC: 134 MMOL/L (ref 136–145)
WBC NRBC COR # BLD: 17.03 10*3/MM3 (ref 3.4–10.8)

## 2022-08-02 PROCEDURE — 71250 CT THORAX DX C-: CPT

## 2022-08-02 PROCEDURE — 87150 DNA/RNA AMPLIFIED PROBE: CPT | Performed by: PHYSICIAN ASSISTANT

## 2022-08-02 PROCEDURE — G0378 HOSPITAL OBSERVATION PER HR: HCPCS

## 2022-08-02 PROCEDURE — 25010000002 MORPHINE PER 10 MG: Performed by: EMERGENCY MEDICINE

## 2022-08-02 PROCEDURE — U0005 INFEC AGEN DETEC AMPLI PROBE: HCPCS | Performed by: EMERGENCY MEDICINE

## 2022-08-02 PROCEDURE — 85610 PROTHROMBIN TIME: CPT | Performed by: PHYSICIAN ASSISTANT

## 2022-08-02 PROCEDURE — 25010000002 CEFAZOLIN IN DEXTROSE 2-4 GM/100ML-% SOLUTION: Performed by: PHYSICIAN ASSISTANT

## 2022-08-02 PROCEDURE — 87040 BLOOD CULTURE FOR BACTERIA: CPT | Performed by: PHYSICIAN ASSISTANT

## 2022-08-02 PROCEDURE — 82565 ASSAY OF CREATININE: CPT

## 2022-08-02 PROCEDURE — 82962 GLUCOSE BLOOD TEST: CPT

## 2022-08-02 PROCEDURE — 85730 THROMBOPLASTIN TIME PARTIAL: CPT | Performed by: PHYSICIAN ASSISTANT

## 2022-08-02 PROCEDURE — 99221 1ST HOSP IP/OBS SF/LOW 40: CPT | Performed by: NURSE PRACTITIONER

## 2022-08-02 PROCEDURE — 80053 COMPREHEN METABOLIC PANEL: CPT | Performed by: PHYSICIAN ASSISTANT

## 2022-08-02 PROCEDURE — U0003 INFECTIOUS AGENT DETECTION BY NUCLEIC ACID (DNA OR RNA); SEVERE ACUTE RESPIRATORY SYNDROME CORONAVIRUS 2 (SARS-COV-2) (CORONAVIRUS DISEASE [COVID-19]), AMPLIFIED PROBE TECHNIQUE, MAKING USE OF HIGH THROUGHPUT TECHNOLOGIES AS DESCRIBED BY CMS-2020-01-R: HCPCS | Performed by: EMERGENCY MEDICINE

## 2022-08-02 PROCEDURE — 99284 EMERGENCY DEPT VISIT MOD MDM: CPT

## 2022-08-02 PROCEDURE — 36415 COLL VENOUS BLD VENIPUNCTURE: CPT | Performed by: PHYSICIAN ASSISTANT

## 2022-08-02 PROCEDURE — 87147 CULTURE TYPE IMMUNOLOGIC: CPT | Performed by: PHYSICIAN ASSISTANT

## 2022-08-02 PROCEDURE — 85025 COMPLETE CBC W/AUTO DIFF WBC: CPT | Performed by: PHYSICIAN ASSISTANT

## 2022-08-02 RX ORDER — AMOXICILLIN 250 MG
2 CAPSULE ORAL 2 TIMES DAILY
Status: DISCONTINUED | OUTPATIENT
Start: 2022-08-02 | End: 2022-08-08 | Stop reason: HOSPADM

## 2022-08-02 RX ORDER — ONDANSETRON 4 MG/1
4 TABLET, FILM COATED ORAL EVERY 6 HOURS PRN
Status: DISCONTINUED | OUTPATIENT
Start: 2022-08-02 | End: 2022-08-08 | Stop reason: HOSPADM

## 2022-08-02 RX ORDER — NICOTINE POLACRILEX 4 MG
15 LOZENGE BUCCAL
Status: DISCONTINUED | OUTPATIENT
Start: 2022-08-02 | End: 2022-08-08 | Stop reason: HOSPADM

## 2022-08-02 RX ORDER — MEMANTINE HYDROCHLORIDE 5 MG/1
5 TABLET ORAL DAILY
Status: DISCONTINUED | OUTPATIENT
Start: 2022-08-02 | End: 2022-08-08 | Stop reason: HOSPADM

## 2022-08-02 RX ORDER — ACETAMINOPHEN 160 MG/5ML
650 SOLUTION ORAL EVERY 4 HOURS PRN
Status: DISCONTINUED | OUTPATIENT
Start: 2022-08-02 | End: 2022-08-08 | Stop reason: HOSPADM

## 2022-08-02 RX ORDER — SODIUM CHLORIDE 0.9 % (FLUSH) 0.9 %
10 SYRINGE (ML) INJECTION AS NEEDED
Status: DISCONTINUED | OUTPATIENT
Start: 2022-08-02 | End: 2022-08-08 | Stop reason: HOSPADM

## 2022-08-02 RX ORDER — BISACODYL 5 MG/1
5 TABLET, DELAYED RELEASE ORAL DAILY PRN
Status: DISCONTINUED | OUTPATIENT
Start: 2022-08-02 | End: 2022-08-08 | Stop reason: HOSPADM

## 2022-08-02 RX ORDER — TRAMADOL HYDROCHLORIDE 50 MG/1
50 TABLET ORAL EVERY 6 HOURS PRN
Status: DISCONTINUED | OUTPATIENT
Start: 2022-08-02 | End: 2022-08-08 | Stop reason: HOSPADM

## 2022-08-02 RX ORDER — BISACODYL 10 MG
10 SUPPOSITORY, RECTAL RECTAL DAILY PRN
Status: DISCONTINUED | OUTPATIENT
Start: 2022-08-02 | End: 2022-08-08 | Stop reason: HOSPADM

## 2022-08-02 RX ORDER — POLYETHYLENE GLYCOL 3350 17 G/17G
17 POWDER, FOR SOLUTION ORAL DAILY PRN
Status: DISCONTINUED | OUTPATIENT
Start: 2022-08-02 | End: 2022-08-08 | Stop reason: HOSPADM

## 2022-08-02 RX ORDER — INSULIN LISPRO 100 [IU]/ML
0-7 INJECTION, SOLUTION INTRAVENOUS; SUBCUTANEOUS
Status: DISCONTINUED | OUTPATIENT
Start: 2022-08-02 | End: 2022-08-08 | Stop reason: HOSPADM

## 2022-08-02 RX ORDER — ONDANSETRON 2 MG/ML
4 INJECTION INTRAMUSCULAR; INTRAVENOUS EVERY 6 HOURS PRN
Status: DISCONTINUED | OUTPATIENT
Start: 2022-08-02 | End: 2022-08-08 | Stop reason: HOSPADM

## 2022-08-02 RX ORDER — ACETAMINOPHEN 650 MG/1
650 SUPPOSITORY RECTAL EVERY 4 HOURS PRN
Status: DISCONTINUED | OUTPATIENT
Start: 2022-08-02 | End: 2022-08-08 | Stop reason: HOSPADM

## 2022-08-02 RX ORDER — CEFAZOLIN SODIUM 2 G/100ML
2 INJECTION, SOLUTION INTRAVENOUS ONCE
Status: COMPLETED | OUTPATIENT
Start: 2022-08-02 | End: 2022-08-02

## 2022-08-02 RX ORDER — LEVOTHYROXINE SODIUM 88 UG/1
88 TABLET ORAL DAILY
Status: DISCONTINUED | OUTPATIENT
Start: 2022-08-02 | End: 2022-08-08 | Stop reason: HOSPADM

## 2022-08-02 RX ORDER — DEXTROSE MONOHYDRATE 25 G/50ML
25 INJECTION, SOLUTION INTRAVENOUS
Status: DISCONTINUED | OUTPATIENT
Start: 2022-08-02 | End: 2022-08-08 | Stop reason: HOSPADM

## 2022-08-02 RX ORDER — ATORVASTATIN CALCIUM 20 MG/1
10 TABLET, FILM COATED ORAL DAILY
Status: DISCONTINUED | OUTPATIENT
Start: 2022-08-02 | End: 2022-08-08 | Stop reason: HOSPADM

## 2022-08-02 RX ORDER — ACETAMINOPHEN 500 MG
500 TABLET ORAL EVERY 6 HOURS PRN
Status: DISCONTINUED | OUTPATIENT
Start: 2022-08-02 | End: 2022-08-08 | Stop reason: HOSPADM

## 2022-08-02 RX ORDER — SODIUM CHLORIDE 0.9 % (FLUSH) 0.9 %
10 SYRINGE (ML) INJECTION EVERY 12 HOURS SCHEDULED
Status: DISCONTINUED | OUTPATIENT
Start: 2022-08-02 | End: 2022-08-08 | Stop reason: HOSPADM

## 2022-08-02 RX ORDER — ACETAMINOPHEN 325 MG/1
650 TABLET ORAL EVERY 4 HOURS PRN
Status: DISCONTINUED | OUTPATIENT
Start: 2022-08-02 | End: 2022-08-08 | Stop reason: HOSPADM

## 2022-08-02 RX ORDER — MORPHINE SULFATE 2 MG/ML
4 INJECTION, SOLUTION INTRAMUSCULAR; INTRAVENOUS ONCE
Status: COMPLETED | OUTPATIENT
Start: 2022-08-02 | End: 2022-08-02

## 2022-08-02 RX ADMIN — INSULIN GLARGINE-YFGN 8 UNITS: 100 INJECTION, SOLUTION SUBCUTANEOUS at 21:26

## 2022-08-02 RX ADMIN — Medication 10 ML: at 21:25

## 2022-08-02 RX ADMIN — MORPHINE SULFATE 4 MG: 2 INJECTION, SOLUTION INTRAMUSCULAR; INTRAVENOUS at 13:30

## 2022-08-02 RX ADMIN — DOCUSATE SODIUM 50MG AND SENNOSIDES 8.6MG 2 TABLET: 8.6; 5 TABLET, FILM COATED ORAL at 21:25

## 2022-08-02 RX ADMIN — CEFAZOLIN SODIUM 2 G: 2 INJECTION, SOLUTION INTRAVENOUS at 15:15

## 2022-08-02 RX ADMIN — ATORVASTATIN CALCIUM 10 MG: 20 TABLET, FILM COATED ORAL at 21:25

## 2022-08-02 RX ADMIN — LEVOTHYROXINE SODIUM 88 MCG: 0.09 TABLET ORAL at 21:25

## 2022-08-02 RX ADMIN — MEMANTINE HYDROCHLORIDE 5 MG: 5 TABLET, FILM COATED ORAL at 21:25

## 2022-08-02 NOTE — PLAN OF CARE
Problem: Adult Inpatient Plan of Care  Goal: Plan of Care Review  Outcome: Ongoing, Progressing  Flowsheets (Taken 8/2/2022 1753)  Progress: no change  Plan of Care Reviewed With:   patient   daughter  Outcome Evaluation: vss. aaox2. hematoma marked by thoracic.   Goal Outcome Evaluation:  Plan of Care Reviewed With: patient, daughter        Progress: no change  Outcome Evaluation: vss. aaox2. hematoma marked by thoracic.

## 2022-08-02 NOTE — ED PROVIDER NOTES
EMERGENCY DEPARTMENT ENCOUNTER    Room Number:  08/08  Date of encounter:  8/2/2022  PCP: Reg Rivera MD  Historian: EMS  Full history not obtainable due to: Dementia    HPI:  Chief Complaint: Chest wall swelling    Context: Sam Flores is a 87 y.o. female with a PMH significant for dementia, osteoporosis, type 2 diabetes who presents to the ED c/o right-sided chest wall swelling.  The patient lives at a facility where her caregiver today noticed swelling on the right-sided chest wall that was not there last night.  The patient was having increased difficulty moving her right shoulder and right upper extremity so they sent her here for further evaluation.  She does describe an aching discomfort to the right-sided chest and right shoulder diffusely.  Symptoms are worse with range of motion.  There has been no fever, vomiting according to the living facility.  No new injury.      MEDICAL RECORD REVIEW:    Upon review of the medical record it appears the patient was evaluated in this emergency department 3 days ago for right shoulder pain.  The patient did not remember an injury but according to the note from the provider taking care of her at that time there was suspicion for a fall that the patient did not remember.      PAST MEDICAL HISTORY    Active Ambulatory Problems     Diagnosis Date Noted   • Lumbar compression fracture (HCC) 11/30/2016   • Osteoporosis 12/01/2016   • Dehydration 03/12/2018   • Acute on chronic renal failure (HCC) 03/12/2018   • Type 2 diabetes mellitus without complication (HCC) 03/12/2018   • Benign essential hypertension 03/12/2018   • Weight loss, abnormal 03/12/2018   • Trouble swallowing 03/12/2018   • Hyponatremia 03/12/2018   • Hypokalemia 03/12/2018   • Aspiration pneumonia (HCC) 03/15/2018     Resolved Ambulatory Problems     Diagnosis Date Noted   • Fall 12/01/2016   • Acute kidney injury superimposed on chronic kidney disease (HCC) 12/01/2016   • Rhabdomyolysis  2016   • Slow transit constipation 2016   • S/P kyphoplasty 2016     Past Medical History:   Diagnosis Date   • Dementia    • Diabetes mellitus (CMS/HCC)    • Disease of thyroid gland    • Emphysema of lung (CMS/HCC)    • Hyperlipidemia    • Hypertension          PAST SURGICAL HISTORY  Past Surgical History:   Procedure Laterality Date   •  SECTION     • KYPHOPLASTY      T12 and L2   • KYPHOPLASTY N/A 2016    Procedure: KYPHOPLASTY LUMBAR 1 AND LUMBAR 4;  Surgeon: Triston Marinelli IV, MD;  Location: [facility] ;  Service:    • MASTECTOMY           FAMILY HISTORY  No family history on file.      SOCIAL HISTORY  Social History     Socioeconomic History   • Marital status:    Tobacco Use   • Smoking status: Never Smoker   • Smokeless tobacco: Never Used   Substance and Sexual Activity   • Alcohol use: No   • Drug use: No         ALLERGIES  Penicillins        REVIEW OF SYSTEMS    All systems reviewed and marked as negative except as listed in HPI     PHYSICAL EXAM    I have reviewed the triage vital signs and nursing notes.    ED Triage Vitals   Temp Heart Rate Resp BP SpO2   22 1057 22 1057 22 1057 22 1057 22 1057   98.5 °F (36.9 °C) 75 16 170/90 95 %      Temp src Heart Rate Source Patient Position BP Location FiO2 (%)   -- 22 1117 22 1117 22 1117 --    Monitor Lying Left arm        Physical Exam  Constitutional:       General: She is not in acute distress.     Appearance: She is well-developed.   HENT:      Head: Normocephalic and atraumatic.   Eyes:      General: No scleral icterus.     Conjunctiva/sclera: Conjunctivae normal.   Neck:      Trachea: No tracheal deviation.   Cardiovascular:      Rate and Rhythm: Normal rate and regular rhythm.   Pulmonary:      Effort: Pulmonary effort is normal.      Breath sounds: Normal breath sounds.   Chest:       Abdominal:      Palpations: Abdomen is soft.      Tenderness: There is  no abdominal tenderness.   Musculoskeletal:         General: No deformity.      Right shoulder: Swelling and tenderness present. Decreased range of motion. Normal pulse.      Cervical back: Normal range of motion.   Lymphadenopathy:      Cervical: No cervical adenopathy.   Skin:     General: Skin is warm and dry.   Neurological:      Mental Status: She is alert. Mental status is at baseline.   Psychiatric:         Behavior: Behavior normal.         Cognition and Memory: Cognition is impaired. Memory is impaired.         Vital signs and nursing notes reviewed.            LAB RESULTS  Recent Results (from the past 24 hour(s))   Comprehensive Metabolic Panel    Collection Time: 08/02/22 12:28 PM    Specimen: Blood   Result Value Ref Range    Glucose 282 (H) 65 - 99 mg/dL    BUN 22 8 - 23 mg/dL    Creatinine 1.28 (H) 0.57 - 1.00 mg/dL    Sodium 134 (L) 136 - 145 mmol/L    Potassium 4.9 3.5 - 5.2 mmol/L    Chloride 102 98 - 107 mmol/L    CO2 20.2 (L) 22.0 - 29.0 mmol/L    Calcium 9.0 8.6 - 10.5 mg/dL    Total Protein 6.2 6.0 - 8.5 g/dL    Albumin 3.20 (L) 3.50 - 5.20 g/dL    ALT (SGPT) 12 1 - 33 U/L    AST (SGOT) 17 1 - 32 U/L    Alkaline Phosphatase 90 39 - 117 U/L    Total Bilirubin 0.4 0.0 - 1.2 mg/dL    Globulin 3.0 gm/dL    A/G Ratio 1.1 g/dL    BUN/Creatinine Ratio 17.2 7.0 - 25.0    Anion Gap 11.8 5.0 - 15.0 mmol/L    eGFR 40.6 (L) >60.0 mL/min/1.73   CBC Auto Differential    Collection Time: 08/02/22 12:28 PM    Specimen: Blood   Result Value Ref Range    WBC 17.03 (H) 3.40 - 10.80 10*3/mm3    RBC 4.17 3.77 - 5.28 10*6/mm3    Hemoglobin 12.6 12.0 - 15.9 g/dL    Hematocrit 38.9 34.0 - 46.6 %    MCV 93.3 79.0 - 97.0 fL    MCH 30.2 26.6 - 33.0 pg    MCHC 32.4 31.5 - 35.7 g/dL    RDW 13.3 12.3 - 15.4 %    RDW-SD 45.2 37.0 - 54.0 fl    MPV 9.8 6.0 - 12.0 fL    Platelets 395 140 - 450 10*3/mm3    Neutrophil % 88.1 (H) 42.7 - 76.0 %    Lymphocyte % 6.2 (L) 19.6 - 45.3 %    Monocyte % 4.9 (L) 5.0 - 12.0 %    Eosinophil %  0.1 (L) 0.3 - 6.2 %    Basophil % 0.2 0.0 - 1.5 %    Immature Grans % 0.5 0.0 - 0.5 %    Neutrophils, Absolute 15.02 (H) 1.70 - 7.00 10*3/mm3    Lymphocytes, Absolute 1.05 0.70 - 3.10 10*3/mm3    Monocytes, Absolute 0.83 0.10 - 0.90 10*3/mm3    Eosinophils, Absolute 0.01 0.00 - 0.40 10*3/mm3    Basophils, Absolute 0.04 0.00 - 0.20 10*3/mm3    Immature Grans, Absolute 0.08 (H) 0.00 - 0.05 10*3/mm3    nRBC 0.0 0.0 - 0.2 /100 WBC       Ordered the above labs and independently reviewed the results.        RADIOLOGY  CT Chest Without Contrast Diagnostic    Result Date: 8/2/2022  CT CHEST WITHOUT CONTRAST  HISTORY: Right-sided chest wall mass/swelling. Difficulty with right upper extremity range of motion.  TECHNIQUE: Radiation dose reduction techniques were utilized, including automated exposure control and exposure modulation based on body size. 3 mm images were obtained through the chest without the administration of IV contrast. Lack of IV contrast limits evaluation of mediastinal, hilar, and vascular structures. Sensitivity for underlying lesions and infection decreased.  COMPARISON: 02/14/2018  FINDINGS: Correlation is somewhat limited by artifact from overlying soft tissues/right shoulder prosthesis and motion.  Thoracic inlet: Within normal noncontrast limits  Heart and great vessels: Heart size is enlarged.. Ectasia of the ascending aorta approximating 3.5 cm. Extensive atherosclerosis thoracoabdominal aorta and branch vessels with dense calcification about the mitral annulus. Trace pericardial fluid and/or thickening. 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    Lymphatics: Mildly prominent mediastinal nodes with an index right paratracheal node measures 9 mm. Hilar evaluation limited without contrast. Recommend attention on follow-up.  Lung parenchyma and pleural space: Small bilateral pleural effusions with bibasilar airspace disease. Patchy tree-in-bud nodular opacities  predominantly in the lung bases left slightly greater than right. Coarsened interstitial markings. Respiratory motion artifact limits evaluation of the parenchyma. Calcified granulomas. No pneumothorax.  Soft tissues: Large soft tissue hematoma of the right chest/breast approximating 6 x 12.8 cm. Evaluation for active extravasation is limited. There is extensive streak artifact limiting evaluation of the right shoulder and upper chest status post right shoulder arthroplasty. The radiographic appearance of the prosthesis is similar in appearance to the prior exam. No definitive acute displaced fractures. Diffuse bone demineralization with advanced degenerative changes of the spine. Status post vertebroplasty/kyphoplasty for compression fractures of T12-L2. Age indeterminate but chronic appearing compression fractures of T6, T9, T10, and T11 with greater than 50% loss of vertebral body height at T6 and T11. No definitive retropulsion. MRI could be considered for further evaluation if clinically indicated.         Limited noncontrast CT impression:  1.  Large right chest wall/breast hematoma approximating 6 x 12.8 cm. 2.  Cardiomegaly with small bilateral pleural effusions and bibasilar airspace disease. Tree-in-bud nodular opacities particularly in the left lung base may be the sequela of pneumonitis/aspiration. Recommend attention on short-term follow-up to resolution after treatment. 3.  Status post right shoulder arthroplasty incompletely imaged. 4.  Multiple age-indeterminate but chronic appearing compression fractures of the thoracolumbar spine. Status post vertebroplasty/kyphoplasty of T12-L2. 5.  Mediastinal adenopathy likely reactive. Recommend attention on subsequent surveillance imaging. 6.  Please see above for additional findings.  7.  I discussed these findings with CARMEN Amaro at 11:35 AM on 08/02/2022.  This report was finalized on 8/2/2022 12:38 PM by Dr. Hi Doan M.D.        I ordered the  above noted radiological studies. Independently reviewed by me and discussed with radiologist.  See dictation above for official radiology interpretation.      PROCEDURES    Procedures        MEDICATIONS GIVEN IN ER    Medications   sodium chloride 0.9 % flush 10 mL (has no administration in time range)   ceFAZolin in dextrose (ANCEF) IVPB solution 2 g (has no administration in time range)   morphine injection 4 mg (4 mg Intravenous Given 8/2/22 1330)         PROGRESS, DATA ANALYSIS, CONSULTS, AND MEDICAL DECISION MAKING    All labs have been independently reviewed by me.  All radiology studies have been reviewed by me.   EKG's independently reviewed by me.  Discussion below represents my analysis of pertinent findings related to patient's condition, differential diagnosis, treatment plan and final disposition.    DIFFERENTIAL DIAGNOSIS INCLUDE BUT NOT LIMITED TO:     Chest wall cellulitis, chest wall hematoma, rib fracture, right shoulder fracture    ED Course as of 08/03/22 0950   Tue Aug 02, 2022   1307 Creatinine(!): 1.28 [DC]   1307 WBC(!): 17.03 [DC]   1334 I spoke with MOLLY Beckham with Thoracic Surgery at this time regarding the patient.  I discussed work-up, results, concerns.  I discussed the consulting provider's desire for admission to the hospitalist service and recommends interventional radiology consult and broad-spectrum antibiotics. [DC]   1354 I spoke with MD Iggy with A at this time regarding the patient.  I discussed work-up, results, concerns.  I discussed the consulting provider's desire for tele admit.     [DC]   4109 I spoke with MD Bernabe with IR at this time regarding the patient.  I discussed work-up, results, concerns.  I discussed the consulting provider's desire for placing the order for CT-guided subcutaneous drain placement to be scheduled for tomorrow.  Orders placed at this time.     [DC]      ED Course User Index  [DC] Dannie Hernández PA       AS OF 13:56 EDT  VITALS:    BP - 148/82  HR - 87  TEMP - 98.5 °F (36.9 °C)  02 SATS - 96%    1357 I rechecked the patient.  I discussed the patient's radiology findings (including all incidental findings), diagnosis, and plan for admission.  All questions answered.      DIAGNOSIS  Final diagnoses:   Chest wall hematoma, right, initial encounter   Leukocytosis, unspecified type         DISPOSITION  Admit    Pt masked in first look. I wore a surgical mask throughout my encounters with the pt. I performed hand hygiene on entry into the pt room and upon exit.     Dictated utilizing Dragon dictation      Dannie Hernández PA  08/03/22 0903

## 2022-08-02 NOTE — ED TRIAGE NOTES
"Patient to ER via EMS from nursing facility. Nursing home states that she \"has a mass on the right upper chest area that came up today that is softball size\" Patient was recently here for right shoulder pain bruising noted to the area  Facility denies any injury  A&ox 2 hx of dementia patient is bed bound  Patient wearing mask this RN in PPE  "

## 2022-08-02 NOTE — ED NOTES
BIB [facility].  Pt arrives with large,swelling noted to her right chest, +purple/green discoloration noted.  She is a bilateral mastectomy patient.  She is able to raise her right arm at the elbow but is unable to raise her arm from the shoulder.  She does c/o pain in her right shoulder with movement and palpation.  She also c/o pain of the chest mass with palpation.

## 2022-08-02 NOTE — ED PROVIDER NOTES
MD ATTESTATION NOTE    The MOLLY and I have discussed this patient's history, physical exam, and treatment plan.  I have reviewed the documentation and personally had a face to face interaction with the patient. I affirm the documentation and agree with the treatment and plan.  The attached note describes my personal findings.    I provided a substantive portion of the care of this patient. I personally performed the physical exam, in its entirety.    Sam Flores is a 87 y.o. female who presents to the ED c/o swelling to her right chest.  She was evaluated here 3 days ago for ecchymosis to her right anterior humerus.  She has a history of dementia and there was not much history to go on.  She was sent from the nursing facility today because she has swelling in her right chest area.  They report she has not had any new injury.  Patient reports she has had a history of mastectomy on that side.      On exam:  GENERAL: Awake, alert, no acute distress  SKIN: Warm, dry  HENT: Normocephalic, atraumatic  EYES: no scleral icterus  CV: regular rhythm, regular rate  RESPIRATORY: normal effort, lungs clear  ABDOMEN: soft, nontender, nondistended  MUSCULOSKELETAL: no deformity.  Right anterior chest with a large hematoma.  This is tense.  There is no skin breakdown currently.  She has intact distal radial pulses.  She has normal sensation and motor.  NEURO: alert, moves all extremities, follows commands    Labs  Recent Results (from the past 24 hour(s))   Comprehensive Metabolic Panel    Collection Time: 08/02/22 12:28 PM    Specimen: Blood   Result Value Ref Range    Glucose 282 (H) 65 - 99 mg/dL    BUN 22 8 - 23 mg/dL    Creatinine 1.28 (H) 0.57 - 1.00 mg/dL    Sodium 134 (L) 136 - 145 mmol/L    Potassium 4.9 3.5 - 5.2 mmol/L    Chloride 102 98 - 107 mmol/L    CO2 20.2 (L) 22.0 - 29.0 mmol/L    Calcium 9.0 8.6 - 10.5 mg/dL    Total Protein 6.2 6.0 - 8.5 g/dL    Albumin 3.20 (L) 3.50 - 5.20 g/dL    ALT (SGPT) 12 1 - 33 U/L     AST (SGOT) 17 1 - 32 U/L    Alkaline Phosphatase 90 39 - 117 U/L    Total Bilirubin 0.4 0.0 - 1.2 mg/dL    Globulin 3.0 gm/dL    A/G Ratio 1.1 g/dL    BUN/Creatinine Ratio 17.2 7.0 - 25.0    Anion Gap 11.8 5.0 - 15.0 mmol/L    eGFR 40.6 (L) >60.0 mL/min/1.73   CBC Auto Differential    Collection Time: 08/02/22 12:28 PM    Specimen: Blood   Result Value Ref Range    WBC 17.03 (H) 3.40 - 10.80 10*3/mm3    RBC 4.17 3.77 - 5.28 10*6/mm3    Hemoglobin 12.6 12.0 - 15.9 g/dL    Hematocrit 38.9 34.0 - 46.6 %    MCV 93.3 79.0 - 97.0 fL    MCH 30.2 26.6 - 33.0 pg    MCHC 32.4 31.5 - 35.7 g/dL    RDW 13.3 12.3 - 15.4 %    RDW-SD 45.2 37.0 - 54.0 fl    MPV 9.8 6.0 - 12.0 fL    Platelets 395 140 - 450 10*3/mm3    Neutrophil % 88.1 (H) 42.7 - 76.0 %    Lymphocyte % 6.2 (L) 19.6 - 45.3 %    Monocyte % 4.9 (L) 5.0 - 12.0 %    Eosinophil % 0.1 (L) 0.3 - 6.2 %    Basophil % 0.2 0.0 - 1.5 %    Immature Grans % 0.5 0.0 - 0.5 %    Neutrophils, Absolute 15.02 (H) 1.70 - 7.00 10*3/mm3    Lymphocytes, Absolute 1.05 0.70 - 3.10 10*3/mm3    Monocytes, Absolute 0.83 0.10 - 0.90 10*3/mm3    Eosinophils, Absolute 0.01 0.00 - 0.40 10*3/mm3    Basophils, Absolute 0.04 0.00 - 0.20 10*3/mm3    Immature Grans, Absolute 0.08 (H) 0.00 - 0.05 10*3/mm3    nRBC 0.0 0.0 - 0.2 /100 WBC   Protime-INR    Collection Time: 08/02/22  1:28 PM    Specimen: Blood   Result Value Ref Range    Protime 14.6 (H) 11.7 - 14.2 Seconds    INR 1.15 (H) 0.90 - 1.10   aPTT    Collection Time: 08/02/22  1:28 PM    Specimen: Blood   Result Value Ref Range    PTT 31.5 22.7 - 35.4 seconds       Radiology  CT Chest Without Contrast Diagnostic    Result Date: 8/2/2022  CT CHEST WITHOUT CONTRAST  HISTORY: Right-sided chest wall mass/swelling. Difficulty with right upper extremity range of motion.  TECHNIQUE: Radiation dose reduction techniques were utilized, including automated exposure control and exposure modulation based on body size. 3 mm images were obtained through the chest  without the administration of IV contrast. Lack of IV contrast limits evaluation of mediastinal, hilar, and vascular structures. Sensitivity for underlying lesions and infection decreased.  COMPARISON: 02/14/2018  FINDINGS: Correlation is somewhat limited by artifact from overlying soft tissues/right shoulder prosthesis and motion.  Thoracic inlet: Within normal noncontrast limits  Heart and great vessels: Heart size is enlarged.. Ectasia of the ascending aorta approximating 3.5 cm. Extensive atherosclerosis thoracoabdominal aorta and branch vessels with dense calcification about the mitral annulus. Trace pericardial fluid and/or thickening. 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    Lymphatics: Mildly prominent mediastinal nodes with an index right paratracheal node measures 9 mm. Hilar evaluation limited without contrast. Recommend attention on follow-up.  Lung parenchyma and pleural space: Small bilateral pleural effusions with bibasilar airspace disease. Patchy tree-in-bud nodular opacities predominantly in the lung bases left slightly greater than right. Coarsened interstitial markings. Respiratory motion artifact limits evaluation of the parenchyma. Calcified granulomas. No pneumothorax.  Soft tissues: Large soft tissue hematoma of the right chest/breast approximating 6 x 12.8 cm. Evaluation for active extravasation is limited. There is extensive streak artifact limiting evaluation of the right shoulder and upper chest status post right shoulder arthroplasty. The radiographic appearance of the prosthesis is similar in appearance to the prior exam. No definitive acute displaced fractures. Diffuse bone demineralization with advanced degenerative changes of the spine. Status post vertebroplasty/kyphoplasty for compression fractures of T12-L2. Age indeterminate but chronic appearing compression fractures of T6, T9, T10, and T11 with greater than 50% loss of vertebral body height at  T6 and T11. No definitive retropulsion. MRI could be considered for further evaluation if clinically indicated.         Limited noncontrast CT impression:  1.  Large right chest wall/breast hematoma approximating 6 x 12.8 cm. 2.  Cardiomegaly with small bilateral pleural effusions and bibasilar airspace disease. Tree-in-bud nodular opacities particularly in the left lung base may be the sequela of pneumonitis/aspiration. Recommend attention on short-term follow-up to resolution after treatment. 3.  Status post right shoulder arthroplasty incompletely imaged. 4.  Multiple age-indeterminate but chronic appearing compression fractures of the thoracolumbar spine. Status post vertebroplasty/kyphoplasty of T12-L2. 5.  Mediastinal adenopathy likely reactive. Recommend attention on subsequent surveillance imaging. 6.  Please see above for additional findings.  7.  I discussed these findings with CARMEN Amaro at 11:35 AM on 08/02/2022.  This report was finalized on 8/2/2022 12:38 PM by Dr. Hi Doan M.D.        Medical Decision Making:  ED Course as of 08/02/22 1852   Tue Aug 02, 2022   1307 Creatinine(!): 1.28 [DC]   1307 WBC(!): 17.03 [DC]   1334 I spoke with MOLLY Beckham with Thoracic Surgery at this time regarding the patient.  I discussed work-up, results, concerns.  I discussed the consulting provider's desire for admission to the hospitalist service and recommends interventional radiology consult and broad-spectrum antibiotics. [DC]   4011 I spoke with MD Iggy with A at this time regarding the patient.  I discussed work-up, results, concerns.  I discussed the consulting provider's desire for tele admit.     [DC]   9879 I spoke with MD Bernabe with IR at this time regarding the patient.  I discussed work-up, results, concerns.  I discussed the consulting provider's desire for placing the order for CT-guided subcutaneous drain placement to be scheduled for tomorrow.  Orders placed at this time.     [DC]       ED Course User Index  [DC] Dannie Hernández PA       Procedures:  Procedures    There is a large hematoma to her right chest.  We obtain chemistries and blood counts.  We will CT her chest to evaluate the origin as well as to evaluate for any other injury.  She is on baby aspirin daily.  Given the tenseness of this we will talk with thoracic surgery regarding whether they would recommend it be drained or monitored in the hospital.    PPE: The patient wore a mask and I wore an N95 mask throughout the entire patient encounter.      The patient has started, but not completed, their COVID-19 vaccination series.    Diagnosis  Final diagnoses:   Chest wall hematoma, right, initial encounter   Leukocytosis, unspecified type        John Ring MD  08/02/22 1003

## 2022-08-02 NOTE — CONSULTS
General MD Inpatient Consult  Consult performed by: Jennifer Pan, JOO, APRN  Consult ordered by: Dannie Hernández PA          Patient Care Team:  Reg Rivera MD as PCP - General (Internal Medicine)    Chief Complaint   Patient presents with   • Mass       Subjective     History of Present Illness    Ms. Flores is a 87-year-old lady with a past medical history of renal failure, hypertension, diabetes mellitus type 2, bilateral mastectomy secondary to breast cancer and dementia.  She initially presented to [facility] ER on 7/30/2022 for a suspected fall, landing on the right side.  Patient is a poor historian and cannot confirm if she fell because she could not remember.  X-rays of the right right upper extremity did not demonstrate any acute fracture and patient was discharged to her facility in stable condition.  She presented back to the ER earlier today via EMS from assisted living facility with increased bruising to the right upper extremity and a bruised, masslike area of concern to her right chest the size of a softball.    Initial evaluation in the ER included CT of the chest which demonstrated a right chest wall hematoma, for which we have been asked to see this lady.  Other initial work-up included labs, which demonstrated elevated white count of 7.3 and stable hemoglobin of 12.6.     On exam today, the patient is resting in bed comfortably on room air.  She reports increased pain to right chest wall with palpation and with active range of motion of the right upper extremity.  Of note, she currently resides at an assisted living facility due to the inability to care for herself.    Review of Systems   Unable to perform ROS: Dementia        Patient Active Problem List   Diagnosis   • Lumbar compression fracture (HCC)   • Osteoporosis   • Dehydration   • Acute on chronic renal failure (HCC)   • Type 2 diabetes mellitus without complication (HCC)   • Benign essential hypertension   •  Weight loss, abnormal   • Trouble swallowing   • Hyponatremia   • Hypokalemia   • Aspiration pneumonia (HCC)   • Chest wall hematoma, right, initial encounter     Past Medical History:   Diagnosis Date   • Dementia (HCC)    • Diabetes mellitus (HCC)    • Disease of thyroid gland    • Emphysema of lung (HCC)    • Hyperlipidemia    • Hypertension    • Lumbar compression fracture (HCC) 2016   • Osteoporosis    • S/P kyphoplasty 2016    L1 and L4, 2016     Past Surgical History:   Procedure Laterality Date   •  SECTION     • KYPHOPLASTY      T12 and L2   • KYPHOPLASTY N/A 2016    Procedure: KYPHOPLASTY LUMBAR 1 AND LUMBAR 4;  Surgeon: Triston Marinelli IV, MD;  Location: [facility] ;  Service:    • MASTECTOMY     • SHOULDER SURGERY Right      History reviewed. No pertinent family history.  Social History     Socioeconomic History   • Marital status:    Tobacco Use   • Smoking status: Never Smoker   • Smokeless tobacco: Never Used   Substance and Sexual Activity   • Alcohol use: No   • Drug use: No     Medications Prior to Admission   Medication Sig Dispense Refill Last Dose   • acetaminophen (TYLENOL) 325 MG tablet Take 650 mg by mouth Every 6 (Six) Hours As Needed for Mild Pain .      • aspirin 81 MG EC tablet Take 81 mg by mouth Daily.      • furosemide (LASIX) 20 MG tablet Take 20 mg by mouth Daily.      • Insulin Glargine (Lantus SoloStar) 100 UNIT/ML injection pen Inject 12 Units under the skin into the appropriate area as directed Daily.      • levothyroxine (SYNTHROID, LEVOTHROID) 88 MCG tablet Take 88 mcg by mouth Daily.      • linaclotide (LINZESS) 145 MCG capsule capsule Take 145 mcg by mouth Daily.      • memantine (NAMENDA) 10 MG tablet Take 10 mg by mouth Daily.      • potassium chloride (K-DUR,KLOR-CON) 20 MEQ CR tablet Take 40 mEq by mouth 2 (Two) Times a Day.      • simvastatin (ZOCOR) 10 MG tablet Take 10 mg by mouth Every Night.        Allergies    Allergen Reactions   • Penicillins        Objective      Vital Signs  Temp:  [98.5 °F (36.9 °C)-98.7 °F (37.1 °C)] 98.7 °F (37.1 °C)  Heart Rate:  [72-87] 79  Resp:  [14-16] 16  BP: (148-170)/(82-93) 148/82    Intake & Output (last day)     None          Physical Exam  Constitutional:       Appearance: Normal appearance. She is not ill-appearing.   HENT:      Head: Normocephalic and atraumatic.   Cardiovascular:      Rate and Rhythm: Normal rate.   Pulmonary:      Effort: Pulmonary effort is normal.   Musculoskeletal:      Cervical back: Normal range of motion.      Comments: Limited ROM secondary to weakness, especially right upper extremity   Skin:     Findings: Bruising present. No erythema (Right anterior chest).             Comments: Hard mass-like lesion with associated tenderness and ecchymosis     Neurological:      Mental Status: She is alert. Mental status is at baseline.   Psychiatric:         Mood and Affect: Mood normal.         Results Review:    I reviewed the patient's new clinical results.  I reviewed the patient's new imaging results and agree with the interpretation.  I reviewed the patient's other test results and agree with the interpretation  Discussed with patient, Dr. Ring and Dr. Blair    Imaging Results (Last 24 Hours)     Procedure Component Value Units Date/Time    CT Chest Without Contrast Diagnostic [[id number]] Collected: 08/02/22 1218     Updated: 08/02/22 1241    Narrative:      CT CHEST WITHOUT CONTRAST     HISTORY: Right-sided chest wall mass/swelling. Difficulty with right  upper extremity range of motion.     TECHNIQUE: Radiation dose reduction techniques were utilized, including  automated exposure control and exposure modulation based on body size.   3 mm images were obtained through the chest without the administration  of IV contrast. Lack of IV contrast limits evaluation of mediastinal,  hilar, and vascular structures. Sensitivity for underlying lesions and  infection  decreased.     COMPARISON: 02/14/2018     FINDINGS: Correlation is somewhat limited by artifact from overlying  soft tissues/right shoulder prosthesis and motion.     Thoracic inlet: Within normal noncontrast limits     Heart and great vessels: Heart size is enlarged.. Ectasia of the  ascending aorta approximating 3.5 cm. Extensive atherosclerosis  thoracoabdominal aorta and branch vessels with dense calcification about  the mitral annulus. Trace pericardial fluid and/or thickening. 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           Lymphatics: Mildly prominent mediastinal nodes with an index right  paratracheal node measures 9 mm. Hilar evaluation limited without  contrast. Recommend attention on follow-up.     Lung parenchyma and pleural space: Small bilateral pleural effusions  with bibasilar airspace disease. Patchy tree-in-bud nodular opacities  predominantly in the lung bases left slightly greater than right.  Coarsened interstitial markings. Respiratory motion artifact limits  evaluation of the parenchyma. Calcified granulomas. No pneumothorax.     Soft tissues: Large soft tissue hematoma of the right chest/breast  approximating 6 x 12.8 cm. Evaluation for active extravasation is  limited. There is extensive streak artifact limiting evaluation of the  right shoulder and upper chest status post right shoulder arthroplasty.  The radiographic appearance of the prosthesis is similar in appearance  to the prior exam. No definitive acute displaced fractures. Diffuse bone  demineralization with advanced degenerative changes of the spine. Status  post vertebroplasty/kyphoplasty for compression fractures of T12-L2. Age  indeterminate but chronic appearing compression fractures of T6, T9,  T10, and T11 with greater than 50% loss of vertebral body height at T6  and T11. No definitive retropulsion. MRI could be considered for further  evaluation if clinically indicated.                 Impression:      Limited noncontrast CT impression:     1.  Large right chest wall/breast hematoma approximating 6 x 12.8 cm.  2.  Cardiomegaly with small bilateral pleural effusions and bibasilar  airspace disease. Tree-in-bud nodular opacities particularly in the left  lung base may be the sequela of pneumonitis/aspiration. Recommend  attention on short-term follow-up to resolution after treatment.  3.  Status post right shoulder arthroplasty incompletely imaged.  4.  Multiple age-indeterminate but chronic appearing compression  fractures of the thoracolumbar spine. Status post  vertebroplasty/kyphoplasty of T12-L2.  5.  Mediastinal adenopathy likely reactive. Recommend attention on  subsequent surveillance imaging.  6.  Please see above for additional findings.     7.  I discussed these findings with CARMEN Amaro at 11:35 AM on  08/02/2022.     This report was finalized on 8/2/2022 12:38 PM by Dr. Hi Doan M.D.             Lab Results:  Lab Results (last 24 hours)     Procedure Component Value Units Date/Time    Blood Culture - Blood, Arm, Left [[id number]] Collected: 08/02/22 1414    Specimen: Blood from Arm, Left Updated: 08/02/22 1420    Blood Culture - Blood, Arm, Right [[id number]] Collected: 08/02/22 1414    Specimen: Blood from Arm, Right Updated: 08/02/22 1420    Protime-INR [[id number]]  (Abnormal) Collected: 08/02/22 1328    Specimen: Blood Updated: 08/02/22 1359     Protime 14.6 Seconds      INR 1.15    aPTT [[id number]]  (Normal) Collected: 08/02/22 1328    Specimen: Blood Updated: 08/02/22 1359     PTT 31.5 seconds     Comprehensive Metabolic Panel [[id number]]  (Abnormal) Collected: 08/02/22 1228    Specimen: Blood Updated: 08/02/22 1306     Glucose 282 mg/dL      BUN 22 mg/dL      Creatinine 1.28 mg/dL      Sodium 134 mmol/L      Potassium 4.9 mmol/L      Comment: Slight hemolysis detected by analyzer. Results may be affected.        Chloride 102 mmol/L      CO2 20.2 mmol/L      Calcium 9.0  mg/dL      Total Protein 6.2 g/dL      Albumin 3.20 g/dL      ALT (SGPT) 12 U/L      AST (SGOT) 17 U/L      Alkaline Phosphatase 90 U/L      Total Bilirubin 0.4 mg/dL      Globulin 3.0 gm/dL      A/G Ratio 1.1 g/dL      BUN/Creatinine Ratio 17.2     Anion Gap 11.8 mmol/L      eGFR 40.6 mL/min/1.73      Comment: National Kidney Foundation and American Society of Nephrology (ASN) Task Force recommended calculation based on the Chronic Kidney Disease Epidemiology Collaboration (CKD-EPI) equation refit without adjustment for race.       Narrative:      GFR Normal >60  Chronic Kidney Disease <60  Kidney Failure <15      CBC & Differential [[id number]]  (Abnormal) Collected: 08/02/22 1228    Specimen: Blood Updated: 08/02/22 1245    Narrative:      The following orders were created for panel order CBC & Differential.  Procedure                               Abnormality         Status                     ---------                               -----------         ------                     CBC Auto Differential[[id number]]        Abnormal            Final result                 Please view results for these tests on the individual orders.    CBC Auto Differential [[id number]]  (Abnormal) Collected: 08/02/22 1228    Specimen: Blood Updated: 08/02/22 1245     WBC 17.03 10*3/mm3      RBC 4.17 10*6/mm3      Hemoglobin 12.6 g/dL      Hematocrit 38.9 %      MCV 93.3 fL      MCH 30.2 pg      MCHC 32.4 g/dL      RDW 13.3 %      RDW-SD 45.2 fl      MPV 9.8 fL      Platelets 395 10*3/mm3      Neutrophil % 88.1 %      Lymphocyte % 6.2 %      Monocyte % 4.9 %      Eosinophil % 0.1 %      Basophil % 0.2 %      Immature Grans % 0.5 %      Neutrophils, Absolute 15.02 10*3/mm3      Lymphocytes, Absolute 1.05 10*3/mm3      Monocytes, Absolute 0.83 10*3/mm3      Eosinophils, Absolute 0.01 10*3/mm3      Basophils, Absolute 0.04 10*3/mm3      Immature Grans, Absolute 0.08 10*3/mm3      nRBC 0.0 /100 WBC               Assessment & Plan        Chest wall hematoma, right, initial encounter      Assessment & Plan    I have independently reviewed the CT of the chest performed upon admission which demonstrates a large hematoma to the right chest wall measuring 6 x 12.8 cm.  There are small bilateral pleural effusions as well as left basilar tree-in-bud opacities.  Patient appears to have a chronic compression fractures throughout the thoracic spine with kyphoplasty at T12-L2.  Mediastinal lymphadenopathy is likely reactive.  No pneumothorax.    Right chest wall hematoma: Recommend conservative management as patient is not a surgical candidate given her history of dementia and poor clinical presentation.  Discussed with Dr. Ring and initially recommended IR evaluation for percutaneous drain placement.  However, Dr. Blair recommends hold off on drain as this could potentially increase likelihood for infection. Continue watchful waiting for now. Hold all anticoagulation and trend H&H every 12 h. Transfuse for hemoglobin < 8.  Analgesic regimen has been ordered for adequate pain control.  We will also ask for 3D reconstruction for rib fractures to be added on to the CT chest and repeat a chest x-ray in the morning.        I discussed the patients findings and our recommendations with patient, consulting provider and Dr. Blair    Thank you for this consult and allowing us to participate in the care of your patient.  We will follow along with you during this hospitalization.       Jennifer Pan DNP, APRN  [facility]  08/02/22  17:11 EDT      I spent >65 minutes reviewing the patient's chart including medical history, notes, radiographic imaging, labs and performing an assessment and development of a plan and discussion with the patient/family at bedside.

## 2022-08-02 NOTE — H&P
HISTORY AND PHYSICAL   [facility]        Patient Identification:  Name: Sam Flores  Age: 87 y.o.  Sex: female  :  1934  MRN: [id number]                     Primary Care Physician: Reg Rivera MD    Chief Complaint: Chest wall mass    History of Present Illness:   Pleasant 87-year-old female with advanced Alzheimer's dementia.  History is assisted by her daughter who is at bedside as well as ER documentations and discussion with ER staff.  She was brought into the emergency room from a nursing home due to an expanding mass in the right anterior chest area.  The patient herself has no complaints.  She denies pain and to try to move her arm.  She was actually seen in the emergency room on  due to history of fall.  At that time was noted that she had ecchymosis in the right upper arm area.  No mention of mass in the chest wall at that point.  X-ray of the right shoulder and humerus showed no acute changes.  Her daughter states that she suffered a fall maybe as long as 3 weeks ago.  Unsure of any falls since then.  Patient does a history have arthroplasty of the right shoulder and bilateral mastectomy.      Past Medical History:  Past Medical History:   Diagnosis Date   • Dementia (HCC)    • Diabetes mellitus (HCC)    • Disease of thyroid gland    • Emphysema of lung (HCC)    • Hyperlipidemia    • Hypertension    • Lumbar compression fracture (HCC) 2016   • Osteoporosis    • S/P kyphoplasty 2016    L1 and L4, 2016     Past Surgical History:  Past Surgical History:   Procedure Laterality Date   •  SECTION     • KYPHOPLASTY      T12 and L2   • KYPHOPLASTY N/A 2016    Procedure: KYPHOPLASTY LUMBAR 1 AND LUMBAR 4;  Surgeon: Triston Marinelli IV, MD;  Location: [facility] ;  Service:    • MASTECTOMY     • SHOULDER SURGERY Right       Home Meds:  Medications Prior to Admission   Medication Sig Dispense Refill Last Dose   • acetaminophen  (TYLENOL) 325 MG tablet Take 650 mg by mouth Every 6 (Six) Hours As Needed for Mild Pain .      • aspirin 81 MG EC tablet Take 81 mg by mouth Daily.      • furosemide (LASIX) 20 MG tablet Take 20 mg by mouth Daily.      • Insulin Glargine (Lantus SoloStar) 100 UNIT/ML injection pen Inject 12 Units under the skin into the appropriate area as directed Daily.      • levothyroxine (SYNTHROID, LEVOTHROID) 88 MCG tablet Take 88 mcg by mouth Daily.      • linaclotide (LINZESS) 145 MCG capsule capsule Take 145 mcg by mouth Daily.      • memantine (NAMENDA) 10 MG tablet Take 10 mg by mouth Daily.      • potassium chloride (K-DUR,KLOR-CON) 20 MEQ CR tablet Take 40 mEq by mouth 2 (Two) Times a Day.      • simvastatin (ZOCOR) 10 MG tablet Take 10 mg by mouth Every Night.          Allergies:  Allergies   Allergen Reactions   • Penicillins      Immunizations:  Immunization History   Administered Date(s) Administered   • COVID-19 (PFIZER) PURPLE CAP 2021, 2021     Social History:   Social History     Social History Narrative   • Not on file     Social History     Tobacco Use   • Smoking status: Never Smoker   • Smokeless tobacco: Never Used   Substance Use Topics   • Alcohol use: No     Family History:  History reviewed. No pertinent family history.     Review of Systems  Review of Systems   Unable to perform ROS: Dementia       Objective:  T Max 24 hrs: Temp (24hrs), Av.6 °F (37 °C), Min:98.5 °F (36.9 °C), Max:98.7 °F (37.1 °C)    Vitals Ranges:   Temp:  [98.5 °F (36.9 °C)-98.7 °F (37.1 °C)] 98.7 °F (37.1 °C)  Heart Rate:  [72-87] 79  Resp:  [14-16] 16  BP: (148-170)/(82-93) 148/82      Exam:  Physical Exam  Constitutional:       Appearance: Normal appearance. She is normal weight.   HENT:      Head: Normocephalic and atraumatic.      Right Ear: External ear normal.      Left Ear: External ear normal.      Nose: Nose normal.   Eyes:      General: No scleral icterus.        Right eye: No discharge.         Left  eye: No discharge.      Extraocular Movements: Extraocular movements intact.      Conjunctiva/sclera: Conjunctivae normal.   Cardiovascular:      Rate and Rhythm: Normal rate and regular rhythm.      Heart sounds: Normal heart sounds.      Comments: Pedal pulses 1+ bilaterally.  Upper extremity pulses just shy of 2+ bilaterally.  Pulmonary:      Effort: Pulmonary effort is normal.      Breath sounds: Normal breath sounds.   Abdominal:      General: Abdomen is flat. Bowel sounds are normal. There is no distension.      Palpations: Abdomen is soft. There is no mass.      Tenderness: There is no abdominal tenderness. There is no guarding or rebound.   Musculoskeletal:      Cervical back: Neck supple.      Right lower leg: Edema present.      Left lower leg: Edema present.   Skin:     General: Skin is warm and dry.      Comments: There is a palpable mass approximately 8 x 8 cm in the right upper chest area just above been incorporating her previous well-healed mastectomy scar.  It is consistent with a hematoma as noted on a CT scan.  It is not tense.  It is not warm to touch.  There is no associated erythema.  There is ecchymosis at the peripheral edges, mostly inferior and laterally.  Ecchymosis also noted medial aspect of the right upper extremity nearly to the shoulder area.  Of the hematoma itself is nontender the patient is showing significant favoring of the right shoulder.   Neurological:      Mental Status: She is alert.      Comments: She is alert.  Oriented to person and the fact that she is in a hospital but not which 1.  Pleasant and cooperative.  No appreciable lateralizing signs.   Psychiatric:         Mood and Affect: Mood normal.         Behavior: Behavior normal.         Thought Content: Thought content normal.         Judgment: Judgment normal.         Data Review:  All labs and radiology reviewed.    Assessment:  Chest wall hematoma: Traumatic by history.  Does not appear tight.  No evidence of  infection involved and no evidence of danger tissue damage.  Agree with conservative management.  Thoracic surgery input appreciated.  Hold aspirin.  Right shoulder pain: Status post fall.  History of arthroplasty.  X-ray from July 30 appears benign.  Still with pain on attempts of range of motion of right upper extremity.  Would like to get an orthopedic opinion.  Leukocytosis: Likely stress related.  No evidence of bacterial infection at present.  Will check urinalysis.  Monitor white count.  Chronic kidney disease stage III: Stable.  Diabetes type 2: Monitor with sliding scale insulin.  Dementia: Appears Alzheimer's in character.  Appears stable.  High risk for increased confusion during hospitalization.      Plan:  Please see above.  Discussed with patient and daughter at bedside.    Bayron Parkinson MD  8/2/2022  17:05 EDT    EMR Dragon/Transcription disclaimer:   Much of this encounter note is an electronic transcription/translation of spoken language to printed text. The electronic translation of spoken language may permit erroneous, or at times, nonsensical words or phrases to be inadvertently transcribed; Although I have reviewed the note for such errors, some may still exist.

## 2022-08-02 NOTE — PROGRESS NOTES
Clinical Pharmacy Services: Medication History    Sam Flores is a 87 y.o. female presenting to [facility] for   Chief Complaint   Patient presents with   • Mass       She  has a past medical history of Dementia, Diabetes mellitus (CMS/Formerly [facility]), Disease of thyroid gland, Emphysema of lung (CMS/Formerly [facility]), Hyperlipidemia, Hypertension, Lumbar compression fracture (CMS/Formerly [facility]) (11/30/2016), Osteoporosis, and S/P kyphoplasty (12/5/2016).    Allergies as of 08/02/2022 - Reviewed 08/02/2022   Allergen Reaction Noted   • Penicillins  11/30/2016       Medication information was obtained from: jail Paperwork  Pharmacy and Phone Number:     Prior to Admission Medications     Prescriptions Last Dose Informant Patient Reported? Taking?    acetaminophen (TYLENOL) 325 MG tablet  Nursing Home Yes Yes    Take 650 mg by mouth Every 6 (Six) Hours As Needed for Mild Pain .    aspirin 81 MG EC tablet  Nursing Home Yes Yes    Take 81 mg by mouth Daily.    furosemide (LASIX) 20 MG tablet  Nursing Home Yes Yes    Take 20 mg by mouth Daily.    Insulin Glargine (Lantus SoloStar) 100 UNIT/ML injection pen  Nursing Home Yes Yes    Inject 12 Units under the skin into the appropriate area as directed Daily.    levothyroxine (SYNTHROID, LEVOTHROID) 88 MCG tablet  Nursing Home Yes Yes    Take 88 mcg by mouth Daily.    linaclotide (LINZESS) 145 MCG capsule capsule  Nursing Home Yes Yes    Take 145 mcg by mouth Daily.    memantine (NAMENDA) 10 MG tablet  Nursing Home Yes Yes    Take 10 mg by mouth Daily.    potassium chloride (K-DUR,KLOR-CON) 20 MEQ CR tablet  Nursing Home Yes Yes    Take 40 mEq by mouth 2 (Two) Times a Day.    simvastatin (ZOCOR) 10 MG tablet  Nursing Home Yes Yes    Take 10 mg by mouth Every Night.            Medication notes:     This medication list is complete to the best of my knowledge as of 8/2/2022    Please call if questions.    Mireille Vo  Medication History Technician   [phone]    8/2/2022 12:28 EDT

## 2022-08-03 ENCOUNTER — APPOINTMENT (OUTPATIENT)
Dept: GENERAL RADIOLOGY | Facility: HOSPITAL | Age: 87
End: 2022-08-03

## 2022-08-03 PROBLEM — F03.90 DEMENTIA (HCC): Status: ACTIVE | Noted: 2022-08-03

## 2022-08-03 LAB
ANION GAP SERPL CALCULATED.3IONS-SCNC: 11 MMOL/L (ref 5–15)
BACTERIA BLD CULT: ABNORMAL
BASOPHILS # BLD AUTO: 0.05 10*3/MM3 (ref 0–0.2)
BASOPHILS NFR BLD AUTO: 0.3 % (ref 0–1.5)
BOTTLE TYPE: ABNORMAL
BUN SERPL-MCNC: 20 MG/DL (ref 8–23)
BUN/CREAT SERPL: 16.8 (ref 7–25)
CALCIUM SPEC-SCNC: 8.9 MG/DL (ref 8.6–10.5)
CHLORIDE SERPL-SCNC: 105 MMOL/L (ref 98–107)
CO2 SERPL-SCNC: 23 MMOL/L (ref 22–29)
CREAT SERPL-MCNC: 1.19 MG/DL (ref 0.57–1)
DEPRECATED RDW RBC AUTO: 46.5 FL (ref 37–54)
EGFRCR SERPLBLD CKD-EPI 2021: 44.3 ML/MIN/1.73
EOSINOPHIL # BLD AUTO: 0.09 10*3/MM3 (ref 0–0.4)
EOSINOPHIL NFR BLD AUTO: 0.6 % (ref 0.3–6.2)
ERYTHROCYTE [DISTWIDTH] IN BLOOD BY AUTOMATED COUNT: 13.4 % (ref 12.3–15.4)
GLUCOSE BLDC GLUCOMTR-MCNC: 114 MG/DL (ref 70–130)
GLUCOSE BLDC GLUCOMTR-MCNC: 137 MG/DL (ref 70–130)
GLUCOSE BLDC GLUCOMTR-MCNC: 167 MG/DL (ref 70–130)
GLUCOSE BLDC GLUCOMTR-MCNC: 86 MG/DL (ref 70–130)
GLUCOSE SERPL-MCNC: 153 MG/DL (ref 65–99)
HBA1C MFR BLD: 8.4 % (ref 4.8–5.6)
HCT VFR BLD AUTO: 30.5 % (ref 34–46.6)
HCT VFR BLD AUTO: 34.5 % (ref 34–46.6)
HCT VFR BLD AUTO: 34.5 % (ref 34–46.6)
HGB BLD-MCNC: 10.3 G/DL (ref 12–15.9)
HGB BLD-MCNC: 11.2 G/DL (ref 12–15.9)
HGB BLD-MCNC: 11.2 G/DL (ref 12–15.9)
IMM GRANULOCYTES # BLD AUTO: 0.06 10*3/MM3 (ref 0–0.05)
IMM GRANULOCYTES NFR BLD AUTO: 0.4 % (ref 0–0.5)
LYMPHOCYTES # BLD AUTO: 3.25 10*3/MM3 (ref 0.7–3.1)
LYMPHOCYTES NFR BLD AUTO: 20 % (ref 19.6–45.3)
MCH RBC QN AUTO: 30.8 PG (ref 26.6–33)
MCHC RBC AUTO-ENTMCNC: 32.5 G/DL (ref 31.5–35.7)
MCV RBC AUTO: 94.8 FL (ref 79–97)
MONOCYTES # BLD AUTO: 1.66 10*3/MM3 (ref 0.1–0.9)
MONOCYTES NFR BLD AUTO: 10.2 % (ref 5–12)
NEUTROPHILS NFR BLD AUTO: 11.13 10*3/MM3 (ref 1.7–7)
NEUTROPHILS NFR BLD AUTO: 68.5 % (ref 42.7–76)
NRBC BLD AUTO-RTO: 0 /100 WBC (ref 0–0.2)
PLATELET # BLD AUTO: 373 10*3/MM3 (ref 140–450)
PMV BLD AUTO: 9.9 FL (ref 6–12)
POTASSIUM SERPL-SCNC: 4.4 MMOL/L (ref 3.5–5.2)
PROCALCITONIN SERPL-MCNC: 0.15 NG/ML (ref 0–0.25)
RBC # BLD AUTO: 3.64 10*6/MM3 (ref 3.77–5.28)
SODIUM SERPL-SCNC: 139 MMOL/L (ref 136–145)
WBC NRBC COR # BLD: 16.24 10*3/MM3 (ref 3.4–10.8)

## 2022-08-03 PROCEDURE — 85025 COMPLETE CBC W/AUTO DIFF WBC: CPT | Performed by: HOSPITALIST

## 2022-08-03 PROCEDURE — G0378 HOSPITAL OBSERVATION PER HR: HCPCS

## 2022-08-03 PROCEDURE — 99232 SBSQ HOSP IP/OBS MODERATE 35: CPT | Performed by: NURSE PRACTITIONER

## 2022-08-03 PROCEDURE — 85014 HEMATOCRIT: CPT | Performed by: NURSE PRACTITIONER

## 2022-08-03 PROCEDURE — 85018 HEMOGLOBIN: CPT | Performed by: NURSE PRACTITIONER

## 2022-08-03 PROCEDURE — 84145 PROCALCITONIN (PCT): CPT | Performed by: HOSPITALIST

## 2022-08-03 PROCEDURE — 97530 THERAPEUTIC ACTIVITIES: CPT

## 2022-08-03 PROCEDURE — 82962 GLUCOSE BLOOD TEST: CPT

## 2022-08-03 PROCEDURE — 80048 BASIC METABOLIC PNL TOTAL CA: CPT | Performed by: HOSPITALIST

## 2022-08-03 PROCEDURE — 97165 OT EVAL LOW COMPLEX 30 MIN: CPT

## 2022-08-03 PROCEDURE — 97162 PT EVAL MOD COMPLEX 30 MIN: CPT

## 2022-08-03 PROCEDURE — 71045 X-RAY EXAM CHEST 1 VIEW: CPT

## 2022-08-03 PROCEDURE — 83036 HEMOGLOBIN GLYCOSYLATED A1C: CPT | Performed by: HOSPITALIST

## 2022-08-03 RX ADMIN — INSULIN GLARGINE-YFGN 8 UNITS: 100 INJECTION, SOLUTION SUBCUTANEOUS at 09:51

## 2022-08-03 RX ADMIN — MEMANTINE HYDROCHLORIDE 5 MG: 5 TABLET, FILM COATED ORAL at 09:50

## 2022-08-03 RX ADMIN — Medication 10 ML: at 20:51

## 2022-08-03 RX ADMIN — DOCUSATE SODIUM 50MG AND SENNOSIDES 8.6MG 2 TABLET: 8.6; 5 TABLET, FILM COATED ORAL at 09:50

## 2022-08-03 RX ADMIN — ATORVASTATIN CALCIUM 10 MG: 20 TABLET, FILM COATED ORAL at 09:50

## 2022-08-03 RX ADMIN — DOCUSATE SODIUM 50MG AND SENNOSIDES 8.6MG 2 TABLET: 8.6; 5 TABLET, FILM COATED ORAL at 20:35

## 2022-08-03 RX ADMIN — LEVOTHYROXINE SODIUM 88 MCG: 0.09 TABLET ORAL at 09:50

## 2022-08-03 RX ADMIN — ACETAMINOPHEN 650 MG: 325 TABLET, FILM COATED ORAL at 23:08

## 2022-08-03 NOTE — DISCHARGE PLACEMENT REQUEST
"Lisa Alicea (87 y.o. Female)             Date of Birth   1934    Social Security Number       Address   [place] [address]    Home Phone   [phone]    MRN   [id number]       Hinduism   None    Marital Status                               Admission Date   8/2/22    Admission Type   Emergency    Admitting Provider   Bayron Parkinson MD    Attending Provider   Kp Sky MD    Department, Room/Bed   [address], [address]/1       Discharge Date       Discharge Disposition       Discharge Destination                               Attending Provider: Kp Sky MD    Allergies: Penicillins    Isolation: None   Infection: None   Code Status: CPR   Advance Care Planning Activity    Ht: 149.9 cm (59\")   Wt: 45 kg (99 lb 3.3 oz)    Admission Cmt: None   Principal Problem: None                Active Insurance as of 8/2/2022     Primary Coverage     Payor Plan Insurance Group Employer/Plan Group    MEDICARE MEDICARE A & B      Payor Plan Address Payor Plan Phone Number Payor Plan Fax Number Effective Dates    [address] [phone]  10/1/1999 - None Entered    [facility] [id number]       Subscriber Name Subscriber Birth Date Member ID       LISA ALICEA 1934 [id number]           Secondary Coverage     Payor Plan Insurance Group Employer/Plan Group    ANTHEM BLUE CROSS ANTHEM BLUE CROSS BLUE SHIELD PPO [id number]     Payor Plan Address Payor Plan Phone Number Payor Plan Fax Number Effective Dates    [address] [phone]  10/1/2015 - None Entered    [facility] [id number]       Subscriber Name Subscriber Birth Date Member ID       LISA ALICEA 1934 [id number]           Tertiary Coverage     Payor Plan Insurance Group Employer/Plan Group    [place]      Payor Plan Address Payor Plan Phone Number Payor Plan Fax Number Effective Dates    [address] [phone]  8/2/2022 - None Entered "    [facility] [address]       Subscriber Name Subscriber Birth Date Member ID       LISA ALICEA 1934 [id number]                 Emergency Contacts      (Rel.) Home Phone Work Phone Mobile Phone    MarkTrevon thacker (Son) [phone] -- [phone]    MarkCesar dcik (Son) (Power of ) [phone] -- --    Mariela Tucker (Daughter) (Power of ) [phone] -- [phone]

## 2022-08-03 NOTE — PLAN OF CARE
Goal Outcome Evaluation:  Plan of Care Reviewed With: patient           Outcome Evaluation: pt rested well today, c/o pain if you touch her hematoma site but good as long as you leave it alone. xray for in am, hematoma looks smaller today. will cont to monitor  Problem: Adult Inpatient Plan of Care  Goal: Plan of Care Review  Outcome: Ongoing, Progressing  Flowsheets (Taken 8/3/2022 1259)  Plan of Care Reviewed With: patient  Outcome Evaluation: pt rested well today, c/o pain if you touch her hematoma site but good as long as you leave it alone. xray for in am, hematoma looks smaller today. will cont to monitor  Goal: Patient-Specific Goal (Individualized)  Outcome: Ongoing, Progressing  Goal: Absence of Hospital-Acquired Illness or Injury  Outcome: Ongoing, Progressing  Intervention: Identify and Manage Fall Risk  Description: Perform standard risk assessment on admission using a validated tool or comprehensive approach appropriate to the patient; reassess fall risk frequently, with change in status or transfer to another level of care.  Communicate fall injury risk to interprofessional healthcare team.  Determine need for increased observation, equipment and environmental modification, such as low bed, signage and supportive, nonskid footwear.  Adjust safety measures to individual developmental age, stage and identified risk factors.  Reinforce the importance of safety and physical activity with patient and family.  Perform regular intentional rounding to assess need for position change, pain assessment and personal needs, including assistance with toileting.  Recent Flowsheet Documentation  Taken 8/3/2022 1200 by Nay Abdullahi, RN  Safety Promotion/Fall Prevention:   activity supervised   assistive device/personal items within reach   clutter free environment maintained   fall prevention program maintained   nonskid shoes/slippers when out of bed   room organization consistent   safety round/check  completed  Taken 8/3/2022 1005 by Nay Abdullahi, RN  Safety Promotion/Fall Prevention:   activity supervised   assistive device/personal items within reach   clutter free environment maintained   fall prevention program maintained   nonskid shoes/slippers when out of bed   room organization consistent   safety round/check completed  Taken 8/3/2022 0800 by Nay Abdullahi, RN  Safety Promotion/Fall Prevention:   activity supervised   assistive device/personal items within reach   clutter free environment maintained   fall prevention program maintained   nonskid shoes/slippers when out of bed   room organization consistent   safety round/check completed  Intervention: Prevent Skin Injury  Description: Perform a screening for skin injury risk, such as pressure or moisture associated skin damage on admission and at regular intervals throughout hospital stay.  Keep all areas of skin (especially folds) clean and dry.  Maintain adequate skin hydration.  Relieve and redistribute pressure and protect bony prominences; implement measures based on patient-specific risk factors.  Match turning and repositioning schedule to clinical condition.  Encourage weight shift frequently; assist with reposition if unable to complete independently.  Float heels off bed; avoid pressure on the Achilles tendon.  Keep skin free from extended contact with medical devices.  Encourage functional activity and mobility, as early as tolerated.  Use aids (e.g., slide boards, mechanical lift) during transfer.  Recent Flowsheet Documentation  Taken 8/3/2022 0800 by Nay Abdullahi, RN  Skin Protection: adhesive use limited  Intervention: Prevent and Manage VTE (Venous Thromboembolism) Risk  Description: Assess for VTE (venous thromboembolism) risk.  Encourage and assist with early ambulation.  Initiate and maintain compression or other therapy, as indicated, based on identified risk in accordance with organizational protocol and provider  order.  Encourage both active and passive leg exercises while in bed, if unable to ambulate.  Recent Flowsheet Documentation  Taken 8/3/2022 1200 by Nay Abdullahi RN  Activity Management:   activity adjusted per tolerance   activity encouraged  Taken 8/3/2022 1005 by Nay Abdullahi RN  Activity Management:   activity adjusted per tolerance   activity encouraged  Taken 8/3/2022 0800 by Nay Abdullahi RN  Activity Management:   activity adjusted per tolerance   activity encouraged  VTE Prevention/Management:   bilateral   dorsiflexion/plantar flexion performed  Intervention: Prevent Infection  Description: Maintain skin and mucous membrane integrity; promote hand, oral and pulmonary hygiene.  Optimize fluid balance, nutrition, sleep and glycemic control to maximize infection resistance.  Identify potential sources of infection early to prevent or mitigate progression of infection (e.g., wound, lines, devices).  Evaluate ongoing need for invasive devices; remove promptly when no longer indicated.  Recent Flowsheet Documentation  Taken 8/3/2022 1200 by Nay Abdullahi RN  Infection Prevention: single patient room provided  Taken 8/3/2022 1005 by Nay Abdullahi RN  Infection Prevention: single patient room provided  Taken 8/3/2022 0800 by Nay Abdullahi RN  Infection Prevention: single patient room provided  Goal: Optimal Comfort and Wellbeing  Outcome: Ongoing, Progressing  Intervention: Provide Person-Centered Care  Description: Use a family-focused approach to care.  Develop trust and rapport by proactively providing information, encouraging questions, addressing concerns and offering reassurance.  Acknowledge emotional response to hospitalization.  Recognize and utilize personal coping strategies.  Honor spiritual and cultural preferences.  Recent Flowsheet Documentation  Taken 8/3/2022 0800 by Nay Abdullahi RN  Trust Relationship/Rapport:   care explained   thoughts/feelings acknowledged  Goal:  Readiness for Transition of Care  Outcome: Ongoing, Progressing

## 2022-08-03 NOTE — CASE MANAGEMENT/SOCIAL WORK
Discharge Planning Assessment  [facility]     Patient Name: Sam Flores  MRN: [id number]  Today's Date: 8/3/2022    Admit Date: 8/2/2022     Discharge Needs Assessment     Row Name 08/03/22 1114       Living Environment    People in Home facility resident    Current Living Arrangements extended care facility    Provides Primary Care For no one    Quality of Family Relationships supportive       Resource/Environmental Concerns    Resource/Environmental Concerns none       Transition Planning    Patient/Family Anticipates Transition to long-term care facility    Patient/Family Anticipated Services at Transition        Discharge Needs Assessment    Equipment Currently Used at Home wheelchair    Equipment Needed After Discharge none               Discharge Plan     Row Name 08/03/22 1115       Plan    Plan Return to [facility]    Patient/Family in Agreement with Plan yes    Plan Comments Pt is from [facility].  Spoke with [place] who state pt is long term care and has private pay bedhold.  Call placed to pts FRIDA/Cesar, await return call to confirm plan.  AGUILAR Vu RN              Continued Care and Services - Admitted Since 8/2/2022     Destination     Service Provider Request Status Selected Services Address Phone Fax Patient Preferred    [facility]  Accepted N/A [address] [facility] [id number] [phone] --                 Demographic Summary     Row Name 08/03/22 1112       General Information    Admission Type observation    Arrived From home    Referral Source admission list    Reason for Consult discharge planning    Preferred Language English       Contact Information    Permission Granted to Share Info With family/designee  Trevon (son) and Cesar (son)               Functional Status    No documentation.                Psychosocial    No documentation.                Abuse/Neglect    No documentation.                Legal    No  documentation.                Substance Abuse    No documentation.                Patient Forms    No documentation.                   Bhavani Vu RN

## 2022-08-03 NOTE — PROGRESS NOTES
"DAILY PROGRESS NOTE  [facility]    Patient Identification:  Name: Sam Flores  Age: 87 y.o.  Sex: female  :  1934  MRN: [id number]         Primary Care Physician: Reg Rivera MD    Subjective:  Interval History:She complains of chest wall pain.    Objective:    Scheduled Meds:atorvastatin, 10 mg, Oral, Daily  insulin glargine, 8 Units, Subcutaneous, Daily  insulin lispro, 0-7 Units, Subcutaneous, TID AC  levothyroxine, 88 mcg, Oral, Daily  memantine, 5 mg, Oral, Daily  senna-docusate sodium, 2 tablet, Oral, BID  sodium chloride, 10 mL, Intravenous, Q12H      Continuous Infusions:     Vital signs in last 24 hours:  Temp:  [97.7 °F (36.5 °C)-98.7 °F (37.1 °C)] 97.7 °F (36.5 °C)  Heart Rate:  [72-85] 83  Resp:  [16] 16  BP: (122-148)/(63-82) 122/81    Intake/Output:    Intake/Output Summary (Last 24 hours) at 8/3/2022 1335  Last data filed at 8/3/2022 1200  Gross per 24 hour   Intake 120 ml   Output --   Net 120 ml       Exam:  /81 (BP Location: Right arm, Patient Position: Lying)   Pulse 83   Temp 97.7 °F (36.5 °C) (Oral)   Resp 16   Ht 149.9 cm (59\")   Wt 45 kg (99 lb 3.3 oz)   SpO2 92%   BMI 20.04 kg/m²     General Appearance:    Alert, cooperative, no distress   Head:    Normocephalic, without obvious abnormality, atraumatic   Eyes:       Throat:   Lips, tongue, gums normal   Neck:   Supple, symmetrical, trachea midline, no JVD   Lungs:     Clear to auscultation bilaterally, respirations unlabored   Chest Wall:    Right chest wall hematoma    Heart:    Regular rate and rhythm, S1 and S2 normal, no murmur,no  Rub or gallop   Abdomen:     Soft, nontender, bowel sounds active, no masses, no organomegaly    Extremities:   Extremities normal, atraumatic, no cyanosis or edema   Pulses:      Skin:   Skin is warm and dry,  no rashes or palpable lesions   Neurologic:   demented      Lab Results (last 72 hours)     Procedure Component Value Units Date/Time    Blood Culture " "ID, PCR - Blood, Arm, Left [[id number]]  (Abnormal) Collected: 08/02/22 1414    Specimen: Blood from Arm, Left Updated: 08/03/22 1206     BCID, PCR Staph spp, not aureus or lugdunesis. Identification by BCID2 PCR.     BOTTLE TYPE Aerobic Bottle    POC Glucose Once [[id number]]  (Normal) Collected: 08/03/22 1100    Specimen: Blood Updated: 08/03/22 1102     Glucose 86 mg/dL      Comment: Meter: [id number] : [id number] Geovanni HOLLINGSWORTH       Blood Culture - Blood, Arm, Left [[id number]]  (Abnormal) Collected: 08/02/22 1414    Specimen: Blood from Arm, Left Updated: 08/03/22 1050     Blood Culture Abnormal Stain     Gram Stain Aerobic Bottle Gram positive cocci    POC Glucose Once [[id number]]  (Abnormal) Collected: 08/03/22 0546    Specimen: Blood Updated: 08/03/22 0547     Glucose 137 mg/dL      Comment: Meter: [id number] : [id number] Fabiano Brittney DARRICK       Procalcitonin [[id number]]  (Normal) Collected: 08/03/22 0410    Specimen: Blood Updated: 08/03/22 0456     Procalcitonin 0.15 ng/mL     Narrative:      As a Marker for Sepsis (Non-Neonates):    1. <0.5 ng/mL represents a low risk of severe sepsis and/or septic shock.  2. >2 ng/mL represents a high risk of severe sepsis and/or septic shock.    As a Marker for Lower Respiratory Tract Infections that require antibiotic therapy:    PCT on Admission    Antibiotic Therapy       6-12 Hrs later    >0.5                Strongly Recommended  >0.25 - <0.5        Recommended   0.1 - 0.25          Discouraged              Remeasure/reassess PCT  <0.1                Strongly Discouraged     Remeasure/reassess PCT    As 28 day mortality risk marker: \"Change in Procalcitonin Result\" (>80% or <=80%) if Day 0 (or Day 1) and Day 4 values are available. Refer to http://www.[employer]-pct-calculator.com    Change in PCT <=80%  A decrease of PCT levels below or equal to 80% defines a positive change in PCT test result representing a higher risk for 28-day all-cause mortality of " patients diagnosed with severe sepsis for septic shock.    Change in PCT >80%  A decrease of PCT levels of more than 80% defines a negative change in PCT result representing a lower risk for 28-day all-cause mortality of patients diagnosed with severe sepsis or septic shock.       Basic Metabolic Panel [[id number]]  (Abnormal) Collected: 08/03/22 0410    Specimen: Blood Updated: 08/03/22 0449     Glucose 153 mg/dL      BUN 20 mg/dL      Creatinine 1.19 mg/dL      Sodium 139 mmol/L      Potassium 4.4 mmol/L      Chloride 105 mmol/L      CO2 23.0 mmol/L      Calcium 8.9 mg/dL      BUN/Creatinine Ratio 16.8     Anion Gap 11.0 mmol/L      eGFR 44.3 mL/min/1.73      Comment: National Kidney Foundation and American Society of Nephrology (ASN) Task Force recommended calculation based on the Chronic Kidney Disease Epidemiology Collaboration (CKD-EPI) equation refit without adjustment for race.       Narrative:      GFR Normal >60  Chronic Kidney Disease <60  Kidney Failure <15      Hemoglobin A1c [[id number]]  (Abnormal) Collected: 08/03/22 0410    Specimen: Blood Updated: 08/03/22 0441     Hemoglobin A1C 8.40 %     Narrative:      Hemoglobin A1C Ranges:    Increased Risk for Diabetes  5.7% to 6.4%  Diabetes                     >= 6.5%  Diabetic Goal                < 7.0%    Hemoglobin & Hematocrit, Blood [[id number]]  (Abnormal) Collected: 08/03/22 0410    Specimen: Blood Updated: 08/03/22 0430     Hemoglobin 11.2 g/dL      Hematocrit 34.5 %     CBC & Differential [[id number]]  (Abnormal) Collected: 08/03/22 0410    Specimen: Blood Updated: 08/03/22 0430    Narrative:      The following orders were created for panel order CBC & Differential.  Procedure                               Abnormality         Status                     ---------                               -----------         ------                     CBC Auto Differential[[id number]]        Abnormal            Final result                 Please view results for  these tests on the individual orders.    CBC Auto Differential [[id number]]  (Abnormal) Collected: 08/03/22 0410    Specimen: Blood Updated: 08/03/22 0430     WBC 16.24 10*3/mm3      RBC 3.64 10*6/mm3      Hemoglobin 11.2 g/dL      Hematocrit 34.5 %      MCV 94.8 fL      MCH 30.8 pg      MCHC 32.5 g/dL      RDW 13.4 %      RDW-SD 46.5 fl      MPV 9.9 fL      Platelets 373 10*3/mm3      Neutrophil % 68.5 %      Lymphocyte % 20.0 %      Monocyte % 10.2 %      Eosinophil % 0.6 %      Basophil % 0.3 %      Immature Grans % 0.4 %      Neutrophils, Absolute 11.13 10*3/mm3      Lymphocytes, Absolute 3.25 10*3/mm3      Monocytes, Absolute 1.66 10*3/mm3      Eosinophils, Absolute 0.09 10*3/mm3      Basophils, Absolute 0.05 10*3/mm3      Immature Grans, Absolute 0.06 10*3/mm3      nRBC 0.0 /100 WBC     COVID PRE-OP / PRE-PROCEDURE SCREENING ORDER (NO ISOLATION) - Swab, Nasopharynx [[id number]]  (Normal) Collected: 08/02/22 1832    Specimen: Swab from Nasopharynx Updated: 08/02/22 2026    Narrative:      The following orders were created for panel order COVID PRE-OP / PRE-PROCEDURE SCREENING ORDER (NO ISOLATION) - Swab, Nasopharynx.  Procedure                               Abnormality         Status                     ---------                               -----------         ------                     COVID-19,[facility] IN-HOUSE...[[id number]]  Normal              Final result                 Please view results for these tests on the individual orders.    COVID-19,[facility] IN-HOUSE CEPHEID/CRUZITO NP SWAB IN TRANSPORT MEDIA 8-12 HR TAT - Swab, Nasopharynx [[id number]]  (Normal) Collected: 08/02/22 1832    Specimen: Swab from Nasopharynx Updated: 08/02/22 2026     COVID19 Not Detected    Narrative:      Fact sheet for providers: https://www.fda.gov/media/690828/download     Fact sheet for patients: https://www.fda.gov/media/846289/download    POC Glucose Once [[id number]]  (Abnormal) Collected: 08/02/22 2011    Specimen: Blood Updated:  08/02/22 2013     Glucose 188 mg/dL      Comment: Meter: [id number] : [id number] Fabiano HOLLINGSWORTH       Blood Culture - Blood, Arm, Right [[id number]] Collected: 08/02/22 1414    Specimen: Blood from Arm, Right Updated: 08/02/22 1420    Protime-INR [[id number]]  (Abnormal) Collected: 08/02/22 1328    Specimen: Blood Updated: 08/02/22 1359     Protime 14.6 Seconds      INR 1.15    aPTT [[id number]]  (Normal) Collected: 08/02/22 1328    Specimen: Blood Updated: 08/02/22 1359     PTT 31.5 seconds     Comprehensive Metabolic Panel [[id number]]  (Abnormal) Collected: 08/02/22 1228    Specimen: Blood Updated: 08/02/22 1306     Glucose 282 mg/dL      BUN 22 mg/dL      Creatinine 1.28 mg/dL      Sodium 134 mmol/L      Potassium 4.9 mmol/L      Comment: Slight hemolysis detected by analyzer. Results may be affected.        Chloride 102 mmol/L      CO2 20.2 mmol/L      Calcium 9.0 mg/dL      Total Protein 6.2 g/dL      Albumin 3.20 g/dL      ALT (SGPT) 12 U/L      AST (SGOT) 17 U/L      Alkaline Phosphatase 90 U/L      Total Bilirubin 0.4 mg/dL      Globulin 3.0 gm/dL      A/G Ratio 1.1 g/dL      BUN/Creatinine Ratio 17.2     Anion Gap 11.8 mmol/L      eGFR 40.6 mL/min/1.73      Comment: National Kidney Foundation and American Society of Nephrology (ASN) Task Force recommended calculation based on the Chronic Kidney Disease Epidemiology Collaboration (CKD-EPI) equation refit without adjustment for race.       Narrative:      GFR Normal >60  Chronic Kidney Disease <60  Kidney Failure <15      CBC & Differential [[id number]]  (Abnormal) Collected: 08/02/22 1228    Specimen: Blood Updated: 08/02/22 1245    Narrative:      The following orders were created for panel order CBC & Differential.  Procedure                               Abnormality         Status                     ---------                               -----------         ------                     CBC Auto Differential[[id number]]        Abnormal             Final result                 Please view results for these tests on the individual orders.    CBC Auto Differential [[id number]]  (Abnormal) Collected: 08/02/22 1228    Specimen: Blood Updated: 08/02/22 1245     WBC 17.03 10*3/mm3      RBC 4.17 10*6/mm3      Hemoglobin 12.6 g/dL      Hematocrit 38.9 %      MCV 93.3 fL      MCH 30.2 pg      MCHC 32.4 g/dL      RDW 13.3 %      RDW-SD 45.2 fl      MPV 9.8 fL      Platelets 395 10*3/mm3      Neutrophil % 88.1 %      Lymphocyte % 6.2 %      Monocyte % 4.9 %      Eosinophil % 0.1 %      Basophil % 0.2 %      Immature Grans % 0.5 %      Neutrophils, Absolute 15.02 10*3/mm3      Lymphocytes, Absolute 1.05 10*3/mm3      Monocytes, Absolute 0.83 10*3/mm3      Eosinophils, Absolute 0.01 10*3/mm3      Basophils, Absolute 0.04 10*3/mm3      Immature Grans, Absolute 0.08 10*3/mm3      nRBC 0.0 /100 WBC         Data Review:  Results from last 7 days   Lab Units 08/03/22  0410 08/02/22  1228   SODIUM mmol/L 139 134*   POTASSIUM mmol/L 4.4 4.9   CHLORIDE mmol/L 105 102   CO2 mmol/L 23.0 20.2*   BUN mg/dL 20 22   CREATININE mg/dL 1.19* 1.28*   GLUCOSE mg/dL 153* 282*   CALCIUM mg/dL 8.9 9.0     Results from last 7 days   Lab Units 08/03/22  0410 08/02/22  1228   WBC 10*3/mm3 16.24* 17.03*   HEMOGLOBIN g/dL 11.2*  11.2* 12.6   HEMATOCRIT % 34.5  34.5 38.9   PLATELETS 10*3/mm3 373 395         Results from last 7 days   Lab Units 08/03/22  0410   HEMOGLOBIN A1C % 8.40*     Lab Results   Lab Value Date/Time    TROPONINT 0.020 03/12/2018 1304    TROPONINT <0.010 11/30/2016 1946         Results from last 7 days   Lab Units 08/02/22  1228   ALK PHOS U/L 90   BILIRUBIN mg/dL 0.4   ALT (SGPT) U/L 12   AST (SGOT) U/L 17         Results from last 7 days   Lab Units 08/03/22  0410   HEMOGLOBIN A1C % 8.40*     Glucose   Date/Time Value Ref Range Status   08/03/2022 1100 86 70 - 130 mg/dL Final     Comment:     Meter: [id number] : [id number] Geovanni HOLLINGSWORTH   08/03/2022 0546 137 (H) 70 -  130 mg/dL Final     Comment:     Meter: [id number] : [id number] Fabiano Lugo NA   08/02/2022 2011 188 (H) 70 - 130 mg/dL Final     Comment:     Meter: [id number] : [id number] Fabiano HOLLINGSWORTH     Results from last 7 days   Lab Units 08/02/22  1328   INR  1.15*       Past Medical History:   Diagnosis Date   • Dementia (HCC)    • Diabetes mellitus (HCC)    • Disease of thyroid gland    • Emphysema of lung (HCC)    • Hyperlipidemia    • Hypertension    • Lumbar compression fracture (HCC) 11/30/2016   • Osteoporosis    • S/P kyphoplasty 12/5/2016    L1 and L4, 12/2/2016       Assessment:  Active Hospital Problems    Diagnosis  POA   • Chest wall hematoma, right, initial encounter [S20.211A]  Yes      Resolved Hospital Problems   No resolved problems to display.       Plan:  Continue with current RX. Follow lab. Thoracic surgery consult noted. Maybe back to SNU tomorrow LTC  if stable.    Kp Sky MD  8/3/2022  13:35 EDT

## 2022-08-03 NOTE — PLAN OF CARE
Goal Outcome Evaluation:  Plan of Care Reviewed With: patient, family    Outcome Evaluation: Pt is an 86 y/o female with large hematoma to the right chest wall. Hx of advanced dementia, lumbar compression fx, harmony mastectomy, R TSA. Recent ED visit s/p fall. Resides at NH, multiple falls. Per family report, pt is mostly bedbound, total A for TFs into chair/WC occasionally. Req's assist for all ADLs and bed mobility. Pt reports 10/10 pain in RUE/chest at this time, guarding noted with no AROM at this time. She req's MaxA to sit EOB, knees with flexion contractures noted. Min-modA to maintain sitting balance at EOB, requests return to supine d/t discomfort. Pt appears to be at baseline, family confirms this. No further OT needs identified, plans to return to NH, OT will sign off.    Hand hygiene completed before and after session. Appropriate PPE worn t/o

## 2022-08-03 NOTE — PROGRESS NOTES
"    Chief Complaint: Right chest wall hematoma      Subjective:  Symptoms:  Improved.  She reports chest pain (Tenderness with palpation, right chest) and weakness.    Diet:  Poor intake.  No nausea or vomiting.    Activity level: Impaired due to pain.    Pain:  She complains of pain that is mild.  She reports pain is improving.  Pain is well controlled.        Vital Signs:  Temp:  [97.7 °F (36.5 °C)-98.7 °F (37.1 °C)] 97.7 °F (36.5 °C)  Heart Rate:  [72-87] 83  Resp:  [16] 16  BP: (122-148)/(63-82) 122/81    Intake & Output (last day)       08/02 0701  08/03 0700 08/03 0701  08/04 0700    P.O.  120    Total Intake(mL/kg)  120 (2.7)    Net  +120          Stool Unmeasured Occurrence 1 x           Objective:  General Appearance:  Comfortable, in no acute distress, in pain and ill-appearing.    Vital signs: (most recent): Blood pressure 131/77, pulse 72, temperature 97.9 °F (36.6 °C), temperature source Oral, resp. rate 16, height 149.9 cm (59\"), weight 45 kg (99 lb 3.3 oz), SpO2 93 %.  No fever.    HEENT: Normal HEENT exam.    Lungs:  Normal effort.  Breath sounds clear to auscultation.    Heart: Normal rate.  Regular rhythm.    Chest: Symmetric chest wall expansion. Chest wall tenderness (With palpation) present.    Abdomen: Abdomen is soft and flat.  Bowel sounds are normal.   There is no abdominal tenderness.     Pulses: Distal pulses are intact.    Neurological: Patient is alert.  (Pleasantly confused.).    Skin:  Warm and dry.  (Bruising around right chest hematoma, softer today)          Results Review:     I reviewed the patient's new clinical results.  I reviewed the patient's new imaging results and agree with the interpretation.  I reviewed the patient's other test results and agree with the interpretation  Discussed with patient, RN and Dr. Blair    Imaging Results (Last 24 Hours)     Procedure Component Value Units Date/Time    XR Chest 1 View [[id number]] Collected: 08/03/22 0723     Updated: 08/03/22 " 0728    Narrative:      XR CHEST 1 VW-  08/03/2022     HISTORY: Chest wall hematoma.     Heart size is at the upper limits of normal. Lungs are underinflated.  There is increased density in the left lung base partially obscuring the  left hemidiaphragm this may be related to left basilar atelectasis,  infiltrate and/or small amount of pleural fluid. Left upper lung and  right lung appear clear. Right shoulder prosthesis is seen in good  position.     There is some aortic calcification.     Please see additional dictation for the CT of the chest from 08/02/2022.     This report was finalized on 8/3/2022 7:25 AM by Dr. Robby Rodriges M.D.             Lab Results:     Lab Results (last 24 hours)     Procedure Component Value Units Date/Time    Blood Culture ID, PCR - Blood, Arm, Left [[id number]]  (Abnormal) Collected: 08/02/22 1414    Specimen: Blood from Arm, Left Updated: 08/03/22 1206     BCID, PCR Staph spp, not aureus or lugdunesis. Identification by BCID2 PCR.     BOTTLE TYPE Aerobic Bottle    POC Glucose Once [[id number]]  (Normal) Collected: 08/03/22 1100    Specimen: Blood Updated: 08/03/22 1102     Glucose 86 mg/dL      Comment: Meter: [id number] : [id number] Geovanni HOLLING[place]ORTH       Blood Culture - Blood, Arm, Left [[id number]]  (Abnormal) Collected: 08/02/22 1414    Specimen: Blood from Arm, Left Updated: 08/03/22 1050     Blood Culture Abnormal Stain     Gram Stain Aerobic Bottle Gram positive cocci    POC Glucose Once [[id number]]  (Abnormal) Collected: 08/03/22 0546    Specimen: Blood Updated: 08/03/22 0547     Glucose 137 mg/dL      Comment: Meter: [id number] : [id number] Fabiano [place]TH       Procalcitonin [[id number]]  (Normal) Collected: 08/03/22 0410    Specimen: Blood Updated: 08/03/22 0456     Procalcitonin 0.15 ng/mL     Narrative:      As a Marker for Sepsis (Non-Neonates):    1. <0.5 ng/mL represents a low risk of severe sepsis and/or septic shock.  2. >2 ng/mL represents a high  "risk of severe sepsis and/or septic shock.    As a Marker for Lower Respiratory Tract Infections that require antibiotic therapy:    PCT on Admission    Antibiotic Therapy       6-12 Hrs later    >0.5                Strongly Recommended  >0.25 - <0.5        Recommended   0.1 - 0.25          Discouraged              Remeasure/reassess PCT  <0.1                Strongly Discouraged     Remeasure/reassess PCT    As 28 day mortality risk marker: \"Change in Procalcitonin Result\" (>80% or <=80%) if Day 0 (or Day 1) and Day 4 values are available. Refer to http://www.[facility]-pct-calculator.com    Change in PCT <=80%  A decrease of PCT levels below or equal to 80% defines a positive change in PCT test result representing a higher risk for 28-day all-cause mortality of patients diagnosed with severe sepsis for septic shock.    Change in PCT >80%  A decrease of PCT levels of more than 80% defines a negative change in PCT result representing a lower risk for 28-day all-cause mortality of patients diagnosed with severe sepsis or septic shock.       Basic Metabolic Panel [[id number]]  (Abnormal) Collected: 08/03/22 0410    Specimen: Blood Updated: 08/03/22 0449     Glucose 153 mg/dL      BUN 20 mg/dL      Creatinine 1.19 mg/dL      Sodium 139 mmol/L      Potassium 4.4 mmol/L      Chloride 105 mmol/L      CO2 23.0 mmol/L      Calcium 8.9 mg/dL      BUN/Creatinine Ratio 16.8     Anion Gap 11.0 mmol/L      eGFR 44.3 mL/min/1.73      Comment: National Kidney Foundation and American Society of Nephrology (ASN) Task Force recommended calculation based on the Chronic Kidney Disease Epidemiology Collaboration (CKD-EPI) equation refit without adjustment for race.       Narrative:      GFR Normal >60  Chronic Kidney Disease <60  Kidney Failure <15      Hemoglobin A1c [[id number]]  (Abnormal) Collected: 08/03/22 0410    Specimen: Blood Updated: 08/03/22 0441     Hemoglobin A1C 8.40 %     Narrative:      Hemoglobin A1C Ranges:    Increased Risk " for Diabetes  5.7% to 6.4%  Diabetes                     >= 6.5%  Diabetic Goal                < 7.0%    Hemoglobin & Hematocrit, Blood [[id number]]  (Abnormal) Collected: 08/03/22 0410    Specimen: Blood Updated: 08/03/22 0430     Hemoglobin 11.2 g/dL      Hematocrit 34.5 %     CBC & Differential [[id number]]  (Abnormal) Collected: 08/03/22 0410    Specimen: Blood Updated: 08/03/22 0430    Narrative:      The following orders were created for panel order CBC & Differential.  Procedure                               Abnormality         Status                     ---------                               -----------         ------                     CBC Auto Differential[[id number]]        Abnormal            Final result                 Please view results for these tests on the individual orders.    CBC Auto Differential [[id number]]  (Abnormal) Collected: 08/03/22 0410    Specimen: Blood Updated: 08/03/22 0430     WBC 16.24 10*3/mm3      RBC 3.64 10*6/mm3      Hemoglobin 11.2 g/dL      Hematocrit 34.5 %      MCV 94.8 fL      MCH 30.8 pg      MCHC 32.5 g/dL      RDW 13.4 %      RDW-SD 46.5 fl      MPV 9.9 fL      Platelets 373 10*3/mm3      Neutrophil % 68.5 %      Lymphocyte % 20.0 %      Monocyte % 10.2 %      Eosinophil % 0.6 %      Basophil % 0.3 %      Immature Grans % 0.4 %      Neutrophils, Absolute 11.13 10*3/mm3      Lymphocytes, Absolute 3.25 10*3/mm3      Monocytes, Absolute 1.66 10*3/mm3      Eosinophils, Absolute 0.09 10*3/mm3      Basophils, Absolute 0.05 10*3/mm3      Immature Grans, Absolute 0.06 10*3/mm3      nRBC 0.0 /100 WBC     COVID PRE-OP / PRE-PROCEDURE SCREENING ORDER (NO ISOLATION) - Swab, Nasopharynx [[id number]]  (Normal) Collected: 08/02/22 1832    Specimen: Swab from Nasopharynx Updated: 08/02/22 2026    Narrative:      The following orders were created for panel order COVID PRE-OP / PRE-PROCEDURE SCREENING ORDER (NO ISOLATION) - Swab, Nasopharynx.  Procedure                                Abnormality         Status                     ---------                               -----------         ------                     COVID-19, [facility]SE...[[id number]]  Normal              Final result                 Please view results for these tests on the individual orders.    COVID-19,[facility] IN-HOUSE CEPHEID/CRUZITO NP SWAB IN TRANSPORT MEDIA 8-12 HR TAT - Swab, Nasopharynx [[id number]]  (Normal) Collected: 08/02/22 1832    Specimen: Swab from Nasopharynx Updated: 08/02/22 2026     COVID19 Not Detected    Narrative:      Fact sheet for providers: https://www.fda.gov/media/215832/download     Fact sheet for patients: https://www.fda.gov/media/510492/download    POC Glucose Once [[id number]]  (Abnormal) Collected: 08/02/22 2011    Specimen: Blood Updated: 08/02/22 2013     Glucose 188 mg/dL      Comment: Meter: [id number] : [id number] Fabiano [place]TH       Blood Culture - Blood, Arm, Right [[id number]] Collected: 08/02/22 1414    Specimen: Blood from Arm, Right Updated: 08/02/22 1420    Protime-INR [[id number]]  (Abnormal) Collected: 08/02/22 1328    Specimen: Blood Updated: 08/02/22 1359     Protime 14.6 Seconds      INR 1.15    aPTT [[id number]]  (Normal) Collected: 08/02/22 1328    Specimen: Blood Updated: 08/02/22 1359     PTT 31.5 seconds     Comprehensive Metabolic Panel [[id number]]  (Abnormal) Collected: 08/02/22 1228    Specimen: Blood Updated: 08/02/22 1306     Glucose 282 mg/dL      BUN 22 mg/dL      Creatinine 1.28 mg/dL      Sodium 134 mmol/L      Potassium 4.9 mmol/L      Comment: Slight hemolysis detected by analyzer. Results may be affected.        Chloride 102 mmol/L      CO2 20.2 mmol/L      Calcium 9.0 mg/dL      Total Protein 6.2 g/dL      Albumin 3.20 g/dL      ALT (SGPT) 12 U/L      AST (SGOT) 17 U/L      Alkaline Phosphatase 90 U/L      Total Bilirubin 0.4 mg/dL      Globulin 3.0 gm/dL      A/G Ratio 1.1 g/dL      BUN/Creatinine Ratio 17.2     Anion Gap 11.8 mmol/L      eGFR 40.6  mL/min/1.73      Comment: National Kidney Foundation and American Society of Nephrology (ASN) Task Force recommended calculation based on the Chronic Kidney Disease Epidemiology Collaboration (CKD-EPI) equation refit without adjustment for race.       Narrative:      GFR Normal >60  Chronic Kidney Disease <60  Kidney Failure <15             Assessment & Plan       Chest wall hematoma, right, initial encounter       Assessment & Plan    Ms. Flores appears to be doing well today.  She reports improvement of the chest wall pain she was experiencing yesterday.  Hematoma feels softer today and appears to be descending relative to border markings drawn yesterday. 3D reconstruction added to chest CT obtained yesterday negative for acute rib fractures.  This morning's chest x-ray was reviewed and demonstrates some left basilar atelectasis.  No acute airspace disease or rib fractures are apparent.    Hemoglobin decreased to 11.2 today. It appears that this is close to her baseline when compared to labs from 4 years ago.  Continue to trend her H&H with a.m. labs.  Recommend conservative management with analgesic regimen and good pulmonary hygiene including incentive spirometry.     Nothing more to add from a thoracic standpoint. We will follow peripherally.     Jennifer Pan DNP, APRN  [facility]  08/03/22  13:01 EDT    I wore protective equipment throughout this patient encounter including a face mask, gloves and protective eyewear.  Hand hygiene was performed before donning protective equipment and after removal when leaving the room.

## 2022-08-03 NOTE — PLAN OF CARE
Goal Outcome Evaluation:  Plan of Care Reviewed With: patient           Outcome Evaluation: Pt. is an 87 year old Female with Right sided chest wall pain.  Pt. with h/o advanced dementia and bilateral mastectomy.  Per chart review pt.'s primary means of mobility was via W/C prior to admission.  Pt. currently presents with decreased strength, decreased balance, decreased ROM, and decreased tolerance to functional activity. Pt. will benefit from skilled inpt. P.T. to address her functional deficits and to assist pt. in regaining her maximum level of independence with functional mobility. Pt.'s progress will depend on her ability to follow commands and cooperate with P.T.    Patient was wearing a face mask during this therapy encounter. Therapist used appropriate personal protective equipment including eye protection, mask, and gloves.  Mask used was standard procedure mask. Appropriate PPE was worn during the entire therapy session. Hand hygiene was completed before and after therapy session. Patient is not in enhanced droplet precautions.

## 2022-08-03 NOTE — THERAPY EVALUATION
Patient Name: Sam Flores  : 1934    MRN: [id number]                              Today's Date: 8/3/2022       Admit Date: 2022    Visit Dx:     ICD-10-CM ICD-9-CM   1. Chest wall hematoma, right, initial encounter  S20.211A 922.1   2. Leukocytosis, unspecified type  D72.829 288.60     Patient Active Problem List   Diagnosis   • Lumbar compression fracture (HCC)   • Osteoporosis   • Dehydration   • Acute on chronic renal failure (HCC)   • Type 2 diabetes mellitus without complication (HCC)   • Benign essential hypertension   • Weight loss, abnormal   • Trouble swallowing   • Hyponatremia   • Hypokalemia   • Aspiration pneumonia (HCC)   • Chest wall hematoma, right, initial encounter   • Dementia (HCC)     Past Medical History:   Diagnosis Date   • Dementia (HCC)    • Diabetes mellitus (HCC)    • Disease of thyroid gland    • Emphysema of lung (HCC)    • Hyperlipidemia    • Hypertension    • Lumbar compression fracture (HCC) 2016   • Osteoporosis    • S/P kyphoplasty 2016    L1 and L4, 2016     Past Surgical History:   Procedure Laterality Date   •  SECTION     • KYPHOPLASTY      T12 and L2   • KYPHOPLASTY N/A 2016    Procedure: KYPHOPLASTY LUMBAR 1 AND LUMBAR 4;  Surgeon: Triston Marinelli IV, MD;  Location: [facility] ;  Service:    • MASTECTOMY     • SHOULDER SURGERY Right       General Information     Row Name 22 1534          Physical Therapy Time and Intention    Document Type evaluation  Pt. admitted from [place] with Right sided Chest wall swelling  -MS     Mode of Treatment physical therapy;individual therapy  -MS     Row Name 22 5872          General Information    Patient Profile Reviewed yes  -MS     Prior Level of Function --  Per chart review, pt. uses a W/C for primary means of mobility  -MS     Existing Precautions/Restrictions fall   Exit alarm  -MS     Barriers to Rehab cognitive status  -MS     Row Name 22 1864           Cognition    Orientation Status (Cognition) oriented to;person  -MS           User Key  (r) = Recorded By, (t) = Taken By, (c) = Cosigned By    Initials Name Provider Type    Virgil Ashley ANA, PT Physical Therapist               Mobility     Row Name 08/03/22 1536          Bed Mobility    Supine-Sit Addison (Bed Mobility) maximum assist (25% patient effort);2 person assist  -MS     Sit-Supine Addison (Bed Mobility) maximum assist (25% patient effort);2 person assist  -MS     Assistive Device (Bed Mobility) draw sheet  -MS     Comment, (Bed Mobility) Once sitting EOB, pt. requires Mod. assist x 1 for static sitting balance and Max. assist x 1 for dynamic sitting balance.  Heavy posterior lean.  -MS           User Key  (r) = Recorded By, (t) = Taken By, (c) = Cosigned By    Initials Name Provider Type    Virgil Ashley ANA, PT Physical Therapist               Obj/Interventions     Row Name 08/03/22 1536          Range of Motion Comprehensive    Comment, General Range of Motion BLE (Imp. 50%); Unable to assess BUE's due to chest wall pain.  Pt. refuses to attempt;  Pt. also with bilateral knee flexion contractures  -MS     Row Name 08/03/22 1536          Strength Comprehensive (MMT)    Comment, General Manual Muscle Testing (MMT) Assessment BLE (3-/5)  -MS           User Key  (r) = Recorded By, (t) = Taken By, (c) = Cosigned By    Initials Name Provider Type    Virgil Ashley ANA, PT Physical Therapist               Goals/Plan     Row Name 08/03/22 1539          Bed Mobility Goal 1 (PT)    Activity/Assistive Device (Bed Mobility Goal 1, PT) bed mobility activities, all  -MS     Addison Level/Cues Needed (Bed Mobility Goal 1, PT) minimum assist (75% or more patient effort)  assist x 2  -MS     Time Frame (Bed Mobility Goal 1, PT) long term goal (LTG);1 week  -MS     Row Name 08/03/22 1539          Problem Specific Goal 1 (PT)    Problem Specific Goal 1 (PT) Pt. will perform dynamic sitting balance  with CGA x 1  -MS     Time Frame (Problem Specific Goal 1, PT) long-term goal (LTG);1 week  -MS     Row Name 08/03/22 1539          Therapy Assessment/Plan (PT)    Planned Therapy Interventions (PT) balance training;bed mobility training;home exercise program;patient/family education;postural re-education;transfer training;ROM (range of motion);strengthening  -MS           User Key  (r) = Recorded By, (t) = Taken By, (c) = Cosigned By    Initials Name Provider Type    Virgil Ashley, PT Physical Therapist               Clinical Impression     Row Name 08/03/22 1537          Pain    Pre/Posttreatment Pain Comment Intermittent Right chest wall pain with general mobility.  Pt. does not give pain rating but moans loudly when she experiences the pain.   -MS     Pain Intervention(s) Elevated;Nursing Notified;Repositioned  -MS     Row Name 08/03/22 1537          Plan of Care Review    Plan of Care Reviewed With patient  -MS     Row Name 08/03/22 1537          Therapy Assessment/Plan (PT)    Rehab Potential (PT) fair, will monitor progress closely  -MS     Criteria for Skilled Interventions Met (PT) skilled treatment is necessary  -MS     Therapy Frequency (PT) 3 times/wk  -MS     Row Name 08/03/22 1537          Positioning and Restraints    Pre-Treatment Position in bed  -MS     Post Treatment Position bed  -MS     In Bed notified nsg;supine;call light within reach;encouraged to call for assist;exit alarm on  All lines intact.  -MS           User Key  (r) = Recorded By, (t) = Taken By, (c) = Cosigned By    Initials Name Provider Type    Virgil Ashley, PT Physical Therapist               Outcome Measures     Row Name 08/03/22 7175          How much help from another person do you currently need...    Turning from your back to your side while in flat bed without using bedrails? 2  -MS     Moving from lying on back to sitting on the side of a flat bed without bedrails? 2  -MS     Moving to and from a bed to a  chair (including a wheelchair)? 1  -MS     Standing up from a chair using your arms (e.g., wheelchair, bedside chair)? 1  -MS     Climbing 3-5 steps with a railing? 1  -MS     To walk in hospital room? 1  -MS     AM-PAC 6 Clicks Score (PT) 8  -MS     Highest level of mobility 3 --> Sat at edge of bed  -MS     Row Name 08/03/22 1540 08/03/22 1514       Functional Assessment    Outcome Measure Options AM-PAC 6 Clicks Basic Mobility (PT)  -MS AM-PAC 6 Clicks Daily Activity (OT)  -          User Key  (r) = Recorded By, (t) = Taken By, (c) = Cosigned By    Initials Name Provider Type    Virgil Ashley, PT Physical Therapist    Di Ryan, OT Occupational Therapist                             Physical Therapy Education                 Title: PT OT SLP Therapies (In Progress)     Topic: Physical Therapy (In Progress)     Point: Mobility training (In Progress)     Learning Progress Summary           Patient Acceptance, E,D, NR by MS at 8/3/2022 1540                   Point: Home exercise program (Not Started)     Learner Progress:  Not documented in this visit.          Point: Body mechanics (In Progress)     Learning Progress Summary           Patient Acceptance, E,D, NR by MS at 8/3/2022 1540                   Point: Precautions (In Progress)     Learning Progress Summary           Patient Acceptance, E,D, NR by MS at 8/3/2022 1540                               User Key     Initials Effective Dates Name Provider Type Discipline    MS 06/16/21 -  Virgil Osuna, PT Physical Therapist PT              PT Recommendation and Plan  Planned Therapy Interventions (PT): balance training, bed mobility training, home exercise program, patient/family education, postural re-education, transfer training, ROM (range of motion), strengthening  Plan of Care Reviewed With: patient  Outcome Evaluation: Pt. is an 87 year old Female with Right sided chest wall pain.  Pt. with h/o advanced dementia and bilateral mastectomy.  Per  chart review pt.'s primary means of mobility was via W/C prior to admission.  Pt. currently presents with decreased strength, decreased balance, decreased ROM, and decreased tolerance to functional activity. Pt. will benefit from skilled inpt. P.T. to address her functional deficits and to assist pt. in regaining her maximum level of independence with functional mobility. Pt.'s progress will depend on her ability to follow commands and cooperate with P.T.     Time Calculation:    PT Charges     Row Name 08/03/22 1545             Time Calculation    Start Time 1320  -MS      Stop Time 1335  -MS      Time Calculation (min) 15 min  -MS      PT Received On 08/03/22  -MS      PT - Next Appointment 08/05/22  -MS      PT Goal Re-Cert Due Date 08/10/22  -MS              Time Calculation- PT    Total Timed Code Minutes- PT 14 minute(s)  -MS            User Key  (r) = Recorded By, (t) = Taken By, (c) = Cosigned By    Initials Name Provider Type    MS Virgil Osuna PT Physical Therapist              Therapy Charges for Today     Code Description Service Date Service Provider Modifiers Qty    18622965601 HC PT EVAL MOD COMPLEXITY 2 8/3/2022 Virgil Osuna, PT GP 1    26893347769 HC PT THERAPEUTIC ACT EA 15 MIN 8/3/2022 Virgil Osuna, PT GP 1    54812771916 HC PT THER SUPP EA 15 MIN 8/3/2022 Virgil Osuna, PT GP 1          PT G-Codes  Outcome Measure Options: AM-PAC 6 Clicks Basic Mobility (PT)  AM-PAC 6 Clicks Score (PT): 8  AM-PAC 6 Clicks Score (OT): 7    Virgil Osuna PT  8/3/2022

## 2022-08-03 NOTE — THERAPY EVALUATION
Patient Name: Sam Flores  : 1934    MRN: [id number]                              Today's Date: 8/3/2022       Admit Date: 2022    Visit Dx:     ICD-10-CM ICD-9-CM   1. Chest wall hematoma, right, initial encounter  S20.211A 922.1   2. Leukocytosis, unspecified type  D72.829 288.60     Patient Active Problem List   Diagnosis   • Lumbar compression fracture (HCC)   • Osteoporosis   • Dehydration   • Acute on chronic renal failure (HCC)   • Type 2 diabetes mellitus without complication (HCC)   • Benign essential hypertension   • Weight loss, abnormal   • Trouble swallowing   • Hyponatremia   • Hypokalemia   • Aspiration pneumonia (HCC)   • Chest wall hematoma, right, initial encounter   • Dementia (HCC)     Past Medical History:   Diagnosis Date   • Dementia (HCC)    • Diabetes mellitus (HCC)    • Disease of thyroid gland    • Emphysema of lung (HCC)    • Hyperlipidemia    • Hypertension    • Lumbar compression fracture (HCC) 2016   • Osteoporosis    • S/P kyphoplasty 2016    L1 and L4, 2016     Past Surgical History:   Procedure Laterality Date   •  SECTION     • KYPHOPLASTY      T12 and L2   • KYPHOPLASTY N/A 2016    Procedure: KYPHOPLASTY LUMBAR 1 AND LUMBAR 4;  Surgeon: Triston Marinelli IV, MD;  Location: [facility] ;  Service:    • MASTECTOMY     • SHOULDER SURGERY Right       General Information     Row Name 22 1505          OT Time and Intention    Document Type evaluation  -JW     Mode of Treatment individual therapy;occupational therapy  -     Row Name 22 1505          General Information    Patient Profile Reviewed yes  -JW     Prior Level of Function max assist:;ADL's;transfer;dependent:  per pt's family report, pt is mostly bedbound, max-totalA for TFs into . Assist for all ADLs.  -JW     Existing Precautions/Restrictions fall  -JW     Barriers to Rehab previous functional deficit  -     Row Name 22 1506          Living  Environment    People in Home facility resident  -     Row Name 08/03/22 1505          Cognition    Orientation Status (Cognition) oriented to;person  -     Row Name 08/03/22 1505          Safety Issues, Functional Mobility    Safety Issues Affecting Function (Mobility) insight into deficits/self-awareness;judgment;problem-solving  -     Impairments Affecting Function (Mobility) balance;cognition;pain  -     Cognitive Impairments, Mobility Safety/Performance insight into deficits/self-awareness;judgment;problem-solving/reasoning  -           User Key  (r) = Recorded By, (t) = Taken By, (c) = Cosigned By    Initials Name Provider Type    Di Ryan OT Occupational Therapist                 Mobility/ADL's     Row Name 08/03/22 1507          Bed Mobility    Bed Mobility supine-sit;sit-supine  -     Supine-Sit Blaine (Bed Mobility) verbal cues;maximum assist (25% patient effort)  -     Sit-Supine Blaine (Bed Mobility) dependent (less than 25% patient effort);verbal cues  -     Bed Mobility, Safety Issues decreased use of arms for pushing/pulling  -     Assistive Device (Bed Mobility) draw sheet  -     Comment, (Bed Mobility) pain in RUE/chest limiting pt's participation in mobility. however, family states this is her baseline  -     Row Name 08/03/22 1507          Transfers    Comment, (Transfers) total A for lift TFs at baseline. Pt does not stand  -[facility] Name 08/03/22 1507          Activities of Daily Living    BADL Assessment/Intervention lower body dressing  -[facility] Name 08/03/22 1507          Lower Body Dressing Assessment/Training    Blaine Level (Lower Body Dressing) dependent (less than 25% patient effort);lower body dressing skills  -     Comment, (Lower Body Dressing) based on clinical judgement  -           User Key  (r) = Recorded By, (t) = Taken By, (c) = Cosigned By    Initials Name Provider Type    Di Ryan OT Occupational Therapist                Obj/Interventions     [facility] Name 08/03/22 1509          Sensory Assessment (Somatosensory)    Sensory Assessment (Somatosensory) UE sensation intact  -[facility] Name 08/03/22 1509          Vision Assessment/Intervention    Vision Assessment Comment appears WFL  -[facility] Name 08/03/22 1509          Range of Motion Comprehensive    Comment, General Range of Motion does not move RUE d/t pain. LUE limited shoulder flexion. Giovanni knees with flexion contractures  -[facility] Name 08/03/22 1509          Strength Comprehensive (MMT)    Comment, General Manual Muscle Testing (MMT) Assessment generalized weakness  -[facility] Name 08/03/22 1509          Balance    Balance Assessment sitting static balance  -     Static Sitting Balance minimal assist;moderate assist  -     Position, Sitting Balance supported;sitting edge of bed  -     Balance Interventions sitting  -     Comment, Balance posterior leaning at EOB, Min-ModA to maintain midline  -           User Key  (r) = Recorded By, (t) = Taken By, (c) = Cosigned By    Initials Name Provider Type     Di Oliveira OT Occupational Therapist               Goals/Plan     Row Name 08/03/22 1514          Problem Specific Goal 1 (OT)    Problem Specific Goal 1 (OT) Pt will be at baseline function with ADLs and mobility by d/c  -JW     Time Frame (Problem Specific Goal 1, OT) by discharge  -           User Key  (r) = Recorded By, (t) = Taken By, (c) = Cosigned By    Initials Name Provider Type     Di Oliveira OT Occupational Therapist               Clinical Impression     [facility] Name 08/03/22 1510          Pain Assessment    Pretreatment Pain Rating 10/10  -     Posttreatment Pain Rating 10/10  -     Pain Location - Side/Orientation Right  -     Pain Location upper  -     Pain Location - extremity;chest  -     Pain Intervention(s) Repositioned  -[facility] 08/03/22 1510          Plan of Care Review    Plan of Care Reviewed With patient;family  -      Outcome Evaluation Pt is an 88 y/o female with large hematoma to the right chest wall. Hx of advanced dementia, lumbar compression fx, harmony mastectomy, R TSA. Recent ED visit s/p fall. Resides at NH, multiple falls. Per family report, pt is mostly bedbound, total A for TFs into chair/WC occasionally. Req's assist for all ADLs and bed mobility. Pt reports 10/10 pain in RUE/chest at this time, guarding noted with no AROM at this time. She req's MaxA to sit EOB, knees with flexion contractures noted. Min-modA to maintain sitting balance at EOB, requests return to supine d/t discomfort. Pt appears to be at baseline, family confirms this. No further OT needs identified, plans to return to NH, OT will sign off.  -     Row Name 08/03/22 7840          Therapy Assessment/Plan (OT)    Criteria for Skilled Therapeutic Interventions Met (OT) no;does not meet criteria for skilled intervention  -     Therapy Frequency (OT) evaluation only  -     Row Name 08/03/22 6620          Therapy Plan Review/Discharge Plan (OT)    Anticipated Discharge Disposition (OT) extended care facility  -     Row Name 08/03/22 4540          Positioning and Restraints    Pre-Treatment Position in bed  -JW     Post Treatment Position bed  -JW     In Bed notified nsg;fowlers;call light within reach;encouraged to call for assist;exit alarm on;with family/caregiver  -           User Key  (r) = Recorded By, (t) = Taken By, (c) = Cosigned By    Initials Name Provider Type    Di Ryan OT Occupational Therapist               Outcome Measures     Row Name 08/03/22 4347          How much help from another is currently needed...    Putting on and taking off regular lower body clothing? 1  -JW     Bathing (including washing, rinsing, and drying) 1  -JW     Toileting (which includes using toilet bed pan or urinal) 1  -JW     Putting on and taking off regular upper body clothing 1  -JW     Taking care of personal grooming (such as brushing teeth) 1   -     Eating meals 2  -     AM-PAC 6 Clicks Score (OT) 7  -     Row Name 08/03/22 [facility]4          Functional Assessment    Outcome Measure Options AM-PAC 6 Clicks Daily Activity (OT)  -           User Key  (r) = Recorded By, (t) = Taken By, (c) = Cosigned By    Initials Name Provider Type    Di Ryan OT Occupational Therapist                Occupational Therapy Education                 Title: PT OT SLP Therapies (Done)     Topic: Occupational Therapy (Done)     Point: ADL training (Done)     Description:   Instruct learner(s) on proper safety adaptation and remediation techniques during self care or transfers.   Instruct in proper use of assistive devices.              Learning Progress Summary           Patient Acceptance, E, VU,NR by  at 8/3/2022 1515   Family Acceptance, E, VU,NR by  at 8/3/2022 1515                   Point: Home exercise program (Done)     Description:   Instruct learner(s) on appropriate technique for monitoring, assisting and/or progressing therapeutic exercises/activities.              Learning Progress Summary           Patient Acceptance, E, VU,NR by  at 8/3/2022 1515   Family Acceptance, E, VU,NR by  at 8/3/2022 1515                   Point: Precautions (Done)     Description:   Instruct learner(s) on prescribed precautions during self-care and functional transfers.              Learning Progress Summary           Patient Acceptance, E, VU,NR by  at 8/3/2022 1515   Family Acceptance, E, VU,NR by  at 8/3/2022 1515                   Point: Body mechanics (Done)     Description:   Instruct learner(s) on proper positioning and spine alignment during self-care, functional mobility activities and/or exercises.              Learning Progress Summary           Patient Acceptance, E, VU,NR by  at 8/3/2022 1515   Family Acceptance, E, VU,NR by  at 8/3/2022 1515                               User Key     Initials Effective Dates Name Provider Type [facility]    MORRIS  06/10/21 -  Di Oliveira OT Occupational Therapist OT              OT Recommendation and Plan  Therapy Frequency (OT): evaluation only  Plan of Care Review  Plan of Care Reviewed With: patient, family  Outcome Evaluation: Pt is an 86 y/o female with large hematoma to the right chest wall. Hx of advanced dementia, lumbar compression fx, harmony mastectomy, R TSA. Recent ED visit s/p fall. Resides at NH, multiple falls. Per family report, pt is mostly bedbound, total A for TFs into chair/WC occasionally. Req's assist for all ADLs and bed mobility. Pt reports 10/10 pain in RUE/chest at this time, guarding noted with no AROM at this time. She req's MaxA to sit EOB, knees with flexion contractures noted. Min-modA to maintain sitting balance at EOB, requests return to supine d/t discomfort. Pt appears to be at baseline, family confirms this. No further OT needs identified, plans to return to NH, OT will sign off.     Time Calculation:    Time Calculation- OT     Row Name 08/03/22 1515             Time Calculation- OT    OT Start Time 1443  -JW      OT Stop Time 1452  -JW      OT Time Calculation (min) 9 min  -JW              Untimed Charges    OT Eval/Re-eval Minutes 9  -JW              Total Minutes    Untimed Charges Total Minutes 9  -JW       Total Minutes 9  -JW            User Key  (r) = Recorded By, (t) = Taken By, (c) = Cosigned By    Initials Name Provider Type    Di Ryan OT Occupational Therapist              Therapy Charges for Today     Code Description Service Date Service Provider Modifiers Qty    [id number]  OT EVAL LOW COMPLEXITY 3 8/3/2022 Di Oliveira OT GO 1               Di Oliveira OT  8/3/2022

## 2022-08-03 NOTE — CASE MANAGEMENT/SOCIAL WORK
Continued Stay Note  [facility]     Patient Name: Sam Flores  MRN: [id number]  Today's Date: 8/3/2022    Admit Date: 8/2/2022     Discharge Plan     Row Name 08/03/22 8166       Plan    Plan Comments Spoke with pts daughter/Mariela who confirms plan for pt to return to [place] at discharge.  Will need ambulance for transportation.  AGUILAR Vu RN                                          Discharge Codes    No documentation.                     Bhavani Vu RN

## 2022-08-03 NOTE — PLAN OF CARE
Goal Outcome Evaluation:      VSS. Pt only a/o to self. R chest wall hematoma remains same size. Having pain in R shoulder and chest, hurts to move. Incontinent, purewick in place. IV replaced after pt pulled one out. Spoke with NH to give update on condition.

## 2022-08-04 LAB
ANION GAP SERPL CALCULATED.3IONS-SCNC: 13 MMOL/L (ref 5–15)
BACTERIA SPEC AEROBE CULT: ABNORMAL
BASOPHILS # BLD AUTO: 0.05 10*3/MM3 (ref 0–0.2)
BASOPHILS NFR BLD AUTO: 0.3 % (ref 0–1.5)
BUN SERPL-MCNC: 22 MG/DL (ref 8–23)
BUN/CREAT SERPL: 18.3 (ref 7–25)
CALCIUM SPEC-SCNC: 8.5 MG/DL (ref 8.6–10.5)
CHLORIDE SERPL-SCNC: 102 MMOL/L (ref 98–107)
CO2 SERPL-SCNC: 22 MMOL/L (ref 22–29)
CREAT SERPL-MCNC: 1.2 MG/DL (ref 0.57–1)
DEPRECATED RDW RBC AUTO: 44.7 FL (ref 37–54)
EGFRCR SERPLBLD CKD-EPI 2021: 43.9 ML/MIN/1.73
EOSINOPHIL # BLD AUTO: 0.26 10*3/MM3 (ref 0–0.4)
EOSINOPHIL NFR BLD AUTO: 1.6 % (ref 0.3–6.2)
ERYTHROCYTE [DISTWIDTH] IN BLOOD BY AUTOMATED COUNT: 13.1 % (ref 12.3–15.4)
GLUCOSE BLDC GLUCOMTR-MCNC: 122 MG/DL (ref 70–130)
GLUCOSE BLDC GLUCOMTR-MCNC: 127 MG/DL (ref 70–130)
GLUCOSE BLDC GLUCOMTR-MCNC: 144 MG/DL (ref 70–130)
GLUCOSE BLDC GLUCOMTR-MCNC: 167 MG/DL (ref 70–130)
GLUCOSE SERPL-MCNC: 139 MG/DL (ref 65–99)
GRAM STN SPEC: ABNORMAL
HCT VFR BLD AUTO: 29.6 % (ref 34–46.6)
HCT VFR BLD AUTO: 29.9 % (ref 34–46.6)
HCT VFR BLD AUTO: 29.9 % (ref 34–46.6)
HGB BLD-MCNC: 10 G/DL (ref 12–15.9)
HGB BLD-MCNC: 9.8 G/DL (ref 12–15.9)
HGB BLD-MCNC: 9.8 G/DL (ref 12–15.9)
IMM GRANULOCYTES # BLD AUTO: 0.06 10*3/MM3 (ref 0–0.05)
IMM GRANULOCYTES NFR BLD AUTO: 0.4 % (ref 0–0.5)
ISOLATED FROM: ABNORMAL
LYMPHOCYTES # BLD AUTO: 2.83 10*3/MM3 (ref 0.7–3.1)
LYMPHOCYTES NFR BLD AUTO: 17 % (ref 19.6–45.3)
MCH RBC QN AUTO: 30.4 PG (ref 26.6–33)
MCHC RBC AUTO-ENTMCNC: 32.8 G/DL (ref 31.5–35.7)
MCV RBC AUTO: 92.9 FL (ref 79–97)
MONOCYTES # BLD AUTO: 1.66 10*3/MM3 (ref 0.1–0.9)
MONOCYTES NFR BLD AUTO: 10 % (ref 5–12)
NEUTROPHILS NFR BLD AUTO: 11.75 10*3/MM3 (ref 1.7–7)
NEUTROPHILS NFR BLD AUTO: 70.7 % (ref 42.7–76)
NRBC BLD AUTO-RTO: 0 /100 WBC (ref 0–0.2)
PLATELET # BLD AUTO: 347 10*3/MM3 (ref 140–450)
PMV BLD AUTO: 9.9 FL (ref 6–12)
POTASSIUM SERPL-SCNC: 4.2 MMOL/L (ref 3.5–5.2)
RBC # BLD AUTO: 3.22 10*6/MM3 (ref 3.77–5.28)
SODIUM SERPL-SCNC: 137 MMOL/L (ref 136–145)
WBC NRBC COR # BLD: 16.61 10*3/MM3 (ref 3.4–10.8)

## 2022-08-04 PROCEDURE — 82962 GLUCOSE BLOOD TEST: CPT

## 2022-08-04 PROCEDURE — 85014 HEMATOCRIT: CPT | Performed by: NURSE PRACTITIONER

## 2022-08-04 PROCEDURE — 85018 HEMOGLOBIN: CPT | Performed by: NURSE PRACTITIONER

## 2022-08-04 PROCEDURE — 85025 COMPLETE CBC W/AUTO DIFF WBC: CPT | Performed by: HOSPITALIST

## 2022-08-04 PROCEDURE — G0378 HOSPITAL OBSERVATION PER HR: HCPCS

## 2022-08-04 PROCEDURE — 80048 BASIC METABOLIC PNL TOTAL CA: CPT | Performed by: HOSPITALIST

## 2022-08-04 RX ORDER — LORAZEPAM 1 MG/1
1 TABLET ORAL EVERY 6 HOURS PRN
Status: DISCONTINUED | OUTPATIENT
Start: 2022-08-04 | End: 2022-08-08 | Stop reason: HOSPADM

## 2022-08-04 RX ADMIN — Medication 10 ML: at 09:51

## 2022-08-04 RX ADMIN — ACETAMINOPHEN 650 MG: 325 TABLET, FILM COATED ORAL at 16:32

## 2022-08-04 RX ADMIN — INSULIN GLARGINE-YFGN 8 UNITS: 100 INJECTION, SOLUTION SUBCUTANEOUS at 09:37

## 2022-08-04 RX ADMIN — Medication 10 ML: at 21:46

## 2022-08-04 RX ADMIN — DOCUSATE SODIUM 50MG AND SENNOSIDES 8.6MG 2 TABLET: 8.6; 5 TABLET, FILM COATED ORAL at 21:46

## 2022-08-04 RX ADMIN — MEMANTINE HYDROCHLORIDE 5 MG: 5 TABLET, FILM COATED ORAL at 09:51

## 2022-08-04 RX ADMIN — ATORVASTATIN CALCIUM 10 MG: 20 TABLET, FILM COATED ORAL at 09:51

## 2022-08-04 RX ADMIN — LEVOTHYROXINE SODIUM 88 MCG: 0.09 TABLET ORAL at 09:51

## 2022-08-04 RX ADMIN — DOCUSATE SODIUM 50MG AND SENNOSIDES 8.6MG 2 TABLET: 8.6; 5 TABLET, FILM COATED ORAL at 09:50

## 2022-08-04 NOTE — PLAN OF CARE
Goal Outcome Evaluation:      Vss. Remains confused, only A&O to self. States right shoulder hurts with activity, given pain medication at night time. Remains on RA, and NSR on the cardiac monitor. Is incontinent of bladder and bowel, has an external catheter in place, draining yellow urine. Bed alarm remains on.

## 2022-08-04 NOTE — PLAN OF CARE
Goal Outcome Evaluation:  Plan of Care Reviewed With: patient           Outcome Evaluation: pt is having more swelling in here upper chest, getting a MRI of it, oriented X self, RA, SR, daughter at bedside for alittle bit today, will cont to monitor  Problem: Adult Inpatient Plan of Care  Goal: Plan of Care Review  Outcome: Ongoing, Not Progressing  Flowsheets (Taken 8/4/2022 1607)  Plan of Care Reviewed With: patient  Outcome Evaluation: pt is having more swelling in here upper chest, getting a MRI of it, oriented X self, RA, SR, daughter at bedside for alittle bit today, will cont to monitor  Goal: Patient-Specific Goal (Individualized)  Outcome: Ongoing, Not Progressing  Goal: Absence of Hospital-Acquired Illness or Injury  Outcome: Ongoing, Not Progressing  Intervention: Identify and Manage Fall Risk  Description: Perform standard risk assessment on admission using a validated tool or comprehensive approach appropriate to the patient; reassess fall risk frequently, with change in status or transfer to another level of care.  Communicate fall injury risk to interprofessional healthcare team.  Determine need for increased observation, equipment and environmental modification, such as low bed, signage and supportive, nonskid footwear.  Adjust safety measures to individual developmental age, stage and identified risk factors.  Reinforce the importance of safety and physical activity with patient and family.  Perform regular intentional rounding to assess need for position change, pain assessment and personal needs, including assistance with toileting.  Recent Flowsheet Documentation  Taken 8/4/2022 1400 by Nay Abdullahi, RN  Safety Promotion/Fall Prevention:   activity supervised   assistive device/personal items within reach   clutter free environment maintained   fall prevention program maintained   nonskid shoes/slippers when out of bed   room organization consistent   safety round/check completed  Taken  8/4/2022 1253 by Nay Abdullahi RN  Safety Promotion/Fall Prevention:   activity supervised   assistive device/personal items within reach   clutter free environment maintained   fall prevention program maintained   nonskid shoes/slippers when out of bed   room organization consistent   safety round/check completed  Taken 8/4/2022 1023 by Nay Abdullahi RN  Safety Promotion/Fall Prevention:   activity supervised   assistive device/personal items within reach   clutter free environment maintained   fall prevention program maintained   nonskid shoes/slippers when out of bed   room organization consistent   safety round/check completed  Taken 8/4/2022 0800 by Nay Abdullahi RN  Safety Promotion/Fall Prevention:   activity supervised   assistive device/personal items within reach   clutter free environment maintained   fall prevention program maintained   nonskid shoes/slippers when out of bed   room organization consistent   safety round/check completed  Intervention: Prevent Skin Injury  Description: Perform a screening for skin injury risk, such as pressure or moisture associated skin damage on admission and at regular intervals throughout hospital stay.  Keep all areas of skin (especially folds) clean and dry.  Maintain adequate skin hydration.  Relieve and redistribute pressure and protect bony prominences; implement measures based on patient-specific risk factors.  Match turning and repositioning schedule to clinical condition.  Encourage weight shift frequently; assist with reposition if unable to complete independently.  Float heels off bed; avoid pressure on the Achilles tendon.  Keep skin free from extended contact with medical devices.  Encourage functional activity and mobility, as early as tolerated.  Use aids (e.g., slide boards, mechanical lift) during transfer.  Recent Flowsheet Documentation  Taken 8/4/2022 1400 by Nay Abdullahi, RN  Skin Protection: adhesive use limited  Taken 8/4/2022 0800 by  Kaylen, Nay, RN  Skin Protection: adhesive use limited  Intervention: Prevent and Manage VTE (Venous Thromboembolism) Risk  Description: Assess for VTE (venous thromboembolism) risk.  Encourage and assist with early ambulation.  Initiate and maintain compression or other therapy, as indicated, based on identified risk in accordance with organizational protocol and provider order.  Encourage both active and passive leg exercises while in bed, if unable to ambulate.  Recent Flowsheet Documentation  Taken 8/4/2022 1400 by Nay Abdullahi RN  Activity Management:   activity adjusted per tolerance   activity encouraged  VTE Prevention/Management:   bilateral   dorsiflexion/plantar flexion performed  Taken 8/4/2022 1253 by Nay Abdullahi RN  Activity Management:   activity adjusted per tolerance   activity encouraged  Taken 8/4/2022 1023 by Nay Abdullahi RN  Activity Management:   activity adjusted per tolerance   activity encouraged  Taken 8/4/2022 0800 by Nay Abdullahi RN  Activity Management:   activity adjusted per tolerance   activity encouraged  VTE Prevention/Management:   bilateral   dorsiflexion/plantar flexion performed  Intervention: Prevent Infection  Description: Maintain skin and mucous membrane integrity; promote hand, oral and pulmonary hygiene.  Optimize fluid balance, nutrition, sleep and glycemic control to maximize infection resistance.  Identify potential sources of infection early to prevent or mitigate progression of infection (e.g., wound, lines, devices).  Evaluate ongoing need for invasive devices; remove promptly when no longer indicated.  Recent Flowsheet Documentation  Taken 8/4/2022 1400 by Nay Abdullahi RN  Infection Prevention: single patient room provided  Taken 8/4/2022 1253 by Nay Abdullahi RN  Infection Prevention: single patient room provided  Taken 8/4/2022 1023 by Nay Abdullahi RN  Infection Prevention: single patient room provided  Taken 8/4/2022 0800 by  Nay Abdullahi RN  Infection Prevention: single patient room provided  Goal: Optimal Comfort and Wellbeing  Outcome: Ongoing, Not Progressing  Intervention: Provide Person-Centered Care  Description: Use a family-focused approach to care.  Develop trust and rapport by proactively providing information, encouraging questions, addressing concerns and offering reassurance.  Acknowledge emotional response to hospitalization.  Recognize and utilize personal coping strategies.  Honor spiritual and cultural preferences.  Recent Flowsheet Documentation  Taken 8/4/2022 1400 by Nay Abdullahi RN  Trust Relationship/Rapport:   care explained   thoughts/feelings acknowledged  Taken 8/4/2022 0800 by Nay Abdullahi RN  Trust Relationship/Rapport:   care explained   thoughts/feelings acknowledged  Goal: Readiness for Transition of Care  Outcome: Ongoing, Not Progressing

## 2022-08-04 NOTE — PROGRESS NOTES
"DAILY PROGRESS NOTE  [facility]    Patient Identification:  Name: Sam Folres  Age: 87 y.o.  Sex: female  :  1934  MRN: [id number]         Primary Care Physician: Reg Rivera MD    Subjective:  Interval History:She complains of chest wall pain.    Objective:    Scheduled Meds:atorvastatin, 10 mg, Oral, Daily  insulin glargine, 8 Units, Subcutaneous, Daily  insulin lispro, 0-7 Units, Subcutaneous, TID AC  levothyroxine, 88 mcg, Oral, Daily  memantine, 5 mg, Oral, Daily  senna-docusate sodium, 2 tablet, Oral, BID  sodium chloride, 10 mL, Intravenous, Q12H      Continuous Infusions:     Vital signs in last 24 hours:  Temp:  [98.1 °F (36.7 °C)-98.5 °F (36.9 °C)] 98.1 °F (36.7 °C)  Heart Rate:  [74-92] 74  Resp:  [16] 16  BP: (111-155)/(66-81) 118/76    Intake/Output:    Intake/Output Summary (Last 24 hours) at 2022 1439  Last data filed at 2022 1054  Gross per 24 hour   Intake 240 ml   Output 401 ml   Net -161 ml       Exam:  /76 (BP Location: Right arm, Patient Position: Lying)   Pulse 74   Temp 98.1 °F (36.7 °C) (Oral)   Resp 16   Ht 149.9 cm (59\")   Wt 45 kg (99 lb 3.3 oz)   SpO2 92%   BMI 20.04 kg/m²     General Appearance:    Alert, cooperative, no distress   Head:    Normocephalic, without obvious abnormality, atraumatic   Eyes:       Throat:   Lips, tongue, gums normal   Neck:   Supple, symmetrical, trachea midline, no JVD   Lungs:     Clear to auscultation bilaterally, respirations unlabored   Chest Wall:    Right chest wall hematoma right shoulder    Heart:    Regular rate and rhythm, S1 and S2 normal, no murmur,no  Rub or gallop   Abdomen:     Soft, nontender, bowel sounds active, no masses, no organomegaly    Extremities:   Extremities normal, atraumatic, no cyanosis or edema   Pulses:      Skin:   Skin is warm and dry,  no rashes or palpable lesions   Neurologic:   demented      Lab Results (last 72 hours)     Procedure Component Value Units " "Date/Time    Blood Culture ID, PCR - Blood, Arm, Left [[id number]]  (Abnormal) Collected: 08/02/22 1414    Specimen: Blood from Arm, Left Updated: 08/03/22 1206     BCID, PCR Staph spp, not aureus or lugdunesis. Identification by BCID2 PCR.     BOTTLE TYPE Aerobic Bottle    POC Glucose Once [[id number]]  (Normal) Collected: 08/03/22 1100    Specimen: Blood Updated: 08/03/22 1102     Glucose 86 mg/dL      Comment: Meter: [id number] : [id number] Geovanni HOLLINGSWORTH       Blood Culture - Blood, Arm, Left [[id number]]  (Abnormal) Collected: 08/02/22 1414    Specimen: Blood from Arm, Left Updated: 08/03/22 1050     Blood Culture Abnormal Stain     Gram Stain Aerobic Bottle Gram positive cocci    POC Glucose Once [[id number]]  (Abnormal) Collected: 08/03/22 0546    Specimen: Blood Updated: 08/03/22 0547     Glucose 137 mg/dL      Comment: Meter: [id number] : [id number] Liliandaisy Lugo DARRICK       Procalcitonin [[id number]]  (Normal) Collected: 08/03/22 0410    Specimen: Blood Updated: 08/03/22 0456     Procalcitonin 0.15 ng/mL     Narrative:      As a Marker for Sepsis (Non-Neonates):    1. <0.5 ng/mL represents a low risk of severe sepsis and/or septic shock.  2. >2 ng/mL represents a high risk of severe sepsis and/or septic shock.    As a Marker for Lower Respiratory Tract Infections that require antibiotic therapy:    PCT on Admission    Antibiotic Therapy       6-12 Hrs later    >0.5                Strongly Recommended  >0.25 - <0.5        Recommended   0.1 - 0.25          Discouraged              Remeasure/reassess PCT  <0.1                Strongly Discouraged     Remeasure/reassess PCT    As 28 day mortality risk marker: \"Change in Procalcitonin Result\" (>80% or <=80%) if Day 0 (or Day 1) and Day 4 values are available. Refer to http://www.[employer]-pct-calculator.com    Change in PCT <=80%  A decrease of PCT levels below or equal to 80% defines a positive change in PCT test result representing a higher risk for " 28-day all-cause mortality of patients diagnosed with severe sepsis for septic shock.    Change in PCT >80%  A decrease of PCT levels of more than 80% defines a negative change in PCT result representing a lower risk for 28-day all-cause mortality of patients diagnosed with severe sepsis or septic shock.       Basic Metabolic Panel [[id number]]  (Abnormal) Collected: 08/03/22 0410    Specimen: Blood Updated: 08/03/22 0449     Glucose 153 mg/dL      BUN 20 mg/dL      Creatinine 1.19 mg/dL      Sodium 139 mmol/L      Potassium 4.4 mmol/L      Chloride 105 mmol/L      CO2 23.0 mmol/L      Calcium 8.9 mg/dL      BUN/Creatinine Ratio 16.8     Anion Gap 11.0 mmol/L      eGFR 44.3 mL/min/1.73      Comment: National Kidney Foundation and American Society of Nephrology (ASN) Task Force recommended calculation based on the Chronic Kidney Disease Epidemiology Collaboration (CKD-EPI) equation refit without adjustment for race.       Narrative:      GFR Normal >60  Chronic Kidney Disease <60  Kidney Failure <15      Hemoglobin A1c [[id number]]  (Abnormal) Collected: 08/03/22 0410    Specimen: Blood Updated: 08/03/22 0441     Hemoglobin A1C 8.40 %     Narrative:      Hemoglobin A1C Ranges:    Increased Risk for Diabetes  5.7% to 6.4%  Diabetes                     >= 6.5%  Diabetic Goal                < 7.0%    Hemoglobin & Hematocrit, Blood [[id number]]  (Abnormal) Collected: 08/03/22 0410    Specimen: Blood Updated: 08/03/22 0430     Hemoglobin 11.2 g/dL      Hematocrit 34.5 %     CBC & Differential [[id number]]  (Abnormal) Collected: 08/03/22 0410    Specimen: Blood Updated: 08/03/22 0430    Narrative:      The following orders were created for panel order CBC & Differential.  Procedure                               Abnormality         Status                     ---------                               -----------         ------                     CBC Auto Differential[[id number]]        Abnormal            Final result                  Please view results for these tests on the individual orders.    CBC Auto Differential [[id number]]  (Abnormal) Collected: 08/03/22 0410    Specimen: Blood Updated: 08/03/22 0430     WBC 16.24 10*3/mm3      RBC 3.64 10*6/mm3      Hemoglobin 11.2 g/dL      Hematocrit 34.5 %      MCV 94.8 fL      MCH 30.8 pg      MCHC 32.5 g/dL      RDW 13.4 %      RDW-SD 46.5 fl      MPV 9.9 fL      Platelets 373 10*3/mm3      Neutrophil % 68.5 %      Lymphocyte % 20.0 %      Monocyte % 10.2 %      Eosinophil % 0.6 %      Basophil % 0.3 %      Immature Grans % 0.4 %      Neutrophils, Absolute 11.13 10*3/mm3      Lymphocytes, Absolute 3.25 10*3/mm3      Monocytes, Absolute 1.66 10*3/mm3      Eosinophils, Absolute 0.09 10*3/mm3      Basophils, Absolute 0.05 10*3/mm3      Immature Grans, Absolute 0.06 10*3/mm3      nRBC 0.0 /100 WBC     COVID PRE-OP / PRE-PROCEDURE SCREENING ORDER (NO ISOLATION) - Swab, Nasopharynx [[id number]]  (Normal) Collected: 08/02/22 1832    Specimen: Swab from Nasopharynx Updated: 08/02/22 2026    Narrative:      The following orders were created for panel order COVID PRE-OP / PRE-PROCEDURE SCREENING ORDER (NO ISOLATION) - Swab, Nasopharynx.  Procedure                               Abnormality         Status                     ---------                               -----------         ------                     COVID-19,[facility]...[[id number]]  Normal              Final result                 Please view results for these tests on the individual orders.    COVID-19,[facility] CEPHEID/CRUZITO NP SWAB IN TRANSPORT MEDIA 8-12 HR TAT - Swab, Nasopharynx [[id number]]  (Normal) Collected: 08/02/22 1832    Specimen: Swab from Nasopharynx Updated: 08/02/22 2026     COVID19 Not Detected    Narrative:      Fact sheet for providers: https://www.fda.gov/media/095470/download     Fact sheet for patients: https://www.fda.gov/media/718543/download    POC Glucose Once [[id number]]  (Abnormal) Collected: 08/02/22 2011     Specimen: Blood Updated: 08/02/22 2013     Glucose 188 mg/dL      Comment: Meter: [id number] : [id number] Fabiano [place]TH       Blood Culture - Blood, Arm, Right [[id number]] Collected: 08/02/22 1414    Specimen: Blood from Arm, Right Updated: 08/02/22 1420    Protime-INR [[id number]]  (Abnormal) Collected: 08/02/22 1328    Specimen: Blood Updated: 08/02/22 1359     Protime 14.6 Seconds      INR 1.15    aPTT [[id number]]  (Normal) Collected: 08/02/22 1328    Specimen: Blood Updated: 08/02/22 1359     PTT 31.5 seconds     Comprehensive Metabolic Panel [[id number]]  (Abnormal) Collected: 08/02/22 1228    Specimen: Blood Updated: 08/02/22 1306     Glucose 282 mg/dL      BUN 22 mg/dL      Creatinine 1.28 mg/dL      Sodium 134 mmol/L      Potassium 4.9 mmol/L      Comment: Slight hemolysis detected by analyzer. Results may be affected.        Chloride 102 mmol/L      CO2 20.2 mmol/L      Calcium 9.0 mg/dL      Total Protein 6.2 g/dL      Albumin 3.20 g/dL      ALT (SGPT) 12 U/L      AST (SGOT) 17 U/L      Alkaline Phosphatase 90 U/L      Total Bilirubin 0.4 mg/dL      Globulin 3.0 gm/dL      A/G Ratio 1.1 g/dL      BUN/Creatinine Ratio 17.2     Anion Gap 11.8 mmol/L      eGFR 40.6 mL/min/1.73      Comment: National Kidney Foundation and American Society of Nephrology (ASN) Task Force recommended calculation based on the Chronic Kidney Disease Epidemiology Collaboration (CKD-EPI) equation refit without adjustment for race.       Narrative:      GFR Normal >60  Chronic Kidney Disease <60  Kidney Failure <15      CBC & Differential [[id number]]  (Abnormal) Collected: 08/02/22 1228    Specimen: Blood Updated: 08/02/22 1245    Narrative:      The following orders were created for panel order CBC & Differential.  Procedure                               Abnormality         Status                     ---------                               -----------         ------                     CBC Auto Differential[[id number]]         Abnormal            Final result                 Please view results for these tests on the individual orders.    CBC Auto Differential [[id number]]  (Abnormal) Collected: 08/02/22 1228    Specimen: Blood Updated: 08/02/22 1245     WBC 17.03 10*3/mm3      RBC 4.17 10*6/mm3      Hemoglobin 12.6 g/dL      Hematocrit 38.9 %      MCV 93.3 fL      MCH 30.2 pg      MCHC 32.4 g/dL      RDW 13.3 %      RDW-SD 45.2 fl      MPV 9.8 fL      Platelets 395 10*3/mm3      Neutrophil % 88.1 %      Lymphocyte % 6.2 %      Monocyte % 4.9 %      Eosinophil % 0.1 %      Basophil % 0.2 %      Immature Grans % 0.5 %      Neutrophils, Absolute 15.02 10*3/mm3      Lymphocytes, Absolute 1.05 10*3/mm3      Monocytes, Absolute 0.83 10*3/mm3      Eosinophils, Absolute 0.01 10*3/mm3      Basophils, Absolute 0.04 10*3/mm3      Immature Grans, Absolute 0.08 10*3/mm3      nRBC 0.0 /100 WBC         Data Review:  Results from last 7 days   Lab Units 08/04/22  0416 08/03/22  0410 08/02/22  1228   SODIUM mmol/L 137 139 134*   POTASSIUM mmol/L 4.2 4.4 4.9   CHLORIDE mmol/L 102 105 102   CO2 mmol/L 22.0 23.0 20.2*   BUN mg/dL 22 20 22   CREATININE mg/dL 1.20* 1.19* 1.28*   GLUCOSE mg/dL 139* 153* 282*   CALCIUM mg/dL 8.5* 8.9 9.0     Results from last 7 days   Lab Units 08/04/22  0416 08/03/22  1612 08/03/22  0410 08/02/22  1228   WBC 10*3/mm3 16.61*  --  16.24* 17.03*   HEMOGLOBIN g/dL 9.8*  9.8* 10.3* 11.2*  11.2* 12.6   HEMATOCRIT % 29.9*  29.9* 30.5* 34.5  34.5 38.9   PLATELETS 10*3/mm3 347  --  373 395         Results from last 7 days   Lab Units 08/03/22  0410   HEMOGLOBIN A1C % 8.40*     Lab Results   Lab Value Date/Time    TROPONINT 0.020 03/12/2018 1304    TROPONINT <0.010 11/30/2016 1946         Results from last 7 days   Lab Units 08/02/22  1228   ALK PHOS U/L 90   BILIRUBIN mg/dL 0.4   ALT (SGPT) U/L 12   AST (SGOT) U/L 17         Results from last 7 days   Lab Units 08/03/22  0410   HEMOGLOBIN A1C % 8.40*     Glucose   Date/Time Value  Ref Range Status   08/04/2022 1056 127 70 - 130 mg/dL Final     Comment:     Meter: [id number] : [id number] [place] NA   08/04/2022 0545 144 (H) 70 - 130 mg/dL Final     Comment:     Meter: [id number] : [id number] Fabiano Lópezia NA   08/03/2022 2038 167 (H) 70 - 130 mg/dL Final     Comment:     Meter: [id number] : [id number] Lilian[place] Alexia NA   08/03/2022 1554 114 70 - 130 mg/dL Final     Comment:     Meter: [id number] : [id number] Galarza [place] NA   08/03/2022 1100 86 70 - 130 mg/dL Final     Comment:     Meter: [id number] : [id number] Galarza [place] NA   08/03/2022 0546 137 (H) 70 - 130 mg/dL Final     Comment:     Meter: [id number] : [id number] Fabiano Alexia NA   08/02/2022 2011 188 (H) 70 - 130 mg/dL Final     Comment:     Meter: [id number] : [id number] Fabiano Alexia NA     Results from last 7 days   Lab Units 08/02/22  1328   INR  1.15*       Past Medical History:   Diagnosis Date   • Dementia (HCC)    • Diabetes mellitus (HCC)    • Disease of thyroid gland    • Emphysema of lung (HCC)    • Hyperlipidemia    • Hypertension    • Lumbar compression fracture (HCC) 11/30/2016   • Osteoporosis    • S/P kyphoplasty 12/5/2016    L1 and L4, 12/2/2016       Assessment:  Active Hospital Problems    Diagnosis  POA   • **Chest wall hematoma, right, initial encounter [S20.211A]  Yes   • Dementia (HCC) [F03.90]  Unknown   • Type 2 diabetes mellitus without complication (HCC) [E11.9]  Yes   • Benign essential hypertension [I10]  Yes      Resolved Hospital Problems   No resolved problems to display.       Plan:  Continue with current RX. Follow lab. Thoracic surgery consult noted. Maybe back to SNU sometime.    LTC  if stable.  Follow hgb.  Get MRI right shoulder    Kp Sky MD  8/4/2022  14:39 EDT

## 2022-08-05 ENCOUNTER — APPOINTMENT (OUTPATIENT)
Dept: MRI IMAGING | Facility: HOSPITAL | Age: 87
End: 2022-08-05

## 2022-08-05 ENCOUNTER — APPOINTMENT (OUTPATIENT)
Dept: CT IMAGING | Facility: HOSPITAL | Age: 87
End: 2022-08-05

## 2022-08-05 LAB
ANION GAP SERPL CALCULATED.3IONS-SCNC: 13.9 MMOL/L (ref 5–15)
BASOPHILS # BLD AUTO: 0.05 10*3/MM3 (ref 0–0.2)
BASOPHILS NFR BLD AUTO: 0.4 % (ref 0–1.5)
BUN SERPL-MCNC: 19 MG/DL (ref 8–23)
BUN/CREAT SERPL: 18.3 (ref 7–25)
CALCIUM SPEC-SCNC: 8.7 MG/DL (ref 8.6–10.5)
CHLORIDE SERPL-SCNC: 103 MMOL/L (ref 98–107)
CO2 SERPL-SCNC: 21.1 MMOL/L (ref 22–29)
CREAT SERPL-MCNC: 1.04 MG/DL (ref 0.57–1)
DEPRECATED RDW RBC AUTO: 44.4 FL (ref 37–54)
EGFRCR SERPLBLD CKD-EPI 2021: 52.1 ML/MIN/1.73
EOSINOPHIL # BLD AUTO: 0.55 10*3/MM3 (ref 0–0.4)
EOSINOPHIL NFR BLD AUTO: 4.8 % (ref 0.3–6.2)
ERYTHROCYTE [DISTWIDTH] IN BLOOD BY AUTOMATED COUNT: 13.1 % (ref 12.3–15.4)
GLUCOSE BLDC GLUCOMTR-MCNC: 132 MG/DL (ref 70–130)
GLUCOSE BLDC GLUCOMTR-MCNC: 208 MG/DL (ref 70–130)
GLUCOSE BLDC GLUCOMTR-MCNC: 84 MG/DL (ref 70–130)
GLUCOSE BLDC GLUCOMTR-MCNC: 92 MG/DL (ref 70–130)
GLUCOSE SERPL-MCNC: 81 MG/DL (ref 65–99)
HCT VFR BLD AUTO: 29.2 % (ref 34–46.6)
HCT VFR BLD AUTO: 29.2 % (ref 34–46.6)
HCT VFR BLD AUTO: 29.5 % (ref 34–46.6)
HGB BLD-MCNC: 10.1 G/DL (ref 12–15.9)
HGB BLD-MCNC: 9.7 G/DL (ref 12–15.9)
HGB BLD-MCNC: 9.7 G/DL (ref 12–15.9)
IMM GRANULOCYTES # BLD AUTO: 0.04 10*3/MM3 (ref 0–0.05)
IMM GRANULOCYTES NFR BLD AUTO: 0.3 % (ref 0–0.5)
LYMPHOCYTES # BLD AUTO: 2.68 10*3/MM3 (ref 0.7–3.1)
LYMPHOCYTES NFR BLD AUTO: 23.3 % (ref 19.6–45.3)
MCH RBC QN AUTO: 30.6 PG (ref 26.6–33)
MCHC RBC AUTO-ENTMCNC: 33.2 G/DL (ref 31.5–35.7)
MCV RBC AUTO: 92.1 FL (ref 79–97)
MONOCYTES # BLD AUTO: 1.42 10*3/MM3 (ref 0.1–0.9)
MONOCYTES NFR BLD AUTO: 12.4 % (ref 5–12)
NEUTROPHILS NFR BLD AUTO: 58.8 % (ref 42.7–76)
NEUTROPHILS NFR BLD AUTO: 6.75 10*3/MM3 (ref 1.7–7)
NRBC BLD AUTO-RTO: 0 /100 WBC (ref 0–0.2)
PLATELET # BLD AUTO: 341 10*3/MM3 (ref 140–450)
PMV BLD AUTO: 9.8 FL (ref 6–12)
POTASSIUM SERPL-SCNC: 3.5 MMOL/L (ref 3.5–5.2)
QT INTERVAL: 364 MS
RBC # BLD AUTO: 3.17 10*6/MM3 (ref 3.77–5.28)
SODIUM SERPL-SCNC: 138 MMOL/L (ref 136–145)
WBC NRBC COR # BLD: 11.49 10*3/MM3 (ref 3.4–10.8)

## 2022-08-05 PROCEDURE — 80048 BASIC METABOLIC PNL TOTAL CA: CPT | Performed by: HOSPITALIST

## 2022-08-05 PROCEDURE — 97530 THERAPEUTIC ACTIVITIES: CPT

## 2022-08-05 PROCEDURE — 85014 HEMATOCRIT: CPT | Performed by: NURSE PRACTITIONER

## 2022-08-05 PROCEDURE — 82962 GLUCOSE BLOOD TEST: CPT

## 2022-08-05 PROCEDURE — 85018 HEMOGLOBIN: CPT | Performed by: NURSE PRACTITIONER

## 2022-08-05 PROCEDURE — 93010 ELECTROCARDIOGRAM REPORT: CPT | Performed by: INTERNAL MEDICINE

## 2022-08-05 PROCEDURE — 85025 COMPLETE CBC W/AUTO DIFF WBC: CPT | Performed by: HOSPITALIST

## 2022-08-05 PROCEDURE — 71250 CT THORAX DX C-: CPT

## 2022-08-05 PROCEDURE — 73221 MRI JOINT UPR EXTREM W/O DYE: CPT

## 2022-08-05 PROCEDURE — 93005 ELECTROCARDIOGRAM TRACING: CPT | Performed by: HOSPITALIST

## 2022-08-05 RX ADMIN — MEMANTINE HYDROCHLORIDE 5 MG: 5 TABLET, FILM COATED ORAL at 07:57

## 2022-08-05 RX ADMIN — LORAZEPAM 1 MG: 1 TABLET ORAL at 07:57

## 2022-08-05 RX ADMIN — ATORVASTATIN CALCIUM 10 MG: 20 TABLET, FILM COATED ORAL at 07:58

## 2022-08-05 RX ADMIN — LEVOTHYROXINE SODIUM 88 MCG: 0.09 TABLET ORAL at 07:57

## 2022-08-05 RX ADMIN — Medication 10 ML: at 07:58

## 2022-08-05 RX ADMIN — INSULIN GLARGINE-YFGN 8 UNITS: 100 INJECTION, SOLUTION SUBCUTANEOUS at 08:45

## 2022-08-05 RX ADMIN — DOCUSATE SODIUM 50MG AND SENNOSIDES 8.6MG 2 TABLET: 8.6; 5 TABLET, FILM COATED ORAL at 07:58

## 2022-08-05 RX ADMIN — Medication 10 ML: at 20:18

## 2022-08-05 RX ADMIN — DOCUSATE SODIUM 50MG AND SENNOSIDES 8.6MG 2 TABLET: 8.6; 5 TABLET, FILM COATED ORAL at 20:18

## 2022-08-05 NOTE — PLAN OF CARE
Problem: Adult Inpatient Plan of Care  Goal: Plan of Care Review  Flowsheets  Taken 8/5/2022 0459 by Sarai Hamilton RN  Plan of Care Reviewed With: patient  Outcome Evaluation: baseball size swelling to right chest very tender with bruising around and into left chest awaiting mri not taking in much po turned q 2 hours  Taken 8/2/2022 1753 by Kati Morrison, RN  Progress: no change   Goal Outcome Evaluation:  Plan of Care Reviewed With: patient           Outcome Evaluation: baseball size swelling to right chest very tender with bruising around and into left chest awaiting mri not taking in much po turned q 2 hours

## 2022-08-05 NOTE — THERAPY TREATMENT NOTE
Patient Name: Sam Flores  : 1934    MRN: [id number]                              Today's Date: 2022       Admit Date: 2022    Visit Dx:     ICD-10-CM ICD-9-CM   1. Chest wall hematoma, right, initial encounter  S20.211A 922.1   2. Leukocytosis, unspecified type  D72.829 288.60     Patient Active Problem List   Diagnosis   • Lumbar compression fracture (HCC)   • Osteoporosis   • Dehydration   • Acute on chronic renal failure (HCC)   • Type 2 diabetes mellitus without complication (HCC)   • Benign essential hypertension   • Weight loss, abnormal   • Trouble swallowing   • Hyponatremia   • Hypokalemia   • Aspiration pneumonia (HCC)   • Chest wall hematoma, right, initial encounter   • Dementia (HCC)     Past Medical History:   Diagnosis Date   • Dementia (HCC)    • Diabetes mellitus (HCC)    • Disease of thyroid gland    • Emphysema of lung (HCC)    • Hyperlipidemia    • Hypertension    • Lumbar compression fracture (HCC) 2016   • Osteoporosis    • S/P kyphoplasty 2016    L1 and L4, 2016     Past Surgical History:   Procedure Laterality Date   •  SECTION     • KYPHOPLASTY      T12 and L2   • KYPHOPLASTY N/A 2016    Procedure: KYPHOPLASTY LUMBAR 1 AND LUMBAR 4;  Surgeon: Triston Marinelli IV, MD;  Location: [facility] ;  Service:    • MASTECTOMY     • SHOULDER SURGERY Right       General Information     Row Name 22 141          Physical Therapy Time and Intention    Document Type therapy note (daily note)  -PH     Mode of Treatment physical therapy  -PH     Row Name 22 141          General Information    Prior Level of Function --   per son, pt has not amb in over a year; w/c transfer only  -PH     Existing Precautions/Restrictions fall  -PH     Barriers to Rehab medically complex;previous functional deficit;cognitive status  -PH     Row Name 22 141          Safety Issues, Functional Mobility    Impairments Affecting Function (Mobility)  balance;endurance/activity tolerance;strength  -PH     Comment, Safety Issues/Impairments (Mobility) gt belt and non skid socks donned for pt safety; pt refuses to let PTA touch R UE 2/2 extreme pain from R hematoma on chest;  -PH           User Key  (r) = Recorded By, (t) = Taken By, (c) = Cosigned By    Initials Name Provider Type     Sarai Yoon PTA Physical Therapist Assistant               Mobility     Row Name 08/05/22 1413          Bed Mobility    Bed Mobility supine-sit;sit-supine  -PH     Supine-Sit [place] (Bed Mobility) moderate assist (50% patient effort);maximum assist (25% patient effort);verbal cues;nonverbal cues (demo/gesture)  -PH     Sit-Supine [place] (Bed Mobility) maximum assist (25% patient effort);2 person assist;verbal cues;nonverbal cues (demo/gesture)  -PH     Assistive Device (Bed Mobility) draw sheet;head of bed elevated  -PH     Comment, (Bed Mobility) pt will not allow R chest and R UE to be touched 2/2 severe pain req use of draw sheet  -PH     Row Name 08/05/22 1413          Sit-Stand Transfer    Sit-Stand [place] (Transfers) dependent (less than 25% patient effort);2 person assist;verbal cues;nonverbal cues (demo/gesture)  -PH     Assistive Device (Sit-Stand Transfers) other (see comments)  HHA x 1  -PH     Comment, (Sit-Stand Transfer) pt resisting w/ posterior lean and cleared bed by a few inches; hip and B knees remain in flexion  -PH     Row Name 08/05/22 1413          Gait/Stairs (Locomotion)    [place] Level (Gait) unable to assess  -PH     [place] Level (Stairs) unable to assess  -PH           User Key  (r) = Recorded By, (t) = Taken By, (c) = Cosigned By    Initials Name Provider Type     Sarai Yoon PTA Physical Therapist Assistant               Obj/Interventions     Row Name 08/05/22 1415          Motor Skills    Therapeutic Exercise other (see comments)  LAQ x 6; difficult keeping pt on task despite redirect w/ pt  pleasantly talkative  -PH     Row Name 08/05/22 1415          Balance    Balance Assessment sitting static balance  -PH     Static Sitting Balance minimal assist;moderate assist  -PH     Balance Interventions sitting;moderate challenge  -PH     Comment, Balance posterior and L lean w/ cues to correct seated at EOB w/ time spent finding midline.  -PH           User Key  (r) = Recorded By, (t) = Taken By, (c) = Cosigned By    Initials Name Provider Type     Sarai Yoon PTA Physical Therapist Assistant               Goals/Plan    No documentation.                Clinical Impression     Row Name 08/05/22 1417          Pain    Pain Location - Side/Orientation Right  -PH     Pain Location upper  -PH     Pain Location - chest;extremity  -PH     Pre/Posttreatment Pain Comment extreme pain when touched or moved  -PH     Pain Intervention(s) Rest;Elevated;Repositioned  -PH     Row Name 08/05/22 1417          Plan of Care Review    Plan of Care Reviewed With patient  -PH     Progress no change  -PH     Outcome Evaluation Pt pleasantly confused and agreeable to PT. Pt would not allow R upper chest or R UE to be touched 2/2 severe pain during session. Pt req mod/max A to sit up to EOB w/ use of transfer sheet. 2 attempts made to stand pt w/ dep A x 2 and HHA x 1 w/ pt exhibiting strong posterior lean and able to clear bottom by approx 2 inches. Per RN, pt's son stated pt has not amb for approx 1 year and is w/c at baseline. Pt returned to bed req max A x 2. PT will prog as pt conor.  -PH     Row Name 08/05/22 1417          Positioning and Restraints    Pre-Treatment Position in bed  -PH     Post Treatment Position bed  -PH     In Bed notified nsg;fowlers;call light within reach;encouraged to call for assist;exit alarm on  -PH           User Key  (r) = Recorded By, (t) = Taken By, (c) = Cosigned By    Initials Name Provider Type     Sarai Yoon PTA Physical Therapist Assistant               Outcome  Measures     Row Name 08/05/22 1420          How much help from another person do you currently need...    Turning from your back to your side while in flat bed without using bedrails? 2  -PH     Moving from lying on back to sitting on the side of a flat bed without bedrails? 2  -PH     Moving to and from a bed to a chair (including a wheelchair)? 1  -PH     Standing up from a chair using your arms (e.g., wheelchair, bedside chair)? 1  -PH     Climbing 3-5 steps with a railing? 1  -PH     To walk in hospital room? 1  -PH     AM-PAC 6 Clicks Score (PT) 8  -PH     Highest level of mobility 3 --> Sat at edge of bed  -PH     Row Name 08/05/22 1420          Functional Assessment    Outcome Measure Options AM-PAC 6 Clicks Basic Mobility (PT)  -           User Key  (r) = Recorded By, (t) = Taken By, (c) = Cosigned By    Initials Name Provider Type    PH Sarai Yoon, GILLIAN Physical Therapist Assistant                             Physical Therapy Education                 Title: PT OT SLP Therapies (In Progress)     Topic: Physical Therapy (In Progress)     Point: Mobility training (In Progress)     Learning Progress Summary           Patient Acceptance, E,D, NR by  at 8/5/2022 1420    Acceptance, E,D, NR by MS at 8/3/2022 1540                   Point: Home exercise program (In Progress)     Learning Progress Summary           Patient Acceptance, E,D, NR by  at 8/5/2022 1420                   Point: Body mechanics (In Progress)     Learning Progress Summary           Patient Acceptance, E,D, NR by  at 8/5/2022 1420    Acceptance, E,D, NR by MS at 8/3/2022 1540                   Point: Precautions (In Progress)     Learning Progress Summary           Patient Acceptance, E,D, NR by  at 8/5/2022 1420    Acceptance, E,D, NR by MS at 8/3/2022 1540                               User Key     Initials Effective Dates Name Provider Type Discipline    MS 06/16/21 -  Virgil Osuna, PT Physical Therapist PT      06/16/21 -  Sarai Yoon PTA Physical Therapist Assistant PT              PT Recommendation and Plan     Plan of Care Reviewed With: patient  Progress: no change  Outcome Evaluation: Pt pleasantly confused and agreeable to PT. Pt would not allow R upper chest or R UE to be touched 2/2 severe pain during session. Pt req mod/max A to sit up to EOB w/ use of transfer sheet. 2 attempts made to stand pt w/ dep A x 2 and HHA x 1 w/ pt exhibiting strong posterior lean and able to clear bottom by approx 2 inches. Per RN, pt's son stated pt has not amb for approx 1 year and is w/c at baseline. Pt returned to bed req max A x 2. PT will prog as pt conor.     Time Calculation:    PT Charges     Row Name 08/05/22 1421             Time Calculation    Start Time 1349  -PH      Stop Time 1406  -PH      Time Calculation (min) 17 min  -PH      PT Received On 08/05/22  -PH      PT - Next Appointment 08/08/22  -PH              Timed Charges    62274 - PT Therapeutic Activity Minutes 17  -PH              Total Minutes    Timed Charges Total Minutes 17  -PH       Total Minutes 17  -PH            User Key  (r) = Recorded By, (t) = Taken By, (c) = Cosigned By    Initials Name Provider Type    PH Sarai Yoon PTA Physical Therapist Assistant              Therapy Charges for Today     Code Description Service Date Service Provider Modifiers Qty    35384588156  PT THERAPEUTIC ACT EA 15 MIN 8/5/2022 Sarai Yoon PTA GP 1    65933177875 HC PT THER SUPP EA 15 MIN 8/5/2022 Sarai Yoon PTA GP 1          PT G-Codes  Outcome Measure Options: AM-PAC 6 Clicks Basic Mobility (PT)  AM-PAC 6 Clicks Score (PT): 8  AM-PAC 6 Clicks Score (OT): 7    Sarai Yoon PTA  8/5/2022

## 2022-08-05 NOTE — PROGRESS NOTES
"DAILY PROGRESS NOTE  [facility]    Patient Identification:  Name: Sam Floers  Age: 87 y.o.  Sex: female  :  1934  MRN: [id number]         Primary Care Physician: Reg Rivera MD    Subjective:  Interval History:She complains of chest wall pain.  Hematoma is still getting bigger.    Objective:    Scheduled Meds:atorvastatin, 10 mg, Oral, Daily  insulin glargine, 8 Units, Subcutaneous, Daily  insulin lispro, 0-7 Units, Subcutaneous, TID AC  levothyroxine, 88 mcg, Oral, Daily  memantine, 5 mg, Oral, Daily  senna-docusate sodium, 2 tablet, Oral, BID  sodium chloride, 10 mL, Intravenous, Q12H      Continuous Infusions:     Vital signs in last 24 hours:  Temp:  [97.8 °F (36.6 °C)-98.3 °F (36.8 °C)] 97.8 °F (36.6 °C)  Heart Rate:  [74-97] 97  Resp:  [16] 16  BP: ()/(58-95) 124/73    Intake/Output:    Intake/Output Summary (Last 24 hours) at 2022 1430  Last data filed at 2022 0900  Gross per 24 hour   Intake 340 ml   Output 650 ml   Net -310 ml       Exam:  /73 (BP Location: Left arm, Patient Position: Lying)   Pulse 97   Temp 97.8 °F (36.6 °C) (Oral)   Resp 16   Ht 149.9 cm (59\")   Wt 45 kg (99 lb 3.3 oz)   SpO2 98%   BMI 20.04 kg/m²     General Appearance:    Alert, cooperative, no distress   Head:    Normocephalic, without obvious abnormality, atraumatic   Eyes:       Throat:   Lips, tongue, gums normal   Neck:   Supple, symmetrical, trachea midline, no JVD   Lungs:     Clear to auscultation bilaterally, respirations unlabored   Chest Wall:    Right chest wall hematoma right shoulder    Heart:    Regular rate and rhythm, S1 and S2 normal, no murmur,no  Rub or gallop   Abdomen:     Soft, nontender, bowel sounds active, no masses, no organomegaly    Extremities:   Extremities normal, atraumatic, no cyanosis or edema   Pulses:      Skin:   Skin is warm and dry,  no rashes or palpable lesions   Neurologic:   demented      Lab Results (last 72 hours)     " "Procedure Component Value Units Date/Time    Blood Culture ID, PCR - Blood, Arm, Left [[id number]]  (Abnormal) Collected: 08/02/22 1414    Specimen: Blood from Arm, Left Updated: 08/03/22 1206     BCID, PCR Staph spp, not aureus or lugdunesis. Identification by BCID2 PCR.     BOTTLE TYPE Aerobic Bottle    POC Glucose Once [[id number]]  (Normal) Collected: 08/03/22 1100    Specimen: Blood Updated: 08/03/22 1102     Glucose 86 mg/dL      Comment: Meter: [id number] : [id number] Geovanni HOLLINGSWORTH       Blood Culture - Blood, Arm, Left [[id number]]  (Abnormal) Collected: 08/02/22 1414    Specimen: Blood from Arm, Left Updated: 08/03/22 1050     Blood Culture Abnormal Stain     Gram Stain Aerobic Bottle Gram positive cocci    POC Glucose Once [[id number]]  (Abnormal) Collected: 08/03/22 0546    Specimen: Blood Updated: 08/03/22 0547     Glucose 137 mg/dL      Comment: Meter: [id number] : [id number] Fabiano Brittney DARRICK       Procalcitonin [[id number]]  (Normal) Collected: 08/03/22 0410    Specimen: Blood Updated: 08/03/22 0456     Procalcitonin 0.15 ng/mL     Narrative:      As a Marker for Sepsis (Non-Neonates):    1. <0.5 ng/mL represents a low risk of severe sepsis and/or septic shock.  2. >2 ng/mL represents a high risk of severe sepsis and/or septic shock.    As a Marker for Lower Respiratory Tract Infections that require antibiotic therapy:    PCT on Admission    Antibiotic Therapy       6-12 Hrs later    >0.5                Strongly Recommended  >0.25 - <0.5        Recommended   0.1 - 0.25          Discouraged              Remeasure/reassess PCT  <0.1                Strongly Discouraged     Remeasure/reassess PCT    As 28 day mortality risk marker: \"Change in Procalcitonin Result\" (>80% or <=80%) if Day 0 (or Day 1) and Day 4 values are available. Refer to http://www.[facility]-pct-calculator.com    Change in PCT <=80%  A decrease of PCT levels below or equal to 80% defines a positive change in PCT test result " representing a higher risk for 28-day all-cause mortality of patients diagnosed with severe sepsis for septic shock.    Change in PCT >80%  A decrease of PCT levels of more than 80% defines a negative change in PCT result representing a lower risk for 28-day all-cause mortality of patients diagnosed with severe sepsis or septic shock.       Basic Metabolic Panel [[id number]]  (Abnormal) Collected: 08/03/22 0410    Specimen: Blood Updated: 08/03/22 0449     Glucose 153 mg/dL      BUN 20 mg/dL      Creatinine 1.19 mg/dL      Sodium 139 mmol/L      Potassium 4.4 mmol/L      Chloride 105 mmol/L      CO2 23.0 mmol/L      Calcium 8.9 mg/dL      BUN/Creatinine Ratio 16.8     Anion Gap 11.0 mmol/L      eGFR 44.3 mL/min/1.73      Comment: National Kidney Foundation and American Society of Nephrology (ASN) Task Force recommended calculation based on the Chronic Kidney Disease Epidemiology Collaboration (CKD-EPI) equation refit without adjustment for race.       Narrative:      GFR Normal >60  Chronic Kidney Disease <60  Kidney Failure <15      Hemoglobin A1c [[id number]]  (Abnormal) Collected: 08/03/22 0410    Specimen: Blood Updated: 08/03/22 0441     Hemoglobin A1C 8.40 %     Narrative:      Hemoglobin A1C Ranges:    Increased Risk for Diabetes  5.7% to 6.4%  Diabetes                     >= 6.5%  Diabetic Goal                < 7.0%    Hemoglobin & Hematocrit, Blood [[id number]]  (Abnormal) Collected: 08/03/22 0410    Specimen: Blood Updated: 08/03/22 0430     Hemoglobin 11.2 g/dL      Hematocrit 34.5 %     CBC & Differential [[id number]]  (Abnormal) Collected: 08/03/22 0410    Specimen: Blood Updated: 08/03/22 0430    Narrative:      The following orders were created for panel order CBC & Differential.  Procedure                               Abnormality         Status                     ---------                               -----------         ------                     CBC Auto Differential[[id number]]        Abnormal             Final result                 Please view results for these tests on the individual orders.    CBC Auto Differential [[id number]]  (Abnormal) Collected: 08/03/22 0410    Specimen: Blood Updated: 08/03/22 0430     WBC 16.24 10*3/mm3      RBC 3.64 10*6/mm3      Hemoglobin 11.2 g/dL      Hematocrit 34.5 %      MCV 94.8 fL      MCH 30.8 pg      MCHC 32.5 g/dL      RDW 13.4 %      RDW-SD 46.5 fl      MPV 9.9 fL      Platelets 373 10*3/mm3      Neutrophil % 68.5 %      Lymphocyte % 20.0 %      Monocyte % 10.2 %      Eosinophil % 0.6 %      Basophil % 0.3 %      Immature Grans % 0.4 %      Neutrophils, Absolute 11.13 10*3/mm3      Lymphocytes, Absolute 3.25 10*3/mm3      Monocytes, Absolute 1.66 10*3/mm3      Eosinophils, Absolute 0.09 10*3/mm3      Basophils, Absolute 0.05 10*3/mm3      Immature Grans, Absolute 0.06 10*3/mm3      nRBC 0.0 /100 WBC     COVID PRE-OP / PRE-PROCEDURE SCREENING ORDER (NO ISOLATION) - Swab, Nasopharynx [[id number]]  (Normal) Collected: 08/02/22 1832    Specimen: Swab from Nasopharynx Updated: 08/02/22 2026    Narrative:      The following orders were created for panel order COVID PRE-OP / PRE-PROCEDURE SCREENING ORDER (NO ISOLATION) - Swab, Nasopharynx.  Procedure                               Abnormality         Status                     ---------                               -----------         ------                     COVID-19,[facility]...[[id number]]  Normal              Final result                 Please view results for these tests on the individual orders.    COVID-19,[facility] CEPHEID/CRUZITO NP SWAB IN TRANSPORT MEDIA 8-12 HR TAT - Swab, Nasopharynx [[id number]]  (Normal) Collected: 08/02/22 1832    Specimen: Swab from Nasopharynx Updated: 08/02/22 2026     COVID19 Not Detected    Narrative:      Fact sheet for providers: https://www.fda.gov/media/567369/download     Fact sheet for patients: https://www.fda.gov/media/994070/download    POC Glucose Once [[id number]]   (Abnormal) Collected: 08/02/22 2011    Specimen: Blood Updated: 08/02/22 2013     Glucose 188 mg/dL      Comment: Meter: [id number] : [id number] Fabiano HOLLINGSWORTH       Blood Culture - Blood, Arm, Right [[id number]] Collected: 08/02/22 1414    Specimen: Blood from Arm, Right Updated: 08/02/22 1420    Protime-INR [[id number]]  (Abnormal) Collected: 08/02/22 1328    Specimen: Blood Updated: 08/02/22 1359     Protime 14.6 Seconds      INR 1.15    aPTT [[id number]]  (Normal) Collected: 08/02/22 1328    Specimen: Blood Updated: 08/02/22 1359     PTT 31.5 seconds     Comprehensive Metabolic Panel [[id number]]  (Abnormal) Collected: 08/02/22 1228    Specimen: Blood Updated: 08/02/22 1306     Glucose 282 mg/dL      BUN 22 mg/dL      Creatinine 1.28 mg/dL      Sodium 134 mmol/L      Potassium 4.9 mmol/L      Comment: Slight hemolysis detected by analyzer. Results may be affected.        Chloride 102 mmol/L      CO2 20.2 mmol/L      Calcium 9.0 mg/dL      Total Protein 6.2 g/dL      Albumin 3.20 g/dL      ALT (SGPT) 12 U/L      AST (SGOT) 17 U/L      Alkaline Phosphatase 90 U/L      Total Bilirubin 0.4 mg/dL      Globulin 3.0 gm/dL      A/G Ratio 1.1 g/dL      BUN/Creatinine Ratio 17.2     Anion Gap 11.8 mmol/L      eGFR 40.6 mL/min/1.73      Comment: National Kidney Foundation and American Society of Nephrology (ASN) Task Force recommended calculation based on the Chronic Kidney Disease Epidemiology Collaboration (CKD-EPI) equation refit without adjustment for race.       Narrative:      GFR Normal >60  Chronic Kidney Disease <60  Kidney Failure <15      CBC & Differential [[id number]]  (Abnormal) Collected: 08/02/22 1228    Specimen: Blood Updated: 08/02/22 1245    Narrative:      The following orders were created for panel order CBC & Differential.  Procedure                               Abnormality         Status                     ---------                               -----------         ------                      CBC Auto Differential[[id number]]        Abnormal            Final result                 Please view results for these tests on the individual orders.    CBC Auto Differential [[id number]]  (Abnormal) Collected: 08/02/22 1228    Specimen: Blood Updated: 08/02/22 1245     WBC 17.03 10*3/mm3      RBC 4.17 10*6/mm3      Hemoglobin 12.6 g/dL      Hematocrit 38.9 %      MCV 93.3 fL      MCH 30.2 pg      MCHC 32.4 g/dL      RDW 13.3 %      RDW-SD 45.2 fl      MPV 9.8 fL      Platelets 395 10*3/mm3      Neutrophil % 88.1 %      Lymphocyte % 6.2 %      Monocyte % 4.9 %      Eosinophil % 0.1 %      Basophil % 0.2 %      Immature Grans % 0.5 %      Neutrophils, Absolute 15.02 10*3/mm3      Lymphocytes, Absolute 1.05 10*3/mm3      Monocytes, Absolute 0.83 10*3/mm3      Eosinophils, Absolute 0.01 10*3/mm3      Basophils, Absolute 0.04 10*3/mm3      Immature Grans, Absolute 0.08 10*3/mm3      nRBC 0.0 /100 WBC         Data Review:  Results from last 7 days   Lab Units 08/05/22  0410 08/04/22  0416 08/03/22  0410   SODIUM mmol/L 138 137 139   POTASSIUM mmol/L 3.5 4.2 4.4   CHLORIDE mmol/L 103 102 105   CO2 mmol/L 21.1* 22.0 23.0   BUN mg/dL 19 22 20   CREATININE mg/dL 1.04* 1.20* 1.19*   GLUCOSE mg/dL 81 139* 153*   CALCIUM mg/dL 8.7 8.5* 8.9     Results from last 7 days   Lab Units 08/05/22  0410 08/04/22  1623 08/04/22  0416 08/03/22  1612 08/03/22  0410   WBC 10*3/mm3 11.49*  --  16.61*  --  16.24*   HEMOGLOBIN g/dL 9.7*  9.7* 10.0* 9.8*  9.8*   < > 11.2*  11.2*   HEMATOCRIT % 29.2*  29.2* 29.6* 29.9*  29.9*   < > 34.5  34.5   PLATELETS 10*3/mm3 341  --  347  --  373    < > = values in this interval not displayed.         Results from last 7 days   Lab Units 08/03/22  0410   HEMOGLOBIN A1C % 8.40*     Lab Results   Lab Value Date/Time    TROPONINT 0.020 03/12/2018 1304    TROPONINT <0.010 11/30/2016 1946         Results from last 7 days   Lab Units 08/02/22  1228   ALK PHOS U/L 90   BILIRUBIN mg/dL 0.4   ALT (SGPT) U/L  12   AST (SGOT) U/L 17         Results from last 7 days   Lab Units 08/03/22  0410   HEMOGLOBIN A1C % 8.40*     Glucose   Date/Time Value Ref Range Status   08/05/2022 1034 84 70 - 130 mg/dL Final     Comment:     Meter: [id number] : [id number] Clinton Jules NA   08/05/2022 0536 92 70 - 130 mg/dL Final     Comment:     Meter: [id number] : [id number] VonHandorf Alexia NA   08/04/2022 2005 122 70 - 130 mg/dL Final     Comment:     Meter: [id number] : [id number] VonHandorf Alexia NA   08/04/2022 1615 167 (H) 70 - 130 mg/dL Final     Comment:     Meter: [id number] : [id number] Reid Mersadie NA   08/04/2022 1056 127 70 - 130 mg/dL Final     Comment:     Meter: [id number] : [id number] Reid Mersadie NA   08/04/2022 0545 144 (H) 70 - 130 mg/dL Final     Comment:     Meter: [id number] : [id number] VonHandorf Alexia NA   08/03/2022 2038 167 (H) 70 - 130 mg/dL Final     Comment:     Meter: [id number] : [id number] VonHandorf Alexia NA   08/03/2022 1554 114 70 - 130 mg/dL Final     Comment:     Meter: [id number] : [id number] Geovanni Smith NA     Results from last 7 days   Lab Units 08/02/22  1328   INR  1.15*       Past Medical History:   Diagnosis Date   • Dementia (HCC)    • Diabetes mellitus (HCC)    • Disease of thyroid gland    • Emphysema of lung (HCC)    • Hyperlipidemia    • Hypertension    • Lumbar compression fracture (HCC) 11/30/2016   • Osteoporosis    • S/P kyphoplasty 12/5/2016    L1 and L4, 12/2/2016       Assessment:  Active Hospital Problems    Diagnosis  POA   • **Chest wall hematoma, right, initial encounter [S20.211A]  Yes   • Dementia (HCC) [F03.90]  Unknown   • Type 2 diabetes mellitus without complication (HCC) [E11.9]  Yes   • Benign essential hypertension [I10]  Yes      Resolved Hospital Problems   No resolved problems to display.       Plan:  Continue with current RX. Follow lab. Thoracic surgery consult noted. Maybe back to SNU sometime.    LTC  if stable.  Follow  hgb.  Repeat CT chest    Kp Sky MD  8/5/2022  14:30 EDT

## 2022-08-05 NOTE — PLAN OF CARE
Problem: Adult Inpatient Plan of Care  Goal: Plan of Care Review  Outcome: Ongoing, Progressing  Flowsheets  Taken 8/5/2022 1727 by Yaneth Finley RN  Plan of Care Reviewed With: patient  Taken 8/5/2022 1417 by Sarai Yoon PTA  Progress: no change   Goal Outcome Evaluation:  Plan of Care Reviewed With: patient            Pt had a MRI of the right shoulder and right chest. No broken bones were seen. CT of the chest has been ordered but not done yet. Pt is very tender to the right arm and chest. Pt is bruised throughout the whole chest. A very large (possible) hematoma to the right anterior chest wall. Pt is pleasantly confused and been resting comfortably as long as her right arm isn't touched but pain leaves quickly. Anticipate discharge back to her facility in the next day or two. VSS

## 2022-08-05 NOTE — PLAN OF CARE
Goal Outcome Evaluation:  Plan of Care Reviewed With: patient        Progress: no change  Outcome Evaluation: Pt pleasantly confused and agreeable to PT. Pt would not allow R upper chest or R UE to be touched 2/2 severe pain during session. Pt req mod/max A to sit up to EOB w/ use of transfer sheet. 2 attempts made to stand pt w/ dep A x 2 and HHA x 1 w/ pt exhibiting strong posterior lean and able to clear bottom by approx 2 inches. Per RN, pt's son stated pt has not amb for approx 1 year and is w/c at baseline. Pt returned to bed req max A x 2. PT will prog as pt conor.    Patient was not wearing a face mask during this therapy encounter. Therapist used appropriate personal protective equipment including mask and gloves.  Mask used was standard procedure mask. Appropriate PPE was worn during the entire therapy session. Hand hygiene was completed before and after therapy session. Patient is not in enhanced droplet precautions.  PT tech: Jackie.

## 2022-08-06 LAB
ANION GAP SERPL CALCULATED.3IONS-SCNC: 10.6 MMOL/L (ref 5–15)
BASOPHILS # BLD AUTO: 0.04 10*3/MM3 (ref 0–0.2)
BASOPHILS NFR BLD AUTO: 0.3 % (ref 0–1.5)
BUN SERPL-MCNC: 17 MG/DL (ref 8–23)
BUN/CREAT SERPL: 16.5 (ref 7–25)
CALCIUM SPEC-SCNC: 8.7 MG/DL (ref 8.6–10.5)
CHLORIDE SERPL-SCNC: 105 MMOL/L (ref 98–107)
CO2 SERPL-SCNC: 22.4 MMOL/L (ref 22–29)
CREAT SERPL-MCNC: 1.03 MG/DL (ref 0.57–1)
DEPRECATED RDW RBC AUTO: 43.3 FL (ref 37–54)
EGFRCR SERPLBLD CKD-EPI 2021: 52.7 ML/MIN/1.73
EOSINOPHIL # BLD AUTO: 0.54 10*3/MM3 (ref 0–0.4)
EOSINOPHIL NFR BLD AUTO: 4.4 % (ref 0.3–6.2)
ERYTHROCYTE [DISTWIDTH] IN BLOOD BY AUTOMATED COUNT: 13.3 % (ref 12.3–15.4)
GLUCOSE BLDC GLUCOMTR-MCNC: 108 MG/DL (ref 70–130)
GLUCOSE BLDC GLUCOMTR-MCNC: 112 MG/DL (ref 70–130)
GLUCOSE BLDC GLUCOMTR-MCNC: 138 MG/DL (ref 70–130)
GLUCOSE BLDC GLUCOMTR-MCNC: 98 MG/DL (ref 70–130)
GLUCOSE SERPL-MCNC: 104 MG/DL (ref 65–99)
HCT VFR BLD AUTO: 26.9 % (ref 34–46.6)
HCT VFR BLD AUTO: 31.9 % (ref 34–46.6)
HGB BLD-MCNC: 10.3 G/DL (ref 12–15.9)
HGB BLD-MCNC: 9 G/DL (ref 12–15.9)
IMM GRANULOCYTES # BLD AUTO: 0.06 10*3/MM3 (ref 0–0.05)
IMM GRANULOCYTES NFR BLD AUTO: 0.5 % (ref 0–0.5)
LYMPHOCYTES # BLD AUTO: 2.49 10*3/MM3 (ref 0.7–3.1)
LYMPHOCYTES NFR BLD AUTO: 20.2 % (ref 19.6–45.3)
MCH RBC QN AUTO: 30.5 PG (ref 26.6–33)
MCHC RBC AUTO-ENTMCNC: 33.5 G/DL (ref 31.5–35.7)
MCV RBC AUTO: 91.2 FL (ref 79–97)
MONOCYTES # BLD AUTO: 1.45 10*3/MM3 (ref 0.1–0.9)
MONOCYTES NFR BLD AUTO: 11.8 % (ref 5–12)
NEUTROPHILS NFR BLD AUTO: 62.8 % (ref 42.7–76)
NEUTROPHILS NFR BLD AUTO: 7.76 10*3/MM3 (ref 1.7–7)
NRBC BLD AUTO-RTO: 0.1 /100 WBC (ref 0–0.2)
PLATELET # BLD AUTO: 362 10*3/MM3 (ref 140–450)
PMV BLD AUTO: 9.6 FL (ref 6–12)
POTASSIUM SERPL-SCNC: 3.5 MMOL/L (ref 3.5–5.2)
RBC # BLD AUTO: 2.95 10*6/MM3 (ref 3.77–5.28)
SODIUM SERPL-SCNC: 138 MMOL/L (ref 136–145)
WBC NRBC COR # BLD: 12.34 10*3/MM3 (ref 3.4–10.8)

## 2022-08-06 PROCEDURE — 85018 HEMOGLOBIN: CPT | Performed by: NURSE PRACTITIONER

## 2022-08-06 PROCEDURE — 82962 GLUCOSE BLOOD TEST: CPT

## 2022-08-06 PROCEDURE — 85014 HEMATOCRIT: CPT | Performed by: NURSE PRACTITIONER

## 2022-08-06 PROCEDURE — 80048 BASIC METABOLIC PNL TOTAL CA: CPT | Performed by: HOSPITALIST

## 2022-08-06 PROCEDURE — 85025 COMPLETE CBC W/AUTO DIFF WBC: CPT | Performed by: HOSPITALIST

## 2022-08-06 RX ADMIN — INSULIN GLARGINE-YFGN 8 UNITS: 100 INJECTION, SOLUTION SUBCUTANEOUS at 09:36

## 2022-08-06 RX ADMIN — ATORVASTATIN CALCIUM 10 MG: 20 TABLET, FILM COATED ORAL at 09:36

## 2022-08-06 RX ADMIN — LEVOTHYROXINE SODIUM 88 MCG: 0.09 TABLET ORAL at 09:36

## 2022-08-06 RX ADMIN — MEMANTINE HYDROCHLORIDE 5 MG: 5 TABLET, FILM COATED ORAL at 09:36

## 2022-08-06 RX ADMIN — Medication 10 ML: at 20:16

## 2022-08-06 RX ADMIN — Medication 10 ML: at 09:43

## 2022-08-06 RX ADMIN — ACETAMINOPHEN 500 MG: 500 TABLET, FILM COATED ORAL at 23:44

## 2022-08-06 NOTE — PROGRESS NOTES
"Physicians Statement of Medical Necessity for  Ambulance Transportation    GENERAL INFORMATION     Name: Sam Flores  YOB: 1934  Medicare #: [id number]  Transport Date: 2022   (Valid for round trips this date, or for scheduled repetitive trips for 60 days from the date signed below.)  Origin: [facility], [address] [place] [address]  Destination: [facility], [address] [place] [address]  Is the Patient's stay covered under Medicare Part A (PPS/DRG?)Yes  Closest appropriate facility? Yes  If this a hosp-hosp transfer? No  Is this a hospice patient? No    MEDICAL NECESSITY QUESTIONAIRE    Ambulance Transportation is medically necessary only if other means of transportation are contraindicated or would be potentially harmful to the patient.  To meet this requirement, the patient must be either \"bed confined\" or suffer from a condition such that transport by means other than an ambulance is contraindicated by the patient's condition.  The following questions must be answered by the healthcare professional signing below for this form to be valid:     1) Describe the MEDICAL CONDITION (physical and/or mental) of this patient AT THE TIME OF AMBULANCE TRANSPORT that requires the patient to be transported in an ambulance, and why transport by other means is contraindicated by the patient's condition: Chest Wall Hematoma  Past Medical History:   Diagnosis Date   • Dementia (HCC)    • Diabetes mellitus (HCC)    • Disease of thyroid gland    • Emphysema of lung (HCC)    • Hyperlipidemia    • Hypertension    • Lumbar compression fracture (HCC) 2016   • Osteoporosis    • S/P kyphoplasty 2016    L1 and L4, 2016      Past Surgical History:   Procedure Laterality Date   •  SECTION     • KYPHOPLASTY      T12 and L2   • KYPHOPLASTY N/A 2016    Procedure: KYPHOPLASTY LUMBAR 1 AND LUMBAR 4;  Surgeon: Triston Marinelli IV, MD;  Location: [facility]" "HYBRID OR 18/19;  Service:    • MASTECTOMY     • SHOULDER SURGERY Right       2) Is this patient \"bed confined\" as defined below?Yes   To be \"bed confined\" the patient must satisfy all three of the following criteria:  (1) unable to get up from bed without assistance; AND (2) unable to ambulate;  AND (3) unable to sit in a chair or wheelchair.  3) Can this patient safely be transported by car or wheelchair van (I.e., may safely sit during transport, without an attendant or monitoring?)No   4. In addition to completing questions 1-3 above, please check any of the following conditions that apply*:          *Note: supporting documentation for any boxes checked must be maintained in the patient's medical records Patient is confused and Unable to tolerate seated position for time needed to transport      SIGNATURE OF PHYSICIAN OR OTHER AUTHORIZED HEALTHCARE PROFESSIONAL    I certify that the above information is true and correct based on my evaluation of this patient, and represent that the patient requires transport by ambulance and that other forms of transport are contraindicated.  I understand that this information will be used by the Centers for Medicare and Medicaid Services (CMS) to support the determiniation of medical necessity for ambulance services, and I represent that I have personal knowledge of the patient's condition at the time of transport.       If this box is checked, I also certify that the patient is physically or mentally incapable of signing the ambulance service's claim form and that the institution with which I am affiliated has furnished care, services or assistance to the patient.  My signature below is made on behalf of the patient pursuant to 42 .36(b)(4). In accordance with 42 .37, the specific reason(s) that the patient is physically or mentally incapable of signing the claim for is as follows:     Signature of Physician or Healthcare Professional  Date/Time:        (For " Scheduled repetitive transport, this form is not valid for transports performed more than 60 days after this date).                                                                                                                                            --------------------------------------------------------------------------------------------  Printed Name and Credentials of Physician or Authorized Healthcare Professional     *Form must be signed by patient's attending physician for scheduled, repetitive transports,.  For non-repetitive ambulance transports, if unable to obtain the signature of the attending physician, any of the following may sign (please select below):     Physician  Clinical Nurse Specialist  Registered Nurse     Physician Assistant  Discharge Planner  Licensed Practical Nurse     Nurse Practitioner

## 2022-08-06 NOTE — PLAN OF CARE
Problem: Adult Inpatient Plan of Care  Goal: Plan of Care Review  Outcome: Ongoing, Progressing  Flowsheets (Taken 8/6/2022 1511)  Progress: no change  Plan of Care Reviewed With: family  Outcome Evaluation: VSS, patient guarding right arm, discharge order placed, awaiting transportation, will continue to monitor  Goal: Patient-Specific Goal (Individualized)  Outcome: Ongoing, Progressing  Goal: Absence of Hospital-Acquired Illness or Injury  Outcome: Ongoing, Progressing  Intervention: Identify and Manage Fall Risk  Description: Perform standard risk assessment on admission using a validated tool or comprehensive approach appropriate to the patient; reassess fall risk frequently, with change in status or transfer to another level of care.  Communicate fall injury risk to interprofessional healthcare team.  Determine need for increased observation, equipment and environmental modification, such as low bed, signage and supportive, nonskid footwear.  Adjust safety measures to individual developmental age, stage and identified risk factors.  Reinforce the importance of safety and physical activity with patient and family.  Perform regular intentional rounding to assess need for position change, pain assessment and personal needs, including assistance with toileting.  Recent Flowsheet Documentation  Taken 8/6/2022 1417 by Ghazal Rutherford, RN  Safety Promotion/Fall Prevention:   activity supervised   assistive device/personal items within reach   clutter free environment maintained   fall prevention program maintained   nonskid shoes/slippers when out of bed   room organization consistent   safety round/check completed  Taken 8/6/2022 1223 by Ghazal Rutherford, RN  Safety Promotion/Fall Prevention:   activity supervised   assistive device/personal items within reach   clutter free environment maintained   fall prevention program maintained   nonskid shoes/slippers when out of bed   room organization consistent   safety  round/check completed  Taken 8/6/2022 0952 by Ghazal Rutherford, RN  Safety Promotion/Fall Prevention:   activity supervised   assistive device/personal items within reach   clutter free environment maintained   fall prevention program maintained   nonskid shoes/slippers when out of bed   room organization consistent   safety round/check completed  Taken 8/6/2022 0936 by Ghazal Rutherford RN  Safety Promotion/Fall Prevention:   activity supervised   assistive device/personal items within reach   clutter free environment maintained   fall prevention program maintained   nonskid shoes/slippers when out of bed   room organization consistent   safety round/check completed  Intervention: Prevent Skin Injury  Description: Perform a screening for skin injury risk, such as pressure or moisture associated skin damage on admission and at regular intervals throughout hospital stay.  Keep all areas of skin (especially folds) clean and dry.  Maintain adequate skin hydration.  Relieve and redistribute pressure and protect bony prominences; implement measures based on patient-specific risk factors.  Match turning and repositioning schedule to clinical condition.  Encourage weight shift frequently; assist with reposition if unable to complete independently.  Float heels off bed; avoid pressure on the Achilles tendon.  Keep skin free from extended contact with medical devices.  Encourage functional activity and mobility, as early as tolerated.  Use aids (e.g., slide boards, mechanical lift) during transfer.  Recent Flowsheet Documentation  Taken 8/6/2022 1417 by Ghazal Rutherford, RN  Body Position:   position changed independently   tilted   left  Skin Protection:   adhesive use limited   incontinence pads utilized   tubing/devices free from skin contact  Taken 8/6/2022 1223 by Ghazal Rutherford, RN  Body Position: sitting up in bed  Taken 8/6/2022 0952 by Ghazal Rutherford, RN  Body Position: position changed independently  Taken 8/6/2022 0936  by Ghazal Rutherford RN  Body Position:   position changed independently   side-lying   right  Skin Protection:   adhesive use limited   incontinence pads utilized   tubing/devices free from skin contact  Intervention: Prevent and Manage VTE (Venous Thromboembolism) Risk  Description: Assess for VTE (venous thromboembolism) risk.  Encourage and assist with early ambulation.  Initiate and maintain compression or other therapy, as indicated, based on identified risk in accordance with organizational protocol and provider order.  Encourage both active and passive leg exercises while in bed, if unable to ambulate.  Recent Flowsheet Documentation  Taken 8/6/2022 1417 by Ghazal Rutherford RN  VTE Prevention/Management:   bilateral   dorsiflexion/plantar flexion performed  Range of Motion: active ROM (range of motion) encouraged  Taken 8/6/2022 0936 by Ghazal Rutherford RN  VTE Prevention/Management:   bilateral   dorsiflexion/plantar flexion performed  Range of Motion: active ROM (range of motion) encouraged  Intervention: Prevent Infection  Description: Maintain skin and mucous membrane integrity; promote hand, oral and pulmonary hygiene.  Optimize fluid balance, nutrition, sleep and glycemic control to maximize infection resistance.  Identify potential sources of infection early to prevent or mitigate progression of infection (e.g., wound, lines, devices).  Evaluate ongoing need for invasive devices; remove promptly when no longer indicated.  Recent Flowsheet Documentation  Taken 8/6/2022 0936 by Ghazal Rutherford RN  Infection Prevention:   environmental surveillance performed   hand hygiene promoted   personal protective equipment utilized   single patient room provided   visitors restricted/screened  Goal: Optimal Comfort and Wellbeing  Outcome: Ongoing, Progressing  Intervention: Provide Person-Centered Care  Description: Use a family-focused approach to care.  Develop trust and rapport by proactively providing information,  encouraging questions, addressing concerns and offering reassurance.  Acknowledge emotional response to hospitalization.  Recognize and utilize personal coping strategies.  Honor spiritual and cultural preferences.  Recent Flowsheet Documentation  Taken 8/6/2022 1417 by Ghazal Rutherford RN  Trust Relationship/Rapport:   care explained   choices provided   emotional support provided   empathic listening provided   questions answered   questions encouraged   reassurance provided   thoughts/feelings acknowledged  Taken 8/6/2022 0936 by Ghazal Rutherford RN  Trust Relationship/Rapport:   care explained   choices provided   emotional support provided   empathic listening provided   questions answered   questions encouraged   reassurance provided   thoughts/feelings acknowledged  Goal: Readiness for Transition of Care  Outcome: Ongoing, Progressing     Problem: Skin Injury Risk Increased  Goal: Skin Health and Integrity  Outcome: Ongoing, Progressing  Intervention: Optimize Skin Protection  Description: Perform a full pressure injury risk assessment, as indicated by screening, upon admission to care unit.  Reassess skin (injury risk, full inspection) frequently (e.g., scheduled interval, with change in condition) to provide optimal early detection and prevention.  Maintain adequate tissue perfusion (e.g., encourage fluid balance; avoid crossing legs, constrictive clothing or devices) to promote tissue oxygenation.  Maintain head of bed at lowest degree of elevation tolerated, considering medical condition and other restrictions.  Avoid positioning onto an area that remains reddened.  Minimize incontinence and moisture (e.g., toileting schedule; moisture-wicking pad, diaper or incontinence collection device; skin moisture barrier).  Cleanse skin promptly and gently when soiled utilizing a pH-balanced cleanser.  Relieve and redistribute pressure (e.g., scheduled position changes, weight shifts, use of support surface, medical  device repositioning, protective dressing application, use of positioning device, microclimate control, use of pressure-injury-monitor  Encourage increased activity, such as sitting in a chair at the bedside or early mobilization, when able to tolerate.  Recent Flowsheet Documentation  Taken 8/6/2022 1417 by Ghazal Rutherford RN  Pressure Reduction Techniques:   frequent weight shift encouraged   weight shift assistance provided  Head of Bed (HOB) Positioning: HOB elevated  Pressure Reduction Devices: alternating pressure pump (ADD)  Skin Protection:   adhesive use limited   incontinence pads utilized   tubing/devices free from skin contact  Taken 8/6/2022 1223 by Ghazal Rutherford RN  Head of Bed (HOB) Positioning: HOB elevated  Taken 8/6/2022 0952 by Ghazal Rutherford RN  Head of Bed (HOB) Positioning: HOB elevated  Taken 8/6/2022 0936 by Ghazal Rutherford RN  Pressure Reduction Techniques:   frequent weight shift encouraged   weight shift assistance provided  Head of Bed (HOB) Positioning: HOB elevated  Pressure Reduction Devices: alternating pressure pump (ADD)  Skin Protection:   adhesive use limited   incontinence pads utilized   tubing/devices free from skin contact     Problem: Fall Injury Risk  Goal: Absence of Fall and Fall-Related Injury  Outcome: Ongoing, Progressing  Intervention: Identify and Manage Contributors  Description: Develop a fall prevention plan with the patient and caregiver/family.  Provide reorientation, appropriate sensory stimulation and routines with changes in mental status to decrease risk of fall.  Promote use of personal vision and auditory aids.  Assess assistance level required for safe and effective self-care; provide support as needed, such as toileting, mobilization. For age 65 and older, implement timed toileting with assistance.  Encourage physical activity, such as performance of mobility and self-care at highest level of patient ability, multicomponent exercise program and  provision of appropriate assistive devices.  If fall occurs, assess the severity of injury; implement fall injury protocol. Determine the cause and revise fall injury prevention plan.  Regularly review medication contribution to fall risk; adjust medication administration times to minimize risk of falling.  Consider risk related to polypharmacy and age.  Balance adequate pain management with potential for oversedation.  Recent Flowsheet Documentation  Taken 8/6/2022 1417 by Ghazal Rutherford RN  Medication Review/Management: medications reviewed  Taken 8/6/2022 1223 by Ghazal Rutherford RN  Medication Review/Management: medications reviewed  Taken 8/6/2022 0952 by Ghazal Rutherford RN  Medication Review/Management: medications reviewed  Taken 8/6/2022 0936 by Ghazal Rutherford RN  Medication Review/Management: medications reviewed  Intervention: Promote Injury-Free Environment  Description: Provide a safe, barrier-free environment that encourages independent activity.  Keep care area uncluttered and well-lighted.  Determine need for increased observation or monitoring.  Avoid use of devices that minimize mobility, such as restraints or indwelling urinary catheter.  Recent Flowsheet Documentation  Taken 8/6/2022 1417 by Ghazal Rutherford RN  Safety Promotion/Fall Prevention:   activity supervised   assistive device/personal items within reach   clutter free environment maintained   fall prevention program maintained   nonskid shoes/slippers when out of bed   room organization consistent   safety round/check completed  Taken 8/6/2022 1223 by Ghazal Rutherford RN  Safety Promotion/Fall Prevention:   activity supervised   assistive device/personal items within reach   clutter free environment maintained   fall prevention program maintained   nonskid shoes/slippers when out of bed   room organization consistent   safety round/check completed  Taken 8/6/2022 0952 by Ghazal Rutherford RN  Safety Promotion/Fall Prevention:   activity  supervised   assistive device/personal items within reach   clutter free environment maintained   fall prevention program maintained   nonskid shoes/slippers when out of bed   room organization consistent   safety round/check completed  Taken 8/6/2022 0936 by Ghazal Rutherford, RN  Safety Promotion/Fall Prevention:   activity supervised   assistive device/personal items within reach   clutter free environment maintained   fall prevention program maintained   nonskid shoes/slippers when out of bed   room organization consistent   safety round/check completed   Goal Outcome Evaluation:  Plan of Care Reviewed With: family        Progress: no change  Outcome Evaluation: VSS, patient guarding right arm, discharge order placed, awaiting transportation, will continue to monitor

## 2022-08-06 NOTE — DISCHARGE SUMMARY
PHYSICIAN DISCHARGE SUMMARY                                                                        [facility]    Patient Identification:  Name: Sam Flores  Age: 87 y.o.  Sex: female  :  1934  MRN: [id number]  Primary Care Physician: Reg Rivera MD    Admit date: 2022  Discharge date and time:2022  Discharged Condition: good    Discharge Diagnoses:  Active Hospital Problems    Diagnosis  POA   • **Chest wall hematoma, right, initial encounter [S20.211A]  Yes   • Dementia (HCC) [F03.90]  Yes   • Type 2 diabetes mellitus without complication (HCC) [E11.9]  Yes   • Benign essential hypertension [I10]  Yes      Resolved Hospital Problems   No resolved problems to display.          PMHX:   Past Medical History:   Diagnosis Date   • Dementia (HCC)    • Diabetes mellitus (HCC)    • Disease of thyroid gland    • Emphysema of lung (HCC)    • Hyperlipidemia    • Hypertension    • Lumbar compression fracture (HCC) 2016   • Osteoporosis    • S/P kyphoplasty 2016    L1 and L4, 2016     PSHX:   Past Surgical History:   Procedure Laterality Date   •  SECTION     • KYPHOPLASTY      T12 and L2   • KYPHOPLASTY N/A 2016    Procedure: KYPHOPLASTY LUMBAR 1 AND LUMBAR 4;  Surgeon: Triston Marinelli IV, MD;  Location: [facility] ;  Service:    • MASTECTOMY     • SHOULDER SURGERY Right        Hospital Course: Sam Flores  Is a 87-year-old female with advanced Alzheimer's dementia. History is assisted by her daughter who is at bedside as well as ER documentations and discussion with ER staff. She was brought into the emergency room from a nursing home due to an expanding mass in the right anterior chest area. The patient herself has no complaints. She denies pain and to try to move her arm. She was actually seen in the emergency room on  due to history of fall. At that time was  noted that she had ecchymosis in the right upper arm area. No mention of mass in the chest wall at that point. X-ray of the right shoulder and humerus showed no acute changes. Her daughter states that she suffered a fall maybe as long as 3 weeks ago. Unsure of any falls since then. Patient does a history have arthroplasty of the right shoulder and bilateral mastectomy.         The patient was admitted to the hospital and seen by thoracic surgery.  They did not recommend any intervention.  Her hemoglobin did drop a bit but seems stable at around 9.  She did have follow-up CT scan which showed the hematoma was decreasing in size.  She looks stable enough to go back to her nursing facility for long-term care.  She will stay off aspirin for a while and follow-up with her primary care at the facility.    Consults:     Consults     Date and Time Order Name Status Description    8/2/2022  5:23 PM Inpatient Orthopedic Surgery Consult      8/2/2022  1:32 PM LIPPS (on-call MD unless specified)      8/2/2022 12:47 PM IP General Consult (Use specialty-specific consult if known) Completed         Results from last 7 days   Lab Units 08/06/22  0415   WBC 10*3/mm3 12.34*   HEMOGLOBIN g/dL 9.0*   HEMATOCRIT % 26.9*   PLATELETS 10*3/mm3 362     Results from last 7 days   Lab Units 08/06/22  0415   SODIUM mmol/L 138   POTASSIUM mmol/L 3.5   CHLORIDE mmol/L 105   CO2 mmol/L 22.4   BUN mg/dL 17   CREATININE mg/dL 1.03*   GLUCOSE mg/dL 104*   CALCIUM mg/dL 8.7     Significant Diagnostic Studies:   WBC   Date Value Ref Range Status   08/06/2022 12.34 (H) 3.40 - 10.80 10*3/mm3 Final     Hemoglobin   Date Value Ref Range Status   08/06/2022 9.0 (L) 12.0 - 15.9 g/dL Final     Hematocrit   Date Value Ref Range Status   08/06/2022 26.9 (L) 34.0 - 46.6 % Final     Platelets   Date Value Ref Range Status   08/06/2022 362 140 - 450 10*3/mm3 Final     Sodium   Date Value Ref Range Status   08/06/2022 138 136 - 145 mmol/L Final     Potassium    Date Value Ref Range Status   08/06/2022 3.5 3.5 - 5.2 mmol/L Final     Chloride   Date Value Ref Range Status   08/06/2022 105 98 - 107 mmol/L Final     CO2   Date Value Ref Range Status   08/06/2022 22.4 22.0 - 29.0 mmol/L Final     BUN   Date Value Ref Range Status   08/06/2022 17 8 - 23 mg/dL Final     Creatinine   Date Value Ref Range Status   08/06/2022 1.03 (H) 0.57 - 1.00 mg/dL Final     Glucose   Date Value Ref Range Status   08/06/2022 104 (H) 65 - 99 mg/dL Final     Calcium   Date Value Ref Range Status   08/06/2022 8.7 8.6 - 10.5 mg/dL Final     No results found for: AST, ALT, ALKPHOS  No results found for: APTT, INR  No results found for: COLORU, CLARITYU, SPECGRAV, PHUR, PROTEINUR, GLUCOSEU, KETONESU, BLOODU, NITRITE, LEUKOCYTESUR, BILIRUBINUR, UROBILINOGEN, RBCUA, WBCUA, BACTERIA, UACOMMENT  No results found for: TROPONINT, TROPONINI, BNP  No components found for: HGBA1C;2  No components found for: TSH;2  Imaging Results (All)     Procedure Component Value Units Date/Time    CT Chest Without Contrast Diagnostic [[id number]] Collected: 08/05/22 2105     Updated: 08/05/22 2111    Narrative:      CHEST CT WITHOUT CONTRAST     HISTORY: Follow-up right chest wall hematoma.     TECHNIQUE: Noncontrast CT of the chest is provided and correlated with  chest CT 08/02/2022.     Radiation dose reduction techniques were utilized, including automated  exposure control and exposure modulation based on body size.     FINDINGS: The anterior right chest wall hematoma is actually slightly  smaller than on the August 2 exam. Today, the hematoma measures 10.5 x  4.4 cm axial diameter on image 31, smaller than the same level on the  prior exam, image 29, where it measured 11.7 x 5.7 cm. The hematoma  extends over a length of about 13 cm as before. No new body wall or  upper abdominal hematoma is present.     There is extensive atheromatous vascular calcification with  calcification at the aortic and mitral valves. No  lymphadenopathy is  identified in the chest or the visualized upper abdomen. Visualized  portions of the upper abdominal solid organs appear normal. Lungs are  clear.     There has been previous bilateral mastectomy. There is arthroplasty  hardware at the right shoulder. No significant hematoma in the arm or  around the shoulder.       Impression:      Large anterior right upper chest wall hematoma is again  observed and appears slightly smaller than on chest CT 08/02/2022. No  new abnormality is present.     This report was finalized on 8/5/2022 9:08 PM by Dr. Triston Costello M.D.       MRI Shoulder Right Without Contrast [[id number]] Collected: 08/05/22 1115     Updated: 08/05/22 1214    Narrative:      MRI RIGHT SHOULDER WITHOUT CONTRAST WITH METAL ARTIFACT REDUCTION  PROTOCOL     HISTORY: Increasing right shoulder pain for 1 week. No known injury.  History of breast cancer.      TECHNIQUE: MRI right shoulder with metal artifact reduction protocol  includes axial PD as well as oblique coronal PD, T1, STIR, T2 and  oblique sagittal T2-weighted sequences.     COMPARISON: Right shoulder and humerus x-ray 07/30/2022.     FINDINGS: There is a reverse right shoulder arthroplasty and despite  optimizing protocol, evaluation remains limited by the degree of  magnetic susceptibility artifact. There is moderate AC joint arthritis.  Edema is present within the subcutaneous fat about the shoulder. There  is also edema within the periarticular musculature though there is no  evidence for effusion. No periprosthetic fracture or osteophytosis is  evident. There is generalized atrophy of the musculature of the right  shoulder girdle.       Impression:      Limited exam demonstrates no evidence for fracture or acute  osseous abnormality in patient with a reverse right shoulder  arthroplasty. Mild AC joint arthritis is present. There is mild  periarticular soft tissue edema extending into the muscular and  subcutaneous fat without  fluid collection or focal hematoma.     This report was finalized on 8/5/2022 12:11 PM by Dr. Jimbo Lopez M.D.       XR Chest 1 View [[id number]] Collected: 08/03/22 0723     Updated: 08/03/22 0728    Narrative:      XR CHEST 1 VW-  08/03/2022     HISTORY: Chest wall hematoma.     Heart size is at the upper limits of normal. Lungs are underinflated.  There is increased density in the left lung base partially obscuring the  left hemidiaphragm this may be related to left basilar atelectasis,  infiltrate and/or small amount of pleural fluid. Left upper lung and  right lung appear clear. Right shoulder prosthesis is seen in good  position.     There is some aortic calcification.     Please see additional dictation for the CT of the chest from 08/02/2022.     This report was finalized on 8/3/2022 7:25 AM by Dr. Robby Rodriges M.D.       CT Chest Without Contrast Diagnostic [[id number]] Collected: 08/02/22 1218     Updated: 08/02/22 1241    Narrative:      CT CHEST WITHOUT CONTRAST     HISTORY: Right-sided chest wall mass/swelling. Difficulty with right  upper extremity range of motion.     TECHNIQUE: Radiation dose reduction techniques were utilized, including  automated exposure control and exposure modulation based on body size.   3 mm images were obtained through the chest without the administration  of IV contrast. Lack of IV contrast limits evaluation of mediastinal,  hilar, and vascular structures. Sensitivity for underlying lesions and  infection decreased.     COMPARISON: 02/14/2018     FINDINGS: Correlation is somewhat limited by artifact from overlying  soft tissues/right shoulder prosthesis and motion.     Thoracic inlet: Within normal noncontrast limits     Heart and great vessels: Heart size is enlarged.. Ectasia of the  ascending aorta approximating 3.5 cm. Extensive atherosclerosis  thoracoabdominal aorta and branch vessels with dense calcification about  the mitral annulus. Trace pericardial fluid  and/or thickening. 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           Lymphatics: Mildly prominent mediastinal nodes with an index right  paratracheal node measures 9 mm. Hilar evaluation limited without  contrast. Recommend attention on follow-up.     Lung parenchyma and pleural space: Small bilateral pleural effusions  with bibasilar airspace disease. Patchy tree-in-bud nodular opacities  predominantly in the lung bases left slightly greater than right.  Coarsened interstitial markings. Respiratory motion artifact limits  evaluation of the parenchyma. Calcified granulomas. No pneumothorax.     Soft tissues: Large soft tissue hematoma of the right chest/breast  approximating 6 x 12.8 cm. Evaluation for active extravasation is  limited. There is extensive streak artifact limiting evaluation of the  right shoulder and upper chest status post right shoulder arthroplasty.  The radiographic appearance of the prosthesis is similar in appearance  to the prior exam. No definitive acute displaced fractures. Diffuse bone  demineralization with advanced degenerative changes of the spine. Status  post vertebroplasty/kyphoplasty for compression fractures of T12-L2. Age  indeterminate but chronic appearing compression fractures of T6, T9,  T10, and T11 with greater than 50% loss of vertebral body height at T6  and T11. No definitive retropulsion. MRI could be considered for further  evaluation if clinically indicated.                Impression:      Limited noncontrast CT impression:     1.  Large right chest wall/breast hematoma approximating 6 x 12.8 cm.  2.  Cardiomegaly with small bilateral pleural effusions and bibasilar  airspace disease. Tree-in-bud nodular opacities particularly in the left  lung base may be the sequela of pneumonitis/aspiration. Recommend  attention on short-term follow-up to resolution after treatment.  3.  Status post right shoulder arthroplasty incompletely imaged.  4.   Multiple age-indeterminate but chronic appearing compression  fractures of the thoracolumbar spine. Status post  vertebroplasty/kyphoplasty of T12-L2.  5.  Mediastinal adenopathy likely reactive. Recommend attention on  subsequent surveillance imaging.  6.  Please see above for additional findings.     7.  I discussed these findings with CARMEN Amaro at 11:35 AM on  08/02/2022.     This report was finalized on 8/2/2022 12:38 PM by Dr. Hi Doan M.D.           Lab Results (last 7 days)     Procedure Component Value Units Date/Time    POC Glucose Once [[id number]]  (Normal) Collected: 08/06/22 1052    Specimen: Blood Updated: 08/06/22 1053     Glucose 108 mg/dL      Comment: Meter: [id number] : [id number] [place]       POC Glucose Once [[id number]]  (Normal) Collected: 08/06/22 0638    Specimen: Blood Updated: 08/06/22 0639     Glucose 98 mg/dL      Comment: Meter: [id number] : [id number] Teddy [place]       Basic Metabolic Panel [[id number]]  (Abnormal) Collected: 08/06/22 0415    Specimen: Blood Updated: 08/06/22 0457     Glucose 104 mg/dL      BUN 17 mg/dL      Creatinine 1.03 mg/dL      Sodium 138 mmol/L      Potassium 3.5 mmol/L      Chloride 105 mmol/L      CO2 22.4 mmol/L      Calcium 8.7 mg/dL      BUN/Creatinine Ratio 16.5     Anion Gap 10.6 mmol/L      eGFR 52.7 mL/min/1.73      Comment: National Kidney Foundation and American Society of Nephrology (ASN) Task Force recommended calculation based on the Chronic Kidney Disease Epidemiology Collaboration (CKD-EPI) equation refit without adjustment for race.       Narrative:      GFR Normal >60  Chronic Kidney Disease <60  Kidney Failure <15      CBC & Differential [[id number]]  (Abnormal) Collected: 08/06/22 0415    Specimen: Blood Updated: 08/06/22 0438    Narrative:      The following orders were created for panel order CBC & Differential.  Procedure                               Abnormality         Status                     ---------                                -----------         ------                     CBC Auto Differential[[id number]]        Abnormal            Final result                 Please view results for these tests on the individual orders.    CBC Auto Differential [[id number]]  (Abnormal) Collected: 08/06/22 0415    Specimen: Blood Updated: 08/06/22 0438     WBC 12.34 10*3/mm3      RBC 2.95 10*6/mm3      Hemoglobin 9.0 g/dL      Hematocrit 26.9 %      MCV 91.2 fL      MCH 30.5 pg      MCHC 33.5 g/dL      RDW 13.3 %      RDW-SD 43.3 fl      MPV 9.6 fL      Platelets 362 10*3/mm3      Neutrophil % 62.8 %      Lymphocyte % 20.2 %      Monocyte % 11.8 %      Eosinophil % 4.4 %      Basophil % 0.3 %      Immature Grans % 0.5 %      Neutrophils, Absolute 7.76 10*3/mm3      Lymphocytes, Absolute 2.49 10*3/mm3      Monocytes, Absolute 1.45 10*3/mm3      Eosinophils, Absolute 0.54 10*3/mm3      Basophils, Absolute 0.04 10*3/mm3      Immature Grans, Absolute 0.06 10*3/mm3      nRBC 0.1 /100 WBC     POC Glucose Once [[id number]]  (Abnormal) Collected: 08/05/22 2113    Specimen: Blood Updated: 08/05/22 2115     Glucose 208 mg/dL      Comment: Meter: [id number] : [id number] Teddy [place]       Hemoglobin & Hematocrit, Blood [[id number]]  (Abnormal) Collected: 08/05/22 1608    Specimen: Blood Updated: 08/05/22 1656     Hemoglobin 10.1 g/dL      Hematocrit 29.5 %     POC Glucose Once [[id number]]  (Abnormal) Collected: 08/05/22 1649    Specimen: Blood Updated: 08/05/22 1650     Glucose 132 mg/dL      Comment: Meter: [id number] : [id number] [place]       Blood Culture - Blood, Arm, Right [[id number]]  (Normal) Collected: 08/02/22 1414    Specimen: Blood from Arm, Right Updated: 08/05/22 1431     Blood Culture No growth at 3 days    POC Glucose Once [[id number]]  (Normal) Collected: 08/05/22 1034    Specimen: Blood Updated: 08/05/22 1035     Glucose 84 mg/dL      Comment: Meter: [id number] : [id number] [place]        POC Glucose Once [[id number]]  (Normal) Collected: 08/05/22 0536    Specimen: Blood Updated: 08/05/22 0537     Glucose 92 mg/dL      Comment: Meter: [id number] : [id number] Fabiano [place]       Basic Metabolic Panel [[id number]]  (Abnormal) Collected: 08/05/22 0410    Specimen: Blood Updated: 08/05/22 0516     Glucose 81 mg/dL      BUN 19 mg/dL      Creatinine 1.04 mg/dL      Sodium 138 mmol/L      Potassium 3.5 mmol/L      Chloride 103 mmol/L      CO2 21.1 mmol/L      Calcium 8.7 mg/dL      BUN/Creatinine Ratio 18.3     Anion Gap 13.9 mmol/L      eGFR 52.1 mL/min/1.73      Comment: National Kidney Foundation and American Society of Nephrology (ASN) Task Force recommended calculation based on the Chronic Kidney Disease Epidemiology Collaboration (CKD-EPI) equation refit without adjustment for race.       Narrative:      GFR Normal >60  Chronic Kidney Disease <60  Kidney Failure <15      Hemoglobin & Hematocrit, Blood [[id number]]  (Abnormal) Collected: 08/05/22 0410    Specimen: Blood Updated: 08/05/22 0457     Hemoglobin 9.7 g/dL      Hematocrit 29.2 %     CBC & Differential [[id number]]  (Abnormal) Collected: 08/05/22 0410    Specimen: Blood Updated: 08/05/22 0457    Narrative:      The following orders were created for panel order CBC & Differential.  Procedure                               Abnormality         Status                     ---------                               -----------         ------                     CBC Auto Differential[[id number]]        Abnormal            Final result                 Please view results for these tests on the individual orders.    CBC Auto Differential [[id number]]  (Abnormal) Collected: 08/05/22 0410    Specimen: Blood Updated: 08/05/22 0457     WBC 11.49 10*3/mm3      RBC 3.17 10*6/mm3      Hemoglobin 9.7 g/dL      Hematocrit 29.2 %      MCV 92.1 fL      MCH 30.6 pg      MCHC 33.2 g/dL      RDW 13.1 %      RDW-SD 44.4 fl      MPV 9.8 fL      Platelets 341 10*3/mm3       Neutrophil % 58.8 %      Lymphocyte % 23.3 %      Monocyte % 12.4 %      Eosinophil % 4.8 %      Basophil % 0.4 %      Immature Grans % 0.3 %      Neutrophils, Absolute 6.75 10*3/mm3      Lymphocytes, Absolute 2.68 10*3/mm3      Monocytes, Absolute 1.42 10*3/mm3      Eosinophils, Absolute 0.55 10*3/mm3      Basophils, Absolute 0.05 10*3/mm3      Immature Grans, Absolute 0.04 10*3/mm3      nRBC 0.0 /100 WBC     POC Glucose Once [[id number]]  (Normal) Collected: 08/04/22 2005    Specimen: Blood Updated: 08/04/22 2006     Glucose 122 mg/dL      Comment: Meter: [id number] : [id number] Fabiano [place]       Hemoglobin & Hematocrit, Blood [[id number]]  (Abnormal) Collected: 08/04/22 1623    Specimen: Blood Updated: 08/04/22 1658     Hemoglobin 10.0 g/dL      Hematocrit 29.6 %     POC Glucose Once [[id number]]  (Abnormal) Collected: 08/04/22 1615    Specimen: Blood Updated: 08/04/22 1616     Glucose 167 mg/dL      Comment: Meter: [id number] : [id number] [place]       POC Glucose Once [[id number]]  (Normal) Collected: 08/04/22 1056    Specimen: Blood Updated: 08/04/22 1057     Glucose 127 mg/dL      Comment: Meter: [id number] : [id number] Franklin [place]esbitte DARR[place]       Blood Culture - Blood, Arm, Left [[id number]]  (Abnormal) Collected: 08/02/22 1414    Specimen: Blood from Arm, Left Updated: 08/04/22 0639     Blood Culture Staphylococcus, coagulase negative     Isolated from Aerobic Bottle     Gram Stain Aerobic Bottle Gram positive cocci    Narrative:      Probable contaminant requires clinical correlation, susceptibility not performed unless requested by physician.    POC Glucose Once [[id number]]  (Abnormal) Collected: 08/04/22 0545    Specimen: Blood Updated: 08/04/22 0546     Glucose 144 mg/dL      Comment: Meter: [id number] : [id number] Fabiano [place]       Basic Metabolic Panel [[id number]]  (Abnormal) Collected: 08/04/22 0416    Specimen: Blood Updated: 08/04/22 0519     Glucose 139  mg/dL      BUN 22 mg/dL      Creatinine 1.20 mg/dL      Sodium 137 mmol/L      Potassium 4.2 mmol/L      Chloride 102 mmol/L      CO2 22.0 mmol/L      Calcium 8.5 mg/dL      BUN/Creatinine Ratio 18.3     Anion Gap 13.0 mmol/L      eGFR 43.9 mL/min/1.73      Comment: National Kidney Foundation and American Society of Nephrology (ASN) Task Force recommended calculation based on the Chronic Kidney Disease Epidemiology Collaboration (CKD-EPI) equation refit without adjustment for race.       Narrative:      GFR Normal >60  Chronic Kidney Disease <60  Kidney Failure <15      Hemoglobin & Hematocrit, Blood [[id number]]  (Abnormal) Collected: 08/04/22 0416    Specimen: Blood Updated: 08/04/22 0500     Hemoglobin 9.8 g/dL      Hematocrit 29.9 %     CBC & Differential [[id number]]  (Abnormal) Collected: 08/04/22 0416    Specimen: Blood Updated: 08/04/22 0500    Narrative:      The following orders were created for panel order CBC & Differential.  Procedure                               Abnormality         Status                     ---------                               -----------         ------                     CBC Auto Differential[[id number]]        Abnormal            Final result                 Please view results for these tests on the individual orders.    CBC Auto Differential [[id number]]  (Abnormal) Collected: 08/04/22 0416    Specimen: Blood Updated: 08/04/22 0500     WBC 16.61 10*3/mm3      RBC 3.22 10*6/mm3      Hemoglobin 9.8 g/dL      Hematocrit 29.9 %      MCV 92.9 fL      MCH 30.4 pg      MCHC 32.8 g/dL      RDW 13.1 %      RDW-SD 44.7 fl      MPV 9.9 fL      Platelets 347 10*3/mm3      Neutrophil % 70.7 %      Lymphocyte % 17.0 %      Monocyte % 10.0 %      Eosinophil % 1.6 %      Basophil % 0.3 %      Immature Grans % 0.4 %      Neutrophils, Absolute 11.75 10*3/mm3      Lymphocytes, Absolute 2.83 10*3/mm3      Monocytes, Absolute 1.66 10*3/mm3      Eosinophils, Absolute 0.26 10*3/mm3      Basophils,  Absolute 0.05 10*3/mm3      Immature Grans, Absolute 0.06 10*3/mm3      nRBC 0.0 /100 WBC     POC Glucose Once [[id number]]  (Abnormal) Collected: 08/03/22 2038    Specimen: Blood Updated: 08/03/22 2039     Glucose 167 mg/dL      Comment: Meter: [id number] : [id number] Fabiano [place]       Hemoglobin & Hematocrit, Blood [[id number]]  (Abnormal) Collected: 08/03/22 1612    Specimen: Blood Updated: 08/03/22 1632     Hemoglobin 10.3 g/dL      Hematocrit 30.5 %     POC Glucose Once [[id number]]  (Normal) Collected: 08/03/22 1554    Specimen: Blood Updated: 08/03/22 1555     Glucose 114 mg/dL      Comment: Meter: [id number] : [id number] Ge[place]       Blood Culture ID, PCR - Blood, Arm, Left [[id number]]  (Abnormal) Collected: 08/02/22 1414    Specimen: Blood from Arm, Left Updated: 08/03/22 1206     BCID, PCR Staph spp, not aureus or lugdunesis. Identification by BCID2 PCR.     BOTTLE TYPE Aerobic Bottle    POC Glucose Once [[id number]]  (Normal) Collected: 08/03/22 1100    Specimen: Blood Updated: 08/03/22 1102     Glucose 86 mg/dL      Comment: Meter: [id number] : [id number] Geovanni [place]       POC Glucose Once [[id number]]  (Abnormal) Collected: 08/03/22 0546    Specimen: Blood Updated: 08/03/22 0547     Glucose 137 mg/dL      Comment: Meter: [id number] : [id number] Fabiano [place]       Procalcitonin [[id number]]  (Normal) Collected: 08/03/22 0410    Specimen: Blood Updated: 08/03/22 0456     Procalcitonin 0.15 ng/mL     Narrative:      As a Marker for Sepsis (Non-Neonates):    1. <0.5 ng/mL represents a low risk of severe sepsis and/or septic shock.  2. >2 ng/mL represents a high risk of severe sepsis and/or septic shock.    As a Marker for Lower Respiratory Tract Infections that require antibiotic therapy:    PCT on Admission    Antibiotic Therapy       6-12 Hrs later    >0.5                Strongly Recommended  >0.25 - <0.5        Recommended   0.1 - 0.25          Discouraged     "          Remeasure/reassess PCT  <0.1                Strongly Discouraged     Remeasure/reassess PCT    As 28 day mortality risk marker: \"Change in Procalcitonin Result\" (>80% or <=80%) if Day 0 (or Day 1) and Day 4 values are available. Refer to http://[email or web address]-pct-[email or web address]    Change in PCT <=80%  A decrease of PCT levels below or equal to 80% defines a positive change in PCT test result representing a higher risk for 28-day all-cause mortality of patients diagnosed with severe sepsis for septic shock.    Change in PCT >80%  A decrease of PCT levels of more than 80% defines a negative change in PCT result representing a lower risk for 28-day all-cause mortality of patients diagnosed with severe sepsis or septic shock.       Basic Metabolic Panel [[id number]]  (Abnormal) Collected: 08/03/22 0410    Specimen: Blood Updated: 08/03/22 0449     Glucose 153 mg/dL      BUN 20 mg/dL      Creatinine 1.19 mg/dL      Sodium 139 mmol/L      Potassium 4.4 mmol/L      Chloride 105 mmol/L      CO2 23.0 mmol/L      Calcium 8.9 mg/dL      BUN/Creatinine Ratio 16.8     Anion Gap 11.0 mmol/L      eGFR 44.3 mL/min/1.73      Comment: National Kidney Foundation and American Society of Nephrology (ASN) Task Force recommended calculation based on the Chronic Kidney Disease Epidemiology Collaboration (CKD-EPI) equation refit without adjustment for race.       Narrative:      GFR Normal >60  Chronic Kidney Disease <60  Kidney Failure <15      Hemoglobin A1c [[id number]]  (Abnormal) Collected: 08/03/22 0410    Specimen: Blood Updated: 08/03/22 0441     Hemoglobin A1C 8.40 %     Narrative:      Hemoglobin A1C Ranges:    Increased Risk for Diabetes  5.7% to 6.4%  Diabetes                     >= 6.5%  Diabetic Goal                < 7.0%    Hemoglobin & Hematocrit, Blood [[id number]]  (Abnormal) Collected: 08/03/22 0410    Specimen: Blood Updated: 08/03/22 0430     Hemoglobin 11.2 g/dL      Hematocrit 34.5 %     CBC & Differential " [[id number]]  (Abnormal) Collected: 08/03/22 0410    Specimen: Blood Updated: 08/03/22 0430    Narrative:      The following orders were created for panel order CBC & Differential.  Procedure                               Abnormality         Status                     ---------                               -----------         ------                     CBC Auto Differential[[id number]]        Abnormal            Final result                 Please view results for these tests on the individual orders.    CBC Auto Differential [[id number]]  (Abnormal) Collected: 08/03/22 0410    Specimen: Blood Updated: 08/03/22 0430     WBC 16.24 10*3/mm3      RBC 3.64 10*6/mm3      Hemoglobin 11.2 g/dL      Hematocrit 34.5 %      MCV 94.8 fL      MCH 30.8 pg      MCHC 32.5 g/dL      RDW 13.4 %      RDW-SD 46.5 fl      MPV 9.9 fL      Platelets 373 10*3/mm3      Neutrophil % 68.5 %      Lymphocyte % 20.0 %      Monocyte % 10.2 %      Eosinophil % 0.6 %      Basophil % 0.3 %      Immature Grans % 0.4 %      Neutrophils, Absolute 11.13 10*3/mm3      Lymphocytes, Absolute 3.25 10*3/mm3      Monocytes, Absolute 1.66 10*3/mm3      Eosinophils, Absolute 0.09 10*3/mm3      Basophils, Absolute 0.05 10*3/mm3      Immature Grans, Absolute 0.06 10*3/mm3      nRBC 0.0 /100 WBC     COVID PRE-OP / PRE-PROCEDURE SCREENING ORDER (NO ISOLATION) - Swab, Nasopharynx [[id number]]  (Normal) Collected: 08/02/22 1832    Specimen: Swab from Nasopharynx Updated: 08/02/22 2026    Narrative:      The following orders were created for panel order COVID PRE-OP / PRE-PROCEDURE SCREENING ORDER (NO ISOLATION) - Swab, Nasopharynx.  Procedure                               Abnormality         Status                     ---------                               -----------         ------                     [facility]SE...[[id number]]  Normal              Final result                 Please view results for these tests on the individual orders.    COVID-19,[facility]-HOUSE CEPHEID/CRUZITO NP SWAB IN TRANSPORT MEDIA 8-12 HR TAT - Swab, Nasopharynx [[id number]]  (Normal) Collected: 08/02/22 1832    Specimen: Swab from Nasopharynx Updated: 08/02/22 2026     COVID19 Not Detected    Narrative:      Fact sheet for providers: https://www.fda.gov/media/458287/download     Fact sheet for patients: https://www.fda.gov/media/293859/download    POC Glucose Once [[id number]]  (Abnormal) Collected: 08/02/22 2011    Specimen: Blood Updated: 08/02/22 2013     Glucose 188 mg/dL      Comment: Meter: [id number] : [id number] Fabiano Brittney DARRICK       Protime-INR [[id number]]  (Abnormal) Collected: 08/02/22 1328    Specimen: Blood Updated: 08/02/22 1359     Protime 14.6 Seconds      INR 1.15    aPTT [[id number]]  (Normal) Collected: 08/02/22 1328    Specimen: Blood Updated: 08/02/22 1359     PTT 31.5 seconds     Comprehensive Metabolic Panel [[id number]]  (Abnormal) Collected: 08/02/22 1228    Specimen: Blood Updated: 08/02/22 1306     Glucose 282 mg/dL      BUN 22 mg/dL      Creatinine 1.28 mg/dL      Sodium 134 mmol/L      Potassium 4.9 mmol/L      Comment: Slight hemolysis detected by analyzer. Results may be affected.        Chloride 102 mmol/L      CO2 20.2 mmol/L      Calcium 9.0 mg/dL      Total Protein 6.2 g/dL      Albumin 3.20 g/dL      ALT (SGPT) 12 U/L      AST (SGOT) 17 U/L      Alkaline Phosphatase 90 U/L      Total Bilirubin 0.4 mg/dL      Globulin 3.0 gm/dL      A/G Ratio 1.1 g/dL      BUN/Creatinine Ratio 17.2     Anion Gap 11.8 mmol/L      eGFR 40.6 mL/min/1.73      Comment: National Kidney Foundation and American Society of Nephrology (ASN) Task Force recommended calculation based on the Chronic Kidney Disease Epidemiology Collaboration (CKD-EPI) equation refit without adjustment for race.       Narrative:      GFR Normal >60  Chronic Kidney Disease <60  Kidney Failure <15      CBC & Differential [[id number]]  (Abnormal) Collected: 08/02/22 1228    Specimen: Blood Updated:  "08/02/22 1245    Narrative:      The following orders were created for panel order CBC & Differential.  Procedure                               Abnormality         Status                     ---------                               -----------         ------                     CBC Auto Differential[[id number]]        Abnormal            Final result                 Please view results for these tests on the individual orders.    CBC Auto Differential [[id number]]  (Abnormal) Collected: 08/02/22 1228    Specimen: Blood Updated: 08/02/22 1245     WBC 17.03 10*3/mm3      RBC 4.17 10*6/mm3      Hemoglobin 12.6 g/dL      Hematocrit 38.9 %      MCV 93.3 fL      MCH 30.2 pg      MCHC 32.4 g/dL      RDW 13.3 %      RDW-SD 45.2 fl      MPV 9.8 fL      Platelets 395 10*3/mm3      Neutrophil % 88.1 %      Lymphocyte % 6.2 %      Monocyte % 4.9 %      Eosinophil % 0.1 %      Basophil % 0.2 %      Immature Grans % 0.5 %      Neutrophils, Absolute 15.02 10*3/mm3      Lymphocytes, Absolute 1.05 10*3/mm3      Monocytes, Absolute 0.83 10*3/mm3      Eosinophils, Absolute 0.01 10*3/mm3      Basophils, Absolute 0.04 10*3/mm3      Immature Grans, Absolute 0.08 10*3/mm3      nRBC 0.0 /100 WBC         /76 (BP Location: Left arm, Patient Position: Lying)   Pulse 78   Temp 98.4 °F (36.9 °C) (Oral)   Resp 16   Ht 149.9 cm (59\")   Wt 45 kg (99 lb 3.3 oz)   SpO2 98%   BMI 20.04 kg/m²     Discharge Exam:  General Appearance:    Alert, cooperative, no distress                          Head:    Normocephalic, without obvious abnormality, atraumatic                          Eyes:                            Throat:   Lips, tongue, gums normal                          Neck:   Supple, symmetrical, trachea midline, no JVD                        Lungs:     Clear to auscultation bilaterally, respirations unlabored                Chest Wall:    No tenderness or deformity                        Heart:    Regular rate and rhythm, S1 and S2 " normal, no murmur,no  Rub or gallop                  Abdomen:     Soft, non-tender, bowel sounds active, no masses, no organomegaly                  Extremities:   Extremities normal, atraumatic, no cyanosis or edema                             Skin:   Skin is warm and dry,  no rashes or palpable lesions                  Neurologic:   no focal deficits noted     Disposition:  Skilled nursing facility    Patient Instructions:      Discharge Medications      Continue These Medications      Instructions Start Date   acetaminophen 325 MG tablet  Commonly known as: TYLENOL   650 mg, Oral, Every 6 Hours PRN      furosemide 20 MG tablet  Commonly known as: LASIX   20 mg, Oral, Daily      Lantus SoloStar 100 UNIT/ML injection pen  Generic drug: Insulin Glargine   12 Units, Subcutaneous, Daily      levothyroxine 88 MCG tablet  Commonly known as: SYNTHROID, LEVOTHROID   88 mcg, Oral, Daily      linaclotide 145 MCG capsule capsule  Commonly known as: LINZESS   145 mcg, Oral, Daily      memantine 10 MG tablet  Commonly known as: NAMENDA   10 mg, Oral, Daily      potassium chloride 20 MEQ CR tablet  Commonly known as: K-DUR,KLOR-CON   40 mEq, Oral, 2 Times Daily      simvastatin 10 MG tablet  Commonly known as: ZOCOR   10 mg, Oral, Nightly         Stop These Medications    aspirin 81 MG EC tablet          No future appointments.   Contact information for follow-up providers     Reg Rivera MD Follow up.    Specialty: Internal Medicine  Contact information:  [address] [facility] [address] [phone]                   Contact information for after-discharge care     Destination     [facility] .    Service: Intermediate Care  Contact information:  [address] [facility] [address] [phone]                           Discharge Order (From admission, onward)     Start     Ordered    08/06/22 1145  Discharge patient  Once        Expected Discharge Date: 08/06/22    Discharge  Disposition: Skilled Nursing Facility (DC - External)    Physician of Record for Attribution - Please select from Treatment Team: KP SKY [[id number]]    Review needed by CMO to determine Physician of Record: No       Question Answer Comment   Physician of Record for Attribution - Please select from Treatment Team KP SKY    Review needed by CMO to determine Physician of Record No        08/06/22 1146                Total time spent discharging patient including evaluation,post hospitalization follow up,  medication and post hospitalization instructions and education total time exceeds 30 minutes.    Signed:  Kp Sky MD  8/6/2022  11:46 EDT

## 2022-08-06 NOTE — PLAN OF CARE
Problem: Adult Inpatient Plan of Care  Goal: Plan of Care Review  Outcome: Ongoing, Progressing  Flowsheets (Taken 8/6/2022 0421)  Progress: no change  Plan of Care Reviewed With: patient  Outcome Evaluation: pt awake most of shift, denies pain x when r arm, shoulder or chest is touched.  brusing cont's,  pt wanting to go home today.  no distress noted. will cont to monitor   Goal Outcome Evaluation:  Plan of Care Reviewed With: patient        Progress: no change  Outcome Evaluation: pt awake most of shift, denies pain x when r arm, shoulder or chest is touched.  brusing cont's,  pt wanting to go home today.  no distress noted. will cont to monitor

## 2022-08-06 NOTE — PROGRESS NOTES
"DAILY PROGRESS NOTE  [facility]    Patient Identification:  Name: Sam Flores  Age: 87 y.o.  Sex: female  :  1934  MRN: [id number]         Primary Care Physician: Reg Rivera MD    Subjective:  Interval History:She complains of chest wall pain.  Hematoma is smaller on CT scanning.    Objective:    Scheduled Meds:atorvastatin, 10 mg, Oral, Daily  insulin glargine, 8 Units, Subcutaneous, Daily  insulin lispro, 0-7 Units, Subcutaneous, TID AC  levothyroxine, 88 mcg, Oral, Daily  memantine, 5 mg, Oral, Daily  senna-docusate sodium, 2 tablet, Oral, BID  sodium chloride, 10 mL, Intravenous, Q12H      Continuous Infusions:     Vital signs in last 24 hours:  Temp:  [98.1 °F (36.7 °C)-98.4 °F (36.9 °C)] 98.1 °F (36.7 °C)  Heart Rate:  [70-83] 70  Resp:  [16] 16  BP: (114-134)/(62-76) 114/69    Intake/Output:    Intake/Output Summary (Last 24 hours) at 2022 1423  Last data filed at 2022 1300  Gross per 24 hour   Intake 180 ml   Output 100 ml   Net 80 ml       Exam:  /69 (BP Location: Left arm, Patient Position: Lying)   Pulse 70   Temp 98.1 °F (36.7 °C) (Oral)   Resp 16   Ht 149.9 cm (59\")   Wt 45 kg (99 lb 3.3 oz)   SpO2 97%   BMI 20.04 kg/m²     General Appearance:    Alert, cooperative, no distress   Head:    Normocephalic, without obvious abnormality, atraumatic   Eyes:       Throat:   Lips, tongue, gums normal   Neck:   Supple, symmetrical, trachea midline, no JVD   Lungs:     Clear to auscultation bilaterally, respirations unlabored   Chest Wall:    Right chest wall hematoma right shoulder    Heart:    Regular rate and rhythm, S1 and S2 normal, no murmur,no  Rub or gallop   Abdomen:     Soft, nontender, bowel sounds active, no masses, no organomegaly    Extremities:   Extremities normal, atraumatic, no cyanosis or edema   Pulses:      Skin:   Skin is warm and dry,  no rashes or palpable lesions   Neurologic:   demented      Lab Results (last 72 hours)     " "Procedure Component Value Units Date/Time    Blood Culture ID, PCR - Blood, Arm, Left [[id number]]  (Abnormal) Collected: 08/02/22 1414    Specimen: Blood from Arm, Left Updated: 08/03/22 1206     BCID, PCR Staph spp, not aureus or lugdunesis. Identification by BCID2 PCR.     BOTTLE TYPE Aerobic Bottle    POC Glucose Once [[id number]]  (Normal) Collected: 08/03/22 1100    Specimen: Blood Updated: 08/03/22 1102     Glucose 86 mg/dL      Comment: Meter: [id number] : [id number] Geovanni HOLLINGSWORTH       Blood Culture - Blood, Arm, Left [[id number]]  (Abnormal) Collected: 08/02/22 1414    Specimen: Blood from Arm, Left Updated: 08/03/22 1050     Blood Culture Abnormal Stain     Gram Stain Aerobic Bottle Gram positive cocci    POC Glucose Once [[id number]]  (Abnormal) Collected: 08/03/22 0546    Specimen: Blood Updated: 08/03/22 0547     Glucose 137 mg/dL      Comment: Meter: [id number] : [id number] Fabiano Brittney DARRICK       Procalcitonin [[id number]]  (Normal) Collected: 08/03/22 0410    Specimen: Blood Updated: 08/03/22 0456     Procalcitonin 0.15 ng/mL     Narrative:      As a Marker for Sepsis (Non-Neonates):    1. <0.5 ng/mL represents a low risk of severe sepsis and/or septic shock.  2. >2 ng/mL represents a high risk of severe sepsis and/or septic shock.    As a Marker for Lower Respiratory Tract Infections that require antibiotic therapy:    PCT on Admission    Antibiotic Therapy       6-12 Hrs later    >0.5                Strongly Recommended  >0.25 - <0.5        Recommended   0.1 - 0.25          Discouraged              Remeasure/reassess PCT  <0.1                Strongly Discouraged     Remeasure/reassess PCT    As 28 day mortality risk marker: \"Change in Procalcitonin Result\" (>80% or <=80%) if Day 0 (or Day 1) and Day 4 values are available. Refer to http://www.[facility]-pct-calculator.com    Change in PCT <=80%  A decrease of PCT levels below or equal to 80% defines a positive change in PCT test result " representing a higher risk for 28-day all-cause mortality of patients diagnosed with severe sepsis for septic shock.    Change in PCT >80%  A decrease of PCT levels of more than 80% defines a negative change in PCT result representing a lower risk for 28-day all-cause mortality of patients diagnosed with severe sepsis or septic shock.       Basic Metabolic Panel [[id number]]  (Abnormal) Collected: 08/03/22 0410    Specimen: Blood Updated: 08/03/22 0449     Glucose 153 mg/dL      BUN 20 mg/dL      Creatinine 1.19 mg/dL      Sodium 139 mmol/L      Potassium 4.4 mmol/L      Chloride 105 mmol/L      CO2 23.0 mmol/L      Calcium 8.9 mg/dL      BUN/Creatinine Ratio 16.8     Anion Gap 11.0 mmol/L      eGFR 44.3 mL/min/1.73      Comment: National Kidney Foundation and American Society of Nephrology (ASN) Task Force recommended calculation based on the Chronic Kidney Disease Epidemiology Collaboration (CKD-EPI) equation refit without adjustment for race.       Narrative:      GFR Normal >60  Chronic Kidney Disease <60  Kidney Failure <15      Hemoglobin A1c [[id number]]  (Abnormal) Collected: 08/03/22 0410    Specimen: Blood Updated: 08/03/22 0441     Hemoglobin A1C 8.40 %     Narrative:      Hemoglobin A1C Ranges:    Increased Risk for Diabetes  5.7% to 6.4%  Diabetes                     >= 6.5%  Diabetic Goal                < 7.0%    Hemoglobin & Hematocrit, Blood [[id number]]  (Abnormal) Collected: 08/03/22 0410    Specimen: Blood Updated: 08/03/22 0430     Hemoglobin 11.2 g/dL      Hematocrit 34.5 %     CBC & Differential [[id number]]  (Abnormal) Collected: 08/03/22 0410    Specimen: Blood Updated: 08/03/22 0430    Narrative:      The following orders were created for panel order CBC & Differential.  Procedure                               Abnormality         Status                     ---------                               -----------         ------                     CBC Auto Differential[[id number]]        Abnormal             Final result                 Please view results for these tests on the individual orders.    CBC Auto Differential [[id number]]  (Abnormal) Collected: 08/03/22 0410    Specimen: Blood Updated: 08/03/22 0430     WBC 16.24 10*3/mm3      RBC 3.64 10*6/mm3      Hemoglobin 11.2 g/dL      Hematocrit 34.5 %      MCV 94.8 fL      MCH 30.8 pg      MCHC 32.5 g/dL      RDW 13.4 %      RDW-SD 46.5 fl      MPV 9.9 fL      Platelets 373 10*3/mm3      Neutrophil % 68.5 %      Lymphocyte % 20.0 %      Monocyte % 10.2 %      Eosinophil % 0.6 %      Basophil % 0.3 %      Immature Grans % 0.4 %      Neutrophils, Absolute 11.13 10*3/mm3      Lymphocytes, Absolute 3.25 10*3/mm3      Monocytes, Absolute 1.66 10*3/mm3      Eosinophils, Absolute 0.09 10*3/mm3      Basophils, Absolute 0.05 10*3/mm3      Immature Grans, Absolute 0.06 10*3/mm3      nRBC 0.0 /100 WBC     COVID PRE-OP / PRE-PROCEDURE SCREENING ORDER (NO ISOLATION) - Swab, Nasopharynx [[id number]]  (Normal) Collected: 08/02/22 1832    Specimen: Swab from Nasopharynx Updated: 08/02/22 2026    Narrative:      The following orders were created for panel order COVID PRE-OP / PRE-PROCEDURE SCREENING ORDER (NO ISOLATION) - Swab, Nasopharynx.  Procedure                               Abnormality         Status                     ---------                               -----------         ------                     COVID-19,[facility] IN-HOUSE...[[id number]]  Normal              Final result                 Please view results for these tests on the individual orders.    COVID-19,[facility]-HOUSE CEPHEID/CRUZITO NP SWAB IN TRANSPORT MEDIA 8-12 HR TAT - Swab, Nasopharynx [[id number]]  (Normal) Collected: 08/02/22 1832    Specimen: Swab from Nasopharynx Updated: 08/02/22 2026     COVID19 Not Detected    Narrative:      Fact sheet for providers: https://www.fda.gov/media/227246/download     Fact sheet for patients: https://www.fda.gov/media/144854/download    POC Glucose Once [[id number]]   (Abnormal) Collected: 08/02/22 2011    Specimen: Blood Updated: 08/02/22 2013     Glucose 188 mg/dL      Comment: Meter: [id number] : [id number] Fabiano HOLLINGSWORTH       Blood Culture - Blood, Arm, Right [[id number]] Collected: 08/02/22 1414    Specimen: Blood from Arm, Right Updated: 08/02/22 1420    Protime-INR [[id number]]  (Abnormal) Collected: 08/02/22 1328    Specimen: Blood Updated: 08/02/22 1359     Protime 14.6 Seconds      INR 1.15    aPTT [[id number]]  (Normal) Collected: 08/02/22 1328    Specimen: Blood Updated: 08/02/22 1359     PTT 31.5 seconds     Comprehensive Metabolic Panel [[id number]]  (Abnormal) Collected: 08/02/22 1228    Specimen: Blood Updated: 08/02/22 1306     Glucose 282 mg/dL      BUN 22 mg/dL      Creatinine 1.28 mg/dL      Sodium 134 mmol/L      Potassium 4.9 mmol/L      Comment: Slight hemolysis detected by analyzer. Results may be affected.        Chloride 102 mmol/L      CO2 20.2 mmol/L      Calcium 9.0 mg/dL      Total Protein 6.2 g/dL      Albumin 3.20 g/dL      ALT (SGPT) 12 U/L      AST (SGOT) 17 U/L      Alkaline Phosphatase 90 U/L      Total Bilirubin 0.4 mg/dL      Globulin 3.0 gm/dL      A/G Ratio 1.1 g/dL      BUN/Creatinine Ratio 17.2     Anion Gap 11.8 mmol/L      eGFR 40.6 mL/min/1.73      Comment: National Kidney Foundation and American Society of Nephrology (ASN) Task Force recommended calculation based on the Chronic Kidney Disease Epidemiology Collaboration (CKD-EPI) equation refit without adjustment for race.       Narrative:      GFR Normal >60  Chronic Kidney Disease <60  Kidney Failure <15      CBC & Differential [[id number]]  (Abnormal) Collected: 08/02/22 1228    Specimen: Blood Updated: 08/02/22 1245    Narrative:      The following orders were created for panel order CBC & Differential.  Procedure                               Abnormality         Status                     ---------                               -----------         ------                      CBC Auto Differential[[id number]]        Abnormal            Final result                 Please view results for these tests on the individual orders.    CBC Auto Differential [[id number]]  (Abnormal) Collected: 08/02/22 1228    Specimen: Blood Updated: 08/02/22 1245     WBC 17.03 10*3/mm3      RBC 4.17 10*6/mm3      Hemoglobin 12.6 g/dL      Hematocrit 38.9 %      MCV 93.3 fL      MCH 30.2 pg      MCHC 32.4 g/dL      RDW 13.3 %      RDW-SD 45.2 fl      MPV 9.8 fL      Platelets 395 10*3/mm3      Neutrophil % 88.1 %      Lymphocyte % 6.2 %      Monocyte % 4.9 %      Eosinophil % 0.1 %      Basophil % 0.2 %      Immature Grans % 0.5 %      Neutrophils, Absolute 15.02 10*3/mm3      Lymphocytes, Absolute 1.05 10*3/mm3      Monocytes, Absolute 0.83 10*3/mm3      Eosinophils, Absolute 0.01 10*3/mm3      Basophils, Absolute 0.04 10*3/mm3      Immature Grans, Absolute 0.08 10*3/mm3      nRBC 0.0 /100 WBC         Data Review:  Results from last 7 days   Lab Units 08/06/22  0415 08/05/22  0410 08/04/22  0416   SODIUM mmol/L 138 138 137   POTASSIUM mmol/L 3.5 3.5 4.2   CHLORIDE mmol/L 105 103 102   CO2 mmol/L 22.4 21.1* 22.0   BUN mg/dL 17 19 22   CREATININE mg/dL 1.03* 1.04* 1.20*   GLUCOSE mg/dL 104* 81 139*   CALCIUM mg/dL 8.7 8.7 8.5*     Results from last 7 days   Lab Units 08/06/22  0415 08/05/22  1608 08/05/22  0410 08/04/22  1623 08/04/22  0416   WBC 10*3/mm3 12.34*  --  11.49*  --  16.61*   HEMOGLOBIN g/dL 9.0* 10.1* 9.7*  9.7*   < > 9.8*  9.8*   HEMATOCRIT % 26.9* 29.5* 29.2*  29.2*   < > 29.9*  29.9*   PLATELETS 10*3/mm3 362  --  341  --  347    < > = values in this interval not displayed.         Results from last 7 days   Lab Units 08/03/22  0410   HEMOGLOBIN A1C % 8.40*     Lab Results   Lab Value Date/Time    TROPONINT 0.020 03/12/2018 1304    TROPONINT <0.010 11/30/2016 1946         Results from last 7 days   Lab Units 08/02/22  1228   ALK PHOS U/L 90   BILIRUBIN mg/dL 0.4   ALT (SGPT) U/L 12   AST (SGOT)  U/L 17         Results from last 7 days   Lab Units 08/03/22  0410   HEMOGLOBIN A1C % 8.40*     Glucose   Date/Time Value Ref Range Status   08/06/2022 1052 108 70 - 130 mg/dL Final     Comment:     Meter: [id number] : [id number] Givens Jorge A NA   08/06/2022 0638 98 70 - 130 mg/dL Final     Comment:     Meter: [id number] : [id number] Espinal Thalia NA   08/05/2022 2113 208 (H) 70 - 130 mg/dL Final     Comment:     Meter: [id number] : [id number] Espinal Thalia NA   08/05/2022 1649 132 (H) 70 - 130 mg/dL Final     Comment:     Meter: [id number] : [id number] Givens Jorge A NA   08/05/2022 1034 84 70 - 130 mg/dL Final     Comment:     Meter: [id number] : [id number] Givens Jorge A NA   08/05/2022 0536 92 70 - 130 mg/dL Final     Comment:     Meter: [id number] : [id number] VonKalie Alexia NA   08/04/2022 2005 122 70 - 130 mg/dL Final     Comment:     Meter: [id number] : [id number] VonHandorf Alexia NA   08/04/2022 1615 167 (H) 70 - 130 mg/dL Final     Comment:     Meter: [id number] : [id number] Reidbenjamín Coxadie NA     Results from last 7 days   Lab Units 08/02/22  1328   INR  1.15*       Past Medical History:   Diagnosis Date   • Dementia (HCC)    • Diabetes mellitus (HCC)    • Disease of thyroid gland    • Emphysema of lung (HCC)    • Hyperlipidemia    • Hypertension    • Lumbar compression fracture (HCC) 11/30/2016   • Osteoporosis    • S/P kyphoplasty 12/5/2016    L1 and L4, 12/2/2016       Assessment:  Active Hospital Problems    Diagnosis  POA   • **Chest wall hematoma, right, initial encounter [S20.211A]  Yes   • Dementia (HCC) [F03.90]  Yes   • Type 2 diabetes mellitus without complication (HCC) [E11.9]  Yes   • Benign essential hypertension [I10]  Yes      Resolved Hospital Problems   No resolved problems to display.       Plan:  Continue with current RX. Follow lab. Thoracic surgery consult noted. Maybe back to SNU sometime.    LTC  if stable.  Follow hgb.  Discharge summary done  and orders and but apparently no transportation by ambulance until Monday.    Kp Sky MD  8/6/2022  14:23 EDT

## 2022-08-07 LAB
ANION GAP SERPL CALCULATED.3IONS-SCNC: 14 MMOL/L (ref 5–15)
BACTERIA SPEC AEROBE CULT: NORMAL
BUN SERPL-MCNC: 12 MG/DL (ref 8–23)
BUN/CREAT SERPL: 12 (ref 7–25)
CALCIUM SPEC-SCNC: 8.4 MG/DL (ref 8.6–10.5)
CHLORIDE SERPL-SCNC: 105 MMOL/L (ref 98–107)
CO2 SERPL-SCNC: 22 MMOL/L (ref 22–29)
CREAT SERPL-MCNC: 1 MG/DL (ref 0.57–1)
DEPRECATED RDW RBC AUTO: 45 FL (ref 37–54)
EGFRCR SERPLBLD CKD-EPI 2021: 54.6 ML/MIN/1.73
ERYTHROCYTE [DISTWIDTH] IN BLOOD BY AUTOMATED COUNT: 13.5 % (ref 12.3–15.4)
GLUCOSE BLDC GLUCOMTR-MCNC: 106 MG/DL (ref 70–130)
GLUCOSE BLDC GLUCOMTR-MCNC: 188 MG/DL (ref 70–130)
GLUCOSE BLDC GLUCOMTR-MCNC: 192 MG/DL (ref 70–130)
GLUCOSE BLDC GLUCOMTR-MCNC: 80 MG/DL (ref 70–130)
GLUCOSE SERPL-MCNC: 82 MG/DL (ref 65–99)
HCT VFR BLD AUTO: 29.5 % (ref 34–46.6)
HGB BLD-MCNC: 9.6 G/DL (ref 12–15.9)
MCH RBC QN AUTO: 30.5 PG (ref 26.6–33)
MCHC RBC AUTO-ENTMCNC: 32.5 G/DL (ref 31.5–35.7)
MCV RBC AUTO: 93.7 FL (ref 79–97)
PLATELET # BLD AUTO: 420 10*3/MM3 (ref 140–450)
PMV BLD AUTO: 10.1 FL (ref 6–12)
POTASSIUM SERPL-SCNC: 3.6 MMOL/L (ref 3.5–5.2)
RBC # BLD AUTO: 3.15 10*6/MM3 (ref 3.77–5.28)
SODIUM SERPL-SCNC: 141 MMOL/L (ref 136–145)
WBC NRBC COR # BLD: 10.68 10*3/MM3 (ref 3.4–10.8)

## 2022-08-07 PROCEDURE — 80048 BASIC METABOLIC PNL TOTAL CA: CPT | Performed by: HOSPITALIST

## 2022-08-07 PROCEDURE — 85027 COMPLETE CBC AUTOMATED: CPT | Performed by: HOSPITALIST

## 2022-08-07 PROCEDURE — 63710000001 INSULIN LISPRO (HUMAN) PER 5 UNITS: Performed by: HOSPITALIST

## 2022-08-07 PROCEDURE — 82962 GLUCOSE BLOOD TEST: CPT

## 2022-08-07 RX ADMIN — Medication 10 ML: at 08:37

## 2022-08-07 RX ADMIN — Medication 10 ML: at 20:52

## 2022-08-07 RX ADMIN — LEVOTHYROXINE SODIUM 88 MCG: 0.09 TABLET ORAL at 08:37

## 2022-08-07 RX ADMIN — INSULIN LISPRO 2 UNITS: 100 INJECTION, SOLUTION INTRAVENOUS; SUBCUTANEOUS at 12:21

## 2022-08-07 RX ADMIN — MEMANTINE HYDROCHLORIDE 5 MG: 5 TABLET, FILM COATED ORAL at 08:37

## 2022-08-07 RX ADMIN — ATORVASTATIN CALCIUM 10 MG: 20 TABLET, FILM COATED ORAL at 08:37

## 2022-08-07 NOTE — PLAN OF CARE
Problem: Adult Inpatient Plan of Care  Goal: Plan of Care Review  Outcome: Ongoing, Progressing  Flowsheets (Taken 8/7/2022 0309)  Progress: no change  Plan of Care Reviewed With: patient  Outcome Evaluation: pt oreinted to person and at times to place.  denies pain x when touched on chest and arm.  bruising noted over chest and arms.  will cont to monitor   Goal Outcome Evaluation:  Plan of Care Reviewed With: patient        Progress: no change  Outcome Evaluation: pt oreinted to person and at times to place.  denies pain x when touched on chest and arm.  bruising noted over chest and arms.  will cont to monitor

## 2022-08-07 NOTE — PROGRESS NOTES
"DAILY PROGRESS NOTE  [facility]    Patient Identification:  Name: Sam Flores  Age: 87 y.o.  Sex: female  :  1934  MRN: [id number]         Primary Care Physician: Reg Rivera MD    Subjective:  Interval History:She complains of chest wall pain.  Hematoma is smaller on CT scanning.    Objective:    Scheduled Meds:atorvastatin, 10 mg, Oral, Daily  insulin glargine, 8 Units, Subcutaneous, Daily  insulin lispro, 0-7 Units, Subcutaneous, TID AC  levothyroxine, 88 mcg, Oral, Daily  memantine, 5 mg, Oral, Daily  senna-docusate sodium, 2 tablet, Oral, BID  sodium chloride, 10 mL, Intravenous, Q12H      Continuous Infusions:     Vital signs in last 24 hours:  Temp:  [97.9 °F (36.6 °C)-99 °F (37.2 °C)] 98.4 °F (36.9 °C)  Heart Rate:  [63-81] 71  Resp:  [16] 16  BP: (121-187)/(64-83) 152/78    Intake/Output:    Intake/Output Summary (Last 24 hours) at 2022 1329  Last data filed at 2022 0900  Gross per 24 hour   Intake 360 ml   Output 350 ml   Net 10 ml       Exam:  /78 (BP Location: Left arm, Patient Position: Lying)   Pulse 71   Temp 98.4 °F (36.9 °C) (Oral)   Resp 16   Ht 149.9 cm (59\")   Wt 45 kg (99 lb 3.3 oz)   SpO2 97%   BMI 20.04 kg/m²     General Appearance:    Alert, cooperative, no distress   Head:    Normocephalic, without obvious abnormality, atraumatic   Eyes:       Throat:   Lips, tongue, gums normal   Neck:   Supple, symmetrical, trachea midline, no JVD   Lungs:     Clear to auscultation bilaterally, respirations unlabored   Chest Wall:    Right chest wall hematoma right shoulder    Heart:    Regular rate and rhythm, S1 and S2 normal, no murmur,no  Rub or gallop   Abdomen:     Soft, nontender, bowel sounds active, no masses, no organomegaly    Extremities:   Extremities normal, atraumatic, no cyanosis or edema   Pulses:      Skin:   Skin is warm and dry,  no rashes or palpable lesions   Neurologic:   demented      Lab Results (last 72 hours)     " "Procedure Component Value Units Date/Time    Blood Culture ID, PCR - Blood, Arm, Left [[id number]]  (Abnormal) Collected: 08/02/22 1414    Specimen: Blood from Arm, Left Updated: 08/03/22 1206     BCID, PCR Staph spp, not aureus or lugdunesis. Identification by BCID2 PCR.     BOTTLE TYPE Aerobic Bottle    POC Glucose Once [[id number]]  (Normal) Collected: 08/03/22 1100    Specimen: Blood Updated: 08/03/22 1102     Glucose 86 mg/dL      Comment: Meter: [id number] : [id number] Geovanni HOLLINGSWORTH       Blood Culture - Blood, Arm, Left [[id number]]  (Abnormal) Collected: 08/02/22 1414    Specimen: Blood from Arm, Left Updated: 08/03/22 1050     Blood Culture Abnormal Stain     Gram Stain Aerobic Bottle Gram positive cocci    POC Glucose Once [[id number]]  (Abnormal) Collected: 08/03/22 0546    Specimen: Blood Updated: 08/03/22 0547     Glucose 137 mg/dL      Comment: Meter: [id number] : [id number] Fabiano Brittney DARRICK       Procalcitonin [[id number]]  (Normal) Collected: 08/03/22 0410    Specimen: Blood Updated: 08/03/22 0456     Procalcitonin 0.15 ng/mL     Narrative:      As a Marker for Sepsis (Non-Neonates):    1. <0.5 ng/mL represents a low risk of severe sepsis and/or septic shock.  2. >2 ng/mL represents a high risk of severe sepsis and/or septic shock.    As a Marker for Lower Respiratory Tract Infections that require antibiotic therapy:    PCT on Admission    Antibiotic Therapy       6-12 Hrs later    >0.5                Strongly Recommended  >0.25 - <0.5        Recommended   0.1 - 0.25          Discouraged              Remeasure/reassess PCT  <0.1                Strongly Discouraged     Remeasure/reassess PCT    As 28 day mortality risk marker: \"Change in Procalcitonin Result\" (>80% or <=80%) if Day 0 (or Day 1) and Day 4 values are available. Refer to http://www.[facility]-pct-calculator.com    Change in PCT <=80%  A decrease of PCT levels below or equal to 80% defines a positive change in PCT test result " representing a higher risk for 28-day all-cause mortality of patients diagnosed with severe sepsis for septic shock.    Change in PCT >80%  A decrease of PCT levels of more than 80% defines a negative change in PCT result representing a lower risk for 28-day all-cause mortality of patients diagnosed with severe sepsis or septic shock.       Basic Metabolic Panel [[id number]]  (Abnormal) Collected: 08/03/22 0410    Specimen: Blood Updated: 08/03/22 0449     Glucose 153 mg/dL      BUN 20 mg/dL      Creatinine 1.19 mg/dL      Sodium 139 mmol/L      Potassium 4.4 mmol/L      Chloride 105 mmol/L      CO2 23.0 mmol/L      Calcium 8.9 mg/dL      BUN/Creatinine Ratio 16.8     Anion Gap 11.0 mmol/L      eGFR 44.3 mL/min/1.73      Comment: National Kidney Foundation and American Society of Nephrology (ASN) Task Force recommended calculation based on the Chronic Kidney Disease Epidemiology Collaboration (CKD-EPI) equation refit without adjustment for race.       Narrative:      GFR Normal >60  Chronic Kidney Disease <60  Kidney Failure <15      Hemoglobin A1c [[id number]]  (Abnormal) Collected: 08/03/22 0410    Specimen: Blood Updated: 08/03/22 0441     Hemoglobin A1C 8.40 %     Narrative:      Hemoglobin A1C Ranges:    Increased Risk for Diabetes  5.7% to 6.4%  Diabetes                     >= 6.5%  Diabetic Goal                < 7.0%    Hemoglobin & Hematocrit, Blood [[id number]]  (Abnormal) Collected: 08/03/22 0410    Specimen: Blood Updated: 08/03/22 0430     Hemoglobin 11.2 g/dL      Hematocrit 34.5 %     CBC & Differential [[id number]]  (Abnormal) Collected: 08/03/22 0410    Specimen: Blood Updated: 08/03/22 0430    Narrative:      The following orders were created for panel order CBC & Differential.  Procedure                               Abnormality         Status                     ---------                               -----------         ------                     CBC Auto Differential[[id number]]        Abnormal             Final result                 Please view results for these tests on the individual orders.    CBC Auto Differential [[id number]]  (Abnormal) Collected: 08/03/22 0410    Specimen: Blood Updated: 08/03/22 0430     WBC 16.24 10*3/mm3      RBC 3.64 10*6/mm3      Hemoglobin 11.2 g/dL      Hematocrit 34.5 %      MCV 94.8 fL      MCH 30.8 pg      MCHC 32.5 g/dL      RDW 13.4 %      RDW-SD 46.5 fl      MPV 9.9 fL      Platelets 373 10*3/mm3      Neutrophil % 68.5 %      Lymphocyte % 20.0 %      Monocyte % 10.2 %      Eosinophil % 0.6 %      Basophil % 0.3 %      Immature Grans % 0.4 %      Neutrophils, Absolute 11.13 10*3/mm3      Lymphocytes, Absolute 3.25 10*3/mm3      Monocytes, Absolute 1.66 10*3/mm3      Eosinophils, Absolute 0.09 10*3/mm3      Basophils, Absolute 0.05 10*3/mm3      Immature Grans, Absolute 0.06 10*3/mm3      nRBC 0.0 /100 WBC     COVID PRE-OP / PRE-PROCEDURE SCREENING ORDER (NO ISOLATION) - Swab, Nasopharynx [[id number]]  (Normal) Collected: 08/02/22 1832    Specimen: Swab from Nasopharynx Updated: 08/02/22 2026    Narrative:      The following orders were created for panel order COVID PRE-OP / PRE-PROCEDURE SCREENING ORDER (NO ISOLATION) - Swab, Nasopharynx.  Procedure                               Abnormality         Status                     ---------                               -----------         ------                     COVID-19,[facility]-HOU[facility]...[[id number]]  Normal              Final result                 Please view results for these tests on the individual orders.    COVID-19,[facility]-HOU[facility] CEPHEID/CRUZITO NP SWAB IN TRANSPORT MEDIA 8-12 HR TAT - Swab, Nasopharynx [[id number]]  (Normal) Collected: 08/02/22 1832    Specimen: Swab from Nasopharynx Updated: 08/02/22 2026     COVID19 Not Detected    Narrative:      Fact sheet for providers: https://www.fda.gov/media/602138/download     Fact sheet for patients: https://www.fda.gov/media/573658/download    POC Glucose Once [[id number]]   (Abnormal) Collected: 08/02/22 2011    Specimen: Blood Updated: 08/02/22 2013     Glucose 188 mg/dL      Comment: Meter: [id number] : [id number] Fabiano HOLLINGSWORTH       Blood Culture - Blood, Arm, Right [[id number]] Collected: 08/02/22 1414    Specimen: Blood from Arm, Right Updated: 08/02/22 1420    Protime-INR [[id number]]  (Abnormal) Collected: 08/02/22 1328    Specimen: Blood Updated: 08/02/22 1359     Protime 14.6 Seconds      INR 1.15    aPTT [[id number]]  (Normal) Collected: 08/02/22 1328    Specimen: Blood Updated: 08/02/22 1359     PTT 31.5 seconds     Comprehensive Metabolic Panel [[id number]]  (Abnormal) Collected: 08/02/22 1228    Specimen: Blood Updated: 08/02/22 1306     Glucose 282 mg/dL      BUN 22 mg/dL      Creatinine 1.28 mg/dL      Sodium 134 mmol/L      Potassium 4.9 mmol/L      Comment: Slight hemolysis detected by analyzer. Results may be affected.        Chloride 102 mmol/L      CO2 20.2 mmol/L      Calcium 9.0 mg/dL      Total Protein 6.2 g/dL      Albumin 3.20 g/dL      ALT (SGPT) 12 U/L      AST (SGOT) 17 U/L      Alkaline Phosphatase 90 U/L      Total Bilirubin 0.4 mg/dL      Globulin 3.0 gm/dL      A/G Ratio 1.1 g/dL      BUN/Creatinine Ratio 17.2     Anion Gap 11.8 mmol/L      eGFR 40.6 mL/min/1.73      Comment: National Kidney Foundation and American Society of Nephrology (ASN) Task Force recommended calculation based on the Chronic Kidney Disease Epidemiology Collaboration (CKD-EPI) equation refit without adjustment for race.       Narrative:      GFR Normal >60  Chronic Kidney Disease <60  Kidney Failure <15      CBC & Differential [[id number]]  (Abnormal) Collected: 08/02/22 1228    Specimen: Blood Updated: 08/02/22 1245    Narrative:      The following orders were created for panel order CBC & Differential.  Procedure                               Abnormality         Status                     ---------                               -----------         ------                      CBC Auto Differential[[id number]]        Abnormal            Final result                 Please view results for these tests on the individual orders.    CBC Auto Differential [[id number]]  (Abnormal) Collected: 08/02/22 1228    Specimen: Blood Updated: 08/02/22 1245     WBC 17.03 10*3/mm3      RBC 4.17 10*6/mm3      Hemoglobin 12.6 g/dL      Hematocrit 38.9 %      MCV 93.3 fL      MCH 30.2 pg      MCHC 32.4 g/dL      RDW 13.3 %      RDW-SD 45.2 fl      MPV 9.8 fL      Platelets 395 10*3/mm3      Neutrophil % 88.1 %      Lymphocyte % 6.2 %      Monocyte % 4.9 %      Eosinophil % 0.1 %      Basophil % 0.2 %      Immature Grans % 0.5 %      Neutrophils, Absolute 15.02 10*3/mm3      Lymphocytes, Absolute 1.05 10*3/mm3      Monocytes, Absolute 0.83 10*3/mm3      Eosinophils, Absolute 0.01 10*3/mm3      Basophils, Absolute 0.04 10*3/mm3      Immature Grans, Absolute 0.08 10*3/mm3      nRBC 0.0 /100 WBC         Data Review:  Results from last 7 days   Lab Units 08/07/22  0717 08/06/22  0415 08/05/22  0410   SODIUM mmol/L 141 138 138   POTASSIUM mmol/L 3.6 3.5 3.5   CHLORIDE mmol/L 105 105 103   CO2 mmol/L 22.0 22.4 21.1*   BUN mg/dL 12 17 19   CREATININE mg/dL 1.00 1.03* 1.04*   GLUCOSE mg/dL 82 104* 81   CALCIUM mg/dL 8.4* 8.7 8.7     Results from last 7 days   Lab Units 08/07/22  0717 08/06/22  1853 08/06/22  0415 08/05/22  1608 08/05/22  0410   WBC 10*3/mm3 10.68  --  12.34*  --  11.49*   HEMOGLOBIN g/dL 9.6* 10.3* 9.0*   < > 9.7*  9.7*   HEMATOCRIT % 29.5* 31.9* 26.9*   < > 29.2*  29.2*   PLATELETS 10*3/mm3 420  --  362  --  341    < > = values in this interval not displayed.         Results from last 7 days   Lab Units 08/03/22  0410   HEMOGLOBIN A1C % 8.40*     Lab Results   Lab Value Date/Time    TROPONINT 0.020 03/12/2018 1304    TROPONINT <0.010 11/30/2016 1946         Results from last 7 days   Lab Units 08/02/22  1228   ALK PHOS U/L 90   BILIRUBIN mg/dL 0.4   ALT (SGPT) U/L 12   AST (SGOT) U/L 17          Results from last 7 days   Lab Units 08/03/22  0410   HEMOGLOBIN A1C % 8.40*     Glucose   Date/Time Value Ref Range Status   08/07/2022 1056 192 (H) 70 - 130 mg/dL Final     Comment:     Meter: [id number] : [id number] Givens Jorge A NA   08/07/2022 0616 80 70 - 130 mg/dL Final     Comment:     Meter: [id number] : [id number] Collins Maryann NA   08/06/2022 2038 138 (H) 70 - 130 mg/dL Final     Comment:     Meter: [id number] : [id number] Collins Maryann NA   08/06/2022 1634 112 70 - 130 mg/dL Final     Comment:     Meter: [id number] : [id number] Givens Jorge A NA   08/06/2022 1052 108 70 - 130 mg/dL Final     Comment:     Meter: [id number] : [id number] Givens Jorge A NA   08/06/2022 0638 98 70 - 130 mg/dL Final     Comment:     Meter: [id number] : [id number] Espinal Thalia NA   08/05/2022 2113 208 (H) 70 - 130 mg/dL Final     Comment:     Meter: [id number] : [id number] Espinal Thalia NA   08/05/2022 1649 132 (H) 70 - 130 mg/dL Final     Comment:     Meter: [id number] : [id number] Givens Jorge A NA     Results from last 7 days   Lab Units 08/02/22  1328   INR  1.15*       Past Medical History:   Diagnosis Date   • Dementia (HCC)    • Diabetes mellitus (HCC)    • Disease of thyroid gland    • Emphysema of lung (HCC)    • Hyperlipidemia    • Hypertension    • Lumbar compression fracture (HCC) 11/30/2016   • Osteoporosis    • S/P kyphoplasty 12/5/2016    L1 and L4, 12/2/2016       Assessment:  Active Hospital Problems    Diagnosis  POA   • **Chest wall hematoma, right, initial encounter [S20.211A]  Yes   • Dementia (HCC) [F03.90]  Yes   • Type 2 diabetes mellitus without complication (HCC) [E11.9]  Yes   • Benign essential hypertension [I10]  Yes      Resolved Hospital Problems   No resolved problems to display.       Plan:  Continue with current RX. Follow lab. Thoracic surgery consult noted. Maybe back to [facility] sometime.    LTC  if stable.  Follow hgb.  Discharge summary done and orders  and but apparently no transportation by ambulance until Monday.    Kp Sky MD  8/7/2022  13:29 EDT

## 2022-08-07 NOTE — PLAN OF CARE
Problem: Adult Inpatient Plan of Care  Goal: Plan of Care Review  Outcome: Ongoing, Progressing  Flowsheets (Taken 8/7/2022 1411)  Progress: no change  Plan of Care Reviewed With: patient  Outcome Evaluation: VSS, patient oriented to self and occassionally to time, pain with manipulation of right arm, but no pain at rest. Awaiting transport for discharge. Will continue to monitor  Goal: Patient-Specific Goal (Individualized)  Outcome: Ongoing, Progressing  Goal: Absence of Hospital-Acquired Illness or Injury  Outcome: Ongoing, Progressing  Intervention: Identify and Manage Fall Risk  Description: Perform standard risk assessment on admission using a validated tool or comprehensive approach appropriate to the patient; reassess fall risk frequently, with change in status or transfer to another level of care.  Communicate fall injury risk to interprofessional healthcare team.  Determine need for increased observation, equipment and environmental modification, such as low bed, signage and supportive, nonskid footwear.  Adjust safety measures to individual developmental age, stage and identified risk factors.  Reinforce the importance of safety and physical activity with patient and family.  Perform regular intentional rounding to assess need for position change, pain assessment and personal needs, including assistance with toileting.  Recent Flowsheet Documentation  Taken 8/7/2022 1404 by Ghazal Rutherford, RN  Safety Promotion/Fall Prevention:   activity supervised   assistive device/personal items within reach   clutter free environment maintained   fall prevention program maintained   nonskid shoes/slippers when out of bed   room organization consistent   safety round/check completed  Taken 8/7/2022 1221 by Ghazal Rutherford, RN  Safety Promotion/Fall Prevention:   activity supervised   assistive device/personal items within reach   fall prevention program maintained   clutter free environment maintained   nonskid  shoes/slippers when out of bed   safety round/check completed   room organization consistent  Taken 8/7/2022 1006 by Ghazal Rutherford RN  Safety Promotion/Fall Prevention:   activity supervised   assistive device/personal items within reach   clutter free environment maintained   fall prevention program maintained   nonskid shoes/slippers when out of bed   room organization consistent   safety round/check completed  Taken 8/7/2022 0837 by Ghazal Rutherford, RN  Safety Promotion/Fall Prevention:   activity supervised   assistive device/personal items within reach   clutter free environment maintained   fall prevention program maintained   nonskid shoes/slippers when out of bed   room organization consistent   safety round/check completed  Intervention: Prevent Skin Injury  Description: Perform a screening for skin injury risk, such as pressure or moisture associated skin damage on admission and at regular intervals throughout hospital stay.  Keep all areas of skin (especially folds) clean and dry.  Maintain adequate skin hydration.  Relieve and redistribute pressure and protect bony prominences; implement measures based on patient-specific risk factors.  Match turning and repositioning schedule to clinical condition.  Encourage weight shift frequently; assist with reposition if unable to complete independently.  Float heels off bed; avoid pressure on the Achilles tendon.  Keep skin free from extended contact with medical devices.  Encourage functional activity and mobility, as early as tolerated.  Use aids (e.g., slide boards, mechanical lift) during transfer.  Recent Flowsheet Documentation  Taken 8/7/2022 1404 by Ghazal Rutherford, RN  Body Position: sitting up in bed  Skin Protection:   adhesive use limited   incontinence pads utilized   tubing/devices free from skin contact  Taken 8/7/2022 1221 by Ghazal Rutherford, RN  Body Position:   position changed independently   sitting up in bed   tilted   left  Taken 8/7/2022 1006  by Ghazal Rutherford RN  Body Position: sitting up in bed  Taken 8/7/2022 0837 by Ghazal Rutherford RN  Body Position: sitting up in bed  Skin Protection:   adhesive use limited   incontinence pads utilized   tubing/devices free from skin contact  Intervention: Prevent and Manage VTE (Venous Thromboembolism) Risk  Description: Assess for VTE (venous thromboembolism) risk.  Encourage and assist with early ambulation.  Initiate and maintain compression or other therapy, as indicated, based on identified risk in accordance with organizational protocol and provider order.  Encourage both active and passive leg exercises while in bed, if unable to ambulate.  Recent Flowsheet Documentation  Taken 8/7/2022 1404 by Ghazal Rutherford RN  VTE Prevention/Management:   bilateral   dorsiflexion/plantar flexion performed  Range of Motion: active ROM (range of motion) encouraged  Taken 8/7/2022 0837 by Ghazal Rutherford RN  VTE Prevention/Management:   bilateral   dorsiflexion/plantar flexion performed  Range of Motion: active ROM (range of motion) encouraged  Intervention: Prevent Infection  Description: Maintain skin and mucous membrane integrity; promote hand, oral and pulmonary hygiene.  Optimize fluid balance, nutrition, sleep and glycemic control to maximize infection resistance.  Identify potential sources of infection early to prevent or mitigate progression of infection (e.g., wound, lines, devices).  Evaluate ongoing need for invasive devices; remove promptly when no longer indicated.  Recent Flowsheet Documentation  Taken 8/7/2022 1404 by Ghazal Rutherford RN  Infection Prevention:   environmental surveillance performed   hand hygiene promoted   personal protective equipment utilized   single patient room provided   visitors restricted/screened  Taken 8/7/2022 1006 by Ghazal Rutherford RN  Infection Prevention:   environmental surveillance performed   hand hygiene promoted   single patient room provided   visitors  restricted/screened   personal protective equipment utilized  Taken 8/7/2022 0837 by Ghazal Rutherford RN  Infection Prevention:   environmental surveillance performed   hand hygiene promoted   personal protective equipment utilized   single patient room provided   visitors restricted/screened  Goal: Optimal Comfort and Wellbeing  Outcome: Ongoing, Progressing  Intervention: Provide Person-Centered Care  Description: Use a family-focused approach to care.  Develop trust and rapport by proactively providing information, encouraging questions, addressing concerns and offering reassurance.  Acknowledge emotional response to hospitalization.  Recognize and utilize personal coping strategies.  Honor spiritual and cultural preferences.  Recent Flowsheet Documentation  Taken 8/7/2022 1404 by Ghazal Rutherford RN  Trust Relationship/Rapport:   care explained   choices provided   emotional support provided   empathic listening provided   questions answered   questions encouraged   reassurance provided   thoughts/feelings acknowledged  Taken 8/7/2022 0837 by Ghazal Rutherford RN  Trust Relationship/Rapport:   care explained   choices provided   emotional support provided   empathic listening provided   questions answered   questions encouraged   reassurance provided   thoughts/feelings acknowledged  Goal: Readiness for Transition of Care  Outcome: Ongoing, Progressing     Problem: Skin Injury Risk Increased  Goal: Skin Health and Integrity  Outcome: Ongoing, Progressing  Intervention: Promote and Optimize Oral Intake  Description: Perform a nutrition assessment; include a nutrition-focused physical exam.  Determine calorie, protein, vitamin, mineral and fluid requirements.  Assess for micronutrient deficiencies; supplement if depleted.  Assess need and assist with meal set-up and feeding.  Adjust diet or meal schedule based on preferences and tolerance.  Offer oral supplemental food or drinks to enhance calorie and protein  intake.  Establish bowel elimination program to increase comfort and appetite.  Minimize unnecessary dietary restrictions to increase oral intake.  Provide and encourage oral hygiene to enhance desire to eat.  Consider enteral nutrition support if oral intake remains inadequate; provide parenteral nutrition if enteral is contraindicated.  Recent Flowsheet Documentation  Taken 8/7/2022 1404 by Ghazal Rutherford, RN  Oral Nutrition Promotion: rest periods promoted  Taken 8/7/2022 0837 by Ghazal Rutherford RN  Oral Nutrition Promotion: rest periods promoted  Intervention: Optimize Skin Protection  Description: Perform a full pressure injury risk assessment, as indicated by screening, upon admission to care unit.  Reassess skin (injury risk, full inspection) frequently (e.g., scheduled interval, with change in condition) to provide optimal early detection and prevention.  Maintain adequate tissue perfusion (e.g., encourage fluid balance; avoid crossing legs, constrictive clothing or devices) to promote tissue oxygenation.  Maintain head of bed at lowest degree of elevation tolerated, considering medical condition and other restrictions.  Avoid positioning onto an area that remains reddened.  Minimize incontinence and moisture (e.g., toileting schedule; moisture-wicking pad, diaper or incontinence collection device; skin moisture barrier).  Cleanse skin promptly and gently when soiled utilizing a pH-balanced cleanser.  Relieve and redistribute pressure (e.g., scheduled position changes, weight shifts, use of support surface, medical device repositioning, protective dressing application, use of positioning device, microclimate control, use of pressure-injury-monitor  Encourage increased activity, such as sitting in a chair at the bedside or early mobilization, when able to tolerate.  Recent Flowsheet Documentation  Taken 8/7/2022 1404 by Ghazal Rutherford, RN  Pressure Reduction Techniques:   frequent weight shift encouraged    weight shift assistance provided  Head of Bed (HOB) Positioning: HOB elevated  Pressure Reduction Devices: alternating pressure pump (ADD)  Skin Protection:   adhesive use limited   incontinence pads utilized   tubing/devices free from skin contact  Taken 8/7/2022 1221 by Ghazal Rutherford RN  Head of Bed (HOB) Positioning: HOB elevated  Taken 8/7/2022 1006 by Ghazal Rutherford RN  Head of Bed (HOB) Positioning: HOB elevated  Taken 8/7/2022 0837 by Ghazal Rutherford RN  Pressure Reduction Techniques:   frequent weight shift encouraged   weight shift assistance provided  Head of Bed (HOB) Positioning: HOB elevated  Pressure Reduction Devices: alternating pressure pump (ADD)  Skin Protection:   adhesive use limited   incontinence pads utilized   tubing/devices free from skin contact     Problem: Fall Injury Risk  Goal: Absence of Fall and Fall-Related Injury  Outcome: Ongoing, Progressing  Intervention: Identify and Manage Contributors  Description: Develop a fall prevention plan with the patient and caregiver/family.  Provide reorientation, appropriate sensory stimulation and routines with changes in mental status to decrease risk of fall.  Promote use of personal vision and auditory aids.  Assess assistance level required for safe and effective self-care; provide support as needed, such as toileting, mobilization. For age 65 and older, implement timed toileting with assistance.  Encourage physical activity, such as performance of mobility and self-care at highest level of patient ability, multicomponent exercise program and provision of appropriate assistive devices.  If fall occurs, assess the severity of injury; implement fall injury protocol. Determine the cause and revise fall injury prevention plan.  Regularly review medication contribution to fall risk; adjust medication administration times to minimize risk of falling.  Consider risk related to polypharmacy and age.  Balance adequate pain management with potential  for oversedation.  Recent Flowsheet Documentation  Taken 8/7/2022 1404 by Ghazal Rutherford RN  Medication Review/Management: medications reviewed  Taken 8/7/2022 1221 by Ghazal Rutherford RN  Medication Review/Management: medications reviewed  Taken 8/7/2022 1006 by Ghazal Rutherford RN  Medication Review/Management: medications reviewed  Taken 8/7/2022 0837 by Ghazal Rutherford RN  Medication Review/Management: medications reviewed  Intervention: Promote Injury-Free Environment  Description: Provide a safe, barrier-free environment that encourages independent activity.  Keep care area uncluttered and well-lighted.  Determine need for increased observation or monitoring.  Avoid use of devices that minimize mobility, such as restraints or indwelling urinary catheter.  Recent Flowsheet Documentation  Taken 8/7/2022 1404 by Ghazal Rutherford RN  Safety Promotion/Fall Prevention:   activity supervised   assistive device/personal items within reach   clutter free environment maintained   fall prevention program maintained   nonskid shoes/slippers when out of bed   room organization consistent   safety round/check completed  Taken 8/7/2022 1221 by Ghazal Rutherford RN  Safety Promotion/Fall Prevention:   activity supervised   assistive device/personal items within reach   fall prevention program maintained   clutter free environment maintained   nonskid shoes/slippers when out of bed   safety round/check completed   room organization consistent  Taken 8/7/2022 1006 by Ghazal Rutherford RN  Safety Promotion/Fall Prevention:   activity supervised   assistive device/personal items within reach   clutter free environment maintained   fall prevention program maintained   nonskid shoes/slippers when out of bed   room organization consistent   safety round/check completed  Taken 8/7/2022 0837 by Ghazal Rutherford RN  Safety Promotion/Fall Prevention:   activity supervised   assistive device/personal items within reach   clutter free  environment maintained   fall prevention program maintained   nonskid shoes/slippers when out of bed   room organization consistent   safety round/check completed   Goal Outcome Evaluation:  Plan of Care Reviewed With: patient        Progress: no change  Outcome Evaluation: VSS, patient oriented to self and occassionally to time, pain with manipulation of right arm, but no pain at rest. Awaiting transport for discharge. Will continue to monitor

## 2022-08-08 VITALS
BODY MASS INDEX: 20 KG/M2 | DIASTOLIC BLOOD PRESSURE: 77 MMHG | TEMPERATURE: 97.3 F | WEIGHT: 99.21 LBS | HEIGHT: 59 IN | RESPIRATION RATE: 14 BRPM | SYSTOLIC BLOOD PRESSURE: 132 MMHG | OXYGEN SATURATION: 97 % | HEART RATE: 60 BPM

## 2022-08-08 LAB
ANION GAP SERPL CALCULATED.3IONS-SCNC: 9.2 MMOL/L (ref 5–15)
BASOPHILS # BLD AUTO: 0.03 10*3/MM3 (ref 0–0.2)
BASOPHILS NFR BLD AUTO: 0.3 % (ref 0–1.5)
BUN SERPL-MCNC: 12 MG/DL (ref 8–23)
BUN/CREAT SERPL: 12.8 (ref 7–25)
CALCIUM SPEC-SCNC: 8.4 MG/DL (ref 8.6–10.5)
CHLORIDE SERPL-SCNC: 106 MMOL/L (ref 98–107)
CO2 SERPL-SCNC: 22.8 MMOL/L (ref 22–29)
CREAT SERPL-MCNC: 0.94 MG/DL (ref 0.57–1)
DEPRECATED RDW RBC AUTO: 44 FL (ref 37–54)
EGFRCR SERPLBLD CKD-EPI 2021: 58.8 ML/MIN/1.73
EOSINOPHIL # BLD AUTO: 0.43 10*3/MM3 (ref 0–0.4)
EOSINOPHIL NFR BLD AUTO: 4.3 % (ref 0.3–6.2)
ERYTHROCYTE [DISTWIDTH] IN BLOOD BY AUTOMATED COUNT: 13.4 % (ref 12.3–15.4)
GLUCOSE BLDC GLUCOMTR-MCNC: 109 MG/DL (ref 70–130)
GLUCOSE BLDC GLUCOMTR-MCNC: 109 MG/DL (ref 70–130)
GLUCOSE SERPL-MCNC: 98 MG/DL (ref 65–99)
HCT VFR BLD AUTO: 29.6 % (ref 34–46.6)
HGB BLD-MCNC: 9.8 G/DL (ref 12–15.9)
IMM GRANULOCYTES # BLD AUTO: 0.05 10*3/MM3 (ref 0–0.05)
IMM GRANULOCYTES NFR BLD AUTO: 0.5 % (ref 0–0.5)
LYMPHOCYTES # BLD AUTO: 2.36 10*3/MM3 (ref 0.7–3.1)
LYMPHOCYTES NFR BLD AUTO: 23.5 % (ref 19.6–45.3)
MCH RBC QN AUTO: 30.4 PG (ref 26.6–33)
MCHC RBC AUTO-ENTMCNC: 33.1 G/DL (ref 31.5–35.7)
MCV RBC AUTO: 91.9 FL (ref 79–97)
MONOCYTES # BLD AUTO: 1.26 10*3/MM3 (ref 0.1–0.9)
MONOCYTES NFR BLD AUTO: 12.6 % (ref 5–12)
NEUTROPHILS NFR BLD AUTO: 5.9 10*3/MM3 (ref 1.7–7)
NEUTROPHILS NFR BLD AUTO: 58.8 % (ref 42.7–76)
NRBC BLD AUTO-RTO: 0 /100 WBC (ref 0–0.2)
PLATELET # BLD AUTO: 446 10*3/MM3 (ref 140–450)
PMV BLD AUTO: 9.8 FL (ref 6–12)
POTASSIUM SERPL-SCNC: 3.7 MMOL/L (ref 3.5–5.2)
RBC # BLD AUTO: 3.22 10*6/MM3 (ref 3.77–5.28)
SODIUM SERPL-SCNC: 138 MMOL/L (ref 136–145)
WBC NRBC COR # BLD: 10.03 10*3/MM3 (ref 3.4–10.8)

## 2022-08-08 PROCEDURE — 82962 GLUCOSE BLOOD TEST: CPT

## 2022-08-08 PROCEDURE — 80048 BASIC METABOLIC PNL TOTAL CA: CPT | Performed by: HOSPITALIST

## 2022-08-08 PROCEDURE — 85025 COMPLETE CBC W/AUTO DIFF WBC: CPT | Performed by: HOSPITALIST

## 2022-08-08 RX ADMIN — LEVOTHYROXINE SODIUM 88 MCG: 0.09 TABLET ORAL at 08:16

## 2022-08-08 RX ADMIN — Medication 10 ML: at 08:16

## 2022-08-08 RX ADMIN — ATORVASTATIN CALCIUM 10 MG: 20 TABLET, FILM COATED ORAL at 08:16

## 2022-08-08 RX ADMIN — INSULIN GLARGINE-YFGN 8 UNITS: 100 INJECTION, SOLUTION SUBCUTANEOUS at 08:16

## 2022-08-08 RX ADMIN — DOCUSATE SODIUM 50MG AND SENNOSIDES 8.6MG 2 TABLET: 8.6; 5 TABLET, FILM COATED ORAL at 08:16

## 2022-08-08 RX ADMIN — MEMANTINE HYDROCHLORIDE 5 MG: 5 TABLET, FILM COATED ORAL at 08:16

## 2022-08-08 RX ADMIN — ACETAMINOPHEN 650 MG: 325 TABLET, FILM COATED ORAL at 01:52

## 2022-08-08 NOTE — DISCHARGE SUMMARY
PHYSICIAN DISCHARGE SUMMARY                                                                        [facility]    Patient Identification:  Name: Sam Flores  Age: 87 y.o.  Sex: female  :  1934  MRN: [id number]  Primary Care Physician: Reg Rivera MD    Admit date: 2022  Discharge date and time:2022  Discharged Condition: good    Discharge Diagnoses:  Active Hospital Problems    Diagnosis  POA   • **Chest wall hematoma, right, initial encounter [S20.211A]  Yes   • Dementia (HCC) [F03.90]  Yes   • Type 2 diabetes mellitus without complication (HCC) [E11.9]  Yes   • Benign essential hypertension [I10]  Yes      Resolved Hospital Problems   No resolved problems to display.          PMHX:   Past Medical History:   Diagnosis Date   • Dementia (HCC)    • Diabetes mellitus (HCC)    • Disease of thyroid gland    • Emphysema of lung (HCC)    • Hyperlipidemia    • Hypertension    • Lumbar compression fracture (HCC) 2016   • Osteoporosis    • S/P kyphoplasty 2016    L1 and L4, 2016     PSHX:   Past Surgical History:   Procedure Laterality Date   •  SECTION     • KYPHOPLASTY      T12 and L2   • KYPHOPLASTY N/A 2016    Procedure: KYPHOPLASTY LUMBAR 1 AND LUMBAR 4;  Surgeon: Triston Marinelli IV, MD;  Location: [facility] ;  Service:    • MASTECTOMY     • SHOULDER SURGERY Right        Hospital Course: Sam Flores  Is a 87-year-old female with advanced Alzheimer's dementia. History is assisted by her daughter who is at bedside as well as ER documentations and discussion with ER staff. She was brought into the emergency room from a nursing home due to an expanding mass in the right anterior chest area. The patient herself has no complaints. She denies pain and to try to move her arm. She was actually seen in the emergency room on  due to history of fall. At that time was  noted that she had ecchymosis in the right upper arm area. No mention of mass in the chest wall at that point. X-ray of the right shoulder and humerus showed no acute changes. Her daughter states that she suffered a fall maybe as long as 3 weeks ago. Unsure of any falls since then. Patient does a history have arthroplasty of the right shoulder and bilateral mastectomy.         The patient was admitted to the hospital and seen by thoracic surgery.  They did not recommend any intervention.  Her hemoglobin did drop a bit but seems stable at around 9.  She did have follow-up CT scan which showed the hematoma was decreasing in size.  She looks stable enough to go back to her nursing facility for long-term care.  She will stay off aspirin for a while and follow-up with her primary care at the facility.  The patient ended up staying a couple of extra days due to problems with ambulance transportation.    Consults:     Consults     Date and Time Order Name Status Description    8/2/2022  5:23 PM Inpatient Orthopedic Surgery Consult      8/2/2022  1:32 PM LIPPS (on-call MD unless specified)      8/2/2022 12:47 PM IP General Consult (Use specialty-specific consult if known) Completed         Results from last 7 days   Lab Units 08/08/22  0547   WBC 10*3/mm3 10.03   HEMOGLOBIN g/dL 9.8*   HEMATOCRIT % 29.6*   PLATELETS 10*3/mm3 446     Results from last 7 days   Lab Units 08/08/22  0547   SODIUM mmol/L 138   POTASSIUM mmol/L 3.7   CHLORIDE mmol/L 106   CO2 mmol/L 22.8   BUN mg/dL 12   CREATININE mg/dL 0.94   GLUCOSE mg/dL 98   CALCIUM mg/dL 8.4*     Significant Diagnostic Studies:   WBC   Date Value Ref Range Status   08/08/2022 10.03 3.40 - 10.80 10*3/mm3 Final     Hemoglobin   Date Value Ref Range Status   08/08/2022 9.8 (L) 12.0 - 15.9 g/dL Final     Hematocrit   Date Value Ref Range Status   08/08/2022 29.6 (L) 34.0 - 46.6 % Final     Platelets   Date Value Ref Range Status   08/08/2022 446 140 - 450 10*3/mm3 Final      Sodium   Date Value Ref Range Status   08/08/2022 138 136 - 145 mmol/L Final     Potassium   Date Value Ref Range Status   08/08/2022 3.7 3.5 - 5.2 mmol/L Final     Chloride   Date Value Ref Range Status   08/08/2022 106 98 - 107 mmol/L Final     CO2   Date Value Ref Range Status   08/08/2022 22.8 22.0 - 29.0 mmol/L Final     BUN   Date Value Ref Range Status   08/08/2022 12 8 - 23 mg/dL Final     Creatinine   Date Value Ref Range Status   08/08/2022 0.94 0.57 - 1.00 mg/dL Final     Glucose   Date Value Ref Range Status   08/08/2022 98 65 - 99 mg/dL Final     Calcium   Date Value Ref Range Status   08/08/2022 8.4 (L) 8.6 - 10.5 mg/dL Final     No results found for: AST, ALT, ALKPHOS  No results found for: APTT, INR  No results found for: COLORU, CLARITYU, SPECGRAV, PHUR, PROTEINUR, GLUCOSEU, KETONESU, BLOODU, NITRITE, LEUKOCYTESUR, BILIRUBINUR, UROBILINOGEN, RBCUA, WBCUA, BACTERIA, UACOMMENT  No results found for: TROPONINT, TROPONINI, BNP  No components found for: HGBA1C;2  No components found for: TSH;2  Imaging Results (All)     Procedure Component Value Units Date/Time    CT Chest Without Contrast Diagnostic [[id number]] Collected: 08/05/22 2105     Updated: 08/05/22 2111    Narrative:      CHEST CT WITHOUT CONTRAST     HISTORY: Follow-up right chest wall hematoma.     TECHNIQUE: Noncontrast CT of the chest is provided and correlated with  chest CT 08/02/2022.     Radiation dose reduction techniques were utilized, including automated  exposure control and exposure modulation based on body size.     FINDINGS: The anterior right chest wall hematoma is actually slightly  smaller than on the August 2 exam. Today, the hematoma measures 10.5 x  4.4 cm axial diameter on image 31, smaller than the same level on the  prior exam, image 29, where it measured 11.7 x 5.7 cm. The hematoma  extends over a length of about 13 cm as before. No new body wall or  upper abdominal hematoma is present.     There is extensive  atheromatous vascular calcification with  calcification at the aortic and mitral valves. No lymphadenopathy is  identified in the chest or the visualized upper abdomen. Visualized  portions of the upper abdominal solid organs appear normal. Lungs are  clear.     There has been previous bilateral mastectomy. There is arthroplasty  hardware at the right shoulder. No significant hematoma in the arm or  around the shoulder.       Impression:      Large anterior right upper chest wall hematoma is again  observed and appears slightly smaller than on chest CT 08/02/2022. No  new abnormality is present.     This report was finalized on 8/5/2022 9:08 PM by Dr. Triston Costello M.D.       MRI Shoulder Right Without Contrast [[id number]] Collected: 08/05/22 1115     Updated: 08/05/22 1214    Narrative:      MRI RIGHT SHOULDER WITHOUT CONTRAST WITH METAL ARTIFACT REDUCTION  PROTOCOL     HISTORY: Increasing right shoulder pain for 1 week. No known injury.  History of breast cancer.      TECHNIQUE: MRI right shoulder with metal artifact reduction protocol  includes axial PD as well as oblique coronal PD, T1, STIR, T2 and  oblique sagittal T2-weighted sequences.     COMPARISON: Right shoulder and humerus x-ray 07/30/2022.     FINDINGS: There is a reverse right shoulder arthroplasty and despite  optimizing protocol, evaluation remains limited by the degree of  magnetic susceptibility artifact. There is moderate AC joint arthritis.  Edema is present within the subcutaneous fat about the shoulder. There  is also edema within the periarticular musculature though there is no  evidence for effusion. No periprosthetic fracture or osteophytosis is  evident. There is generalized atrophy of the musculature of the right  shoulder girdle.       Impression:      Limited exam demonstrates no evidence for fracture or acute  osseous abnormality in patient with a reverse right shoulder  arthroplasty. Mild AC joint arthritis is present. There is  mild  periarticular soft tissue edema extending into the muscular and  subcutaneous fat without fluid collection or focal hematoma.     This report was finalized on 8/5/2022 12:11 PM by Dr. Jimbo Lopez M.D.       XR Chest 1 View [[id number]] Collected: 08/03/22 0723     Updated: 08/03/22 0728    Narrative:      XR CHEST 1 VW-  08/03/2022     HISTORY: Chest wall hematoma.     Heart size is at the upper limits of normal. Lungs are underinflated.  There is increased density in the left lung base partially obscuring the  left hemidiaphragm this may be related to left basilar atelectasis,  infiltrate and/or small amount of pleural fluid. Left upper lung and  right lung appear clear. Right shoulder prosthesis is seen in good  position.     There is some aortic calcification.     Please see additional dictation for the CT of the chest from 08/02/2022.     This report was finalized on 8/3/2022 7:25 AM by Dr. Robby Rodriges M.D.       CT Chest Without Contrast Diagnostic [[id number]] Collected: 08/02/22 1218     Updated: 08/02/22 1241    Narrative:      CT CHEST WITHOUT CONTRAST     HISTORY: Right-sided chest wall mass/swelling. Difficulty with right  upper extremity range of motion.     TECHNIQUE: Radiation dose reduction techniques were utilized, including  automated exposure control and exposure modulation based on body size.   3 mm images were obtained through the chest without the administration  of IV contrast. Lack of IV contrast limits evaluation of mediastinal,  hilar, and vascular structures. Sensitivity for underlying lesions and  infection decreased.     COMPARISON: 02/14/2018     FINDINGS: Correlation is somewhat limited by artifact from overlying  soft tissues/right shoulder prosthesis and motion.     Thoracic inlet: Within normal noncontrast limits     Heart and great vessels: Heart size is enlarged.. Ectasia of the  ascending aorta approximating 3.5 cm. Extensive atherosclerosis  thoracoabdominal  aorta and branch vessels with dense calcification about  the mitral annulus. Trace pericardial fluid and/or thickening. 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           Lymphatics: Mildly prominent mediastinal nodes with an index right  paratracheal node measures 9 mm. Hilar evaluation limited without  contrast. Recommend attention on follow-up.     Lung parenchyma and pleural space: Small bilateral pleural effusions  with bibasilar airspace disease. Patchy tree-in-bud nodular opacities  predominantly in the lung bases left slightly greater than right.  Coarsened interstitial markings. Respiratory motion artifact limits  evaluation of the parenchyma. Calcified granulomas. No pneumothorax.     Soft tissues: Large soft tissue hematoma of the right chest/breast  approximating 6 x 12.8 cm. Evaluation for active extravasation is  limited. There is extensive streak artifact limiting evaluation of the  right shoulder and upper chest status post right shoulder arthroplasty.  The radiographic appearance of the prosthesis is similar in appearance  to the prior exam. No definitive acute displaced fractures. Diffuse bone  demineralization with advanced degenerative changes of the spine. Status  post vertebroplasty/kyphoplasty for compression fractures of T12-L2. Age  indeterminate but chronic appearing compression fractures of T6, T9,  T10, and T11 with greater than 50% loss of vertebral body height at T6  and T11. No definitive retropulsion. MRI could be considered for further  evaluation if clinically indicated.                Impression:      Limited noncontrast CT impression:     1.  Large right chest wall/breast hematoma approximating 6 x 12.8 cm.  2.  Cardiomegaly with small bilateral pleural effusions and bibasilar  airspace disease. Tree-in-bud nodular opacities particularly in the left  lung base may be the sequela of pneumonitis/aspiration. Recommend  attention on short-term follow-up  to resolution after treatment.  3.  Status post right shoulder arthroplasty incompletely imaged.  4.  Multiple age-indeterminate but chronic appearing compression  fractures of the thoracolumbar spine. Status post  vertebroplasty/kyphoplasty of T12-L2.  5.  Mediastinal adenopathy likely reactive. Recommend attention on  subsequent surveillance imaging.  6.  Please see above for additional findings.     7.  I discussed these findings with CARMEN Amaro at 11:35 AM on  08/02/2022.     This report was finalized on 8/2/2022 12:38 PM by Dr. iH Doan M.D.           Lab Results (last 7 days)     Procedure Component Value Units Date/Time    POC Glucose Once [[id number]]  (Normal) Collected: 08/06/22 1052    Specimen: Blood Updated: 08/06/22 1053     Glucose 108 mg/dL      Comment: Meter: [id number] : [id number] [place]       POC Glucose Once [[id number]]  (Normal) Collected: 08/06/22 0638    Specimen: Blood Updated: 08/06/22 0639     Glucose 98 mg/dL      Comment: Meter: [id number] : [id number] Teddy [place]       Basic Metabolic Panel [[id number]]  (Abnormal) Collected: 08/06/22 0415    Specimen: Blood Updated: 08/06/22 0457     Glucose 104 mg/dL      BUN 17 mg/dL      Creatinine 1.03 mg/dL      Sodium 138 mmol/L      Potassium 3.5 mmol/L      Chloride 105 mmol/L      CO2 22.4 mmol/L      Calcium 8.7 mg/dL      BUN/Creatinine Ratio 16.5     Anion Gap 10.6 mmol/L      eGFR 52.7 mL/min/1.73      Comment: National Kidney Foundation and American Society of Nephrology (ASN) Task Force recommended calculation based on the Chronic Kidney Disease Epidemiology Collaboration (CKD-EPI) equation refit without adjustment for race.       Narrative:      GFR Normal >60  Chronic Kidney Disease <60  Kidney Failure <15      CBC & Differential [[id number]]  (Abnormal) Collected: 08/06/22 0415    Specimen: Blood Updated: 08/06/22 0438    Narrative:      The following orders were created for panel order CBC &  Differential.  Procedure                               Abnormality         Status                     ---------                               -----------         ------                     CBC Auto Differential[[id number]]        Abnormal            Final result                 Please view results for these tests on the individual orders.    CBC Auto Differential [[id number]]  (Abnormal) Collected: 08/06/22 0415    Specimen: Blood Updated: 08/06/22 0438     WBC 12.34 10*3/mm3      RBC 2.95 10*6/mm3      Hemoglobin 9.0 g/dL      Hematocrit 26.9 %      MCV 91.2 fL      MCH 30.5 pg      MCHC 33.5 g/dL      RDW 13.3 %      RDW-SD 43.3 fl      MPV 9.6 fL      Platelets 362 10*3/mm3      Neutrophil % 62.8 %      Lymphocyte % 20.2 %      Monocyte % 11.8 %      Eosinophil % 4.4 %      Basophil % 0.3 %      Immature Grans % 0.5 %      Neutrophils, Absolute 7.76 10*3/mm3      Lymphocytes, Absolute 2.49 10*3/mm3      Monocytes, Absolute 1.45 10*3/mm3      Eosinophils, Absolute 0.54 10*3/mm3      Basophils, Absolute 0.04 10*3/mm3      Immature Grans, Absolute 0.06 10*3/mm3      nRBC 0.1 /100 WBC     POC Glucose Once [[id number]]  (Abnormal) Collected: 08/05/22 2113    Specimen: Blood Updated: 08/05/22 2115     Glucose 208 mg/dL      Comment: Meter: [id number] : [id number] Teddy [place]       Hemoglobin & Hematocrit, Blood [[id number]]  (Abnormal) Collected: 08/05/22 1608    Specimen: Blood Updated: 08/05/22 1656     Hemoglobin 10.1 g/dL      Hematocrit 29.5 %     POC Glucose Once [[id number]]  (Abnormal) Collected: 08/05/22 1649    Specimen: Blood Updated: 08/05/22 1650     Glucose 132 mg/dL      Comment: Meter: [id number] : [id number] [place]       Blood Culture - Blood, Arm, Right [[id number]]  (Normal) Collected: 08/02/22 1414    Specimen: Blood from Arm, Right Updated: 08/05/22 1431     Blood Culture No growth at 3 days    POC Glucose Once [[id number]]  (Normal) Collected: 08/05/22 1034    Specimen: Blood  Updated: 08/05/22 1035     Glucose 84 mg/dL      Comment: Meter: [id number] : [id number] [place]       POC Glucose Once [[id number]]  (Normal) Collected: 08/05/22 0536    Specimen: Blood Updated: 08/05/22 0537     Glucose 92 mg/dL      Comment: Meter: [id number] : [id number] Fabiano [place]       Basic Metabolic Panel [[id number]]  (Abnormal) Collected: 08/05/22 0410    Specimen: Blood Updated: 08/05/22 0516     Glucose 81 mg/dL      BUN 19 mg/dL      Creatinine 1.04 mg/dL      Sodium 138 mmol/L      Potassium 3.5 mmol/L      Chloride 103 mmol/L      CO2 21.1 mmol/L      Calcium 8.7 mg/dL      BUN/Creatinine Ratio 18.3     Anion Gap 13.9 mmol/L      eGFR 52.1 mL/min/1.73      Comment: National Kidney Foundation and American Society of Nephrology (ASN) Task Force recommended calculation based on the Chronic Kidney Disease Epidemiology Collaboration (CKD-EPI) equation refit without adjustment for race.       Narrative:      GFR Normal >60  Chronic Kidney Disease <60  Kidney Failure <15      Hemoglobin & Hematocrit, Blood [[id number]]  (Abnormal) Collected: 08/05/22 0410    Specimen: Blood Updated: 08/05/22 0457     Hemoglobin 9.7 g/dL      Hematocrit 29.2 %     CBC & Differential [[id number]]  (Abnormal) Collected: 08/05/22 0410    Specimen: Blood Updated: 08/05/22 0457    Narrative:      The following orders were created for panel order CBC & Differential.  Procedure                               Abnormality         Status                     ---------                               -----------         ------                     CBC Auto Differential[[id number]]        Abnormal            Final result                 Please view results for these tests on the individual orders.    CBC Auto Differential [[id number]]  (Abnormal) Collected: 08/05/22 0410    Specimen: Blood Updated: 08/05/22 0457     WBC 11.49 10*3/mm3      RBC 3.17 10*6/mm3      Hemoglobin 9.7 g/dL      Hematocrit 29.2 %      MCV 92.1 fL       MCH 30.6 pg      MCHC 33.2 g/dL      RDW 13.1 %      RDW-SD 44.4 fl      MPV 9.8 fL      Platelets 341 10*3/mm3      Neutrophil % 58.8 %      Lymphocyte % 23.3 %      Monocyte % 12.4 %      Eosinophil % 4.8 %      Basophil % 0.4 %      Immature Grans % 0.3 %      Neutrophils, Absolute 6.75 10*3/mm3      Lymphocytes, Absolute 2.68 10*3/mm3      Monocytes, Absolute 1.42 10*3/mm3      Eosinophils, Absolute 0.55 10*3/mm3      Basophils, Absolute 0.05 10*3/mm3      Immature Grans, Absolute 0.04 10*3/mm3      nRBC 0.0 /100 WBC     POC Glucose Once [[id number]]  (Normal) Collected: 08/04/22 2005    Specimen: Blood Updated: 08/04/22 2006     Glucose 122 mg/dL      Comment: Meter: [id number] : [id number] Fabiano [place]       Hemoglobin & Hematocrit, Blood [[id number]]  (Abnormal) Collected: 08/04/22 1623    Specimen: Blood Updated: 08/04/22 1658     Hemoglobin 10.0 g/dL      Hematocrit 29.6 %     POC Glucose Once [[id number]]  (Abnormal) Collected: 08/04/22 1615    Specimen: Blood Updated: 08/04/22 1616     Glucose 167 mg/dL      Comment: Meter: [id number] : [id number] [place]       POC Glucose Once [[id number]]  (Normal) Collected: 08/04/22 1056    Specimen: Blood Updated: 08/04/22 1057     Glucose 127 mg/dL      Comment: Meter: [id number] : [id number] [place]       Blood Culture - Blood, Arm, Left [[id number]]  (Abnormal) Collected: 08/02/22 1414    Specimen: Blood from Arm, Left Updated: 08/04/22 0639     Blood Culture Staphylococcus, coagulase negative     Isolated from Aerobic Bottle     Gram Stain Aerobic Bottle Gram positive cocci    Narrative:      Probable contaminant requires clinical correlation, susceptibility not performed unless requested by physician.    POC Glucose Once [[id number]]  (Abnormal) Collected: 08/04/22 0545    Specimen: Blood Updated: 08/04/22 0546     Glucose 144 mg/dL      Comment: Meter: [id number] : [id number] Fabiano [place]       Basic Metabolic  Panel [[id number]]  (Abnormal) Collected: 08/04/22 0416    Specimen: Blood Updated: 08/04/22 0519     Glucose 139 mg/dL      BUN 22 mg/dL      Creatinine 1.20 mg/dL      Sodium 137 mmol/L      Potassium 4.2 mmol/L      Chloride 102 mmol/L      CO2 22.0 mmol/L      Calcium 8.5 mg/dL      BUN/Creatinine Ratio 18.3     Anion Gap 13.0 mmol/L      eGFR 43.9 mL/min/1.73      Comment: National Kidney Foundation and American Society of Nephrology (ASN) Task Force recommended calculation based on the Chronic Kidney Disease Epidemiology Collaboration (CKD-EPI) equation refit without adjustment for race.       Narrative:      GFR Normal >60  Chronic Kidney Disease <60  Kidney Failure <15      Hemoglobin & Hematocrit, Blood [[id number]]  (Abnormal) Collected: 08/04/22 0416    Specimen: Blood Updated: 08/04/22 0500     Hemoglobin 9.8 g/dL      Hematocrit 29.9 %     CBC & Differential [[id number]]  (Abnormal) Collected: 08/04/22 0416    Specimen: Blood Updated: 08/04/22 0500    Narrative:      The following orders were created for panel order CBC & Differential.  Procedure                               Abnormality         Status                     ---------                               -----------         ------                     CBC Auto Differential[[id number]]        Abnormal            Final result                 Please view results for these tests on the individual orders.    CBC Auto Differential [[id number]]  (Abnormal) Collected: 08/04/22 0416    Specimen: Blood Updated: 08/04/22 0500     WBC 16.61 10*3/mm3      RBC 3.22 10*6/mm3      Hemoglobin 9.8 g/dL      Hematocrit 29.9 %      MCV 92.9 fL      MCH 30.4 pg      MCHC 32.8 g/dL      RDW 13.1 %      RDW-SD 44.7 fl      MPV 9.9 fL      Platelets 347 10*3/mm3      Neutrophil % 70.7 %      Lymphocyte % 17.0 %      Monocyte % 10.0 %      Eosinophil % 1.6 %      Basophil % 0.3 %      Immature Grans % 0.4 %      Neutrophils, Absolute 11.75 10*3/mm3      Lymphocytes, Absolute  2.83 10*3/mm3      Monocytes, Absolute 1.66 10*3/mm3      Eosinophils, Absolute 0.26 10*3/mm3      Basophils, Absolute 0.05 10*3/mm3      Immature Grans, Absolute 0.06 10*3/mm3      nRBC 0.0 /100 WBC     POC Glucose Once [[id number]]  (Abnormal) Collected: 08/03/22 2038    Specimen: Blood Updated: 08/03/22 2039     Glucose 167 mg/dL      Comment: Meter: [id number] : [id number] Fabiano [place]       Hemoglobin & Hematocrit, Blood [[id number]]  (Abnormal) Collected: 08/03/22 1612    Specimen: Blood Updated: 08/03/22 1632     Hemoglobin 10.3 g/dL      Hematocrit 30.5 %     POC Glucose Once [[id number]]  (Normal) Collected: 08/03/22 1554    Specimen: Blood Updated: 08/03/22 1555     Glucose 114 mg/dL      Comment: Meter: [id number] : [id number] Geovanni [place]       Blood Culture ID, PCR - Blood, Arm, Left [[id number]]  (Abnormal) Collected: 08/02/22 1414    Specimen: Blood from Arm, Left Updated: 08/03/22 1206     BCID, PCR Staph spp, not aureus or lugdunesis. Identification by BCID2 PCR.     BOTTLE TYPE Aerobic Bottle    POC Glucose Once [[id number]]  (Normal) Collected: 08/03/22 1100    Specimen: Blood Updated: 08/03/22 1102     Glucose 86 mg/dL      Comment: Meter: [id number] : [id number] Geovanni Smith NA       POC Glucose Once [[id number]]  (Abnormal) Collected: 08/03/22 0546    Specimen: Blood Updated: 08/03/22 0547     Glucose 137 mg/dL      Comment: Meter: [id number] : [id number] Fabiano [place]       Procalcitonin [[id number]]  (Normal) Collected: 08/03/22 0410    Specimen: Blood Updated: 08/03/22 0456     Procalcitonin 0.15 ng/mL     Narrative:      As a Marker for Sepsis (Non-Neonates):    1. <0.5 ng/mL represents a low risk of severe sepsis and/or septic shock.  2. >2 ng/mL represents a high risk of severe sepsis and/or septic shock.    As a Marker for Lower Respiratory Tract Infections that require antibiotic therapy:    PCT on Admission    Antibiotic Therapy       6-12 Hrs  "later    >0.5                Strongly Recommended  >0.25 - <0.5        Recommended   0.1 - 0.25          Discouraged              Remeasure/reassess PCT  <0.1                Strongly Discouraged     Remeasure/reassess PCT    As 28 day mortality risk marker: \"Change in Procalcitonin Result\" (>80% or <=80%) if Day 0 (or Day 1) and Day 4 values are available. Refer to http://www.[facility]-pct-calculator.com    Change in PCT <=80%  A decrease of PCT levels below or equal to 80% defines a positive change in PCT test result representing a higher risk for 28-day all-cause mortality of patients diagnosed with severe sepsis for septic shock.    Change in PCT >80%  A decrease of PCT levels of more than 80% defines a negative change in PCT result representing a lower risk for 28-day all-cause mortality of patients diagnosed with severe sepsis or septic shock.       Basic Metabolic Panel [[id number]]  (Abnormal) Collected: 08/03/22 0410    Specimen: Blood Updated: 08/03/22 0449     Glucose 153 mg/dL      BUN 20 mg/dL      Creatinine 1.19 mg/dL      Sodium 139 mmol/L      Potassium 4.4 mmol/L      Chloride 105 mmol/L      CO2 23.0 mmol/L      Calcium 8.9 mg/dL      BUN/Creatinine Ratio 16.8     Anion Gap 11.0 mmol/L      eGFR 44.3 mL/min/1.73      Comment: National Kidney Foundation and American Society of Nephrology (ASN) Task Force recommended calculation based on the Chronic Kidney Disease Epidemiology Collaboration (CKD-EPI) equation refit without adjustment for race.       Narrative:      GFR Normal >60  Chronic Kidney Disease <60  Kidney Failure <15      Hemoglobin A1c [[id number]]  (Abnormal) Collected: 08/03/22 0410    Specimen: Blood Updated: 08/03/22 0441     Hemoglobin A1C 8.40 %     Narrative:      Hemoglobin A1C Ranges:    Increased Risk for Diabetes  5.7% to 6.4%  Diabetes                     >= 6.5%  Diabetic Goal                < 7.0%    Hemoglobin & Hematocrit, Blood [[id number]]  (Abnormal) Collected: 08/03/22 0410 "    Specimen: Blood Updated: 08/03/22 0430     Hemoglobin 11.2 g/dL      Hematocrit 34.5 %     CBC & Differential [[id number]]  (Abnormal) Collected: 08/03/22 0410    Specimen: Blood Updated: 08/03/22 0430    Narrative:      The following orders were created for panel order CBC & Differential.  Procedure                               Abnormality         Status                     ---------                               -----------         ------                     CBC Auto Differential[[id number]]        Abnormal            Final result                 Please view results for these tests on the individual orders.    CBC Auto Differential [[id number]]  (Abnormal) Collected: 08/03/22 0410    Specimen: Blood Updated: 08/03/22 0430     WBC 16.24 10*3/mm3      RBC 3.64 10*6/mm3      Hemoglobin 11.2 g/dL      Hematocrit 34.5 %      MCV 94.8 fL      MCH 30.8 pg      MCHC 32.5 g/dL      RDW 13.4 %      RDW-SD 46.5 fl      MPV 9.9 fL      Platelets 373 10*3/mm3      Neutrophil % 68.5 %      Lymphocyte % 20.0 %      Monocyte % 10.2 %      Eosinophil % 0.6 %      Basophil % 0.3 %      Immature Grans % 0.4 %      Neutrophils, Absolute 11.13 10*3/mm3      Lymphocytes, Absolute 3.25 10*3/mm3      Monocytes, Absolute 1.66 10*3/mm3      Eosinophils, Absolute 0.09 10*3/mm3      Basophils, Absolute 0.05 10*3/mm3      Immature Grans, Absolute 0.06 10*3/mm3      nRBC 0.0 /100 WBC     COVID PRE-OP / PRE-PROCEDURE SCREENING ORDER (NO ISOLATION) - Swab, Nasopharynx [[id number]]  (Normal) Collected: 08/02/22 1832    Specimen: Swab from Nasopharynx Updated: 08/02/22 2026    Narrative:      The following orders were created for panel order COVID PRE-OP / PRE-PROCEDURE SCREENING ORDER (NO ISOLATION) - Swab, Nasopharynx.  Procedure                               Abnormality         Status                     ---------                               -----------         ------                     COVID-19,[facility]...[[id number]]  Normal               Final result                 Please view results for these tests on the individual orders.    COVID-19,[facility] CEPHEID/RCUZITO NP SWAB IN TRANSPORT MEDIA 8-12 HR TAT - Swab, Nasopharynx [[id number]]  (Normal) Collected: 08/02/22 1832    Specimen: Swab from Nasopharynx Updated: 08/02/22 2026     COVID19 Not Detected    Narrative:      Fact sheet for providers: https://www.fda.gov/media/831177/download     Fact sheet for patients: https://www.fda.gov/media/531991/download    POC Glucose Once [[id number]]  (Abnormal) Collected: 08/02/22 2011    Specimen: Blood Updated: 08/02/22 2013     Glucose 188 mg/dL      Comment: Meter: [id number] : [id number] RichiPrafuldaisy [place]ORTH       Protime-INR [[id number]]  (Abnormal) Collected: 08/02/22 1328    Specimen: Blood Updated: 08/02/22 1359     Protime 14.6 Seconds      INR 1.15    aPTT [[id number]]  (Normal) Collected: 08/02/22 1328    Specimen: Blood Updated: 08/02/22 1359     PTT 31.5 seconds     Comprehensive Metabolic Panel [[id number]]  (Abnormal) Collected: 08/02/22 1228    Specimen: Blood Updated: 08/02/22 1306     Glucose 282 mg/dL      BUN 22 mg/dL      Creatinine 1.28 mg/dL      Sodium 134 mmol/L      Potassium 4.9 mmol/L      Comment: Slight hemolysis detected by analyzer. Results may be affected.        Chloride 102 mmol/L      CO2 20.2 mmol/L      Calcium 9.0 mg/dL      Total Protein 6.2 g/dL      Albumin 3.20 g/dL      ALT (SGPT) 12 U/L      AST (SGOT) 17 U/L      Alkaline Phosphatase 90 U/L      Total Bilirubin 0.4 mg/dL      Globulin 3.0 gm/dL      A/G Ratio 1.1 g/dL      BUN/Creatinine Ratio 17.2     Anion Gap 11.8 mmol/L      eGFR 40.6 mL/min/1.73      Comment: National Kidney Foundation and American Society of Nephrology (ASN) Task Force recommended calculation based on the Chronic Kidney Disease Epidemiology Collaboration (CKD-EPI) equation refit without adjustment for race.       Narrative:      GFR Normal >60  Chronic Kidney Disease <60  Kidney Failure  "<15      CBC & Differential [[id number]]  (Abnormal) Collected: 08/02/22 1228    Specimen: Blood Updated: 08/02/22 1245    Narrative:      The following orders were created for panel order CBC & Differential.  Procedure                               Abnormality         Status                     ---------                               -----------         ------                     CBC Auto Differential[[id number]]        Abnormal            Final result                 Please view results for these tests on the individual orders.    CBC Auto Differential [[id number]]  (Abnormal) Collected: 08/02/22 1228    Specimen: Blood Updated: 08/02/22 1245     WBC 17.03 10*3/mm3      RBC 4.17 10*6/mm3      Hemoglobin 12.6 g/dL      Hematocrit 38.9 %      MCV 93.3 fL      MCH 30.2 pg      MCHC 32.4 g/dL      RDW 13.3 %      RDW-SD 45.2 fl      MPV 9.8 fL      Platelets 395 10*3/mm3      Neutrophil % 88.1 %      Lymphocyte % 6.2 %      Monocyte % 4.9 %      Eosinophil % 0.1 %      Basophil % 0.2 %      Immature Grans % 0.5 %      Neutrophils, Absolute 15.02 10*3/mm3      Lymphocytes, Absolute 1.05 10*3/mm3      Monocytes, Absolute 0.83 10*3/mm3      Eosinophils, Absolute 0.01 10*3/mm3      Basophils, Absolute 0.04 10*3/mm3      Immature Grans, Absolute 0.08 10*3/mm3      nRBC 0.0 /100 WBC         /83 (BP Location: Left arm, Patient Position: Lying)   Pulse 60   Temp 98.2 °F (36.8 °C) (Oral)   Resp 14   Ht 149.9 cm (59\")   Wt 45 kg (99 lb 3.3 oz)   SpO2 97%   BMI 20.04 kg/m²     Discharge Exam:  General Appearance:    Alert, cooperative, no distress                          Head:    Normocephalic, without obvious abnormality, atraumatic                          Eyes:                            Throat:   Lips, tongue, gums normal                          Neck:   Supple, symmetrical, trachea midline, no JVD                        Lungs:     Clear to auscultation bilaterally, respirations unlabored                Chest " Wall:    No tenderness or deformity                        Heart:    Regular rate and rhythm, S1 and S2 normal, no murmur,no  Rub or gallop                  Abdomen:     Soft, non-tender, bowel sounds active, no masses, no organomegaly                  Extremities:   Extremities normal, atraumatic, no cyanosis or edema                             Skin:   Skin is warm and dry,  no rashes or palpable lesions                  Neurologic:   no focal deficits noted     Disposition:  Skilled nursing facility    Patient Instructions:      Discharge Medications      Continue These Medications      Instructions Start Date   acetaminophen 325 MG tablet  Commonly known as: TYLENOL   650 mg, Oral, Every 6 Hours PRN      furosemide 20 MG tablet  Commonly known as: LASIX   20 mg, Oral, Daily      Lantus SoloStar 100 UNIT/ML injection pen  Generic drug: Insulin Glargine   12 Units, Subcutaneous, Daily      levothyroxine 88 MCG tablet  Commonly known as: SYNTHROID, LEVOTHROID   88 mcg, Oral, Daily      linaclotide 145 MCG capsule capsule  Commonly known as: LINZESS   145 mcg, Oral, Daily      memantine 10 MG tablet  Commonly known as: NAMENDA   10 mg, Oral, Daily      potassium chloride 20 MEQ CR tablet  Commonly known as: K-DUR,KLOR-CON   40 mEq, Oral, 2 Times Daily      simvastatin 10 MG tablet  Commonly known as: ZOCOR   10 mg, Oral, Nightly         Stop These Medications    aspirin 81 MG EC tablet          Future Appointments   Date Time Provider Department Center   8/8/2022  1:00 PM EMS MED 2  [facility]      Contact information for follow-up providers     Reg Rivera MD Follow up.    Specialty: Internal Medicine  Contact information:  [address] [facility] [address] [phone]                   Contact information for after-discharge care     Destination     [facility] .    Service: Intermediate Care  Contact information:  [address] [facility] [address] [phone]                            Discharge Order (From admission, onward)     Start     Ordered    08/06/22 1145  Discharge patient  Once        Expected Discharge Date: 08/06/22    Discharge Disposition: Skilled Nursing Facility (DC - External)    Physician of Record for Attribution - Please select from Treatment Team: KP KSY [[id number]]    Review needed by CMO to determine Physician of Record: No       Question Answer Comment   Physician of Record for Attribution - Please select from Treatment Team KP SKY    Review needed by CMO to determine Physician of Record No        08/06/22 1146                Total time spent discharging patient including evaluation,post hospitalization follow up,  medication and post hospitalization instructions and education total time exceeds 30 minutes.    Signed:  Kp Sky MD  8/8/2022  10:13 EDT

## 2022-08-08 NOTE — PLAN OF CARE
Problem: Adult Inpatient Plan of Care  Goal: Plan of Care Review  Outcome: Ongoing, Progressing  Flowsheets (Taken 8/8/2022 1261)  Progress: improving  Plan of Care Reviewed With: patient  Outcome Evaluation: bruising cont's throughout chest, left and right side and r arm.  Medicated x1 for co pain in shoulder. pt eager for dc this am and aware going back to NH.  no distress noted. will cont to monitor   Goal Outcome Evaluation:  Plan of Care Reviewed With: patient        Progress: improving  Outcome Evaluation: bruising cont's throughout chest, left and right side and r arm.  Medicated x1 for co pain in shoulder. pt eager for dc this am and aware going back to NH.  no distress noted. will cont to monitor

## 2022-08-10 LAB — CREAT BLDA-MCNC: 1.3 MG/DL (ref 0.6–1.3)

## 2022-08-22 NOTE — PROGRESS NOTES
General Surgery History and Physical      Summary:    Sam Flores is a 87 y.o. lady with a large right chest wall hematoma at the site of her prior mastectomy.  Attempted to drain in the office but she could not tolerate this due to discomfort.  She would like to leave this alone for now and will call if she has any remaining symptoms or any evidence of infection.    Referring Provider: Reg Rivera MD    Chief Complaint:    Chest wall hematoma     History of Present Illness:    Sam Flores is a 87 y.o. lady who fell about a month ago and has a large hematoma of the right breast at her prior mastectomy site.  There is no evidence of infection.  It is not gone down in size very much since it happened.    Past Medical History:   • DM  • Dementia   • Hypothyroidism   • HTN  • HLD    Past Surgical History:    • C section   • Kyphoplasty  • Right breast total mastectomy   • Right shoulder surgery     Family History:    No family history of breast or colon cancer    Social History:    • Denies tobacco use  • Denies alcohol use    Allergies:   Allergies   Allergen Reactions   • Penicillins        Medications:     Current Outpatient Medications:   •  acetaminophen (TYLENOL) 325 MG tablet, Take 650 mg by mouth Every 6 (Six) Hours As Needed for Mild Pain ., Disp: , Rfl:   •  furosemide (LASIX) 20 MG tablet, Take 20 mg by mouth Daily., Disp: , Rfl:   •  Insulin Glargine (Lantus SoloStar) 100 UNIT/ML injection pen, Inject 12 Units under the skin into the appropriate area as directed Daily., Disp: , Rfl:   •  levothyroxine (SYNTHROID, LEVOTHROID) 88 MCG tablet, Take 88 mcg by mouth Daily., Disp: , Rfl:   •  linaclotide (LINZESS) 145 MCG capsule capsule, Take 145 mcg by mouth Daily., Disp: , Rfl:   •  memantine (NAMENDA) 10 MG tablet, Take 10 mg by mouth Daily., Disp: , Rfl:   •  potassium chloride (K-DUR,KLOR-CON) 20 MEQ CR tablet, Take 40 mEq by mouth 2 (Two) Times a Day., Disp: , Rfl:   •  simvastatin (ZOCOR) 10  MG tablet, Take 10 mg by mouth Every Night., Disp: , Rfl:     Radiology/Endoscopy:    • R chest US: 7x8cm hematoma     Labs:    • Labs from 8/8/2022 reviewed    Review of Systems:   Influenza-like illness: no fever, no  cough, no  sore throat, no  body aches, no loss of sense of taste or smell, no known exposure to person with Covid-19.  Constitutional: Negative for fevers or chills  HENT: Negative for hearing loss or runny nose  Eyes: Negative for vision changes or scleral icterus  Respiratory: Negative for cough or shortness of breath  Cardiovascular: Negative for chest pain or heart palpitations  Gastrointestinal: Negative for abdominal pain, nausea, vomiting, constipation, melena, or hematochezia  Genitourinary: Negative for hematuria or dysuria  Musculoskeletal: Negative for joint swelling or gait instability  Neurologic: Negative for tremors or seizures  Psychiatric: Negative for suicidal ideations or depression  All other systems reviewed and negative    Physical Exam:   • Constitutional: Well-developed well-nourished, no acute distress, in wheelchair  • Eyes: Conjunctiva normal, sclera nonicteric  • ENMT: Hearing grossly normal, oral mucosa moist  • Neck: Supple, trachea midline  • Respiratory: No increased work of breathing, normal inspiratory effort  • Cardiovascular: Regular rate, no peripheral edema, no jugular venous distention  • Gastrointestinal: Soft, nontender  • Skin:  Warm, dry, no rash on visualized skin surfaces; large hematoma at the site of her prior right breast mastectomy  • Musculoskeletal: Symmetric strength, normal gait  • Psychiatric: Alert and oriented ×3, normal affect     Procedure note:  Area prepped and draped in sterile fashion.  Attempted to numb the skin with 1% lidocaine but she could not tolerate this due to discomfort.  She requested the procedure be aborted.    Maren Nath M.D.  General and Endoscopic Surgery  [facility]    [address], [address]  [place], [place], [address]  P: [phone]  F: [phone]

## 2022-08-24 ENCOUNTER — OFFICE VISIT (OUTPATIENT)
Dept: SURGERY | Facility: CLINIC | Age: 87
End: 2022-08-24

## 2022-08-24 VITALS — HEIGHT: 59 IN | BODY MASS INDEX: 20.04 KG/M2

## 2022-08-24 DIAGNOSIS — T14.8XXA HEMATOMA: Primary | ICD-10-CM

## 2022-08-24 PROCEDURE — 99203 OFFICE O/P NEW LOW 30 MIN: CPT | Performed by: STUDENT IN AN ORGANIZED HEALTH CARE EDUCATION/TRAINING PROGRAM

## 2022-08-24 RX ORDER — HYDROCODONE BITARTRATE AND ACETAMINOPHEN 5; 325 MG/1; MG/1
1 TABLET ORAL 3 TIMES DAILY
Status: ON HOLD | COMMUNITY
End: 2023-02-03 | Stop reason: SDUPTHER

## 2023-01-25 ENCOUNTER — HOSPITAL ENCOUNTER (EMERGENCY)
Facility: HOSPITAL | Age: 88
Discharge: HOME | End: 2023-01-25
Attending: EMERGENCY MEDICINE | Admitting: EMERGENCY MEDICINE
Payer: MEDICARE

## 2023-01-25 ENCOUNTER — APPOINTMENT (OUTPATIENT)
Dept: CT IMAGING | Facility: HOSPITAL | Age: 88
End: 2023-01-25
Payer: MEDICARE

## 2023-01-25 VITALS
HEART RATE: 85 BPM | SYSTOLIC BLOOD PRESSURE: 180 MMHG | OXYGEN SATURATION: 93 % | DIASTOLIC BLOOD PRESSURE: 92 MMHG | RESPIRATION RATE: 18 BRPM | TEMPERATURE: 98.1 F

## 2023-01-25 DIAGNOSIS — I10 ASYMPTOMATIC HYPERTENSION: ICD-10-CM

## 2023-01-25 DIAGNOSIS — Z86.79 HISTORY OF HYPERTENSION: ICD-10-CM

## 2023-01-25 DIAGNOSIS — M25.551 RIGHT HIP PAIN: Primary | ICD-10-CM

## 2023-01-25 DIAGNOSIS — Z86.59 HISTORY OF DEMENTIA: ICD-10-CM

## 2023-01-25 PROCEDURE — 72192 CT PELVIS W/O DYE: CPT

## 2023-01-25 PROCEDURE — 99284 EMERGENCY DEPT VISIT MOD MDM: CPT

## 2023-01-25 PROCEDURE — 99283 EMERGENCY DEPT VISIT LOW MDM: CPT

## 2023-01-25 RX ORDER — ATORVASTATIN CALCIUM 10 MG/1
10 TABLET, FILM COATED ORAL 3 TIMES WEEKLY
COMMUNITY

## 2023-01-25 RX ORDER — HYDROCODONE BITARTRATE AND ACETAMINOPHEN 5; 325 MG/1; MG/1
1 TABLET ORAL ONCE
Status: COMPLETED | OUTPATIENT
Start: 2023-01-25 | End: 2023-01-25

## 2023-01-25 RX ORDER — MIRTAZAPINE 15 MG/1
7.5 TABLET, FILM COATED ORAL NIGHTLY
COMMUNITY

## 2023-01-25 RX ORDER — SENNOSIDES 8.6 MG
650 CAPSULE ORAL EVERY 6 HOURS PRN
COMMUNITY

## 2023-01-25 RX ORDER — SENNA PLUS 8.6 MG/1
2 TABLET ORAL NIGHTLY
COMMUNITY

## 2023-01-25 RX ADMIN — HYDROCODONE BITARTRATE AND ACETAMINOPHEN 1 TABLET: 5; 325 TABLET ORAL at 15:50

## 2023-01-26 ENCOUNTER — APPOINTMENT (OUTPATIENT)
Dept: CT IMAGING | Facility: HOSPITAL | Age: 88
DRG: 689 | End: 2023-01-26
Payer: MEDICARE

## 2023-01-26 ENCOUNTER — HOSPITAL ENCOUNTER (INPATIENT)
Facility: HOSPITAL | Age: 88
LOS: 7 days | Discharge: NURSING, REHAB OR LONG-TERM CARE | DRG: 689 | End: 2023-02-03
Attending: EMERGENCY MEDICINE | Admitting: HOSPITALIST
Payer: MEDICARE

## 2023-01-26 ENCOUNTER — APPOINTMENT (OUTPATIENT)
Dept: GENERAL RADIOLOGY | Facility: HOSPITAL | Age: 88
DRG: 689 | End: 2023-01-26
Payer: MEDICARE

## 2023-01-26 DIAGNOSIS — J90 PLEURAL EFFUSION, RIGHT: ICD-10-CM

## 2023-01-26 DIAGNOSIS — E78.5 HYPERLIPIDEMIA, UNSPECIFIED HYPERLIPIDEMIA TYPE: ICD-10-CM

## 2023-01-26 DIAGNOSIS — F03.90 DEMENTIA, UNSPECIFIED DEMENTIA SEVERITY, UNSPECIFIED DEMENTIA TYPE, UNSPECIFIED WHETHER BEHAVIORAL, PSYCHOTIC, OR MOOD DISTURBANCE OR ANXIETY: ICD-10-CM

## 2023-01-26 DIAGNOSIS — E11.9 TYPE 2 DIABETES MELLITUS WITHOUT COMPLICATION, UNSPECIFIED WHETHER LONG TERM INSULIN USE: ICD-10-CM

## 2023-01-26 DIAGNOSIS — R41.82 ALTERED MENTAL STATUS, UNSPECIFIED ALTERED MENTAL STATUS TYPE: Primary | ICD-10-CM

## 2023-01-26 DIAGNOSIS — I10 HYPERTENSION, UNSPECIFIED TYPE: ICD-10-CM

## 2023-01-26 DIAGNOSIS — N17.9 AKI (ACUTE KIDNEY INJURY): ICD-10-CM

## 2023-01-26 DIAGNOSIS — D72.829 LEUKOCYTOSIS, UNSPECIFIED TYPE: ICD-10-CM

## 2023-01-26 DIAGNOSIS — N39.0 ACUTE URINARY TRACT INFECTION: ICD-10-CM

## 2023-01-26 LAB
BILIRUB UR QL STRIP: NEGATIVE
CLARITY UR: ABNORMAL
COLOR UR: YELLOW
GLUCOSE UR STRIP-MCNC: NEGATIVE MG/DL
HGB UR QL STRIP.AUTO: ABNORMAL
KETONES UR QL STRIP: NEGATIVE
LEUKOCYTE ESTERASE UR QL STRIP.AUTO: ABNORMAL
NITRITE UR QL STRIP: NEGATIVE
PH UR STRIP.AUTO: 5.5 [PH] (ref 5–8)
PROT UR QL STRIP: ABNORMAL
SP GR UR STRIP: 1.02 (ref 1–1.03)
UROBILINOGEN UR QL STRIP: ABNORMAL

## 2023-01-26 PROCEDURE — 81001 URINALYSIS AUTO W/SCOPE: CPT | Performed by: PHYSICIAN ASSISTANT

## 2023-01-26 PROCEDURE — 85025 COMPLETE CBC W/AUTO DIFF WBC: CPT | Performed by: PHYSICIAN ASSISTANT

## 2023-01-26 PROCEDURE — 83735 ASSAY OF MAGNESIUM: CPT | Performed by: PHYSICIAN ASSISTANT

## 2023-01-26 PROCEDURE — 84484 ASSAY OF TROPONIN QUANT: CPT | Performed by: PHYSICIAN ASSISTANT

## 2023-01-26 PROCEDURE — 93010 ELECTROCARDIOGRAM REPORT: CPT | Performed by: INTERNAL MEDICINE

## 2023-01-26 PROCEDURE — 71045 X-RAY EXAM CHEST 1 VIEW: CPT

## 2023-01-26 PROCEDURE — 80053 COMPREHEN METABOLIC PANEL: CPT | Performed by: PHYSICIAN ASSISTANT

## 2023-01-26 PROCEDURE — 70450 CT HEAD/BRAIN W/O DYE: CPT

## 2023-01-26 PROCEDURE — P9612 CATHETERIZE FOR URINE SPEC: HCPCS

## 2023-01-26 PROCEDURE — 84443 ASSAY THYROID STIM HORMONE: CPT | Performed by: PHYSICIAN ASSISTANT

## 2023-01-26 PROCEDURE — 99285 EMERGENCY DEPT VISIT HI MDM: CPT

## 2023-01-26 PROCEDURE — 93005 ELECTROCARDIOGRAM TRACING: CPT | Performed by: PHYSICIAN ASSISTANT

## 2023-01-26 RX ORDER — SODIUM CHLORIDE 0.9 % (FLUSH) 0.9 %
10 SYRINGE (ML) INJECTION AS NEEDED
Status: DISCONTINUED | OUTPATIENT
Start: 2023-01-26 | End: 2023-02-03 | Stop reason: HOSPADM

## 2023-01-27 PROBLEM — R41.82 ALTERED MENTAL STATUS, UNSPECIFIED ALTERED MENTAL STATUS TYPE: Status: ACTIVE | Noted: 2023-01-27

## 2023-01-27 LAB
ALBUMIN SERPL-MCNC: 3.4 G/DL (ref 3.5–5.2)
ALBUMIN/GLOB SERPL: 0.9 G/DL
ALP SERPL-CCNC: 118 U/L (ref 39–117)
ALT SERPL W P-5'-P-CCNC: 17 U/L (ref 1–33)
ANION GAP SERPL CALCULATED.3IONS-SCNC: 11.4 MMOL/L (ref 5–15)
ANION GAP SERPL CALCULATED.3IONS-SCNC: 13.2 MMOL/L (ref 5–15)
AST SERPL-CCNC: 21 U/L (ref 1–32)
BACTERIA UR QL AUTO: ABNORMAL /HPF
BASOPHILS # BLD AUTO: 0.04 10*3/MM3 (ref 0–0.2)
BASOPHILS # BLD AUTO: 0.06 10*3/MM3 (ref 0–0.2)
BASOPHILS NFR BLD AUTO: 0.3 % (ref 0–1.5)
BASOPHILS NFR BLD AUTO: 0.4 % (ref 0–1.5)
BILIRUB SERPL-MCNC: 0.7 MG/DL (ref 0–1.2)
BUN SERPL-MCNC: 13 MG/DL (ref 8–23)
BUN SERPL-MCNC: 29 MG/DL (ref 8–23)
BUN/CREAT SERPL: 22.7 (ref 7–25)
BUN/CREAT SERPL: 9.2 (ref 7–25)
CALCIUM SPEC-SCNC: 9.1 MG/DL (ref 8.6–10.5)
CALCIUM SPEC-SCNC: 9.4 MG/DL (ref 8.6–10.5)
CHLORIDE SERPL-SCNC: 106 MMOL/L (ref 98–107)
CHLORIDE SERPL-SCNC: 107 MMOL/L (ref 98–107)
CO2 SERPL-SCNC: 24.8 MMOL/L (ref 22–29)
CO2 SERPL-SCNC: 26.6 MMOL/L (ref 22–29)
CREAT SERPL-MCNC: 1.28 MG/DL (ref 0.57–1)
CREAT SERPL-MCNC: 1.42 MG/DL (ref 0.57–1)
DEPRECATED RDW RBC AUTO: 44.4 FL (ref 37–54)
DEPRECATED RDW RBC AUTO: 45.6 FL (ref 37–54)
EGFRCR SERPLBLD CKD-EPI 2021: 35.6 ML/MIN/1.73
EGFRCR SERPLBLD CKD-EPI 2021: 40.4 ML/MIN/1.73
EOSINOPHIL # BLD AUTO: 0.02 10*3/MM3 (ref 0–0.4)
EOSINOPHIL # BLD AUTO: 0.36 10*3/MM3 (ref 0–0.4)
EOSINOPHIL NFR BLD AUTO: 0.1 % (ref 0.3–6.2)
EOSINOPHIL NFR BLD AUTO: 2.5 % (ref 0.3–6.2)
ERYTHROCYTE [DISTWIDTH] IN BLOOD BY AUTOMATED COUNT: 13.3 % (ref 12.3–15.4)
ERYTHROCYTE [DISTWIDTH] IN BLOOD BY AUTOMATED COUNT: 13.3 % (ref 12.3–15.4)
GLOBULIN UR ELPH-MCNC: 3.9 GM/DL
GLUCOSE BLDC GLUCOMTR-MCNC: 149 MG/DL (ref 70–130)
GLUCOSE BLDC GLUCOMTR-MCNC: 151 MG/DL (ref 70–130)
GLUCOSE BLDC GLUCOMTR-MCNC: 152 MG/DL (ref 70–130)
GLUCOSE BLDC GLUCOMTR-MCNC: 181 MG/DL (ref 70–130)
GLUCOSE SERPL-MCNC: 163 MG/DL (ref 65–99)
GLUCOSE SERPL-MCNC: 181 MG/DL (ref 65–99)
HCT VFR BLD AUTO: 39.7 % (ref 34–46.6)
HCT VFR BLD AUTO: 41.1 % (ref 34–46.6)
HGB BLD-MCNC: 12.7 G/DL (ref 12–15.9)
HGB BLD-MCNC: 13.3 G/DL (ref 12–15.9)
HYALINE CASTS UR QL AUTO: ABNORMAL /LPF
IMM GRANULOCYTES # BLD AUTO: 0.06 10*3/MM3 (ref 0–0.05)
IMM GRANULOCYTES # BLD AUTO: 0.06 10*3/MM3 (ref 0–0.05)
IMM GRANULOCYTES NFR BLD AUTO: 0.4 % (ref 0–0.5)
IMM GRANULOCYTES NFR BLD AUTO: 0.4 % (ref 0–0.5)
LYMPHOCYTES # BLD AUTO: 1.41 10*3/MM3 (ref 0.7–3.1)
LYMPHOCYTES # BLD AUTO: 1.69 10*3/MM3 (ref 0.7–3.1)
LYMPHOCYTES NFR BLD AUTO: 11.6 % (ref 19.6–45.3)
LYMPHOCYTES NFR BLD AUTO: 9.4 % (ref 19.6–45.3)
MAGNESIUM SERPL-MCNC: 2.1 MG/DL (ref 1.6–2.4)
MCH RBC QN AUTO: 30 PG (ref 26.6–33)
MCH RBC QN AUTO: 30.4 PG (ref 26.6–33)
MCHC RBC AUTO-ENTMCNC: 32 G/DL (ref 31.5–35.7)
MCHC RBC AUTO-ENTMCNC: 32.4 G/DL (ref 31.5–35.7)
MCV RBC AUTO: 93.6 FL (ref 79–97)
MCV RBC AUTO: 93.8 FL (ref 79–97)
MONOCYTES # BLD AUTO: 0.95 10*3/MM3 (ref 0.1–0.9)
MONOCYTES # BLD AUTO: 1.15 10*3/MM3 (ref 0.1–0.9)
MONOCYTES NFR BLD AUTO: 6.3 % (ref 5–12)
MONOCYTES NFR BLD AUTO: 7.9 % (ref 5–12)
NEUTROPHILS NFR BLD AUTO: 11.19 10*3/MM3 (ref 1.7–7)
NEUTROPHILS NFR BLD AUTO: 12.56 10*3/MM3 (ref 1.7–7)
NEUTROPHILS NFR BLD AUTO: 77.2 % (ref 42.7–76)
NEUTROPHILS NFR BLD AUTO: 83.5 % (ref 42.7–76)
NRBC BLD AUTO-RTO: 0 /100 WBC (ref 0–0.2)
NRBC BLD AUTO-RTO: 0 /100 WBC (ref 0–0.2)
NT-PROBNP SERPL-MCNC: 1521 PG/ML (ref 0–1800)
PLATELET # BLD AUTO: 340 10*3/MM3 (ref 140–450)
PLATELET # BLD AUTO: 352 10*3/MM3 (ref 140–450)
PMV BLD AUTO: 10.1 FL (ref 6–12)
PMV BLD AUTO: 10.6 FL (ref 6–12)
POTASSIUM SERPL-SCNC: 4.5 MMOL/L (ref 3.5–5.2)
POTASSIUM SERPL-SCNC: 4.7 MMOL/L (ref 3.5–5.2)
PROT SERPL-MCNC: 7.3 G/DL (ref 6–8.5)
QT INTERVAL: 415 MS
RBC # BLD AUTO: 4.24 10*6/MM3 (ref 3.77–5.28)
RBC # BLD AUTO: 4.38 10*6/MM3 (ref 3.77–5.28)
RBC # UR STRIP: ABNORMAL /HPF
REF LAB TEST METHOD: ABNORMAL
SODIUM SERPL-SCNC: 144 MMOL/L (ref 136–145)
SODIUM SERPL-SCNC: 145 MMOL/L (ref 136–145)
SQUAMOUS #/AREA URNS HPF: ABNORMAL /HPF
TROPONIN T SERPL-MCNC: <0.01 NG/ML (ref 0–0.03)
TSH SERPL DL<=0.05 MIU/L-ACNC: 0.28 UIU/ML (ref 0.27–4.2)
WBC # UR STRIP: ABNORMAL /HPF
WBC NRBC COR # BLD: 14.51 10*3/MM3 (ref 3.4–10.8)
WBC NRBC COR # BLD: 15.04 10*3/MM3 (ref 3.4–10.8)

## 2023-01-27 PROCEDURE — 25010000002 CEFTRIAXONE PER 250 MG: Performed by: HOSPITALIST

## 2023-01-27 PROCEDURE — 82962 GLUCOSE BLOOD TEST: CPT

## 2023-01-27 PROCEDURE — 80048 BASIC METABOLIC PNL TOTAL CA: CPT | Performed by: HOSPITALIST

## 2023-01-27 PROCEDURE — 83880 ASSAY OF NATRIURETIC PEPTIDE: CPT | Performed by: HOSPITALIST

## 2023-01-27 PROCEDURE — 85025 COMPLETE CBC W/AUTO DIFF WBC: CPT | Performed by: HOSPITALIST

## 2023-01-27 PROCEDURE — 25010000002 CEFTRIAXONE PER 250 MG

## 2023-01-27 PROCEDURE — 63710000001 INSULIN LISPRO (HUMAN) PER 5 UNITS: Performed by: HOSPITALIST

## 2023-01-27 PROCEDURE — 92610 EVALUATE SWALLOWING FUNCTION: CPT

## 2023-01-27 RX ORDER — MEMANTINE HYDROCHLORIDE 10 MG/1
10 TABLET ORAL EVERY 12 HOURS SCHEDULED
Status: DISCONTINUED | OUTPATIENT
Start: 2023-01-27 | End: 2023-02-03 | Stop reason: HOSPADM

## 2023-01-27 RX ORDER — INSULIN LISPRO 100 [IU]/ML
0-7 INJECTION, SOLUTION INTRAVENOUS; SUBCUTANEOUS
Status: DISCONTINUED | OUTPATIENT
Start: 2023-01-27 | End: 2023-01-27

## 2023-01-27 RX ORDER — BACLOFEN 10 MG/1
10 TABLET ORAL 3 TIMES DAILY PRN
COMMUNITY
End: 2023-02-03 | Stop reason: HOSPADM

## 2023-01-27 RX ORDER — LEVOTHYROXINE SODIUM 88 UG/1
88 TABLET ORAL EVERY MORNING
Status: DISCONTINUED | OUTPATIENT
Start: 2023-01-27 | End: 2023-02-03 | Stop reason: HOSPADM

## 2023-01-27 RX ORDER — HYDROCODONE BITARTRATE AND ACETAMINOPHEN 5; 325 MG/1; MG/1
1 TABLET ORAL ONCE
Status: COMPLETED | OUTPATIENT
Start: 2023-01-27 | End: 2023-01-27

## 2023-01-27 RX ORDER — SENNA PLUS 8.6 MG/1
2 TABLET ORAL NIGHTLY
Status: DISCONTINUED | OUTPATIENT
Start: 2023-01-27 | End: 2023-02-03 | Stop reason: HOSPADM

## 2023-01-27 RX ORDER — FAMOTIDINE 20 MG/1
20 TABLET, FILM COATED ORAL 2 TIMES DAILY
Status: DISCONTINUED | OUTPATIENT
Start: 2023-01-27 | End: 2023-02-03 | Stop reason: HOSPADM

## 2023-01-27 RX ORDER — HYDROCODONE BITARTRATE AND ACETAMINOPHEN 5; 325 MG/1; MG/1
TABLET ORAL
Status: COMPLETED
Start: 2023-01-27 | End: 2023-01-27

## 2023-01-27 RX ORDER — AMLODIPINE BESYLATE 5 MG/1
5 TABLET ORAL
Status: DISCONTINUED | OUTPATIENT
Start: 2023-01-27 | End: 2023-01-28

## 2023-01-27 RX ORDER — ATORVASTATIN CALCIUM 20 MG/1
10 TABLET, FILM COATED ORAL NIGHTLY
Status: DISCONTINUED | OUTPATIENT
Start: 2023-01-27 | End: 2023-02-03 | Stop reason: HOSPADM

## 2023-01-27 RX ORDER — ATORVASTATIN CALCIUM 20 MG/1
40 TABLET, FILM COATED ORAL NIGHTLY
Status: DISCONTINUED | OUTPATIENT
Start: 2023-01-27 | End: 2023-01-27

## 2023-01-27 RX ORDER — MIRTAZAPINE 15 MG/1
7.5 TABLET, FILM COATED ORAL NIGHTLY
Status: DISCONTINUED | OUTPATIENT
Start: 2023-01-27 | End: 2023-02-03 | Stop reason: HOSPADM

## 2023-01-27 RX ORDER — SODIUM CHLORIDE 450 MG/100ML
75 INJECTION, SOLUTION INTRAVENOUS CONTINUOUS
Status: DISCONTINUED | OUTPATIENT
Start: 2023-01-27 | End: 2023-01-28

## 2023-01-27 RX ORDER — INSULIN LISPRO 100 [IU]/ML
0-7 INJECTION, SOLUTION INTRAVENOUS; SUBCUTANEOUS
Status: DISCONTINUED | OUTPATIENT
Start: 2023-01-27 | End: 2023-02-03 | Stop reason: HOSPADM

## 2023-01-27 RX ORDER — CEFTRIAXONE 1 G/1
INJECTION, POWDER, FOR SOLUTION INTRAMUSCULAR; INTRAVENOUS
Status: COMPLETED
Start: 2023-01-27 | End: 2023-01-27

## 2023-01-27 RX ORDER — ACETAMINOPHEN 325 MG/1
650 TABLET ORAL EVERY 6 HOURS PRN
Status: DISCONTINUED | OUTPATIENT
Start: 2023-01-27 | End: 2023-02-03 | Stop reason: HOSPADM

## 2023-01-27 RX ORDER — NITROGLYCERIN 0.4 MG/1
0.4 TABLET SUBLINGUAL
Status: DISCONTINUED | OUTPATIENT
Start: 2023-01-27 | End: 2023-01-31

## 2023-01-27 RX ORDER — HYDROCODONE BITARTRATE AND ACETAMINOPHEN 5; 325 MG/1; MG/1
1 TABLET ORAL EVERY 4 HOURS PRN
Status: DISCONTINUED | OUTPATIENT
Start: 2023-01-27 | End: 2023-01-27

## 2023-01-27 RX ADMIN — FAMOTIDINE 20 MG: 20 TABLET, FILM COATED ORAL at 10:31

## 2023-01-27 RX ADMIN — HYDROCODONE BITARTRATE AND ACETAMINOPHEN 1 TABLET: 5; 325 TABLET ORAL at 05:18

## 2023-01-27 RX ADMIN — INSULIN LISPRO 2 UNITS: 100 INJECTION, SOLUTION INTRAVENOUS; SUBCUTANEOUS at 17:36

## 2023-01-27 RX ADMIN — CEFTRIAXONE SODIUM: 1 INJECTION, POWDER, FOR SOLUTION INTRAMUSCULAR; INTRAVENOUS at 05:15

## 2023-01-27 RX ADMIN — MIRTAZAPINE 7.5 MG: 15 TABLET, FILM COATED ORAL at 21:07

## 2023-01-27 RX ADMIN — FAMOTIDINE 20 MG: 20 TABLET, FILM COATED ORAL at 21:06

## 2023-01-27 RX ADMIN — CEFTRIAXONE SODIUM 1 G: 1 INJECTION, POWDER, FOR SOLUTION INTRAMUSCULAR; INTRAVENOUS at 22:19

## 2023-01-27 RX ADMIN — SODIUM CHLORIDE 75 ML/HR: 4.5 INJECTION, SOLUTION INTRAVENOUS at 06:52

## 2023-01-27 RX ADMIN — SODIUM CHLORIDE 100 ML/HR: 4.5 INJECTION, SOLUTION INTRAVENOUS at 18:52

## 2023-01-27 RX ADMIN — AMLODIPINE BESYLATE 5 MG: 5 TABLET ORAL at 10:31

## 2023-01-27 RX ADMIN — ATORVASTATIN CALCIUM 10 MG: 20 TABLET, FILM COATED ORAL at 21:06

## 2023-01-27 RX ADMIN — SENNOSIDES 2 TABLET: 8.6 TABLET, FILM COATED ORAL at 21:06

## 2023-01-27 RX ADMIN — ACETAMINOPHEN 650 MG: 325 TABLET, FILM COATED ORAL at 21:06

## 2023-01-27 RX ADMIN — MEMANTINE HYDROCHLORIDE 10 MG: 10 TABLET, FILM COATED ORAL at 13:23

## 2023-01-27 RX ADMIN — LEVOTHYROXINE SODIUM 88 MCG: 0.09 TABLET ORAL at 13:23

## 2023-01-27 RX ADMIN — MEMANTINE HYDROCHLORIDE 10 MG: 10 TABLET, FILM COATED ORAL at 21:07

## 2023-01-27 RX ADMIN — INSULIN LISPRO 2 UNITS: 100 INJECTION, SOLUTION INTRAVENOUS; SUBCUTANEOUS at 21:14

## 2023-01-27 RX ADMIN — INSULIN GLARGINE-YFGN 10 UNITS: 100 INJECTION, SOLUTION SUBCUTANEOUS at 21:14

## 2023-01-27 RX ADMIN — HYDROCODONE BITARTRATE AND ACETAMINOPHEN 1 TABLET: 5; 325 TABLET ORAL at 13:28

## 2023-01-28 ENCOUNTER — APPOINTMENT (OUTPATIENT)
Dept: GENERAL RADIOLOGY | Facility: HOSPITAL | Age: 88
DRG: 689 | End: 2023-01-28
Payer: MEDICARE

## 2023-01-28 LAB
ALBUMIN SERPL-MCNC: 3.1 G/DL (ref 3.5–5.2)
ALBUMIN/GLOB SERPL: 0.9 G/DL
ALP SERPL-CCNC: 101 U/L (ref 39–117)
ALT SERPL W P-5'-P-CCNC: 10 U/L (ref 1–33)
ANION GAP SERPL CALCULATED.3IONS-SCNC: 12.7 MMOL/L (ref 5–15)
AST SERPL-CCNC: 16 U/L (ref 1–32)
BASOPHILS # BLD AUTO: 0.04 10*3/MM3 (ref 0–0.2)
BASOPHILS NFR BLD AUTO: 0.3 % (ref 0–1.5)
BILIRUB SERPL-MCNC: 0.3 MG/DL (ref 0–1.2)
BUN SERPL-MCNC: 31 MG/DL (ref 8–23)
BUN/CREAT SERPL: 24.6 (ref 7–25)
CALCIUM SPEC-SCNC: 8.4 MG/DL (ref 8.6–10.5)
CHLORIDE SERPL-SCNC: 102 MMOL/L (ref 98–107)
CHOLEST SERPL-MCNC: 151 MG/DL (ref 0–200)
CO2 SERPL-SCNC: 24.3 MMOL/L (ref 22–29)
CREAT SERPL-MCNC: 1.26 MG/DL (ref 0.57–1)
DEPRECATED RDW RBC AUTO: 44.8 FL (ref 37–54)
EGFRCR SERPLBLD CKD-EPI 2021: 41.1 ML/MIN/1.73
EOSINOPHIL # BLD AUTO: 0.85 10*3/MM3 (ref 0–0.4)
EOSINOPHIL NFR BLD AUTO: 7.3 % (ref 0.3–6.2)
ERYTHROCYTE [DISTWIDTH] IN BLOOD BY AUTOMATED COUNT: 13.2 % (ref 12.3–15.4)
GLOBULIN UR ELPH-MCNC: 3.4 GM/DL
GLUCOSE BLDC GLUCOMTR-MCNC: 131 MG/DL (ref 70–130)
GLUCOSE BLDC GLUCOMTR-MCNC: 136 MG/DL (ref 70–130)
GLUCOSE BLDC GLUCOMTR-MCNC: 156 MG/DL (ref 70–130)
GLUCOSE BLDC GLUCOMTR-MCNC: 158 MG/DL (ref 70–130)
GLUCOSE SERPL-MCNC: 124 MG/DL (ref 65–99)
HBA1C MFR BLD: 6.6 % (ref 4.8–5.6)
HCT VFR BLD AUTO: 37.7 % (ref 34–46.6)
HDLC SERPL-MCNC: 45 MG/DL (ref 40–60)
HGB BLD-MCNC: 12.5 G/DL (ref 12–15.9)
IMM GRANULOCYTES # BLD AUTO: 0.04 10*3/MM3 (ref 0–0.05)
IMM GRANULOCYTES NFR BLD AUTO: 0.3 % (ref 0–0.5)
LDLC SERPL CALC-MCNC: 87 MG/DL (ref 0–100)
LDLC/HDLC SERPL: 1.89 {RATIO}
LYMPHOCYTES # BLD AUTO: 1.71 10*3/MM3 (ref 0.7–3.1)
LYMPHOCYTES NFR BLD AUTO: 14.6 % (ref 19.6–45.3)
MCH RBC QN AUTO: 30.7 PG (ref 26.6–33)
MCHC RBC AUTO-ENTMCNC: 33.2 G/DL (ref 31.5–35.7)
MCV RBC AUTO: 92.6 FL (ref 79–97)
MONOCYTES # BLD AUTO: 1.25 10*3/MM3 (ref 0.1–0.9)
MONOCYTES NFR BLD AUTO: 10.7 % (ref 5–12)
NEUTROPHILS NFR BLD AUTO: 66.8 % (ref 42.7–76)
NEUTROPHILS NFR BLD AUTO: 7.8 10*3/MM3 (ref 1.7–7)
NRBC BLD AUTO-RTO: 0 /100 WBC (ref 0–0.2)
PLATELET # BLD AUTO: 287 10*3/MM3 (ref 140–450)
PMV BLD AUTO: 9.9 FL (ref 6–12)
POTASSIUM SERPL-SCNC: 3.8 MMOL/L (ref 3.5–5.2)
PROT SERPL-MCNC: 6.5 G/DL (ref 6–8.5)
RBC # BLD AUTO: 4.07 10*6/MM3 (ref 3.77–5.28)
SODIUM SERPL-SCNC: 139 MMOL/L (ref 136–145)
T4 FREE SERPL-MCNC: 1.18 NG/DL (ref 0.93–1.7)
TRIGL SERPL-MCNC: 104 MG/DL (ref 0–150)
TSH SERPL DL<=0.05 MIU/L-ACNC: 0.22 UIU/ML (ref 0.27–4.2)
VLDLC SERPL-MCNC: 19 MG/DL (ref 5–40)
WBC NRBC COR # BLD: 11.69 10*3/MM3 (ref 3.4–10.8)

## 2023-01-28 PROCEDURE — 84439 ASSAY OF FREE THYROXINE: CPT | Performed by: HOSPITALIST

## 2023-01-28 PROCEDURE — 83036 HEMOGLOBIN GLYCOSYLATED A1C: CPT | Performed by: HOSPITALIST

## 2023-01-28 PROCEDURE — 80061 LIPID PANEL: CPT | Performed by: HOSPITALIST

## 2023-01-28 PROCEDURE — 25010000002 CEFTRIAXONE PER 250 MG: Performed by: HOSPITALIST

## 2023-01-28 PROCEDURE — 63710000001 INSULIN LISPRO (HUMAN) PER 5 UNITS: Performed by: HOSPITALIST

## 2023-01-28 PROCEDURE — 84443 ASSAY THYROID STIM HORMONE: CPT | Performed by: HOSPITALIST

## 2023-01-28 PROCEDURE — 80053 COMPREHEN METABOLIC PANEL: CPT | Performed by: HOSPITALIST

## 2023-01-28 PROCEDURE — 82962 GLUCOSE BLOOD TEST: CPT

## 2023-01-28 PROCEDURE — 25010000002 SODIUM CHLORIDE 0.9 % WITH KCL 20 MEQ 20-0.9 MEQ/L-% SOLUTION: Performed by: HOSPITALIST

## 2023-01-28 PROCEDURE — 85025 COMPLETE CBC W/AUTO DIFF WBC: CPT | Performed by: HOSPITALIST

## 2023-01-28 PROCEDURE — 72110 X-RAY EXAM L-2 SPINE 4/>VWS: CPT

## 2023-01-28 RX ORDER — INSULIN LISPRO 100 [IU]/ML
5 INJECTION, SOLUTION INTRAVENOUS; SUBCUTANEOUS
Status: DISCONTINUED | OUTPATIENT
Start: 2023-01-28 | End: 2023-01-28

## 2023-01-28 RX ORDER — SODIUM CHLORIDE AND POTASSIUM CHLORIDE 150; 900 MG/100ML; MG/100ML
75 INJECTION, SOLUTION INTRAVENOUS CONTINUOUS
Status: DISCONTINUED | OUTPATIENT
Start: 2023-01-28 | End: 2023-01-29

## 2023-01-28 RX ORDER — HYDROCODONE BITARTRATE AND ACETAMINOPHEN 5; 325 MG/1; MG/1
1 TABLET ORAL EVERY 6 HOURS PRN
Status: DISCONTINUED | OUTPATIENT
Start: 2023-01-28 | End: 2023-01-31

## 2023-01-28 RX ORDER — INSULIN LISPRO 100 [IU]/ML
3 INJECTION, SOLUTION INTRAVENOUS; SUBCUTANEOUS
Status: DISCONTINUED | OUTPATIENT
Start: 2023-01-28 | End: 2023-02-03 | Stop reason: HOSPADM

## 2023-01-28 RX ORDER — AMLODIPINE BESYLATE 10 MG/1
10 TABLET ORAL
Status: DISCONTINUED | OUTPATIENT
Start: 2023-01-29 | End: 2023-02-03 | Stop reason: HOSPADM

## 2023-01-28 RX ADMIN — INSULIN LISPRO 3 UNITS: 100 INJECTION, SOLUTION INTRAVENOUS; SUBCUTANEOUS at 17:49

## 2023-01-28 RX ADMIN — AMLODIPINE BESYLATE 5 MG: 5 TABLET ORAL at 09:11

## 2023-01-28 RX ADMIN — ACETAMINOPHEN 650 MG: 325 TABLET, FILM COATED ORAL at 04:29

## 2023-01-28 RX ADMIN — SENNOSIDES 2 TABLET: 8.6 TABLET, FILM COATED ORAL at 21:15

## 2023-01-28 RX ADMIN — LEVOTHYROXINE SODIUM 88 MCG: 0.09 TABLET ORAL at 06:50

## 2023-01-28 RX ADMIN — MEMANTINE HYDROCHLORIDE 10 MG: 10 TABLET, FILM COATED ORAL at 09:11

## 2023-01-28 RX ADMIN — HYDROCODONE BITARTRATE AND ACETAMINOPHEN 1 TABLET: 5; 325 TABLET ORAL at 15:21

## 2023-01-28 RX ADMIN — INSULIN LISPRO 2 UNITS: 100 INJECTION, SOLUTION INTRAVENOUS; SUBCUTANEOUS at 12:02

## 2023-01-28 RX ADMIN — MEMANTINE HYDROCHLORIDE 10 MG: 10 TABLET, FILM COATED ORAL at 21:15

## 2023-01-28 RX ADMIN — ATORVASTATIN CALCIUM 10 MG: 20 TABLET, FILM COATED ORAL at 21:14

## 2023-01-28 RX ADMIN — INSULIN GLARGINE-YFGN 10 UNITS: 100 INJECTION, SOLUTION SUBCUTANEOUS at 21:15

## 2023-01-28 RX ADMIN — POTASSIUM CHLORIDE AND SODIUM CHLORIDE 75 ML/HR: 900; 150 INJECTION, SOLUTION INTRAVENOUS at 15:22

## 2023-01-28 RX ADMIN — CEFTRIAXONE SODIUM 1 G: 1 INJECTION, POWDER, FOR SOLUTION INTRAMUSCULAR; INTRAVENOUS at 22:00

## 2023-01-28 RX ADMIN — HYDROCODONE BITARTRATE AND ACETAMINOPHEN 1 TABLET: 5; 325 TABLET ORAL at 21:14

## 2023-01-28 RX ADMIN — FAMOTIDINE 20 MG: 20 TABLET, FILM COATED ORAL at 21:14

## 2023-01-28 RX ADMIN — INSULIN LISPRO 2 UNITS: 100 INJECTION, SOLUTION INTRAVENOUS; SUBCUTANEOUS at 17:48

## 2023-01-28 RX ADMIN — FAMOTIDINE 20 MG: 20 TABLET, FILM COATED ORAL at 09:11

## 2023-01-28 RX ADMIN — MIRTAZAPINE 7.5 MG: 15 TABLET, FILM COATED ORAL at 21:15

## 2023-01-29 LAB
ANION GAP SERPL CALCULATED.3IONS-SCNC: 11 MMOL/L (ref 5–15)
BASOPHILS # BLD MANUAL: 0.05 10*3/MM3 (ref 0–0.2)
BASOPHILS NFR BLD MANUAL: 0.4 % (ref 0–1.5)
BUN SERPL-MCNC: 26 MG/DL (ref 8–23)
BUN/CREAT SERPL: 27.4 (ref 7–25)
CALCIUM SPEC-SCNC: 8.3 MG/DL (ref 8.6–10.5)
CHLORIDE SERPL-SCNC: 107 MMOL/L (ref 98–107)
CO2 SERPL-SCNC: 21 MMOL/L (ref 22–29)
CREAT SERPL-MCNC: 0.95 MG/DL (ref 0.57–1)
DEPRECATED RDW RBC AUTO: 45.6 FL (ref 37–54)
EGFRCR SERPLBLD CKD-EPI 2021: 57.7 ML/MIN/1.73
EOSINOPHIL # BLD MANUAL: 0.51 10*3/MM3 (ref 0–0.4)
EOSINOPHIL NFR BLD MANUAL: 4.5 % (ref 0.3–6.2)
ERYTHROCYTE [DISTWIDTH] IN BLOOD BY AUTOMATED COUNT: 13.1 % (ref 12.3–15.4)
GLUCOSE BLDC GLUCOMTR-MCNC: 103 MG/DL (ref 70–130)
GLUCOSE BLDC GLUCOMTR-MCNC: 137 MG/DL (ref 70–130)
GLUCOSE BLDC GLUCOMTR-MCNC: 158 MG/DL (ref 70–130)
GLUCOSE BLDC GLUCOMTR-MCNC: 212 MG/DL (ref 70–130)
GLUCOSE SERPL-MCNC: 114 MG/DL (ref 65–99)
HCT VFR BLD AUTO: 38.1 % (ref 34–46.6)
HGB BLD-MCNC: 12.3 G/DL (ref 12–15.9)
LYMPHOCYTES # BLD MANUAL: 1.65 10*3/MM3 (ref 0.7–3.1)
LYMPHOCYTES NFR BLD MANUAL: 6.2 % (ref 5–12)
MCH RBC QN AUTO: 30.7 PG (ref 26.6–33)
MCHC RBC AUTO-ENTMCNC: 32.3 G/DL (ref 31.5–35.7)
MCV RBC AUTO: 95 FL (ref 79–97)
MONOCYTES # BLD: 0.71 10*3/MM3 (ref 0.1–0.9)
NEUTROPHILS # BLD AUTO: 8.47 10*3/MM3 (ref 1.7–7)
NEUTROPHILS NFR BLD MANUAL: 74.4 % (ref 42.7–76)
PLAT MORPH BLD: NORMAL
PLATELET # BLD AUTO: 259 10*3/MM3 (ref 140–450)
PMV BLD AUTO: 10.7 FL (ref 6–12)
POTASSIUM SERPL-SCNC: 4.3 MMOL/L (ref 3.5–5.2)
RBC # BLD AUTO: 4.01 10*6/MM3 (ref 3.77–5.28)
RBC MORPH BLD: NORMAL
SMUDGE CELLS BLD QL SMEAR: ABNORMAL
SODIUM SERPL-SCNC: 139 MMOL/L (ref 136–145)
VARIANT LYMPHS NFR BLD MANUAL: 14.5 % (ref 19.6–45.3)
WBC NRBC COR # BLD: 11.38 10*3/MM3 (ref 3.4–10.8)

## 2023-01-29 PROCEDURE — 25010000002 SODIUM CHLORIDE 0.9 % WITH KCL 20 MEQ 20-0.9 MEQ/L-% SOLUTION: Performed by: HOSPITALIST

## 2023-01-29 PROCEDURE — 63710000001 INSULIN LISPRO (HUMAN) PER 5 UNITS: Performed by: HOSPITALIST

## 2023-01-29 PROCEDURE — 85007 BL SMEAR W/DIFF WBC COUNT: CPT | Performed by: HOSPITALIST

## 2023-01-29 PROCEDURE — 82962 GLUCOSE BLOOD TEST: CPT

## 2023-01-29 PROCEDURE — 80048 BASIC METABOLIC PNL TOTAL CA: CPT | Performed by: HOSPITALIST

## 2023-01-29 PROCEDURE — 25010000002 CEFTRIAXONE PER 250 MG: Performed by: HOSPITALIST

## 2023-01-29 PROCEDURE — 85025 COMPLETE CBC W/AUTO DIFF WBC: CPT | Performed by: HOSPITALIST

## 2023-01-29 RX ADMIN — SENNOSIDES 2 TABLET: 8.6 TABLET, FILM COATED ORAL at 21:45

## 2023-01-29 RX ADMIN — LEVOTHYROXINE SODIUM 88 MCG: 0.09 TABLET ORAL at 06:11

## 2023-01-29 RX ADMIN — CEFTRIAXONE SODIUM 1 G: 1 INJECTION, POWDER, FOR SOLUTION INTRAMUSCULAR; INTRAVENOUS at 21:46

## 2023-01-29 RX ADMIN — INSULIN GLARGINE-YFGN 10 UNITS: 100 INJECTION, SOLUTION SUBCUTANEOUS at 21:46

## 2023-01-29 RX ADMIN — FAMOTIDINE 20 MG: 20 TABLET, FILM COATED ORAL at 21:45

## 2023-01-29 RX ADMIN — ACETAMINOPHEN 650 MG: 325 TABLET, FILM COATED ORAL at 14:57

## 2023-01-29 RX ADMIN — MEMANTINE HYDROCHLORIDE 10 MG: 10 TABLET, FILM COATED ORAL at 21:45

## 2023-01-29 RX ADMIN — POTASSIUM CHLORIDE AND SODIUM CHLORIDE 75 ML/HR: 900; 150 INJECTION, SOLUTION INTRAVENOUS at 09:15

## 2023-01-29 RX ADMIN — HYDROCODONE BITARTRATE AND ACETAMINOPHEN 1 TABLET: 5; 325 TABLET ORAL at 18:49

## 2023-01-29 RX ADMIN — FAMOTIDINE 20 MG: 20 TABLET, FILM COATED ORAL at 09:20

## 2023-01-29 RX ADMIN — INSULIN LISPRO 3 UNITS: 100 INJECTION, SOLUTION INTRAVENOUS; SUBCUTANEOUS at 17:36

## 2023-01-29 RX ADMIN — MIRTAZAPINE 7.5 MG: 15 TABLET, FILM COATED ORAL at 21:46

## 2023-01-29 RX ADMIN — ATORVASTATIN CALCIUM 10 MG: 20 TABLET, FILM COATED ORAL at 21:45

## 2023-01-29 RX ADMIN — INSULIN LISPRO 3 UNITS: 100 INJECTION, SOLUTION INTRAVENOUS; SUBCUTANEOUS at 12:04

## 2023-01-29 RX ADMIN — INSULIN LISPRO 2 UNITS: 100 INJECTION, SOLUTION INTRAVENOUS; SUBCUTANEOUS at 12:03

## 2023-01-29 RX ADMIN — MEMANTINE HYDROCHLORIDE 10 MG: 10 TABLET, FILM COATED ORAL at 09:15

## 2023-01-29 RX ADMIN — ACETAMINOPHEN 650 MG: 325 TABLET, FILM COATED ORAL at 21:45

## 2023-01-29 RX ADMIN — HYDROCODONE BITARTRATE AND ACETAMINOPHEN 1 TABLET: 5; 325 TABLET ORAL at 06:25

## 2023-01-29 RX ADMIN — AMLODIPINE BESYLATE 10 MG: 10 TABLET ORAL at 09:15

## 2023-01-29 RX ADMIN — HYDROCODONE BITARTRATE AND ACETAMINOPHEN 1 TABLET: 5; 325 TABLET ORAL at 12:03

## 2023-01-30 LAB
ANION GAP SERPL CALCULATED.3IONS-SCNC: 8.6 MMOL/L (ref 5–15)
BASOPHILS # BLD AUTO: 0.05 10*3/MM3 (ref 0–0.2)
BASOPHILS NFR BLD AUTO: 0.4 % (ref 0–1.5)
BUN SERPL-MCNC: 20 MG/DL (ref 8–23)
BUN/CREAT SERPL: 20.8 (ref 7–25)
CALCIUM SPEC-SCNC: 8.4 MG/DL (ref 8.6–10.5)
CHLORIDE SERPL-SCNC: 106 MMOL/L (ref 98–107)
CO2 SERPL-SCNC: 24.4 MMOL/L (ref 22–29)
CREAT SERPL-MCNC: 0.96 MG/DL (ref 0.57–1)
DEPRECATED RDW RBC AUTO: 43.5 FL (ref 37–54)
EGFRCR SERPLBLD CKD-EPI 2021: 57 ML/MIN/1.73
EOSINOPHIL # BLD AUTO: 1.13 10*3/MM3 (ref 0–0.4)
EOSINOPHIL NFR BLD AUTO: 8.6 % (ref 0.3–6.2)
ERYTHROCYTE [DISTWIDTH] IN BLOOD BY AUTOMATED COUNT: 13.1 % (ref 12.3–15.4)
GLUCOSE BLDC GLUCOMTR-MCNC: 112 MG/DL (ref 70–130)
GLUCOSE BLDC GLUCOMTR-MCNC: 141 MG/DL (ref 70–130)
GLUCOSE BLDC GLUCOMTR-MCNC: 93 MG/DL (ref 70–130)
GLUCOSE BLDC GLUCOMTR-MCNC: 96 MG/DL (ref 70–130)
GLUCOSE SERPL-MCNC: 89 MG/DL (ref 65–99)
HCT VFR BLD AUTO: 37.6 % (ref 34–46.6)
HGB BLD-MCNC: 12.1 G/DL (ref 12–15.9)
IMM GRANULOCYTES # BLD AUTO: 0.05 10*3/MM3 (ref 0–0.05)
IMM GRANULOCYTES NFR BLD AUTO: 0.4 % (ref 0–0.5)
LYMPHOCYTES # BLD AUTO: 1.78 10*3/MM3 (ref 0.7–3.1)
LYMPHOCYTES NFR BLD AUTO: 13.5 % (ref 19.6–45.3)
MCH RBC QN AUTO: 29.7 PG (ref 26.6–33)
MCHC RBC AUTO-ENTMCNC: 32.2 G/DL (ref 31.5–35.7)
MCV RBC AUTO: 92.4 FL (ref 79–97)
MONOCYTES # BLD AUTO: 1.58 10*3/MM3 (ref 0.1–0.9)
MONOCYTES NFR BLD AUTO: 12 % (ref 5–12)
NEUTROPHILS NFR BLD AUTO: 65.1 % (ref 42.7–76)
NEUTROPHILS NFR BLD AUTO: 8.62 10*3/MM3 (ref 1.7–7)
NRBC BLD AUTO-RTO: 0 /100 WBC (ref 0–0.2)
PLATELET # BLD AUTO: 304 10*3/MM3 (ref 140–450)
PMV BLD AUTO: 10.3 FL (ref 6–12)
POTASSIUM SERPL-SCNC: 3.5 MMOL/L (ref 3.5–5.2)
RBC # BLD AUTO: 4.07 10*6/MM3 (ref 3.77–5.28)
SODIUM SERPL-SCNC: 139 MMOL/L (ref 136–145)
WBC NRBC COR # BLD: 13.21 10*3/MM3 (ref 3.4–10.8)

## 2023-01-30 PROCEDURE — 97166 OT EVAL MOD COMPLEX 45 MIN: CPT

## 2023-01-30 PROCEDURE — 80048 BASIC METABOLIC PNL TOTAL CA: CPT | Performed by: HOSPITALIST

## 2023-01-30 PROCEDURE — 25010000002 CEFTRIAXONE PER 250 MG: Performed by: HOSPITALIST

## 2023-01-30 PROCEDURE — 85025 COMPLETE CBC W/AUTO DIFF WBC: CPT | Performed by: HOSPITALIST

## 2023-01-30 PROCEDURE — 99221 1ST HOSP IP/OBS SF/LOW 40: CPT | Performed by: NURSE PRACTITIONER

## 2023-01-30 PROCEDURE — 82962 GLUCOSE BLOOD TEST: CPT

## 2023-01-30 RX ADMIN — FAMOTIDINE 20 MG: 20 TABLET, FILM COATED ORAL at 09:22

## 2023-01-30 RX ADMIN — LEVOTHYROXINE SODIUM 88 MCG: 0.09 TABLET ORAL at 06:57

## 2023-01-30 RX ADMIN — CEFTRIAXONE SODIUM 1 G: 1 INJECTION, POWDER, FOR SOLUTION INTRAMUSCULAR; INTRAVENOUS at 22:13

## 2023-01-30 RX ADMIN — MEMANTINE HYDROCHLORIDE 10 MG: 10 TABLET, FILM COATED ORAL at 09:22

## 2023-01-30 RX ADMIN — HYDROCODONE BITARTRATE AND ACETAMINOPHEN 1 TABLET: 5; 325 TABLET ORAL at 02:52

## 2023-01-30 RX ADMIN — HYDROCODONE BITARTRATE AND ACETAMINOPHEN 1 TABLET: 5; 325 TABLET ORAL at 22:13

## 2023-01-30 RX ADMIN — HYDROCODONE BITARTRATE AND ACETAMINOPHEN 1 TABLET: 5; 325 TABLET ORAL at 09:22

## 2023-01-30 RX ADMIN — INSULIN GLARGINE-YFGN 10 UNITS: 100 INJECTION, SOLUTION SUBCUTANEOUS at 22:13

## 2023-01-30 RX ADMIN — SENNOSIDES 2 TABLET: 8.6 TABLET, FILM COATED ORAL at 22:13

## 2023-01-30 RX ADMIN — MIRTAZAPINE 7.5 MG: 15 TABLET, FILM COATED ORAL at 22:13

## 2023-01-30 RX ADMIN — ATORVASTATIN CALCIUM 10 MG: 20 TABLET, FILM COATED ORAL at 22:13

## 2023-01-30 RX ADMIN — AMLODIPINE BESYLATE 10 MG: 10 TABLET ORAL at 09:22

## 2023-01-30 RX ADMIN — FAMOTIDINE 20 MG: 20 TABLET, FILM COATED ORAL at 22:13

## 2023-01-30 RX ADMIN — MEMANTINE HYDROCHLORIDE 10 MG: 10 TABLET, FILM COATED ORAL at 22:14

## 2023-01-31 LAB
ANION GAP SERPL CALCULATED.3IONS-SCNC: 10 MMOL/L (ref 5–15)
BASOPHILS # BLD AUTO: 0.04 10*3/MM3 (ref 0–0.2)
BASOPHILS NFR BLD AUTO: 0.3 % (ref 0–1.5)
BUN SERPL-MCNC: 26 MG/DL (ref 8–23)
BUN/CREAT SERPL: 24.8 (ref 7–25)
CALCIUM SPEC-SCNC: 8.9 MG/DL (ref 8.6–10.5)
CHLORIDE SERPL-SCNC: 104 MMOL/L (ref 98–107)
CO2 SERPL-SCNC: 25 MMOL/L (ref 22–29)
CREAT SERPL-MCNC: 1.05 MG/DL (ref 0.57–1)
DEPRECATED RDW RBC AUTO: 44.2 FL (ref 37–54)
EGFRCR SERPLBLD CKD-EPI 2021: 51.2 ML/MIN/1.73
EOSINOPHIL # BLD AUTO: 1.06 10*3/MM3 (ref 0–0.4)
EOSINOPHIL NFR BLD AUTO: 8.9 % (ref 0.3–6.2)
ERYTHROCYTE [DISTWIDTH] IN BLOOD BY AUTOMATED COUNT: 13.1 % (ref 12.3–15.4)
GLUCOSE BLDC GLUCOMTR-MCNC: 118 MG/DL (ref 70–130)
GLUCOSE BLDC GLUCOMTR-MCNC: 160 MG/DL (ref 70–130)
GLUCOSE BLDC GLUCOMTR-MCNC: 201 MG/DL (ref 70–130)
GLUCOSE BLDC GLUCOMTR-MCNC: 219 MG/DL (ref 70–130)
GLUCOSE SERPL-MCNC: 193 MG/DL (ref 65–99)
HCT VFR BLD AUTO: 37.6 % (ref 34–46.6)
HGB BLD-MCNC: 12.6 G/DL (ref 12–15.9)
IMM GRANULOCYTES # BLD AUTO: 0.03 10*3/MM3 (ref 0–0.05)
IMM GRANULOCYTES NFR BLD AUTO: 0.3 % (ref 0–0.5)
LYMPHOCYTES # BLD AUTO: 1.86 10*3/MM3 (ref 0.7–3.1)
LYMPHOCYTES NFR BLD AUTO: 15.5 % (ref 19.6–45.3)
MCH RBC QN AUTO: 31.1 PG (ref 26.6–33)
MCHC RBC AUTO-ENTMCNC: 33.5 G/DL (ref 31.5–35.7)
MCV RBC AUTO: 92.8 FL (ref 79–97)
MONOCYTES # BLD AUTO: 1.35 10*3/MM3 (ref 0.1–0.9)
MONOCYTES NFR BLD AUTO: 11.3 % (ref 5–12)
NEUTROPHILS NFR BLD AUTO: 63.7 % (ref 42.7–76)
NEUTROPHILS NFR BLD AUTO: 7.63 10*3/MM3 (ref 1.7–7)
NRBC BLD AUTO-RTO: 0 /100 WBC (ref 0–0.2)
PLATELET # BLD AUTO: 328 10*3/MM3 (ref 140–450)
PMV BLD AUTO: 10 FL (ref 6–12)
POTASSIUM SERPL-SCNC: 3.3 MMOL/L (ref 3.5–5.2)
RBC # BLD AUTO: 4.05 10*6/MM3 (ref 3.77–5.28)
SODIUM SERPL-SCNC: 139 MMOL/L (ref 136–145)
WBC NRBC COR # BLD: 11.97 10*3/MM3 (ref 3.4–10.8)

## 2023-01-31 PROCEDURE — 85025 COMPLETE CBC W/AUTO DIFF WBC: CPT | Performed by: HOSPITALIST

## 2023-01-31 PROCEDURE — 25010000002 CEFTRIAXONE PER 250 MG: Performed by: HOSPITALIST

## 2023-01-31 PROCEDURE — 63710000001 INSULIN LISPRO (HUMAN) PER 5 UNITS: Performed by: HOSPITALIST

## 2023-01-31 PROCEDURE — 80048 BASIC METABOLIC PNL TOTAL CA: CPT | Performed by: HOSPITALIST

## 2023-01-31 PROCEDURE — 82962 GLUCOSE BLOOD TEST: CPT

## 2023-01-31 RX ORDER — POTASSIUM CHLORIDE 750 MG/1
10 TABLET, FILM COATED, EXTENDED RELEASE ORAL DAILY
Status: DISCONTINUED | OUTPATIENT
Start: 2023-01-31 | End: 2023-01-31

## 2023-01-31 RX ORDER — BISACODYL 10 MG
10 SUPPOSITORY, RECTAL RECTAL DAILY PRN
Status: DISCONTINUED | OUTPATIENT
Start: 2023-01-31 | End: 2023-02-03 | Stop reason: HOSPADM

## 2023-01-31 RX ORDER — POLYETHYLENE GLYCOL 3350 17 G/17G
17 POWDER, FOR SOLUTION ORAL 2 TIMES DAILY
Status: DISCONTINUED | OUTPATIENT
Start: 2023-01-31 | End: 2023-02-03 | Stop reason: HOSPADM

## 2023-01-31 RX ORDER — POLYETHYLENE GLYCOL 3350 17 G/17G
17 POWDER, FOR SOLUTION ORAL DAILY
Status: DISCONTINUED | OUTPATIENT
Start: 2023-01-31 | End: 2023-01-31

## 2023-01-31 RX ORDER — POTASSIUM CHLORIDE 750 MG/1
40 TABLET, FILM COATED, EXTENDED RELEASE ORAL ONCE
Status: COMPLETED | OUTPATIENT
Start: 2023-01-31 | End: 2023-01-31

## 2023-01-31 RX ORDER — HYDROCODONE BITARTRATE AND ACETAMINOPHEN 5; 325 MG/1; MG/1
1 TABLET ORAL EVERY 4 HOURS PRN
Status: DISCONTINUED | OUTPATIENT
Start: 2023-01-31 | End: 2023-02-03 | Stop reason: HOSPADM

## 2023-01-31 RX ORDER — DOCUSATE SODIUM 100 MG/1
100 CAPSULE, LIQUID FILLED ORAL 2 TIMES DAILY
Status: DISCONTINUED | OUTPATIENT
Start: 2023-01-31 | End: 2023-02-03 | Stop reason: HOSPADM

## 2023-01-31 RX ADMIN — ATORVASTATIN CALCIUM 10 MG: 20 TABLET, FILM COATED ORAL at 21:27

## 2023-01-31 RX ADMIN — HYDROCODONE BITARTRATE AND ACETAMINOPHEN 1 TABLET: 5; 325 TABLET ORAL at 11:42

## 2023-01-31 RX ADMIN — INSULIN LISPRO 3 UNITS: 100 INJECTION, SOLUTION INTRAVENOUS; SUBCUTANEOUS at 17:30

## 2023-01-31 RX ADMIN — FAMOTIDINE 20 MG: 20 TABLET, FILM COATED ORAL at 21:27

## 2023-01-31 RX ADMIN — HYDROCODONE BITARTRATE AND ACETAMINOPHEN 1 TABLET: 5; 325 TABLET ORAL at 21:27

## 2023-01-31 RX ADMIN — POTASSIUM CHLORIDE 40 MEQ: 750 TABLET, EXTENDED RELEASE ORAL at 15:53

## 2023-01-31 RX ADMIN — AMLODIPINE BESYLATE 10 MG: 10 TABLET ORAL at 08:44

## 2023-01-31 RX ADMIN — MEMANTINE HYDROCHLORIDE 10 MG: 10 TABLET, FILM COATED ORAL at 21:27

## 2023-01-31 RX ADMIN — Medication 10 ML: at 21:27

## 2023-01-31 RX ADMIN — DOCUSATE SODIUM 100 MG: 100 CAPSULE, LIQUID FILLED ORAL at 21:27

## 2023-01-31 RX ADMIN — HYDROCODONE BITARTRATE AND ACETAMINOPHEN 1 TABLET: 5; 325 TABLET ORAL at 15:53

## 2023-01-31 RX ADMIN — MIRTAZAPINE 7.5 MG: 15 TABLET, FILM COATED ORAL at 21:27

## 2023-01-31 RX ADMIN — SENNOSIDES 2 TABLET: 8.6 TABLET, FILM COATED ORAL at 21:27

## 2023-01-31 RX ADMIN — FAMOTIDINE 20 MG: 20 TABLET, FILM COATED ORAL at 08:44

## 2023-01-31 RX ADMIN — LEVOTHYROXINE SODIUM 88 MCG: 0.09 TABLET ORAL at 06:49

## 2023-01-31 RX ADMIN — INSULIN LISPRO 3 UNITS: 100 INJECTION, SOLUTION INTRAVENOUS; SUBCUTANEOUS at 08:44

## 2023-01-31 RX ADMIN — INSULIN GLARGINE-YFGN 10 UNITS: 100 INJECTION, SOLUTION SUBCUTANEOUS at 22:02

## 2023-01-31 RX ADMIN — INSULIN LISPRO 2 UNITS: 100 INJECTION, SOLUTION INTRAVENOUS; SUBCUTANEOUS at 22:01

## 2023-01-31 RX ADMIN — MEMANTINE HYDROCHLORIDE 10 MG: 10 TABLET, FILM COATED ORAL at 08:44

## 2023-01-31 RX ADMIN — CEFTRIAXONE SODIUM 1 G: 1 INJECTION, POWDER, FOR SOLUTION INTRAMUSCULAR; INTRAVENOUS at 21:27

## 2023-01-31 RX ADMIN — POLYETHYLENE GLYCOL 3350 17 G: 17 POWDER, FOR SOLUTION ORAL at 21:27

## 2023-02-01 LAB
ANION GAP SERPL CALCULATED.3IONS-SCNC: 7.8 MMOL/L (ref 5–15)
BASOPHILS # BLD AUTO: 0.03 10*3/MM3 (ref 0–0.2)
BASOPHILS NFR BLD AUTO: 0.2 % (ref 0–1.5)
BUN SERPL-MCNC: 28 MG/DL (ref 8–23)
BUN/CREAT SERPL: 24.6 (ref 7–25)
CALCIUM SPEC-SCNC: 8.4 MG/DL (ref 8.6–10.5)
CHLORIDE SERPL-SCNC: 107 MMOL/L (ref 98–107)
CO2 SERPL-SCNC: 24.2 MMOL/L (ref 22–29)
CREAT SERPL-MCNC: 1.14 MG/DL (ref 0.57–1)
DEPRECATED RDW RBC AUTO: 44.3 FL (ref 37–54)
EGFRCR SERPLBLD CKD-EPI 2021: 46.4 ML/MIN/1.73
EOSINOPHIL # BLD AUTO: 0.23 10*3/MM3 (ref 0–0.4)
EOSINOPHIL NFR BLD AUTO: 1.8 % (ref 0.3–6.2)
ERYTHROCYTE [DISTWIDTH] IN BLOOD BY AUTOMATED COUNT: 13 % (ref 12.3–15.4)
GLUCOSE BLDC GLUCOMTR-MCNC: 100 MG/DL (ref 70–130)
GLUCOSE BLDC GLUCOMTR-MCNC: 126 MG/DL (ref 70–130)
GLUCOSE BLDC GLUCOMTR-MCNC: 161 MG/DL (ref 70–130)
GLUCOSE BLDC GLUCOMTR-MCNC: 84 MG/DL (ref 70–130)
GLUCOSE SERPL-MCNC: 179 MG/DL (ref 65–99)
HCT VFR BLD AUTO: 32.4 % (ref 34–46.6)
HGB BLD-MCNC: 10.7 G/DL (ref 12–15.9)
IMM GRANULOCYTES # BLD AUTO: 0.06 10*3/MM3 (ref 0–0.05)
IMM GRANULOCYTES NFR BLD AUTO: 0.5 % (ref 0–0.5)
LYMPHOCYTES # BLD AUTO: 1.64 10*3/MM3 (ref 0.7–3.1)
LYMPHOCYTES NFR BLD AUTO: 13 % (ref 19.6–45.3)
MCH RBC QN AUTO: 30.9 PG (ref 26.6–33)
MCHC RBC AUTO-ENTMCNC: 33 G/DL (ref 31.5–35.7)
MCV RBC AUTO: 93.6 FL (ref 79–97)
MONOCYTES # BLD AUTO: 1.38 10*3/MM3 (ref 0.1–0.9)
MONOCYTES NFR BLD AUTO: 11 % (ref 5–12)
NEUTROPHILS NFR BLD AUTO: 73.5 % (ref 42.7–76)
NEUTROPHILS NFR BLD AUTO: 9.24 10*3/MM3 (ref 1.7–7)
NRBC BLD AUTO-RTO: 0 /100 WBC (ref 0–0.2)
PLATELET # BLD AUTO: 323 10*3/MM3 (ref 140–450)
PMV BLD AUTO: 9.8 FL (ref 6–12)
POTASSIUM SERPL-SCNC: 4.5 MMOL/L (ref 3.5–5.2)
RBC # BLD AUTO: 3.46 10*6/MM3 (ref 3.77–5.28)
SODIUM SERPL-SCNC: 139 MMOL/L (ref 136–145)
WBC NRBC COR # BLD: 12.58 10*3/MM3 (ref 3.4–10.8)

## 2023-02-01 PROCEDURE — 85025 COMPLETE CBC W/AUTO DIFF WBC: CPT | Performed by: HOSPITALIST

## 2023-02-01 PROCEDURE — 63710000001 INSULIN LISPRO (HUMAN) PER 5 UNITS: Performed by: HOSPITALIST

## 2023-02-01 PROCEDURE — 82962 GLUCOSE BLOOD TEST: CPT

## 2023-02-01 PROCEDURE — 80048 BASIC METABOLIC PNL TOTAL CA: CPT | Performed by: HOSPITALIST

## 2023-02-01 RX ORDER — CEFDINIR 300 MG/1
300 CAPSULE ORAL EVERY 12 HOURS SCHEDULED
Status: COMPLETED | OUTPATIENT
Start: 2023-02-01 | End: 2023-02-02

## 2023-02-01 RX ADMIN — HYDROCODONE BITARTRATE AND ACETAMINOPHEN 1 TABLET: 5; 325 TABLET ORAL at 20:35

## 2023-02-01 RX ADMIN — MIRTAZAPINE 7.5 MG: 15 TABLET, FILM COATED ORAL at 20:34

## 2023-02-01 RX ADMIN — SENNOSIDES 2 TABLET: 8.6 TABLET, FILM COATED ORAL at 20:34

## 2023-02-01 RX ADMIN — INSULIN LISPRO 2 UNITS: 100 INJECTION, SOLUTION INTRAVENOUS; SUBCUTANEOUS at 08:15

## 2023-02-01 RX ADMIN — INSULIN GLARGINE-YFGN 10 UNITS: 100 INJECTION, SOLUTION SUBCUTANEOUS at 22:21

## 2023-02-01 RX ADMIN — POLYETHYLENE GLYCOL 3350 17 G: 17 POWDER, FOR SOLUTION ORAL at 08:15

## 2023-02-01 RX ADMIN — FAMOTIDINE 20 MG: 20 TABLET, FILM COATED ORAL at 08:14

## 2023-02-01 RX ADMIN — ATORVASTATIN CALCIUM 10 MG: 20 TABLET, FILM COATED ORAL at 20:34

## 2023-02-01 RX ADMIN — LEVOTHYROXINE SODIUM 88 MCG: 0.09 TABLET ORAL at 03:08

## 2023-02-01 RX ADMIN — POLYETHYLENE GLYCOL 3350 17 G: 17 POWDER, FOR SOLUTION ORAL at 20:35

## 2023-02-01 RX ADMIN — MEMANTINE HYDROCHLORIDE 10 MG: 10 TABLET, FILM COATED ORAL at 08:15

## 2023-02-01 RX ADMIN — MEMANTINE HYDROCHLORIDE 10 MG: 10 TABLET, FILM COATED ORAL at 20:34

## 2023-02-01 RX ADMIN — HYDROCODONE BITARTRATE AND ACETAMINOPHEN 1 TABLET: 5; 325 TABLET ORAL at 10:31

## 2023-02-01 RX ADMIN — DOCUSATE SODIUM 100 MG: 100 CAPSULE, LIQUID FILLED ORAL at 08:14

## 2023-02-01 RX ADMIN — BISACODYL 10 MG: 10 SUPPOSITORY RECTAL at 10:28

## 2023-02-01 RX ADMIN — HYDROCODONE BITARTRATE AND ACETAMINOPHEN 1 TABLET: 5; 325 TABLET ORAL at 03:08

## 2023-02-01 RX ADMIN — AMLODIPINE BESYLATE 10 MG: 10 TABLET ORAL at 08:14

## 2023-02-01 RX ADMIN — CEFDINIR 300 MG: 300 CAPSULE ORAL at 17:05

## 2023-02-01 RX ADMIN — FAMOTIDINE 20 MG: 20 TABLET, FILM COATED ORAL at 20:34

## 2023-02-01 NOTE — PLAN OF CARE
Goal Outcome Evaluation:   Patient alert confused forgetful follows commands. Q2h turn. Incont care  and skin care provided. Prn pain meds given. Meds crushed and given with applesauce. Pure wick changed this shift. No acute distress noted will continue to monitor

## 2023-02-01 NOTE — PROGRESS NOTES
"Daily progress note    Primary care physician  Dr. Rivera    Chief complaint  Doing same with no new complaints except no BM yet    History of present illness  88-year-old white female with history of severe dementia Parkinson's disease diabetes mellitus hypertension hyperlipidemia and hypothyroidism who is a nursing home resident sent to the ER with altered mental status.  Patient unable to give any detailed history and work-up in ER revealed acute UTI with acute kidney injury admit for management.  At the time of interview she is awake and alert and follows some commands and in no distress.  Patient is DNR per her wishes.     REVIEW OF SYSTEMS  Unremarkable except constipation and back pain     PHYSICAL EXAM   Blood pressure 147/96, pulse 93, temperature 98 °F (36.7 °C), temperature source Oral, resp. rate 16, height 149.9 cm (59.02\"), weight 39.5 kg (87 lb 1.3 oz), SpO2 95 %.    GENERAL: Awake and alert in no distress  HEENT:  Unremarkable  NECK:  Supple  CV: regular rhythm, regular rate, intact distal pulses  RESPIRATORY: normal effort, moving air bilaterally  ABDOMEN: soft/nontender, nondistended bowel sounds positive  MUSCULOSKELETAL: chronic contractures of BLE,   NEURO: alert, moves all extremities, neuro at baseline, follows commands  SKIN: warm, dry, no rash   Psych: Appropriate mood and affect     LAB RESULTS  Lab Results (last 24 hours)     Procedure Component Value Units Date/Time    POC Glucose Once [[id number]]  (Normal) Collected: 02/01/23 1106    Specimen: Blood Updated: 02/01/23 1108     Glucose 84 mg/dL      Comment: Meter: [id number] : [id number] Christiano [place]       POC Glucose Once [[id number]]  (Abnormal) Collected: 02/01/23 0643    Specimen: Blood Updated: 02/01/23 0644     Glucose 161 mg/dL      Comment: Meter: [id number] : [id number] Gil [place]       Basic Metabolic Panel [[id number]]  (Abnormal) Collected: 02/01/23 0542    Specimen: Blood Updated: 02/01/23 0635     Glucose " 179 mg/dL      BUN 28 mg/dL      Creatinine 1.14 mg/dL      Sodium 139 mmol/L      Potassium 4.5 mmol/L      Chloride 107 mmol/L      CO2 24.2 mmol/L      Calcium 8.4 mg/dL      BUN/Creatinine Ratio 24.6     Anion Gap 7.8 mmol/L      eGFR 46.4 mL/min/1.73     Narrative:      GFR Normal >60  Chronic Kidney Disease <60  Kidney Failure <15    The GFR formula is only valid for adults with stable renal function between ages 18 and 70.    CBC & Differential [[id number]]  (Abnormal) Collected: 02/01/23 0542    Specimen: Blood Updated: 02/01/23 0611    Narrative:      The following orders were created for panel order CBC & Differential.  Procedure                               Abnormality         Status                     ---------                               -----------         ------                     CBC Auto Differential[[id number]]        Abnormal            Final result                 Please view results for these tests on the individual orders.    CBC Auto Differential [[id number]]  (Abnormal) Collected: 02/01/23 0542    Specimen: Blood Updated: 02/01/23 0611     WBC 12.58 10*3/mm3      RBC 3.46 10*6/mm3      Hemoglobin 10.7 g/dL      Hematocrit 32.4 %      MCV 93.6 fL      MCH 30.9 pg      MCHC 33.0 g/dL      RDW 13.0 %      RDW-SD 44.3 fl      MPV 9.8 fL      Platelets 323 10*3/mm3      Neutrophil % 73.5 %      Lymphocyte % 13.0 %      Monocyte % 11.0 %      Eosinophil % 1.8 %      Basophil % 0.2 %      Immature Grans % 0.5 %      Neutrophils, Absolute 9.24 10*3/mm3      Lymphocytes, Absolute 1.64 10*3/mm3      Monocytes, Absolute 1.38 10*3/mm3      Eosinophils, Absolute 0.23 10*3/mm3      Basophils, Absolute 0.03 10*3/mm3      Immature Grans, Absolute 0.06 10*3/mm3      nRBC 0.0 /100 WBC     POC Glucose Once [[id number]]  (Abnormal) Collected: 01/31/23 2159    Specimen: Blood Updated: 01/31/23 2200     Glucose 160 mg/dL      Comment: Meter: [id number] : [id number] Gil [place]       POC Glucose Once  [[id number]]  (Abnormal) Collected: 01/31/23 1557    Specimen: Blood Updated: 01/31/23 1558     Glucose 219 mg/dL      Comment: Meter: [id number] : [id number] Christiano Bhandari DARRICK           Imaging Results (Last 24 Hours)     ** No results found for the last 24 hours. **        ECG 12 Lead        Component  Ref Range & Units 1 d ago 5 mo ago   QT Interval  ms 415 P  364    Resulting Agency  ECG  ECG             HEART RATE= 69  bpm  RR Interval= 870  ms  AR Interval= 39  ms  P Horizontal Axis= 209  deg  P Front Axis= 261  deg  QRSD Interval= 81  ms  QT Interval= 415  ms  QRS Axis= 48  deg  T Wave Axis= 79  deg  - BORDERLINE ECG -  Ectopic atrial rhythm  Short AR interval             Current Facility-Administered Medications:   •  acetaminophen (TYLENOL) tablet 650 mg, 650 mg, Oral, Q6H PRN, Ang Dhaliwal MD, 650 mg at 01/29/23 2145  •  amLODIPine (NORVASC) tablet 10 mg, 10 mg, Oral, Q24H, Ang Dhaliwal MD, 10 mg at 02/01/23 0814  •  atorvastatin (LIPITOR) tablet 10 mg, 10 mg, Oral, Nightly, Ang Dhaliwal MD, 10 mg at 01/31/23 2127  •  bisacodyl (DULCOLAX) suppository 10 mg, 10 mg, Rectal, Daily PRN, Ang Dhaliwal MD, 10 mg at 02/01/23 1028  •  docusate sodium (COLACE) capsule 100 mg, 100 mg, Oral, BID, Ang Dhaliwal MD, 100 mg at 02/01/23 0814  •  famotidine (PEPCID) tablet 20 mg, 20 mg, Oral, BID, Ang Dhaliwal MD, 20 mg at 02/01/23 0814  •  HYDROcodone-acetaminophen (NORCO) 5-325 MG per tablet 1 tablet, 1 tablet, Oral, Q4H PRN, Ang Dhaliwal MD, 1 tablet at 02/01/23 1031  •  insulin glargine (LANTUS, SEMGLEE) injection 10 Units, 10 Units, Subcutaneous, Nightly, Ang Dhaliwal MD, 10 Units at 01/31/23 2202  •  insulin lispro (ADMELOG) injection 0-7 Units, 0-7 Units, Subcutaneous, 4x Daily With Meals & Nightly, Ang Dhaliwal MD, 2 Units at 02/01/23 0815  •  insulin lispro (ADMELOG) injection 3 Units, 3 Units, Subcutaneous, TID With Meals, Ang Dhaliwal MD, 3 Units at 01/31/23 1730  •  levothyroxine (SYNTHROID,  LEVOTHROID) tablet 88 mcg, 88 mcg, Oral, QAM, Kory Dhaliwal MD, 88 mcg at 02/01/23 0308  •  memantine (NAMENDA) tablet 10 mg, 10 mg, Oral, Q12H, Kory Dhaliwal MD, 10 mg at 02/01/23 0815  •  mirtazapine (REMERON) tablet 7.5 mg, 7.5 mg, Oral, Nightly, Kory Dhaliwal MD, 7.5 mg at 01/31/23 2127  •  polyethylene glycol (MIRALAX) packet 17 g, 17 g, Oral, BID, Kory Dhaliwal MD, 17 g at 02/01/23 0815  •  senna (SENOKOT) tablet 2 tablet, 2 tablet, Oral, Nightly, Kory Dhaliwal MD, 2 tablet at 01/31/23 2127  •  [COMPLETED] Insert Peripheral IV, , , Once **AND** sodium chloride 0.9 % flush 10 mL, 10 mL, Intravenous, PRN, Triston Dover PA, 10 mL at 01/31/23 2127     ASSESSMENT  Acute UTI  Acute kidney injury resolved  Diabetes mellitus  Hypertension   Hyperlipidemia  Hypothyroidism  Severe dementia  Parkinson's disease  Degenerative disc disease  Chronic constipation  Chronic multiple thoracic and lumbar compression fracture  Gastroesophageal reflux disease    PLAN  CPM  Change antibiotics to p.o.  Bowel program  Pain management  Adjust nursing home medications  Stress ulcer DVT prophylaxis  Neurosurgery consult appreciated  Supportive care  DNR  PT OT  Discussed with nursing staff and family  Discharge planning    KORY DHALIWAL MD    Copied text in this note has been reviewed and is accurate as of 02/01/23

## 2023-02-02 LAB
ANION GAP SERPL CALCULATED.3IONS-SCNC: 8.5 MMOL/L (ref 5–15)
BASOPHILS # BLD AUTO: 0.03 10*3/MM3 (ref 0–0.2)
BASOPHILS NFR BLD AUTO: 0.2 % (ref 0–1.5)
BUN SERPL-MCNC: 24 MG/DL (ref 8–23)
BUN/CREAT SERPL: 21.8 (ref 7–25)
CALCIUM SPEC-SCNC: 9.1 MG/DL (ref 8.6–10.5)
CHLORIDE SERPL-SCNC: 106 MMOL/L (ref 98–107)
CO2 SERPL-SCNC: 26.5 MMOL/L (ref 22–29)
CREAT SERPL-MCNC: 1.1 MG/DL (ref 0.57–1)
DEPRECATED RDW RBC AUTO: 44.3 FL (ref 37–54)
EGFRCR SERPLBLD CKD-EPI 2021: 48.4 ML/MIN/1.73
EOSINOPHIL # BLD AUTO: 1.21 10*3/MM3 (ref 0–0.4)
EOSINOPHIL NFR BLD AUTO: 10.1 % (ref 0.3–6.2)
ERYTHROCYTE [DISTWIDTH] IN BLOOD BY AUTOMATED COUNT: 13.2 % (ref 12.3–15.4)
GLUCOSE BLDC GLUCOMTR-MCNC: 133 MG/DL (ref 70–130)
GLUCOSE BLDC GLUCOMTR-MCNC: 195 MG/DL (ref 70–130)
GLUCOSE BLDC GLUCOMTR-MCNC: 66 MG/DL (ref 70–130)
GLUCOSE BLDC GLUCOMTR-MCNC: 78 MG/DL (ref 70–130)
GLUCOSE BLDC GLUCOMTR-MCNC: 87 MG/DL (ref 70–130)
GLUCOSE SERPL-MCNC: 93 MG/DL (ref 65–99)
HCT VFR BLD AUTO: 35.3 % (ref 34–46.6)
HGB BLD-MCNC: 11.7 G/DL (ref 12–15.9)
IMM GRANULOCYTES # BLD AUTO: 0.04 10*3/MM3 (ref 0–0.05)
IMM GRANULOCYTES NFR BLD AUTO: 0.3 % (ref 0–0.5)
LYMPHOCYTES # BLD AUTO: 1.92 10*3/MM3 (ref 0.7–3.1)
LYMPHOCYTES NFR BLD AUTO: 16 % (ref 19.6–45.3)
MCH RBC QN AUTO: 30.9 PG (ref 26.6–33)
MCHC RBC AUTO-ENTMCNC: 33.1 G/DL (ref 31.5–35.7)
MCV RBC AUTO: 93.1 FL (ref 79–97)
MONOCYTES # BLD AUTO: 1.19 10*3/MM3 (ref 0.1–0.9)
MONOCYTES NFR BLD AUTO: 9.9 % (ref 5–12)
NEUTROPHILS NFR BLD AUTO: 63.5 % (ref 42.7–76)
NEUTROPHILS NFR BLD AUTO: 7.64 10*3/MM3 (ref 1.7–7)
NRBC BLD AUTO-RTO: 0 /100 WBC (ref 0–0.2)
PLATELET # BLD AUTO: 411 10*3/MM3 (ref 140–450)
PMV BLD AUTO: 9.9 FL (ref 6–12)
POTASSIUM SERPL-SCNC: 4.1 MMOL/L (ref 3.5–5.2)
RBC # BLD AUTO: 3.79 10*6/MM3 (ref 3.77–5.28)
SODIUM SERPL-SCNC: 141 MMOL/L (ref 136–145)
WBC NRBC COR # BLD: 12.03 10*3/MM3 (ref 3.4–10.8)

## 2023-02-02 PROCEDURE — 82962 GLUCOSE BLOOD TEST: CPT

## 2023-02-02 PROCEDURE — 80048 BASIC METABOLIC PNL TOTAL CA: CPT | Performed by: HOSPITALIST

## 2023-02-02 PROCEDURE — 63710000001 INSULIN LISPRO (HUMAN) PER 5 UNITS: Performed by: HOSPITALIST

## 2023-02-02 PROCEDURE — 85025 COMPLETE CBC W/AUTO DIFF WBC: CPT | Performed by: HOSPITALIST

## 2023-02-02 RX ORDER — DEXTROSE MONOHYDRATE 25 G/50ML
25 INJECTION, SOLUTION INTRAVENOUS
Status: DISCONTINUED | OUTPATIENT
Start: 2023-02-02 | End: 2023-02-03 | Stop reason: HOSPADM

## 2023-02-02 RX ORDER — NICOTINE POLACRILEX 4 MG
15 LOZENGE BUCCAL
Status: DISCONTINUED | OUTPATIENT
Start: 2023-02-02 | End: 2023-02-03 | Stop reason: HOSPADM

## 2023-02-02 RX ADMIN — POLYETHYLENE GLYCOL 3350 17 G: 17 POWDER, FOR SOLUTION ORAL at 21:29

## 2023-02-02 RX ADMIN — SENNOSIDES 2 TABLET: 8.6 TABLET, FILM COATED ORAL at 21:28

## 2023-02-02 RX ADMIN — INSULIN LISPRO 2 UNITS: 100 INJECTION, SOLUTION INTRAVENOUS; SUBCUTANEOUS at 21:36

## 2023-02-02 RX ADMIN — MEMANTINE HYDROCHLORIDE 10 MG: 10 TABLET, FILM COATED ORAL at 09:10

## 2023-02-02 RX ADMIN — DOCUSATE SODIUM 100 MG: 100 CAPSULE, LIQUID FILLED ORAL at 21:28

## 2023-02-02 RX ADMIN — FAMOTIDINE 20 MG: 20 TABLET, FILM COATED ORAL at 21:28

## 2023-02-02 RX ADMIN — HYDROCODONE BITARTRATE AND ACETAMINOPHEN 1 TABLET: 5; 325 TABLET ORAL at 06:33

## 2023-02-02 RX ADMIN — INSULIN GLARGINE-YFGN 10 UNITS: 100 INJECTION, SOLUTION SUBCUTANEOUS at 21:37

## 2023-02-02 RX ADMIN — DOCUSATE SODIUM 100 MG: 100 CAPSULE, LIQUID FILLED ORAL at 09:10

## 2023-02-02 RX ADMIN — POLYETHYLENE GLYCOL 3350 17 G: 17 POWDER, FOR SOLUTION ORAL at 09:10

## 2023-02-02 RX ADMIN — MEMANTINE HYDROCHLORIDE 10 MG: 10 TABLET, FILM COATED ORAL at 21:28

## 2023-02-02 RX ADMIN — FAMOTIDINE 20 MG: 20 TABLET, FILM COATED ORAL at 09:10

## 2023-02-02 RX ADMIN — MIRTAZAPINE 7.5 MG: 15 TABLET, FILM COATED ORAL at 21:28

## 2023-02-02 RX ADMIN — AMLODIPINE BESYLATE 10 MG: 10 TABLET ORAL at 09:10

## 2023-02-02 RX ADMIN — ATORVASTATIN CALCIUM 10 MG: 20 TABLET, FILM COATED ORAL at 21:28

## 2023-02-02 RX ADMIN — HYDROCODONE BITARTRATE AND ACETAMINOPHEN 1 TABLET: 5; 325 TABLET ORAL at 12:37

## 2023-02-02 RX ADMIN — HYDROCODONE BITARTRATE AND ACETAMINOPHEN 1 TABLET: 5; 325 TABLET ORAL at 18:36

## 2023-02-02 RX ADMIN — LEVOTHYROXINE SODIUM 88 MCG: 0.09 TABLET ORAL at 06:32

## 2023-02-02 RX ADMIN — CEFDINIR 300 MG: 300 CAPSULE ORAL at 06:32

## 2023-02-02 NOTE — PLAN OF CARE
Goal Outcome Evaluation:            Pt resting in bed quietly. Disoriented but reoriented as needed. Refusing turns at times. Educated. Encouraged po intake.

## 2023-02-02 NOTE — CASE MANAGEMENT/SOCIAL WORK
Continued Stay Note  [facility]     Patient Name: Sam Flores  MRN: [id number]  Today's Date: 2/2/2023    Admit Date: 1/26/2023    Plan: Plan return to [facility].   MAGALY Smart RN   Discharge Plan     Row Name 02/02/23 1321       Plan    Plan Plan return to [facility].   MAGALY Smart RN    Patient/Family in Agreement with Plan yes    Plan Comments Per Pepper  ( [phone]) [facility] has a bed and can accept pt back to [facility] on 2/3.  Called and spoke with pt's son (Cesar Flores [phone]) and informed him pt was ready to return to [facility].  Pt's son denies any questions.  [facility] arranged to transport pt back to [facility] on 2/3 @ 1530.   Plan return to [facility].   MAGALY Smart RN               Discharge Codes    No documentation.               Expected Discharge Date and Time     Expected Discharge Date Expected Discharge Time    Jan 31, 2023             Iqra Smart RN

## 2023-02-02 NOTE — CASE MANAGEMENT/SOCIAL WORK
"Physicians Statement of Medical Necessity for  Ambulance Transportation    GENERAL INFORMATION     Name: Sam Flores  YOB: 1934  Medicare #:  [id number]  Transport Date:   2/3/2023   (Valid for round trips this date, or for scheduled repetitive trips for 60 days from the date signed below.)  Origin:   [facility]          Destination: [facility]  Is the Patient's stay covered under Medicare Part A (PPS/DRG?)Yes  Closest appropriate facility? Yes  If this a hosp-hosp transfer? No  Is this a hospice patient? No    MEDICAL NECESSITY QUESTIONAIRE    Ambulance Transportation is medically necessary only if other means of transportation are contraindicated or would be potentially harmful to the patient.  To meet this requirement, the patient must be either \"bed confined\" or suffer from a condition such that transport by means other than an ambulance is contraindicated by the patient's condition.  The following questions must be answered by the healthcare professional signing below for this form to be valid:     1) Describe the MEDICAL CONDITION (physical and/or mental) of this patient AT THE TIME OF AMBULANCE TRANSPORT that requires the patient to be transported in an ambulance, and why transport by other means is contraindicated by the patient's condition: Pt unable to sit up in wheelchair  Past Medical History:   Diagnosis Date   • Breast cancer (HCC)    • Dementia (HCC)    • Diabetes mellitus (HCC)    • Emphysema of lung (HCC)    • Hyperlipidemia    • Hypertension    • Hypothyroidism    • Lumbar compression fracture (HCC) 2016   • Osteoporosis    • Parkinson's disease (HCC)    • S/P kyphoplasty 2016    L1 and L4, 2016      Past Surgical History:   Procedure Laterality Date   • BREAST BIOPSY     •  SECTION     • KYPHOPLASTY      T12 and L2   • KYPHOPLASTY N/A 2016    Procedure: KYPHOPLASTY LUMBAR 1 AND LUMBAR 4;  Surgeon: Triston Marinelli IV, MD;  " "Location: [facility] OR 18/19;  Service:    • MASTECTOMY Bilateral    • SHOULDER SURGERY Right       2) Is this patient \"bed confined\" as defined below?Yes   To be \"bed confined\" the patient must satisfy all three of the following criteria:  (1) unable to get up from bed without assistance; AND (2) unable to ambulate;  AND (3) unable to sit in a chair or wheelchair.  3) Can this patient safely be transported by car or wheelchair van (I.e., may safely sit during transport, without an attendant or monitoring?)No   4. In addition to completing questions 1-3 above, please check any of the following conditions that apply*:          *Note: supporting documentation for any boxes checked must be maintained in the patient's medical records Patient is confused and Unable to tolerate seated position for time needed to transport      SIGNATURE OF PHYSICIAN OR OTHER AUTHORIZED HEALTHCARE PROFESSIONAL    I certify that the above information is true and correct based on my evaluation of this patient, and represent that the patient requires transport by ambulance and that other forms of transport are contraindicated.  I understand that this information will be used by the Centers for Medicare and Medicaid Services (CMS) to support the determiniation of medical necessity for ambulance services, and I represent that I have personal knowledge of the patient's condition at the time of transport.    XX   If this box is checked, I also certify that the patient is physically or mentally incapable of signing the ambulance service's claim form and that the institution with which I am affiliated has furnished care, services or assistance to the patient.  My signature below is made on behalf of the patient pursuant to 42 .36(b)(4). In accordance with 42 .37, the specific reason(s) that the patient is physically or mentally incapable of signing the claim for is as follows: HISTORY OF DEMENTIA    Signature of Physician or " Healthcare Professional  Date/Time:   2/3/2023  @ 1:15     (For Scheduled repetitive transport, this form is not valid for transports performed more than 60 days after this date).      ARYAN EASTMAN RN                                                                                                                                     --------------------------------------------------------------------------------------------  Printed Name and Credentials of Physician or Authorized Healthcare Professional     *Form must be signed by patient's attending physician for scheduled, repetitive transports,.  For non-repetitive ambulance transports, if unable to obtain the signature of the attending physician, any of the following may sign (please select below):     Physician  Clinical Nurse Specialist X Registered Nurse     Physician Assistant XX Discharge Planner  Licensed Practical Nurse     Nurse Practitioner XX

## 2023-02-02 NOTE — PLAN OF CARE
Goal Outcome Evaluation:  Plan of Care Reviewed With: patient           Outcome Evaluation: Cont with pain medication as ordered.  Pt is to be DC'd tomorrow back to [place].  EMS scheduled for 1530.  VSS.  Will cont to monitor.

## 2023-02-02 NOTE — PROGRESS NOTES
"Daily progress note    Primary care physician  Dr. Rivera    Chief complaint  Doing same with no new complaints and had BM yesterday    History of present illness  88-year-old white female with history of severe dementia Parkinson's disease diabetes mellitus hypertension hyperlipidemia and hypothyroidism who is a nursing home resident sent to the ER with altered mental status.  Patient unable to give any detailed history and work-up in ER revealed acute UTI with acute kidney injury admit for management.  At the time of interview she is awake and alert and follows some commands and in no distress.  Patient is DNR per her wishes.     REVIEW OF SYSTEMS  Unremarkable except generalized weakness     PHYSICAL EXAM   Blood pressure 122/73, pulse 80, temperature 98.5 °F (36.9 °C), temperature source Oral, resp. rate 16, height 149.9 cm (59.02\"), weight 39.5 kg (87 lb 1.3 oz), SpO2 98 %.    GENERAL: Awake and alert in no distress  HEENT:  Unremarkable  NECK:  Supple  CV: regular rhythm, regular rate, intact distal pulses  RESPIRATORY: normal effort, moving air bilaterally  ABDOMEN: soft/nontender, nondistended bowel sounds positive  MUSCULOSKELETAL: chronic contractures of BLE,   NEURO: alert, moves all extremities, neuro at baseline, follows commands  SKIN: warm, dry, no rash   Psych: Appropriate mood and affect     LAB RESULTS  Lab Results (last 24 hours)     Procedure Component Value Units Date/Time    POC Glucose Once [[id number]]  (Normal) Collected: 02/02/23 1050    Specimen: Blood Updated: 02/02/23 1052     Glucose 78 mg/dL      Comment: RN Notified R and V Meter: [id number] : [id number] Maude HOLLINGSWORTH       Basic Metabolic Panel [[id number]]  (Abnormal) Collected: 02/02/23 0727    Specimen: Blood Updated: 02/02/23 0819     Glucose 93 mg/dL      BUN 24 mg/dL      Creatinine 1.10 mg/dL      Sodium 141 mmol/L      Potassium 4.1 mmol/L      Chloride 106 mmol/L      CO2 26.5 mmol/L      Calcium 9.1 mg/dL      " BUN/Creatinine Ratio 21.8     Anion Gap 8.5 mmol/L      eGFR 48.4 mL/min/1.73     Narrative:      GFR Normal >60  Chronic Kidney Disease <60  Kidney Failure <15    The GFR formula is only valid for adults with stable renal function between ages 18 and 70.    CBC & Differential [[id number]]  (Abnormal) Collected: 02/02/23 0727    Specimen: Blood Updated: 02/02/23 0759    Narrative:      The following orders were created for panel order CBC & Differential.  Procedure                               Abnormality         Status                     ---------                               -----------         ------                     CBC Auto Differential[[id number]]        Abnormal            Final result                 Please view results for these tests on the individual orders.    CBC Auto Differential [[id number]]  (Abnormal) Collected: 02/02/23 0727    Specimen: Blood Updated: 02/02/23 0759     WBC 12.03 10*3/mm3      RBC 3.79 10*6/mm3      Hemoglobin 11.7 g/dL      Hematocrit 35.3 %      MCV 93.1 fL      MCH 30.9 pg      MCHC 33.1 g/dL      RDW 13.2 %      RDW-SD 44.3 fl      MPV 9.9 fL      Platelets 411 10*3/mm3      Neutrophil % 63.5 %      Lymphocyte % 16.0 %      Monocyte % 9.9 %      Eosinophil % 10.1 %      Basophil % 0.2 %      Immature Grans % 0.3 %      Neutrophils, Absolute 7.64 10*3/mm3      Lymphocytes, Absolute 1.92 10*3/mm3      Monocytes, Absolute 1.19 10*3/mm3      Eosinophils, Absolute 1.21 10*3/mm3      Basophils, Absolute 0.03 10*3/mm3      Immature Grans, Absolute 0.04 10*3/mm3      nRBC 0.0 /100 WBC     POC Glucose Once [[id number]]  (Normal) Collected: 02/02/23 0655    Specimen: Blood Updated: 02/02/23 0657     Glucose 87 mg/dL      Comment: Meter: [id number] : [id number] Roxanne Fink RN       POC Glucose Once [[id number]]  (Abnormal) Collected: 02/02/23 0629    Specimen: Blood Updated: 02/02/23 0630     Glucose 66 mg/dL      Comment: Meter: [id number] : [id number] Madhuri Guzmán RN       POC  Glucose Once [[id number]]  (Normal) Collected: 02/01/23 2040    Specimen: Blood Updated: 02/01/23 2042     Glucose 100 mg/dL      Comment: Meter: [id number] : [id number] Shahnaz Villalobos RN       POC Glucose Once [[id number]]  (Normal) Collected: 02/01/23 1547    Specimen: Blood Updated: 02/01/23 1548     Glucose 126 mg/dL      Comment: Meter: [id number] : [id number] Christiano Bhandari NA           Imaging Results (Last 24 Hours)     ** No results found for the last 24 hours. **        ECG 12 Lead        Component  Ref Range & Units 1 d ago 5 mo ago   QT Interval  ms 415 P  364    Resulting Agency  ECG  ECG             HEART RATE= 69  bpm  RR Interval= 870  ms  [place] Interval= 39  ms  P Horizontal Axis= 209  deg  P Front Axis= 261  deg  QRSD Interval= 81  ms  QT Interval= 415  ms  QRS Axis= 48  deg  T Wave Axis= 79  deg  - BORDERLINE ECG -  Ectopic atrial rhythm  Short NY interval             Current Facility-Administered Medications:   •  acetaminophen (TYLENOL) tablet 650 mg, 650 mg, Oral, Q6H PRN, Ang Dhaliwal MD, 650 mg at 01/29/23 2145  •  amLODIPine (NORVASC) tablet 10 mg, 10 mg, Oral, Q24H, Ang Dhaliwal MD, 10 mg at 02/02/23 0910  •  atorvastatin (LIPITOR) tablet 10 mg, 10 mg, Oral, Nightly, Ang Dhaliwal MD, 10 mg at 02/01/23 2034  •  bisacodyl (DULCOLAX) suppository 10 mg, 10 mg, Rectal, Daily PRN, Ang Dhaliwal MD, 10 mg at 02/01/23 1028  •  dextrose (D50W) (25 g/50 mL) IV injection 25 g, 25 g, Intravenous, Q15 Min PRN, Ang Dhaliwal MD  •  dextrose (GLUTOSE) oral gel 15 g, 15 g, Oral, Q15 Min PRN, Ang Dhaliwal MD  •  docusate sodium (COLACE) capsule 100 mg, 100 mg, Oral, BID, Ang Dhaliwal MD, 100 mg at 02/02/23 0910  •  famotidine (PEPCID) tablet 20 mg, 20 mg, Oral, BID, Ang Dhaliwal MD, 20 mg at 02/02/23 0910  •  glucagon (human recombinant) (GLUCAGEN DIAGNOSTIC) injection 1 mg, 1 mg, Subcutaneous, Q15 Min PRN, Ang Dhaliwal MD  •  HYDROcodone-acetaminophen (NORCO) 5-325 MG per tablet 1  tablet, 1 tablet, Oral, Q4H PRN, Kory Dhaliwal MD, 1 tablet at 02/02/23 1237  •  insulin glargine (LANTUS, SEMGLEE) injection 10 Units, 10 Units, Subcutaneous, Nightly, Kory Dhaliwal MD, 10 Units at 02/01/23 2221  •  insulin lispro (ADMELOG) injection 0-7 Units, 0-7 Units, Subcutaneous, 4x Daily With Meals & Nightly, Kory Dhaliwal MD, 2 Units at 02/01/23 0815  •  insulin lispro (ADMELOG) injection 3 Units, 3 Units, Subcutaneous, TID With Meals, Kory Dhaliwal MD, 3 Units at 01/31/23 1730  •  levothyroxine (SYNTHROID, LEVOTHROID) tablet 88 mcg, 88 mcg, Oral, QAM, Kory Dhaliwal MD, 88 mcg at 02/02/23 0632  •  memantine (NAMENDA) tablet 10 mg, 10 mg, Oral, Q12H, Kory Dhaliwal MD, 10 mg at 02/02/23 0910  •  mirtazapine (REMERON) tablet 7.5 mg, 7.5 mg, Oral, Nightly, Kory Dhaliwal MD, 7.5 mg at 02/01/23 2034  •  polyethylene glycol (MIRALAX) packet 17 g, 17 g, Oral, BID, Kory Dhaliwal MD, 17 g at 02/02/23 0910  •  senna (SENOKOT) tablet 2 tablet, 2 tablet, Oral, Nightly, Kory Dhaliwal MD, 2 tablet at 02/01/23 2034  •  [COMPLETED] Insert Peripheral IV, , , Once **AND** sodium chloride 0.9 % flush 10 mL, 10 mL, Intravenous, PRN, Triston Dover PA, 10 mL at 01/31/23 2127     ASSESSMENT  Acute UTI resolved  Acute kidney injury resolved  Diabetes mellitus  Hypertension   Hyperlipidemia  Hypothyroidism  Severe dementia  Parkinson's disease  Degenerative disc disease  Chronic constipation  Chronic multiple thoracic and lumbar compression fracture  Gastroesophageal reflux disease    PLAN  CPM  Antibiotics completed  Bowel program  Pain management  Adjust nursing home medications  Stress ulcer DVT prophylaxis  Neurosurgery consult appreciated  Supportive care  DNR  PT OT  Discussed with nursing staff and family  Discharge back to nursing home in a.m.    KORY DHALIWAL MD    Copied text in this note has been reviewed and is accurate as of 02/02/23

## 2023-02-03 VITALS
HEART RATE: 77 BPM | DIASTOLIC BLOOD PRESSURE: 67 MMHG | WEIGHT: 87.08 LBS | HEIGHT: 59 IN | BODY MASS INDEX: 17.56 KG/M2 | TEMPERATURE: 98.6 F | RESPIRATION RATE: 18 BRPM | SYSTOLIC BLOOD PRESSURE: 150 MMHG | OXYGEN SATURATION: 95 %

## 2023-02-03 LAB
ANION GAP SERPL CALCULATED.3IONS-SCNC: 7.9 MMOL/L (ref 5–15)
BASOPHILS # BLD AUTO: 0.03 10*3/MM3 (ref 0–0.2)
BASOPHILS NFR BLD AUTO: 0.2 % (ref 0–1.5)
BUN SERPL-MCNC: 31 MG/DL (ref 8–23)
BUN/CREAT SERPL: 28.4 (ref 7–25)
CALCIUM SPEC-SCNC: 9 MG/DL (ref 8.6–10.5)
CHLORIDE SERPL-SCNC: 104 MMOL/L (ref 98–107)
CO2 SERPL-SCNC: 26.1 MMOL/L (ref 22–29)
CREAT SERPL-MCNC: 1.09 MG/DL (ref 0.57–1)
DEPRECATED RDW RBC AUTO: 44.8 FL (ref 37–54)
EGFRCR SERPLBLD CKD-EPI 2021: 49 ML/MIN/1.73
EOSINOPHIL # BLD AUTO: 0.57 10*3/MM3 (ref 0–0.4)
EOSINOPHIL NFR BLD AUTO: 4 % (ref 0.3–6.2)
ERYTHROCYTE [DISTWIDTH] IN BLOOD BY AUTOMATED COUNT: 13.1 % (ref 12.3–15.4)
GLUCOSE BLDC GLUCOMTR-MCNC: 152 MG/DL (ref 70–130)
GLUCOSE BLDC GLUCOMTR-MCNC: 154 MG/DL (ref 70–130)
GLUCOSE SERPL-MCNC: 161 MG/DL (ref 65–99)
HCT VFR BLD AUTO: 35.3 % (ref 34–46.6)
HGB BLD-MCNC: 11.5 G/DL (ref 12–15.9)
IMM GRANULOCYTES # BLD AUTO: 0.06 10*3/MM3 (ref 0–0.05)
IMM GRANULOCYTES NFR BLD AUTO: 0.4 % (ref 0–0.5)
LYMPHOCYTES # BLD AUTO: 1.64 10*3/MM3 (ref 0.7–3.1)
LYMPHOCYTES NFR BLD AUTO: 11.5 % (ref 19.6–45.3)
MCH RBC QN AUTO: 30.4 PG (ref 26.6–33)
MCHC RBC AUTO-ENTMCNC: 32.6 G/DL (ref 31.5–35.7)
MCV RBC AUTO: 93.4 FL (ref 79–97)
MONOCYTES # BLD AUTO: 1.71 10*3/MM3 (ref 0.1–0.9)
MONOCYTES NFR BLD AUTO: 12 % (ref 5–12)
NEUTROPHILS NFR BLD AUTO: 10.2 10*3/MM3 (ref 1.7–7)
NEUTROPHILS NFR BLD AUTO: 71.9 % (ref 42.7–76)
NRBC BLD AUTO-RTO: 0 /100 WBC (ref 0–0.2)
PLATELET # BLD AUTO: 434 10*3/MM3 (ref 140–450)
PMV BLD AUTO: 9.8 FL (ref 6–12)
POTASSIUM SERPL-SCNC: 4.3 MMOL/L (ref 3.5–5.2)
RBC # BLD AUTO: 3.78 10*6/MM3 (ref 3.77–5.28)
SODIUM SERPL-SCNC: 138 MMOL/L (ref 136–145)
WBC NRBC COR # BLD: 14.21 10*3/MM3 (ref 3.4–10.8)

## 2023-02-03 PROCEDURE — 82962 GLUCOSE BLOOD TEST: CPT

## 2023-02-03 PROCEDURE — 85025 COMPLETE CBC W/AUTO DIFF WBC: CPT | Performed by: HOSPITALIST

## 2023-02-03 PROCEDURE — 80048 BASIC METABOLIC PNL TOTAL CA: CPT | Performed by: HOSPITALIST

## 2023-02-03 RX ORDER — IBUPROFEN 600 MG/1
1 TABLET ORAL
Status: DISCONTINUED | OUTPATIENT
Start: 2023-02-03 | End: 2023-02-03 | Stop reason: HOSPADM

## 2023-02-03 RX ORDER — AMLODIPINE BESYLATE 10 MG/1
10 TABLET ORAL
Qty: 30 TABLET | Refills: 0 | Status: SHIPPED | OUTPATIENT
Start: 2023-02-04 | End: 2023-03-06

## 2023-02-03 RX ORDER — FAMOTIDINE 20 MG/1
20 TABLET, FILM COATED ORAL 2 TIMES DAILY
Qty: 60 TABLET | Refills: 0 | Status: SHIPPED | OUTPATIENT
Start: 2023-02-03 | End: 2023-03-05

## 2023-02-03 RX ORDER — PSEUDOEPHEDRINE HCL 30 MG
100 TABLET ORAL 2 TIMES DAILY
Qty: 60 CAPSULE | Refills: 0 | Status: SHIPPED | OUTPATIENT
Start: 2023-02-03 | End: 2023-03-05

## 2023-02-03 RX ORDER — HYDROCODONE BITARTRATE AND ACETAMINOPHEN 5; 325 MG/1; MG/1
1 TABLET ORAL 3 TIMES DAILY
Qty: 9 TABLET | Refills: 0 | Status: SHIPPED | OUTPATIENT
Start: 2023-02-03 | End: 2023-02-06

## 2023-02-03 RX ORDER — POLYETHYLENE GLYCOL 3350 17 G/17G
17 POWDER, FOR SOLUTION ORAL 2 TIMES DAILY
Qty: 60 PACKET | Refills: 0 | Status: SHIPPED | OUTPATIENT
Start: 2023-02-03 | End: 2023-03-05

## 2023-02-03 RX ADMIN — HYDROCODONE BITARTRATE AND ACETAMINOPHEN 1 TABLET: 5; 325 TABLET ORAL at 06:49

## 2023-02-03 RX ADMIN — FAMOTIDINE 20 MG: 20 TABLET, FILM COATED ORAL at 08:12

## 2023-02-03 RX ADMIN — MEMANTINE HYDROCHLORIDE 10 MG: 10 TABLET, FILM COATED ORAL at 08:12

## 2023-02-03 RX ADMIN — HYDROCODONE BITARTRATE AND ACETAMINOPHEN 1 TABLET: 5; 325 TABLET ORAL at 02:28

## 2023-02-03 RX ADMIN — HYDROCODONE BITARTRATE AND ACETAMINOPHEN 1 TABLET: 5; 325 TABLET ORAL at 14:41

## 2023-02-03 RX ADMIN — LEVOTHYROXINE SODIUM 88 MCG: 0.09 TABLET ORAL at 06:49

## 2023-02-03 RX ADMIN — Medication 10 ML: at 08:12

## 2023-02-03 RX ADMIN — POLYETHYLENE GLYCOL 3350 17 G: 17 POWDER, FOR SOLUTION ORAL at 08:12

## 2023-02-03 RX ADMIN — DOCUSATE SODIUM 100 MG: 100 CAPSULE, LIQUID FILLED ORAL at 08:12

## 2023-02-03 RX ADMIN — AMLODIPINE BESYLATE 10 MG: 10 TABLET ORAL at 08:12

## 2023-02-03 NOTE — CASE MANAGEMENT/SOCIAL WORK
Case Management Discharge Note      Final Note: Pt discharged to [place] for IC level of care.   MAGALY Smart RN         Selected Continued Care - Admitted Since 1/26/2023     Destination Coordination complete.    Service Provider Selected Services Address Phone Fax Patient Preferred    [facility] [address] [facility] [address] [phone] --          Durable Medical Equipment    No services have been selected for the patient.              Dialysis/Infusion    No services have been selected for the patient.              Home Medical Care    No services have been selected for the patient.              Therapy    No services have been selected for the patient.              Community Resources    No services have been selected for the patient.              Community & DME    No services have been selected for the patient.                  Transportation Services  Ambulance: [facility]    Final Discharge Disposition Code: 04 - intermediate care facility

## 2023-02-03 NOTE — PLAN OF CARE
Goal Outcome Evaluation:  Plan of Care Reviewed With: patient      Patient sleeping on and off. Reoriented as needed. Medicated for pain per MAR. Possible D/C today.

## 2023-02-03 NOTE — CASE MANAGEMENT/SOCIAL WORK
Continued Stay Note  [facility]     Patient Name: Sam Flores  MRN: [id number]  Today's Date: 2/3/2023    Admit Date: 1/26/2023    Plan: Plan return to [facility] level of care.   MAGALY Smart RN   Discharge Plan     Row Name 02/03/23 1150       Plan    Plan Plan return to [facility] level of care.   MAGALY Smart RN    Patient/Family in Agreement with Plan yes    Plan Comments Per [facility]  ( [phone]) [facility] has a bed and can accept pt today.  Discharge Summary in packet.  [facility] can print Discharge Summary for facility.  Prescriptions sent to facility pharmacy.  Packet to RN.  [facility] arranged to transport pt at 1530.  Plan return to [facility] level of care.   MAGALY Smart RN               Discharge Codes    No documentation.               Expected Discharge Date and Time     Expected Discharge Date Expected Discharge Time    Feb 3, 2023             Iqra Smart RN

## 2023-02-03 NOTE — PLAN OF CARE
Goal Outcome Evaluation:  Plan of Care Reviewed With: patient           Outcome Evaluation: Pt to be DC'd back home with EMS as transport tentatively at 1530.  Will call reprort.  VSS.  Pain meds given as ordered.  Will cont to monitor.

## 2023-02-03 NOTE — DISCHARGE SUMMARY
Discharge summary    Date of admission 1/26/2023  Date of discharge 2/3/2023    Final diagnosis  Acute UTI resolved  Acute kidney injury resolved  Diabetes mellitus  Hypertension   Hyperlipidemia  Hypothyroidism  Severe dementia  Parkinson's disease  Degenerative disc disease  Chronic constipation resolved  Chronic kidney disease stage III  Chronic multiple thoracic and lumbar compression fracture  Gastroesophageal reflux disease    Discharge medications    Current Facility-Administered Medications:   •  acetaminophen (TYLENOL) tablet 650 mg, 650 mg, Oral, Q6H PRN, Ang Dhaliwal MD, 650 mg at 01/29/23 2145  •  amLODIPine (NORVASC) tablet 10 mg, 10 mg, Oral, Q24H, Ang Dhaliwal MD, 10 mg at 02/03/23 0812  •  atorvastatin (LIPITOR) tablet 10 mg, 10 mg, Oral, Nightly, Ang Dhaliwal MD, 10 mg at 02/02/23 2128  •  bisacodyl (DULCOLAX) suppository 10 mg, 10 mg, Rectal, Daily PRN, Ang Dhaliwal MD, 10 mg at 02/01/23 1028  •  dextrose (D50W) (25 g/50 mL) IV injection 25 g, 25 g, Intravenous, Q15 Min PRN, Ang Dhaliwal MD  •  dextrose (GLUTOSE) oral gel 15 g, 15 g, Oral, Q15 Min PRN, Ang Dhaliwal MD  •  docusate sodium (COLACE) capsule 100 mg, 100 mg, Oral, BID, Ang Dhaliwal MD, 100 mg at 02/03/23 0812  •  famotidine (PEPCID) tablet 20 mg, 20 mg, Oral, BID, Ang Dhaliwal MD, 20 mg at 02/03/23 0812  •  glucagon (human recombinant) (GLUCAGEN DIAGNOSTIC) injection 1 mg, 1 mg, Subcutaneous, Q15 Min PRN, Ang Dhaliwal MD  •  HYDROcodone-acetaminophen (NORCO) 5-325 MG per tablet 1 tablet, 1 tablet, Oral, Q4H PRN, Ang Dhaliwal MD, 1 tablet at 02/03/23 0649  •  insulin glargine (LANTUS, SEMGLEE) injection 10 Units, 10 Units, Subcutaneous, Nightly, Ang Dhaliwal MD, 10 Units at 02/02/23 2137  •  insulin lispro (ADMELOG) injection 0-7 Units, 0-7 Units, Subcutaneous, 4x Daily With Meals & Nightly, Ang Dhaliwal MD, 2 Units at 02/02/23 2136  •  insulin lispro (ADMELOG) injection 3 Units, 3 Units, Subcutaneous, TID With Meals, Madhuri,  MD Ang, 3 Units at 01/31/23 1730  •  levothyroxine (SYNTHROID, LEVOTHROID) tablet 88 mcg, 88 mcg, Oral, QAM, Ang Dhaliwal MD, 88 mcg at 02/03/23 0649  •  memantine (NAMENDA) tablet 10 mg, 10 mg, Oral, Q12H, Ang Dhaliwal MD, 10 mg at 02/03/23 0812  •  mirtazapine (REMERON) tablet 7.5 mg, 7.5 mg, Oral, Nightly, Ang Dhaliwal MD, 7.5 mg at 02/02/23 2128  •  polyethylene glycol (MIRALAX) packet 17 g, 17 g, Oral, BID, Ang Dhaliwal MD, 17 g at 02/03/23 0812  •  senna (SENOKOT) tablet 2 tablet, 2 tablet, Oral, Nightly, Ang Dhaliwal MD, 2 tablet at 02/02/23 2128  •  [COMPLETED] Insert Peripheral IV, , , Once **AND** sodium chloride 0.9 % flush 10 mL, 10 mL, Intravenous, PRN, Triston Dover PA, 10 mL at 02/03/23 0812     Consults obtained  Neurosurgery    Procedures  None    Hospital course  88-year-old white female with history of severe dementia Parkinson's disease diabetes hypertension hyperlipidemia and chronic kidney disease stage III who is a nursing home resident contracted and immobilized admitted to emergency room with altered mental status.  Patient work-up in ER revealed acute UTI and acute kidney injury on top of chronic kidney disease admitted for management.  Patient admitted treated with empiric antibiotics IV fluid and supportive care.  Patient responded to the treatment but continued to have severe pain which is further evaluated with a lumbar x-ray which showed severe degenerative disc disease with multiple compression fracture and neurosurgery consult obtained and they recommend no further work-up.  Patient has constipation which is resolved with bowel program.  Patient completed antibiotics and back to baseline and kidney function is also at baseline.  At the time of discharge her BUN 31 creatinine 1.0 with a GFR 49.  Patient remained DNR throughout hospital course    Discharge diet regular    Activity patient is bedbound    Medication as above    Further care per accepting physician at nursing  home and take medication as directed.    KORY ELY MD

## 2023-02-03 NOTE — PROGRESS NOTES
"Daily progress note    Primary care physician  Dr. Rivera    Chief complaint  Doing same with no new complaints     History of present illness  88-year-old white female with history of severe dementia Parkinson's disease diabetes mellitus hypertension hyperlipidemia and hypothyroidism who is a nursing home resident sent to the ER with altered mental status.  Patient unable to give any detailed history and work-up in ER revealed acute UTI with acute kidney injury admit for management.  At the time of interview she is awake and alert and follows some commands and in no distress.  Patient is DNR per her wishes.     REVIEW OF SYSTEMS  Unremarkable except generalized weakness     PHYSICAL EXAM   Blood pressure 150/67, pulse 71, temperature 98 °F (36.7 °C), temperature source Oral, resp. rate 18, height 149.9 cm (59.02\"), weight 39.5 kg (87 lb 1.3 oz), SpO2 95 %.    GENERAL: Awake and alert in no distress  HEENT:  Unremarkable  NECK:  Supple  CV: regular rhythm, regular rate, intact distal pulses  RESPIRATORY: normal effort, moving air bilaterally  ABDOMEN: soft/nontender, nondistended bowel sounds positive  MUSCULOSKELETAL: chronic contractures of BLE,   NEURO: alert, moves all extremities, neuro at baseline, follows commands  SKIN: warm, dry, no rash   Psych: Appropriate mood and affect     LAB RESULTS  Lab Results (last 24 hours)     Procedure Component Value Units Date/Time    POC Glucose Once [[id number]]  (Abnormal) Collected: 02/03/23 1059    Specimen: Blood Updated: 02/03/23 1100     Glucose 152 mg/dL      Comment: Meter: [id number] : [id number] Medhat [place]TH       Basic Metabolic Panel [[id number]]  (Abnormal) Collected: 02/03/23 0727    Specimen: Blood Updated: 02/03/23 0810     Glucose 161 mg/dL      BUN 31 mg/dL      Creatinine 1.09 mg/dL      Sodium 138 mmol/L      Potassium 4.3 mmol/L      Chloride 104 mmol/L      CO2 26.1 mmol/L      Calcium 9.0 mg/dL      BUN/Creatinine Ratio 28.4     Anion Gap 7.9 " mmol/L      eGFR 49.0 mL/min/1.73     Narrative:      GFR Normal >60  Chronic Kidney Disease <60  Kidney Failure <15    The GFR formula is only valid for adults with stable renal function between ages 18 and 70.    CBC & Differential [[id number]]  (Abnormal) Collected: 02/03/23 0727    Specimen: Blood Updated: 02/03/23 0754    Narrative:      The following orders were created for panel order CBC & Differential.  Procedure                               Abnormality         Status                     ---------                               -----------         ------                     CBC Auto Differential[[id number]]        Abnormal            Final result                 Please view results for these tests on the individual orders.    CBC Auto Differential [[id number]]  (Abnormal) Collected: 02/03/23 0727    Specimen: Blood Updated: 02/03/23 0754     WBC 14.21 10*3/mm3      RBC 3.78 10*6/mm3      Hemoglobin 11.5 g/dL      Hematocrit 35.3 %      MCV 93.4 fL      MCH 30.4 pg      MCHC 32.6 g/dL      RDW 13.1 %      RDW-SD 44.8 fl      MPV 9.8 fL      Platelets 434 10*3/mm3      Neutrophil % 71.9 %      Lymphocyte % 11.5 %      Monocyte % 12.0 %      Eosinophil % 4.0 %      Basophil % 0.2 %      Immature Grans % 0.4 %      Neutrophils, Absolute 10.20 10*3/mm3      Lymphocytes, Absolute 1.64 10*3/mm3      Monocytes, Absolute 1.71 10*3/mm3      Eosinophils, Absolute 0.57 10*3/mm3      Basophils, Absolute 0.03 10*3/mm3      Immature Grans, Absolute 0.06 10*3/mm3      nRBC 0.0 /100 WBC     POC Glucose Once [[id number]]  (Abnormal) Collected: 02/03/23 0621    Specimen: Blood Updated: 02/03/23 0623     Glucose 154 mg/dL      Comment: Meter: [id number] : [id number] Perico Tang CNA       POC Glucose Once [[id number]]  (Abnormal) Collected: 02/02/23 2046    Specimen: Blood Updated: 02/02/23 2048     Glucose 195 mg/dL      Comment: Meter: [id number] : [id number] Narciso Sewell RN       POC Glucose Once [[id number]]   (Abnormal) Collected: 02/02/23 1530    Specimen: Blood Updated: 02/02/23 1532     Glucose 133 mg/dL      Comment: RN Notified R and V Meter: [id number] : [id number] Maude [place]TH           Imaging Results (Last 24 Hours)     ** No results found for the last 24 hours. **        ECG 12 Lead        Component  Ref Range & Units 1 d ago 5 mo ago   QT Interval  ms 415 P  364    Resulting Agency  ECG  ECG             HEART RATE= 69  bpm  RR Interval= 870  ms  GA Interval= 39  ms  P Horizontal Axis= 209  deg  P Front Axis= 261  deg  QRSD Interval= 81  ms  QT Interval= 415  ms  QRS Axis= 48  deg  T Wave Axis= 79  deg  - BORDERLINE ECG -  Ectopic atrial rhythm  Short GA interval             Current Facility-Administered Medications:   •  acetaminophen (TYLENOL) tablet 650 mg, 650 mg, Oral, Q6H PRN, Ang Dhaliwal MD, 650 mg at 01/29/23 2145  •  amLODIPine (NORVASC) tablet 10 mg, 10 mg, Oral, Q24H, Ang Dhaliwal MD, 10 mg at 02/03/23 0812  •  atorvastatin (LIPITOR) tablet 10 mg, 10 mg, Oral, Nightly, Ang Dhaliwal MD, 10 mg at 02/02/23 2128  •  bisacodyl (DULCOLAX) suppository 10 mg, 10 mg, Rectal, Daily PRN, Ang Dhaliwal MD, 10 mg at 02/01/23 1028  •  dextrose (D50W) (25 g/50 mL) IV injection 25 g, 25 g, Intravenous, Q15 Min PRN, Ang Dhaliwal MD  •  dextrose (GLUTOSE) oral gel 15 g, 15 g, Oral, Q15 Min PRN, Ang Dhaliwal MD  •  docusate sodium (COLACE) capsule 100 mg, 100 mg, Oral, BID, Ang Dhaliwal MD, 100 mg at 02/03/23 0812  •  famotidine (PEPCID) tablet 20 mg, 20 mg, Oral, BID, Ang Dhaliwal MD, 20 mg at 02/03/23 0812  •  glucagon (human recombinant) (GLUCAGEN DIAGNOSTIC) injection 1 mg, 1 mg, Subcutaneous, Q15 Min PRN, Ang Dhaliwal MD  •  HYDROcodone-acetaminophen (NORCO) 5-325 MG per tablet 1 tablet, 1 tablet, Oral, Q4H PRN, Ang Dhaliwal MD, 1 tablet at 02/03/23 0649  •  insulin glargine (LANTUS, SEMGLEE) injection 10 Units, 10 Units, Subcutaneous, Nightly, Ang Dhaliwal MD, 10 Units at 02/02/23 2621  •   insulin lispro (ADMELOG) injection 0-7 Units, 0-7 Units, Subcutaneous, 4x Daily With Meals & Nightly, Kory Dhaliwal MD, 2 Units at 02/02/23 2136  •  insulin lispro (ADMELOG) injection 3 Units, 3 Units, Subcutaneous, TID With Meals, Kory Dhaliwal MD, 3 Units at 01/31/23 1730  •  levothyroxine (SYNTHROID, LEVOTHROID) tablet 88 mcg, 88 mcg, Oral, QAM, Kory Dhaliwal MD, 88 mcg at 02/03/23 0649  •  memantine (NAMENDA) tablet 10 mg, 10 mg, Oral, Q12H, Kory Dhaliwal MD, 10 mg at 02/03/23 0812  •  mirtazapine (REMERON) tablet 7.5 mg, 7.5 mg, Oral, Nightly, Kory Dhaliwal MD, 7.5 mg at 02/02/23 2128  •  polyethylene glycol (MIRALAX) packet 17 g, 17 g, Oral, BID, Kory Dhaliwal MD, 17 g at 02/03/23 0812  •  senna (SENOKOT) tablet 2 tablet, 2 tablet, Oral, Nightly, Kory Dhaliwal MD, 2 tablet at 02/02/23 2128  •  [COMPLETED] Insert Peripheral IV, , , Once **AND** sodium chloride 0.9 % flush 10 mL, 10 mL, Intravenous, PRN, Triston Dover PA, 10 mL at 02/03/23 0812     ASSESSMENT  Acute UTI resolved  Acute kidney injury resolved  Diabetes mellitus  Hypertension   Hyperlipidemia  Hypothyroidism  Severe dementia  Parkinson's disease  Degenerative disc disease  Chronic constipation resolved  Chronic multiple thoracic and lumbar compression fracture  Gastroesophageal reflux disease    PLAN  Discharge back to nursing home  Discharge summary dictated  KORY DHALIWAL MD    Copied text in this note has been reviewed and is accurate as of 02/03/23

## 2023-02-08 NOTE — PROGRESS NOTES
"Enter Query Response Below      Query Response:       Severe malnutrition Electronically signed by Ang Dhaliwal MD, 23, 12:51 PM EST.         If applicable, please update the problem list.    Patient: Sam Flores        : 1934  Account: [id number]           Admit Date: 2023        How to Respond to this query:       a. Click New Note     b. Answer query within the yellow box.                c. Update the Problem List, if applicable.      If you have any questions about this query contact me at: [phone]    Dr. Dhaliwal:     Risk Factors: Severe Dementia, Parkinson's, Bedbound, type 2 DM, Emphysema    Clinical Indicators and Treatment:   -Malnutrition Assessment Survey performed by Registered Dietician noted, \"Patient meets ASPEN/AND criteria for nutrition diagnosis of severe malnutrition of chronic illness based on: PO intake, weight loss ( 12 lb (12.1%) x 6 months), nutrition focused physical exam - moderate fat loss and muscle wasting noted.\" Ordered Boost Control, TID in addition to regular diet with monitoring.   -BMI 17.58 kg/m2    Please clarify the diagnosis being treated and/or monitored:    Severe Malnutrition  Other, please specify: _____________  Unable to determine     By submitting this query, we are merely seeking further clarification of documentation to accurately reflect all conditions that you are monitoring, evaluating, treating or that extend the hospitalization or utilize additional resources of care. Please utilize your independent clinical judgment when addressing the question(s) above.     This query and your response, once completed, will be entered into the legal medical record.    Sincerely,    YOAV Ramirez, RN, CCDS -   Clinical Documentation Integrity Program   [email or web address]    "

## 2024-01-01 ENCOUNTER — APPOINTMENT (OUTPATIENT)
Dept: GENERAL RADIOLOGY | Facility: HOSPITAL | Age: 89
End: 2024-01-01
Payer: COMMERCIAL

## 2024-01-01 ENCOUNTER — APPOINTMENT (OUTPATIENT)
Dept: CT IMAGING | Facility: HOSPITAL | Age: 89
End: 2024-01-01
Payer: MEDICARE

## 2024-01-01 ENCOUNTER — APPOINTMENT (OUTPATIENT)
Dept: CARDIOLOGY | Facility: HOSPITAL | Age: 89
End: 2024-01-01
Payer: COMMERCIAL

## 2024-01-01 ENCOUNTER — HOSPITAL ENCOUNTER (INPATIENT)
Facility: HOSPITAL | Age: 89
LOS: 4 days | End: 2024-01-13
Attending: HOSPITALIST | Admitting: HOSPITALIST
Payer: COMMERCIAL

## 2024-01-01 ENCOUNTER — HOSPITAL ENCOUNTER (INPATIENT)
Facility: HOSPITAL | Age: 89
LOS: 6 days | End: 2024-01-09
Attending: EMERGENCY MEDICINE | Admitting: HOSPITALIST
Payer: MEDICARE

## 2024-01-01 ENCOUNTER — APPOINTMENT (OUTPATIENT)
Dept: MRI IMAGING | Facility: HOSPITAL | Age: 89
End: 2024-01-01
Payer: COMMERCIAL

## 2024-01-01 VITALS
WEIGHT: 87 LBS | RESPIRATION RATE: 16 BRPM | TEMPERATURE: 97.4 F | HEART RATE: 69 BPM | BODY MASS INDEX: 17.54 KG/M2 | OXYGEN SATURATION: 93 % | DIASTOLIC BLOOD PRESSURE: 67 MMHG | SYSTOLIC BLOOD PRESSURE: 129 MMHG | HEIGHT: 59 IN

## 2024-01-01 VITALS
SYSTOLIC BLOOD PRESSURE: 87 MMHG | TEMPERATURE: 97.9 F | OXYGEN SATURATION: 72 % | DIASTOLIC BLOOD PRESSURE: 48 MMHG | WEIGHT: 87 LBS | HEIGHT: 59 IN | BODY MASS INDEX: 17.54 KG/M2

## 2024-01-01 DIAGNOSIS — N39.0 ACUTE UTI: ICD-10-CM

## 2024-01-01 DIAGNOSIS — F03.90 DEMENTIA, UNSPECIFIED DEMENTIA SEVERITY, UNSPECIFIED DEMENTIA TYPE, UNSPECIFIED WHETHER BEHAVIORAL, PSYCHOTIC, OR MOOD DISTURBANCE OR ANXIETY: ICD-10-CM

## 2024-01-01 DIAGNOSIS — I63.9 STROKE OF UNKNOWN ETIOLOGY: Primary | ICD-10-CM

## 2024-01-01 DIAGNOSIS — N28.9 ACUTE RENAL INSUFFICIENCY: ICD-10-CM

## 2024-01-01 LAB
ABO GROUP BLD: NORMAL
ALBUMIN SERPL-MCNC: 3.6 G/DL (ref 3.5–5.2)
ALBUMIN/GLOB SERPL: 0.9 G/DL
ALP SERPL-CCNC: 96 U/L (ref 39–117)
ALT SERPL W P-5'-P-CCNC: 15 U/L (ref 1–33)
ANION GAP SERPL CALCULATED.3IONS-SCNC: 14.3 MMOL/L (ref 5–15)
APTT PPP: 31.2 SECONDS (ref 22.7–35.4)
AST SERPL-CCNC: 22 U/L (ref 1–32)
BACTERIA SPEC AEROBE CULT: ABNORMAL
BACTERIA UR QL AUTO: ABNORMAL /HPF
BASOPHILS # BLD AUTO: 0.04 10*3/MM3 (ref 0–0.2)
BASOPHILS NFR BLD AUTO: 0.3 % (ref 0–1.5)
BH CV ECHO MEAS - AO MAX PG: 19.5 MMHG
BH CV ECHO MEAS - AO MEAN PG: 11 MMHG
BH CV ECHO MEAS - AO ROOT DIAM: 2.5 CM
BH CV ECHO MEAS - AO V2 MAX: 221 CM/SEC
BH CV ECHO MEAS - AO V2 VTI: 40.3 CM
BH CV ECHO MEAS - AVA(I,D): 1.5 CM2
BH CV ECHO MEAS - LA DIMENSION: 3.8 CM
BH CV ECHO MEAS - LAT PEAK E' VEL: 7.4 CM/SEC
BH CV ECHO MEAS - LV MAX PG: 4.2 MMHG
BH CV ECHO MEAS - LV MEAN PG: 2 MMHG
BH CV ECHO MEAS - LV V1 MAX: 102 CM/SEC
BH CV ECHO MEAS - LV V1 VTI: 21.9 CM
BH CV ECHO MEAS - LVOT AREA: 2.8 CM2
BH CV ECHO MEAS - LVOT DIAM: 1.88 CM
BH CV ECHO MEAS - MED PEAK E' VEL: 7.6 CM/SEC
BH CV ECHO MEAS - MV A MAX VEL: 73.1 CM/SEC
BH CV ECHO MEAS - MV DEC SLOPE: 714.6 CM/SEC2
BH CV ECHO MEAS - MV DEC TIME: 0.26 SEC
BH CV ECHO MEAS - MV E MAX VEL: 175 CM/SEC
BH CV ECHO MEAS - MV E/A: 2.39
BH CV ECHO MEAS - MV MAX PG: 13.1 MMHG
BH CV ECHO MEAS - MV MEAN PG: 5.2 MMHG
BH CV ECHO MEAS - MV P1/2T: 78.9 MSEC
BH CV ECHO MEAS - MV V2 VTI: 41.4 CM
BH CV ECHO MEAS - MVA(P1/2T): 2.8 CM2
BH CV ECHO MEAS - MVA(VTI): 1.46 CM2
BH CV ECHO MEAS - PA ACC TIME: 0.11 SEC
BH CV ECHO MEAS - SV(LVOT): 60.6 ML
BH CV ECHO MEAS - TAPSE (>1.6): 1.52 CM
BH CV ECHO MEASUREMENTS AVERAGE E/E' RATIO: 23.33
BH CV ECHO SHUNT ASSESSMENT PERFORMED (HIDDEN SCRIPTING): 1
BH CV XLRA - TDI S': 7.8 CM/SEC
BILIRUB SERPL-MCNC: 0.3 MG/DL (ref 0–1.2)
BILIRUB UR QL STRIP: NEGATIVE
BLD GP AB SCN SERPL QL: NEGATIVE
BUN SERPL-MCNC: 24 MG/DL (ref 8–23)
BUN/CREAT SERPL: 14.5 (ref 7–25)
CALCIUM SPEC-SCNC: 9.5 MG/DL (ref 8.6–10.5)
CHLORIDE SERPL-SCNC: 101 MMOL/L (ref 98–107)
CHOLEST SERPL-MCNC: 225 MG/DL (ref 0–200)
CLARITY UR: ABNORMAL
CO2 SERPL-SCNC: 22.7 MMOL/L (ref 22–29)
COLOR UR: YELLOW
CREAT BLDA-MCNC: 1.9 MG/DL (ref 0.6–1.3)
CREAT SERPL-MCNC: 1.66 MG/DL (ref 0.57–1)
DEPRECATED RDW RBC AUTO: 45.1 FL (ref 37–54)
EGFRCR SERPLBLD CKD-EPI 2021: 29.4 ML/MIN/1.73
EOSINOPHIL # BLD AUTO: 0.26 10*3/MM3 (ref 0–0.4)
EOSINOPHIL NFR BLD AUTO: 1.9 % (ref 0.3–6.2)
ERYTHROCYTE [DISTWIDTH] IN BLOOD BY AUTOMATED COUNT: 13 % (ref 12.3–15.4)
GLOBULIN UR ELPH-MCNC: 3.9 GM/DL
GLUCOSE BLDC GLUCOMTR-MCNC: 151 MG/DL (ref 70–130)
GLUCOSE BLDC GLUCOMTR-MCNC: 171 MG/DL (ref 70–130)
GLUCOSE BLDC GLUCOMTR-MCNC: 215 MG/DL (ref 70–130)
GLUCOSE BLDC GLUCOMTR-MCNC: 217 MG/DL (ref 70–130)
GLUCOSE BLDC GLUCOMTR-MCNC: 227 MG/DL (ref 70–130)
GLUCOSE BLDC GLUCOMTR-MCNC: 75 MG/DL (ref 70–130)
GLUCOSE BLDC GLUCOMTR-MCNC: 78 MG/DL (ref 70–130)
GLUCOSE BLDC GLUCOMTR-MCNC: 87 MG/DL (ref 70–130)
GLUCOSE BLDC GLUCOMTR-MCNC: 90 MG/DL (ref 70–130)
GLUCOSE SERPL-MCNC: 241 MG/DL (ref 65–99)
GLUCOSE UR STRIP-MCNC: ABNORMAL MG/DL
HBA1C MFR BLD: 9.4 % (ref 4.8–5.6)
HCT VFR BLD AUTO: 42.3 % (ref 34–46.6)
HDLC SERPL-MCNC: 42 MG/DL (ref 40–60)
HGB BLD-MCNC: 13.6 G/DL (ref 12–15.9)
HGB UR QL STRIP.AUTO: ABNORMAL
HOLD SPECIMEN: NORMAL
HOLD SPECIMEN: NORMAL
HYALINE CASTS UR QL AUTO: ABNORMAL /LPF
IMM GRANULOCYTES # BLD AUTO: 0.05 10*3/MM3 (ref 0–0.05)
IMM GRANULOCYTES NFR BLD AUTO: 0.4 % (ref 0–0.5)
INR PPP: 1.04 (ref 0.9–1.1)
KETONES UR QL STRIP: NEGATIVE
LDLC SERPL CALC-MCNC: 160 MG/DL (ref 0–100)
LDLC/HDLC SERPL: 3.76 {RATIO}
LEUKOCYTE ESTERASE UR QL STRIP.AUTO: ABNORMAL
LYMPHOCYTES # BLD AUTO: 3.67 10*3/MM3 (ref 0.7–3.1)
LYMPHOCYTES NFR BLD AUTO: 26.8 % (ref 19.6–45.3)
MCH RBC QN AUTO: 30.5 PG (ref 26.6–33)
MCHC RBC AUTO-ENTMCNC: 32.2 G/DL (ref 31.5–35.7)
MCV RBC AUTO: 94.8 FL (ref 79–97)
MONOCYTES # BLD AUTO: 1.18 10*3/MM3 (ref 0.1–0.9)
MONOCYTES NFR BLD AUTO: 8.6 % (ref 5–12)
NEUTROPHILS NFR BLD AUTO: 62 % (ref 42.7–76)
NEUTROPHILS NFR BLD AUTO: 8.47 10*3/MM3 (ref 1.7–7)
NITRITE UR QL STRIP: POSITIVE
NRBC BLD AUTO-RTO: 0 /100 WBC (ref 0–0.2)
PH UR STRIP.AUTO: 5.5 [PH] (ref 5–8)
PLATELET # BLD AUTO: 364 10*3/MM3 (ref 140–450)
PMV BLD AUTO: 9.9 FL (ref 6–12)
POTASSIUM SERPL-SCNC: 4.2 MMOL/L (ref 3.5–5.2)
PROT SERPL-MCNC: 7.5 G/DL (ref 6–8.5)
PROT UR QL STRIP: ABNORMAL
PROTHROMBIN TIME: 13.7 SECONDS (ref 11.7–14.2)
QT INTERVAL: 426 MS
QTC INTERVAL: 501 MS
RBC # BLD AUTO: 4.46 10*6/MM3 (ref 3.77–5.28)
RBC # UR STRIP: ABNORMAL /HPF
REF LAB TEST METHOD: ABNORMAL
RH BLD: NEGATIVE
SINUS: 2.8 CM
SODIUM SERPL-SCNC: 138 MMOL/L (ref 136–145)
SP GR UR STRIP: >=1.03 (ref 1–1.03)
SQUAMOUS #/AREA URNS HPF: ABNORMAL /HPF
STJ: 2.19 CM
T&S EXPIRATION DATE: NORMAL
TRIGL SERPL-MCNC: 125 MG/DL (ref 0–150)
TROPONIN T SERPL HS-MCNC: 26 NG/L
TSH SERPL DL<=0.05 MIU/L-ACNC: 50.8 UIU/ML (ref 0.27–4.2)
UROBILINOGEN UR QL STRIP: ABNORMAL
VLDLC SERPL-MCNC: 23 MG/DL (ref 5–40)
WBC # UR STRIP: ABNORMAL /HPF
WBC NRBC COR # BLD AUTO: 13.67 10*3/MM3 (ref 3.4–10.8)
WHOLE BLOOD HOLD COAG: NORMAL
WHOLE BLOOD HOLD SPECIMEN: NORMAL

## 2024-01-01 PROCEDURE — 93005 ELECTROCARDIOGRAM TRACING: CPT | Performed by: EMERGENCY MEDICINE

## 2024-01-01 PROCEDURE — 82948 REAGENT STRIP/BLOOD GLUCOSE: CPT

## 2024-01-01 PROCEDURE — 99222 1ST HOSP IP/OBS MODERATE 55: CPT | Performed by: STUDENT IN AN ORGANIZED HEALTH CARE EDUCATION/TRAINING PROGRAM

## 2024-01-01 PROCEDURE — 97165 OT EVAL LOW COMPLEX 30 MIN: CPT

## 2024-01-01 PROCEDURE — 84443 ASSAY THYROID STIM HORMONE: CPT | Performed by: HOSPITALIST

## 2024-01-01 PROCEDURE — 85025 COMPLETE CBC W/AUTO DIFF WBC: CPT | Performed by: EMERGENCY MEDICINE

## 2024-01-01 PROCEDURE — 93306 TTE W/DOPPLER COMPLETE: CPT

## 2024-01-01 PROCEDURE — P9612 CATHETERIZE FOR URINE SPEC: HCPCS

## 2024-01-01 PROCEDURE — 70496 CT ANGIOGRAPHY HEAD: CPT

## 2024-01-01 PROCEDURE — 25010000002 CEFTRIAXONE PER 250 MG: Performed by: HOSPITALIST

## 2024-01-01 PROCEDURE — 84484 ASSAY OF TROPONIN QUANT: CPT | Performed by: EMERGENCY MEDICINE

## 2024-01-01 PROCEDURE — 25010000002 HYDROMORPHONE 1 MG/ML SOLUTION: Performed by: HOSPITALIST

## 2024-01-01 PROCEDURE — 80061 LIPID PANEL: CPT | Performed by: STUDENT IN AN ORGANIZED HEALTH CARE EDUCATION/TRAINING PROGRAM

## 2024-01-01 PROCEDURE — 97162 PT EVAL MOD COMPLEX 30 MIN: CPT

## 2024-01-01 PROCEDURE — 97535 SELF CARE MNGMENT TRAINING: CPT

## 2024-01-01 PROCEDURE — 85610 PROTHROMBIN TIME: CPT | Performed by: EMERGENCY MEDICINE

## 2024-01-01 PROCEDURE — 93306 TTE W/DOPPLER COMPLETE: CPT | Performed by: INTERNAL MEDICINE

## 2024-01-01 PROCEDURE — 25810000003 SODIUM CHLORIDE 0.9 % SOLUTION: Performed by: HOSPITALIST

## 2024-01-01 PROCEDURE — 86901 BLOOD TYPING SEROLOGIC RH(D): CPT | Performed by: EMERGENCY MEDICINE

## 2024-01-01 PROCEDURE — 82565 ASSAY OF CREATININE: CPT

## 2024-01-01 PROCEDURE — 0042T HC CT CEREBRAL PERFUSION W/WO CONTRAST: CPT

## 2024-01-01 PROCEDURE — 87086 URINE CULTURE/COLONY COUNT: CPT | Performed by: HOSPITALIST

## 2024-01-01 PROCEDURE — 25010000002 MIDAZOLAM PER 1 MG: Performed by: HOSPITALIST

## 2024-01-01 PROCEDURE — 63710000001 INSULIN LISPRO (HUMAN) PER 5 UNITS: Performed by: HOSPITALIST

## 2024-01-01 PROCEDURE — 25010000002 MORPHINE PER 10 MG: Performed by: HOSPITALIST

## 2024-01-01 PROCEDURE — 25510000001 IOPAMIDOL PER 1 ML: Performed by: EMERGENCY MEDICINE

## 2024-01-01 PROCEDURE — 97530 THERAPEUTIC ACTIVITIES: CPT

## 2024-01-01 PROCEDURE — 71045 X-RAY EXAM CHEST 1 VIEW: CPT

## 2024-01-01 PROCEDURE — 83036 HEMOGLOBIN GLYCOSYLATED A1C: CPT | Performed by: STUDENT IN AN ORGANIZED HEALTH CARE EDUCATION/TRAINING PROGRAM

## 2024-01-01 PROCEDURE — 70498 CT ANGIOGRAPHY NECK: CPT

## 2024-01-01 PROCEDURE — 87186 SC STD MICRODIL/AGAR DIL: CPT | Performed by: HOSPITALIST

## 2024-01-01 PROCEDURE — 85730 THROMBOPLASTIN TIME PARTIAL: CPT | Performed by: EMERGENCY MEDICINE

## 2024-01-01 PROCEDURE — 93005 ELECTROCARDIOGRAM TRACING: CPT | Performed by: NURSE PRACTITIONER

## 2024-01-01 PROCEDURE — 36415 COLL VENOUS BLD VENIPUNCTURE: CPT | Performed by: STUDENT IN AN ORGANIZED HEALTH CARE EDUCATION/TRAINING PROGRAM

## 2024-01-01 PROCEDURE — 86850 RBC ANTIBODY SCREEN: CPT | Performed by: EMERGENCY MEDICINE

## 2024-01-01 PROCEDURE — 25010000002 LABETALOL 5 MG/ML SOLUTION: Performed by: EMERGENCY MEDICINE

## 2024-01-01 PROCEDURE — 92610 EVALUATE SWALLOWING FUNCTION: CPT | Performed by: SPEECH-LANGUAGE PATHOLOGIST

## 2024-01-01 PROCEDURE — 87077 CULTURE AEROBIC IDENTIFY: CPT | Performed by: HOSPITALIST

## 2024-01-01 PROCEDURE — 99291 CRITICAL CARE FIRST HOUR: CPT

## 2024-01-01 PROCEDURE — 80053 COMPREHEN METABOLIC PANEL: CPT | Performed by: EMERGENCY MEDICINE

## 2024-01-01 PROCEDURE — 25810000003 SODIUM CHLORIDE 0.9 % SOLUTION: Performed by: STUDENT IN AN ORGANIZED HEALTH CARE EDUCATION/TRAINING PROGRAM

## 2024-01-01 PROCEDURE — 99233 SBSQ HOSP IP/OBS HIGH 50: CPT

## 2024-01-01 PROCEDURE — 81001 URINALYSIS AUTO W/SCOPE: CPT | Performed by: EMERGENCY MEDICINE

## 2024-01-01 PROCEDURE — 86900 BLOOD TYPING SEROLOGIC ABO: CPT | Performed by: EMERGENCY MEDICINE

## 2024-01-01 PROCEDURE — 63710000001 INSULIN GLARGINE PER 5 UNITS: Performed by: HOSPITALIST

## 2024-01-01 RX ORDER — LORAZEPAM 2 MG/ML
0.5 CONCENTRATE ORAL
Status: CANCELLED | OUTPATIENT
Start: 2024-01-01 | End: 2024-01-01

## 2024-01-01 RX ORDER — MORPHINE SULFATE 20 MG/ML
5 SOLUTION ORAL
Status: DISCONTINUED | OUTPATIENT
Start: 2024-01-01 | End: 2024-01-01 | Stop reason: HOSPADM

## 2024-01-01 RX ORDER — MIDAZOLAM HYDROCHLORIDE 1 MG/ML
4 INJECTION INTRAMUSCULAR; INTRAVENOUS
Status: ACTIVE | OUTPATIENT
Start: 2024-01-01 | End: 2024-01-01

## 2024-01-01 RX ORDER — LORAZEPAM 0.5 MG/1
0.5 TABLET ORAL
Status: ACTIVE | OUTPATIENT
Start: 2024-01-01 | End: 2024-01-01

## 2024-01-01 RX ORDER — NICOTINE POLACRILEX 4 MG
15 LOZENGE BUCCAL
Status: DISCONTINUED | OUTPATIENT
Start: 2024-01-01 | End: 2024-01-01

## 2024-01-01 RX ORDER — SODIUM CHLORIDE 0.9 % (FLUSH) 0.9 %
10 SYRINGE (ML) INJECTION EVERY 12 HOURS SCHEDULED
Status: DISCONTINUED | OUTPATIENT
Start: 2024-01-01 | End: 2024-01-01

## 2024-01-01 RX ORDER — HYDROMORPHONE HYDROCHLORIDE 2 MG/ML
1.5 INJECTION, SOLUTION INTRAMUSCULAR; INTRAVENOUS; SUBCUTANEOUS
Status: CANCELLED | OUTPATIENT
Start: 2024-01-01 | End: 2024-01-15

## 2024-01-01 RX ORDER — MIDAZOLAM HYDROCHLORIDE 1 MG/ML
4 INJECTION INTRAMUSCULAR; INTRAVENOUS
Status: CANCELLED | OUTPATIENT
Start: 2024-01-01 | End: 2024-01-01

## 2024-01-01 RX ORDER — MIDAZOLAM HYDROCHLORIDE 1 MG/ML
1 INJECTION INTRAMUSCULAR; INTRAVENOUS
Status: ACTIVE | OUTPATIENT
Start: 2024-01-01 | End: 2024-01-01

## 2024-01-01 RX ORDER — LORAZEPAM 1 MG/1
1 TABLET ORAL
Status: DISCONTINUED | OUTPATIENT
Start: 2024-01-01 | End: 2024-01-01 | Stop reason: HOSPADM

## 2024-01-01 RX ORDER — MORPHINE SULFATE 20 MG/ML
10 SOLUTION ORAL
Status: DISCONTINUED | OUTPATIENT
Start: 2024-01-01 | End: 2024-01-01 | Stop reason: HOSPADM

## 2024-01-01 RX ORDER — MIDAZOLAM HYDROCHLORIDE 1 MG/ML
2 INJECTION INTRAMUSCULAR; INTRAVENOUS
Status: DISCONTINUED | OUTPATIENT
Start: 2024-01-01 | End: 2024-01-01 | Stop reason: HOSPADM

## 2024-01-01 RX ORDER — LORAZEPAM 0.5 MG/1
0.5 TABLET ORAL
Status: DISCONTINUED | OUTPATIENT
Start: 2024-01-01 | End: 2024-01-01 | Stop reason: HOSPADM

## 2024-01-01 RX ORDER — MORPHINE SULFATE 20 MG/ML
5 SOLUTION ORAL
Status: CANCELLED | OUTPATIENT
Start: 2024-01-01 | End: 2024-01-15

## 2024-01-01 RX ORDER — MIDAZOLAM HYDROCHLORIDE 1 MG/ML
1 INJECTION INTRAMUSCULAR; INTRAVENOUS
Status: DISCONTINUED | OUTPATIENT
Start: 2024-01-01 | End: 2024-01-01 | Stop reason: HOSPADM

## 2024-01-01 RX ORDER — POLYETHYLENE GLYCOL 3350 17 G/17G
17 POWDER, FOR SOLUTION ORAL 2 TIMES DAILY
Status: DISCONTINUED | OUTPATIENT
Start: 2024-01-01 | End: 2024-01-01

## 2024-01-01 RX ORDER — ACETAMINOPHEN 325 MG/1
650 TABLET ORAL EVERY 4 HOURS PRN
Status: DISCONTINUED | OUTPATIENT
Start: 2024-01-01 | End: 2024-01-01

## 2024-01-01 RX ORDER — MIDAZOLAM HYDROCHLORIDE 1 MG/ML
1 INJECTION INTRAMUSCULAR; INTRAVENOUS
Status: CANCELLED | OUTPATIENT
Start: 2024-01-01 | End: 2024-01-01

## 2024-01-01 RX ORDER — POLYETHYLENE GLYCOL 3350 17 G/17G
17 POWDER, FOR SOLUTION ORAL 2 TIMES DAILY
COMMUNITY

## 2024-01-01 RX ORDER — MIDAZOLAM HYDROCHLORIDE 5 MG/ML
1 INJECTION INTRAMUSCULAR; INTRAVENOUS
Status: DISCONTINUED | OUTPATIENT
Start: 2024-01-01 | End: 2024-01-01 | Stop reason: HOSPADM

## 2024-01-01 RX ORDER — FAMOTIDINE 20 MG/1
20 TABLET, FILM COATED ORAL 2 TIMES DAILY
Status: DISCONTINUED | OUTPATIENT
Start: 2024-01-01 | End: 2024-01-01

## 2024-01-01 RX ORDER — LORAZEPAM 2 MG/ML
1 CONCENTRATE ORAL
Status: ACTIVE | OUTPATIENT
Start: 2024-01-01 | End: 2024-01-01

## 2024-01-01 RX ORDER — MORPHINE SULFATE 20 MG/ML
20 SOLUTION ORAL
Status: CANCELLED | OUTPATIENT
Start: 2024-01-01 | End: 2024-01-15

## 2024-01-01 RX ORDER — LORAZEPAM 0.5 MG/1
0.5 TABLET ORAL
Status: CANCELLED | OUTPATIENT
Start: 2024-01-01 | End: 2024-01-01

## 2024-01-01 RX ORDER — LORAZEPAM 1 MG/1
2 TABLET ORAL
Status: DISCONTINUED | OUTPATIENT
Start: 2024-01-01 | End: 2024-01-01 | Stop reason: HOSPADM

## 2024-01-01 RX ORDER — MIDAZOLAM HYDROCHLORIDE 5 MG/ML
4 INJECTION INTRAMUSCULAR; INTRAVENOUS
Status: DISCONTINUED | OUTPATIENT
Start: 2024-01-01 | End: 2024-01-01 | Stop reason: HOSPADM

## 2024-01-01 RX ORDER — MELATONIN
1000 DAILY
Status: DISCONTINUED | OUTPATIENT
Start: 2024-01-01 | End: 2024-01-01

## 2024-01-01 RX ORDER — LORAZEPAM 1 MG/1
2 TABLET ORAL
Status: ACTIVE | OUTPATIENT
Start: 2024-01-01 | End: 2024-01-01

## 2024-01-01 RX ORDER — MIDAZOLAM HYDROCHLORIDE 1 MG/ML
4 INJECTION INTRAMUSCULAR; INTRAVENOUS
Status: DISCONTINUED | OUTPATIENT
Start: 2024-01-01 | End: 2024-01-01 | Stop reason: HOSPADM

## 2024-01-01 RX ORDER — MORPHINE SULFATE 10 MG/ML
6 INJECTION INTRAMUSCULAR; INTRAVENOUS; SUBCUTANEOUS
Status: CANCELLED | OUTPATIENT
Start: 2024-01-01 | End: 2024-01-15

## 2024-01-01 RX ORDER — ASPIRIN 81 MG/1
81 TABLET, CHEWABLE ORAL DAILY
Status: DISCONTINUED | OUTPATIENT
Start: 2024-01-01 | End: 2024-01-01

## 2024-01-01 RX ORDER — LORAZEPAM 2 MG/ML
1 CONCENTRATE ORAL
Status: DISCONTINUED | OUTPATIENT
Start: 2024-01-01 | End: 2024-01-01 | Stop reason: HOSPADM

## 2024-01-01 RX ORDER — IBUPROFEN 600 MG/1
1 TABLET ORAL
Status: DISCONTINUED | OUTPATIENT
Start: 2024-01-01 | End: 2024-01-01

## 2024-01-01 RX ORDER — MORPHINE SULFATE 10 MG/ML
6 INJECTION INTRAMUSCULAR; INTRAVENOUS; SUBCUTANEOUS
Status: DISCONTINUED | OUTPATIENT
Start: 2024-01-01 | End: 2024-01-01 | Stop reason: HOSPADM

## 2024-01-01 RX ORDER — LORAZEPAM 2 MG/ML
1 CONCENTRATE ORAL
Status: CANCELLED | OUTPATIENT
Start: 2024-01-01 | End: 2024-01-01

## 2024-01-01 RX ORDER — MORPHINE SULFATE 2 MG/ML
6 INJECTION, SOLUTION INTRAMUSCULAR; INTRAVENOUS
Status: DISCONTINUED | OUTPATIENT
Start: 2024-01-01 | End: 2024-01-01 | Stop reason: HOSPADM

## 2024-01-01 RX ORDER — MORPHINE SULFATE 20 MG/ML
20 SOLUTION ORAL
Status: DISCONTINUED | OUTPATIENT
Start: 2024-01-01 | End: 2024-01-01 | Stop reason: HOSPADM

## 2024-01-01 RX ORDER — LABETALOL HYDROCHLORIDE 5 MG/ML
10 INJECTION, SOLUTION INTRAVENOUS ONCE
Status: DISCONTINUED | OUTPATIENT
Start: 2024-01-01 | End: 2024-01-01

## 2024-01-01 RX ORDER — ACETAMINOPHEN 325 MG/1
650 TABLET ORAL EVERY 8 HOURS PRN
COMMUNITY

## 2024-01-01 RX ORDER — ATORVASTATIN CALCIUM 20 MG/1
80 TABLET, FILM COATED ORAL NIGHTLY
Status: DISCONTINUED | OUTPATIENT
Start: 2024-01-01 | End: 2024-01-01

## 2024-01-01 RX ORDER — MORPHINE SULFATE 20 MG/ML
10 SOLUTION ORAL
Status: CANCELLED | OUTPATIENT
Start: 2024-01-01 | End: 2024-01-15

## 2024-01-01 RX ORDER — MORPHINE SULFATE 4 MG/ML
4 INJECTION, SOLUTION INTRAMUSCULAR; INTRAVENOUS
Status: CANCELLED | OUTPATIENT
Start: 2024-01-01 | End: 2024-01-15

## 2024-01-01 RX ORDER — LORAZEPAM 2 MG/ML
2 CONCENTRATE ORAL
Status: CANCELLED | OUTPATIENT
Start: 2024-01-01 | End: 2024-01-01

## 2024-01-01 RX ORDER — LORAZEPAM 1 MG/1
1 TABLET ORAL
Status: ACTIVE | OUTPATIENT
Start: 2024-01-01 | End: 2024-01-01

## 2024-01-01 RX ORDER — LORAZEPAM 2 MG/ML
2 CONCENTRATE ORAL
Status: DISCONTINUED | OUTPATIENT
Start: 2024-01-01 | End: 2024-01-01 | Stop reason: HOSPADM

## 2024-01-01 RX ORDER — DEXTROSE MONOHYDRATE 25 G/50ML
25 INJECTION, SOLUTION INTRAVENOUS
Status: DISCONTINUED | OUTPATIENT
Start: 2024-01-01 | End: 2024-01-01

## 2024-01-01 RX ORDER — AMOXICILLIN 250 MG
2 CAPSULE ORAL 2 TIMES DAILY
Status: DISCONTINUED | OUTPATIENT
Start: 2024-01-01 | End: 2024-01-01

## 2024-01-01 RX ORDER — SODIUM CHLORIDE 0.9 % (FLUSH) 0.9 %
10 SYRINGE (ML) INJECTION AS NEEDED
Status: DISCONTINUED | OUTPATIENT
Start: 2024-01-01 | End: 2024-01-01 | Stop reason: HOSPADM

## 2024-01-01 RX ORDER — LORAZEPAM 2 MG/ML
0.5 CONCENTRATE ORAL
Status: ACTIVE | OUTPATIENT
Start: 2024-01-01 | End: 2024-01-01

## 2024-01-01 RX ORDER — DIPHENOXYLATE HYDROCHLORIDE AND ATROPINE SULFATE 2.5; .025 MG/1; MG/1
1 TABLET ORAL
Status: DISCONTINUED | OUTPATIENT
Start: 2024-01-01 | End: 2024-01-01

## 2024-01-01 RX ORDER — LORAZEPAM 2 MG/ML
0.5 CONCENTRATE ORAL
Status: DISCONTINUED | OUTPATIENT
Start: 2024-01-01 | End: 2024-01-01 | Stop reason: HOSPADM

## 2024-01-01 RX ORDER — MORPHINE SULFATE 2 MG/ML
4 INJECTION, SOLUTION INTRAMUSCULAR; INTRAVENOUS
Status: DISCONTINUED | OUTPATIENT
Start: 2024-01-01 | End: 2024-01-01 | Stop reason: HOSPADM

## 2024-01-01 RX ORDER — FAMOTIDINE 20 MG/1
20 TABLET, FILM COATED ORAL 2 TIMES DAILY
COMMUNITY

## 2024-01-01 RX ORDER — LORAZEPAM 1 MG/1
2 TABLET ORAL
Status: CANCELLED | OUTPATIENT
Start: 2024-01-01 | End: 2024-01-01

## 2024-01-01 RX ORDER — MIDAZOLAM HYDROCHLORIDE 1 MG/ML
4 INJECTION INTRAMUSCULAR; INTRAVENOUS
Status: DISPENSED | OUTPATIENT
Start: 2024-01-01 | End: 2024-01-01

## 2024-01-01 RX ORDER — AMOXICILLIN 250 MG
2 CAPSULE ORAL 2 TIMES DAILY
COMMUNITY

## 2024-01-01 RX ORDER — MORPHINE SULFATE 4 MG/ML
4 INJECTION, SOLUTION INTRAMUSCULAR; INTRAVENOUS
Status: DISCONTINUED | OUTPATIENT
Start: 2024-01-01 | End: 2024-01-01 | Stop reason: HOSPADM

## 2024-01-01 RX ORDER — INSULIN LISPRO 100 [IU]/ML
2-7 INJECTION, SOLUTION INTRAVENOUS; SUBCUTANEOUS
Status: DISCONTINUED | OUTPATIENT
Start: 2024-01-01 | End: 2024-01-01

## 2024-01-01 RX ORDER — MIDAZOLAM HYDROCHLORIDE 1 MG/ML
2 INJECTION INTRAMUSCULAR; INTRAVENOUS
Status: DISPENSED | OUTPATIENT
Start: 2024-01-01 | End: 2024-01-01

## 2024-01-01 RX ORDER — HYDROCODONE BITARTRATE AND ACETAMINOPHEN 7.5; 325 MG/1; MG/1
1 TABLET ORAL EVERY 4 HOURS PRN
COMMUNITY

## 2024-01-01 RX ORDER — ACETAMINOPHEN 650 MG/1
650 SUPPOSITORY RECTAL EVERY 4 HOURS PRN
Status: DISCONTINUED | OUTPATIENT
Start: 2024-01-01 | End: 2024-01-01

## 2024-01-01 RX ORDER — SODIUM CHLORIDE 9 MG/ML
75 INJECTION, SOLUTION INTRAVENOUS CONTINUOUS
Status: DISCONTINUED | OUTPATIENT
Start: 2024-01-01 | End: 2024-01-01

## 2024-01-01 RX ORDER — MORPHINE SULFATE 2 MG/ML
2 INJECTION, SOLUTION INTRAMUSCULAR; INTRAVENOUS
Status: CANCELLED | OUTPATIENT
Start: 2024-01-01 | End: 2024-01-15

## 2024-01-01 RX ORDER — MENTHOL AND ZINC OXIDE .44; 20.625 G/100G; G/100G
1 OINTMENT TOPICAL 2 TIMES DAILY PRN
COMMUNITY

## 2024-01-01 RX ORDER — MIDAZOLAM HYDROCHLORIDE 1 MG/ML
2 INJECTION INTRAMUSCULAR; INTRAVENOUS
Status: CANCELLED | OUTPATIENT
Start: 2024-01-01 | End: 2024-01-01

## 2024-01-01 RX ORDER — LORAZEPAM 1 MG/1
1 TABLET ORAL
Status: CANCELLED | OUTPATIENT
Start: 2024-01-01 | End: 2024-01-01

## 2024-01-01 RX ORDER — LABETALOL HYDROCHLORIDE 5 MG/ML
10 INJECTION, SOLUTION INTRAVENOUS ONCE
Status: COMPLETED | OUTPATIENT
Start: 2024-01-01 | End: 2024-01-01

## 2024-01-01 RX ORDER — ACETAMINOPHEN 160 MG/5ML
650 SOLUTION ORAL EVERY 4 HOURS PRN
Status: DISCONTINUED | OUTPATIENT
Start: 2024-01-01 | End: 2024-01-01

## 2024-01-01 RX ORDER — LORAZEPAM 2 MG/ML
2 CONCENTRATE ORAL
Status: ACTIVE | OUTPATIENT
Start: 2024-01-01 | End: 2024-01-01

## 2024-01-01 RX ORDER — MIDAZOLAM HYDROCHLORIDE 5 MG/ML
2 INJECTION INTRAMUSCULAR; INTRAVENOUS
Status: DISCONTINUED | OUTPATIENT
Start: 2024-01-01 | End: 2024-01-01 | Stop reason: HOSPADM

## 2024-01-01 RX ORDER — MIDAZOLAM HYDROCHLORIDE 1 MG/ML
2 INJECTION INTRAMUSCULAR; INTRAVENOUS
Status: ACTIVE | OUTPATIENT
Start: 2024-01-01 | End: 2024-01-01

## 2024-01-01 RX ORDER — KETOROLAC TROMETHAMINE 15 MG/ML
15 INJECTION, SOLUTION INTRAMUSCULAR; INTRAVENOUS EVERY 6 HOURS PRN
Status: DISCONTINUED | OUTPATIENT
Start: 2024-01-01 | End: 2024-01-01

## 2024-01-01 RX ORDER — HYDROMORPHONE HYDROCHLORIDE 1 MG/ML
0.5 INJECTION, SOLUTION INTRAMUSCULAR; INTRAVENOUS; SUBCUTANEOUS
Status: DISCONTINUED | OUTPATIENT
Start: 2024-01-01 | End: 2024-01-01 | Stop reason: HOSPADM

## 2024-01-01 RX ORDER — MORPHINE SULFATE 2 MG/ML
2 INJECTION, SOLUTION INTRAMUSCULAR; INTRAVENOUS
Status: DISCONTINUED | OUTPATIENT
Start: 2024-01-01 | End: 2024-01-01 | Stop reason: HOSPADM

## 2024-01-01 RX ORDER — SODIUM CHLORIDE 0.9 % (FLUSH) 0.9 %
10 SYRINGE (ML) INJECTION AS NEEDED
Status: CANCELLED | OUTPATIENT
Start: 2024-01-01

## 2024-01-01 RX ORDER — HYDROMORPHONE HYDROCHLORIDE 2 MG/ML
1.5 INJECTION, SOLUTION INTRAMUSCULAR; INTRAVENOUS; SUBCUTANEOUS
Status: DISCONTINUED | OUTPATIENT
Start: 2024-01-01 | End: 2024-01-01 | Stop reason: HOSPADM

## 2024-01-01 RX ORDER — SODIUM CHLORIDE 9 MG/ML
40 INJECTION, SOLUTION INTRAVENOUS AS NEEDED
Status: DISCONTINUED | OUTPATIENT
Start: 2024-01-01 | End: 2024-01-01

## 2024-01-01 RX ORDER — HYDROMORPHONE HYDROCHLORIDE 1 MG/ML
0.5 INJECTION, SOLUTION INTRAMUSCULAR; INTRAVENOUS; SUBCUTANEOUS
Status: CANCELLED | OUTPATIENT
Start: 2024-01-01 | End: 2024-01-15

## 2024-01-01 RX ORDER — ASPIRIN 300 MG/1
300 SUPPOSITORY RECTAL DAILY
Status: DISCONTINUED | OUTPATIENT
Start: 2024-01-01 | End: 2024-01-01

## 2024-01-01 RX ORDER — SODIUM CHLORIDE 0.9 % (FLUSH) 0.9 %
10 SYRINGE (ML) INJECTION AS NEEDED
Status: DISCONTINUED | OUTPATIENT
Start: 2024-01-01 | End: 2024-01-01

## 2024-01-01 RX ADMIN — MIDAZOLAM 1 MG: 1 INJECTION INTRAMUSCULAR; INTRAVENOUS at 04:34

## 2024-01-01 RX ADMIN — HYDROMORPHONE HYDROCHLORIDE 1 MG: 1 INJECTION, SOLUTION INTRAMUSCULAR; INTRAVENOUS; SUBCUTANEOUS at 02:04

## 2024-01-01 RX ADMIN — MORPHINE SULFATE 4 MG: 4 INJECTION, SOLUTION INTRAMUSCULAR; INTRAVENOUS at 00:19

## 2024-01-01 RX ADMIN — HYDROMORPHONE HYDROCHLORIDE 1 MG: 1 INJECTION, SOLUTION INTRAMUSCULAR; INTRAVENOUS; SUBCUTANEOUS at 17:01

## 2024-01-01 RX ADMIN — MIDAZOLAM 2 MG: 1 INJECTION INTRAMUSCULAR; INTRAVENOUS at 12:28

## 2024-01-01 RX ADMIN — HYDROMORPHONE HYDROCHLORIDE 1 MG: 1 INJECTION, SOLUTION INTRAMUSCULAR; INTRAVENOUS; SUBCUTANEOUS at 16:28

## 2024-01-01 RX ADMIN — Medication 10 ML: at 04:24

## 2024-01-01 RX ADMIN — MIDAZOLAM 2 MG: 1 INJECTION INTRAMUSCULAR; INTRAVENOUS at 20:37

## 2024-01-01 RX ADMIN — MORPHINE SULFATE 4 MG: 4 INJECTION, SOLUTION INTRAMUSCULAR; INTRAVENOUS at 08:32

## 2024-01-01 RX ADMIN — MIDAZOLAM 4 MG: 1 INJECTION INTRAMUSCULAR; INTRAVENOUS at 04:45

## 2024-01-01 RX ADMIN — MIDAZOLAM 1 MG: 1 INJECTION INTRAMUSCULAR; INTRAVENOUS at 05:20

## 2024-01-01 RX ADMIN — ATORVASTATIN CALCIUM 80 MG: 80 TABLET, FILM COATED ORAL at 22:49

## 2024-01-01 RX ADMIN — INSULIN GLARGINE 10 UNITS: 100 INJECTION, SOLUTION SUBCUTANEOUS at 22:49

## 2024-01-01 RX ADMIN — HYDROMORPHONE HYDROCHLORIDE 1 MG: 1 INJECTION, SOLUTION INTRAMUSCULAR; INTRAVENOUS; SUBCUTANEOUS at 12:36

## 2024-01-01 RX ADMIN — MIDAZOLAM 4 MG: 1 INJECTION INTRAMUSCULAR; INTRAVENOUS at 20:52

## 2024-01-01 RX ADMIN — MIDAZOLAM 2 MG: 1 INJECTION INTRAMUSCULAR; INTRAVENOUS at 16:54

## 2024-01-01 RX ADMIN — MIDAZOLAM 2 MG: 1 INJECTION INTRAMUSCULAR; INTRAVENOUS at 00:28

## 2024-01-01 RX ADMIN — MIDAZOLAM 4 MG: 1 INJECTION INTRAMUSCULAR; INTRAVENOUS at 05:41

## 2024-01-01 RX ADMIN — MIDAZOLAM 2 MG: 1 INJECTION INTRAMUSCULAR; INTRAVENOUS at 08:22

## 2024-01-01 RX ADMIN — IOPAMIDOL 150 ML: 755 INJECTION, SOLUTION INTRAVENOUS at 20:48

## 2024-01-01 RX ADMIN — SODIUM CHLORIDE 100 ML/HR: 9 INJECTION, SOLUTION INTRAVENOUS at 04:25

## 2024-01-01 RX ADMIN — MORPHINE SULFATE 4 MG: 2 INJECTION, SOLUTION INTRAMUSCULAR; INTRAVENOUS at 12:59

## 2024-01-01 RX ADMIN — MORPHINE SULFATE 2 MG: 2 INJECTION, SOLUTION INTRAMUSCULAR; INTRAVENOUS at 21:23

## 2024-01-01 RX ADMIN — MORPHINE SULFATE 4 MG: 4 INJECTION, SOLUTION INTRAMUSCULAR; INTRAVENOUS at 04:26

## 2024-01-01 RX ADMIN — MIDAZOLAM 4 MG: 1 INJECTION INTRAMUSCULAR; INTRAVENOUS at 00:53

## 2024-01-01 RX ADMIN — MIDAZOLAM 1 MG: 1 INJECTION INTRAMUSCULAR; INTRAVENOUS at 00:44

## 2024-01-01 RX ADMIN — ASPIRIN 300 MG: 300 SUPPOSITORY RECTAL at 08:36

## 2024-01-01 RX ADMIN — MIDAZOLAM 4 MG: 1 INJECTION INTRAMUSCULAR; INTRAVENOUS at 16:28

## 2024-01-01 RX ADMIN — MIDAZOLAM 2 MG: 1 INJECTION INTRAMUSCULAR; INTRAVENOUS at 12:58

## 2024-01-01 RX ADMIN — FAMOTIDINE 20 MG: 20 TABLET, FILM COATED ORAL at 22:49

## 2024-01-01 RX ADMIN — MIDAZOLAM 4 MG: 1 INJECTION INTRAMUSCULAR; INTRAVENOUS at 12:36

## 2024-01-01 RX ADMIN — MIDAZOLAM 2 MG: 1 INJECTION INTRAMUSCULAR; INTRAVENOUS at 08:32

## 2024-01-01 RX ADMIN — Medication 10 ML: at 08:36

## 2024-01-01 RX ADMIN — MIDAZOLAM 1 MG: 1 INJECTION INTRAMUSCULAR; INTRAVENOUS at 12:23

## 2024-01-01 RX ADMIN — MIDAZOLAM 2 MG: 1 INJECTION INTRAMUSCULAR; INTRAVENOUS at 04:26

## 2024-01-01 RX ADMIN — SODIUM CHLORIDE 75 ML/HR: 9 INJECTION, SOLUTION INTRAVENOUS at 19:22

## 2024-01-01 RX ADMIN — LORAZEPAM 1 MG: 2 LIQUID ORAL at 18:17

## 2024-01-01 RX ADMIN — ASPIRIN 81 MG: 81 TABLET, CHEWABLE ORAL at 08:12

## 2024-01-01 RX ADMIN — Medication 1000 UNITS: at 14:21

## 2024-01-01 RX ADMIN — MORPHINE SULFATE 2 MG: 2 INJECTION, SOLUTION INTRAMUSCULAR; INTRAVENOUS at 08:26

## 2024-01-01 RX ADMIN — MIDAZOLAM 4 MG: 1 INJECTION INTRAMUSCULAR; INTRAVENOUS at 08:16

## 2024-01-01 RX ADMIN — MORPHINE SULFATE 4 MG: 4 INJECTION, SOLUTION INTRAMUSCULAR; INTRAVENOUS at 08:22

## 2024-01-01 RX ADMIN — MIDAZOLAM 2 MG: 1 INJECTION INTRAMUSCULAR; INTRAVENOUS at 17:03

## 2024-01-01 RX ADMIN — MIDAZOLAM 1 MG: 1 INJECTION INTRAMUSCULAR; INTRAVENOUS at 18:35

## 2024-01-01 RX ADMIN — MIDAZOLAM 1 MG: 1 INJECTION INTRAMUSCULAR; INTRAVENOUS at 09:02

## 2024-01-01 RX ADMIN — MIDAZOLAM 2 MG: 1 INJECTION INTRAMUSCULAR; INTRAVENOUS at 00:19

## 2024-01-01 RX ADMIN — MIDAZOLAM 1 MG: 1 INJECTION INTRAMUSCULAR; INTRAVENOUS at 08:26

## 2024-01-01 RX ADMIN — MIDAZOLAM 1 MG: 1 INJECTION INTRAMUSCULAR; INTRAVENOUS at 17:46

## 2024-01-01 RX ADMIN — HYDROMORPHONE HYDROCHLORIDE 1 MG: 1 INJECTION, SOLUTION INTRAMUSCULAR; INTRAVENOUS; SUBCUTANEOUS at 04:45

## 2024-01-01 RX ADMIN — MIDAZOLAM 4 MG: 1 INJECTION INTRAMUSCULAR; INTRAVENOUS at 02:04

## 2024-01-01 RX ADMIN — MIDAZOLAM 1 MG: 1 INJECTION INTRAMUSCULAR; INTRAVENOUS at 12:21

## 2024-01-01 RX ADMIN — HYDROMORPHONE HYDROCHLORIDE 1 MG: 1 INJECTION, SOLUTION INTRAMUSCULAR; INTRAVENOUS; SUBCUTANEOUS at 20:52

## 2024-01-01 RX ADMIN — HYDROMORPHONE HYDROCHLORIDE 1 MG: 1 INJECTION, SOLUTION INTRAMUSCULAR; INTRAVENOUS; SUBCUTANEOUS at 05:41

## 2024-01-01 RX ADMIN — HYDROMORPHONE HYDROCHLORIDE 1 MG: 1 INJECTION, SOLUTION INTRAMUSCULAR; INTRAVENOUS; SUBCUTANEOUS at 00:54

## 2024-01-01 RX ADMIN — MIDAZOLAM 1 MG: 1 INJECTION INTRAMUSCULAR; INTRAVENOUS at 20:22

## 2024-01-01 RX ADMIN — MIDAZOLAM 1 MG: 1 INJECTION INTRAMUSCULAR; INTRAVENOUS at 12:27

## 2024-01-01 RX ADMIN — CEFTRIAXONE SODIUM 1000 MG: 1 INJECTION, POWDER, FOR SOLUTION INTRAMUSCULAR; INTRAVENOUS at 08:35

## 2024-01-01 RX ADMIN — MIDAZOLAM 1 MG: 1 INJECTION INTRAMUSCULAR; INTRAVENOUS at 09:40

## 2024-01-01 RX ADMIN — INSULIN LISPRO 3 UNITS: 100 INJECTION, SOLUTION INTRAVENOUS; SUBCUTANEOUS at 17:44

## 2024-01-01 RX ADMIN — MORPHINE SULFATE 4 MG: 4 INJECTION, SOLUTION INTRAMUSCULAR; INTRAVENOUS at 16:54

## 2024-01-01 RX ADMIN — MIDAZOLAM 1 MG: 1 INJECTION INTRAMUSCULAR; INTRAVENOUS at 20:41

## 2024-01-01 RX ADMIN — ASPIRIN 81 MG: 81 TABLET, CHEWABLE ORAL at 14:21

## 2024-01-01 RX ADMIN — MORPHINE SULFATE 2 MG: 2 INJECTION, SOLUTION INTRAMUSCULAR; INTRAVENOUS at 17:32

## 2024-01-01 RX ADMIN — MIDAZOLAM 4 MG: 1 INJECTION INTRAMUSCULAR; INTRAVENOUS at 20:47

## 2024-01-01 RX ADMIN — HYDROMORPHONE HYDROCHLORIDE 1 MG: 1 INJECTION, SOLUTION INTRAMUSCULAR; INTRAVENOUS; SUBCUTANEOUS at 08:16

## 2024-01-01 RX ADMIN — MORPHINE SULFATE 4 MG: 4 INJECTION, SOLUTION INTRAMUSCULAR; INTRAVENOUS at 00:28

## 2024-01-01 RX ADMIN — SENNOSIDES AND DOCUSATE SODIUM 2 TABLET: 50; 8.6 TABLET ORAL at 08:12

## 2024-01-01 RX ADMIN — Medication 1000 UNITS: at 08:12

## 2024-01-01 RX ADMIN — MORPHINE SULFATE 4 MG: 4 INJECTION, SOLUTION INTRAMUSCULAR; INTRAVENOUS at 17:03

## 2024-01-01 RX ADMIN — MIDAZOLAM 4 MG: 1 INJECTION INTRAMUSCULAR; INTRAVENOUS at 17:01

## 2024-01-01 RX ADMIN — MIDAZOLAM 1 MG: 1 INJECTION INTRAMUSCULAR; INTRAVENOUS at 16:38

## 2024-01-01 RX ADMIN — MORPHINE SULFATE 4 MG: 4 INJECTION, SOLUTION INTRAMUSCULAR; INTRAVENOUS at 20:37

## 2024-01-01 RX ADMIN — FAMOTIDINE 20 MG: 20 TABLET, FILM COATED ORAL at 08:12

## 2024-01-01 RX ADMIN — MIDAZOLAM 1 MG: 1 INJECTION INTRAMUSCULAR; INTRAVENOUS at 00:40

## 2024-01-01 RX ADMIN — MIDAZOLAM 1 MG: 1 INJECTION INTRAMUSCULAR; INTRAVENOUS at 21:49

## 2024-01-01 RX ADMIN — CEFTRIAXONE SODIUM 1000 MG: 1 INJECTION, POWDER, FOR SOLUTION INTRAMUSCULAR; INTRAVENOUS at 09:10

## 2024-01-01 RX ADMIN — MORPHINE SULFATE 2 MG: 2 INJECTION, SOLUTION INTRAMUSCULAR; INTRAVENOUS at 05:02

## 2024-01-01 RX ADMIN — MIDAZOLAM 2 MG: 1 INJECTION INTRAMUSCULAR; INTRAVENOUS at 05:07

## 2024-01-01 RX ADMIN — LABETALOL HYDROCHLORIDE 10 MG: 5 INJECTION, SOLUTION INTRAVENOUS at 20:25

## 2024-01-01 RX ADMIN — HYDROMORPHONE HYDROCHLORIDE 1 MG: 1 INJECTION, SOLUTION INTRAMUSCULAR; INTRAVENOUS; SUBCUTANEOUS at 20:47

## 2024-01-01 RX ADMIN — MORPHINE SULFATE 4 MG: 4 INJECTION, SOLUTION INTRAMUSCULAR; INTRAVENOUS at 12:28

## 2024-01-03 PROBLEM — I63.9 STROKE OF UNKNOWN CAUSE: Status: ACTIVE | Noted: 2024-01-01

## 2024-01-03 PROBLEM — I63.9 STROKE OF UNKNOWN ETIOLOGY: Status: ACTIVE | Noted: 2024-01-01

## 2024-01-03 NOTE — Clinical Note
Level of Care: Progressive Care [20]   Diagnosis: Stroke of unknown etiology [443810]   Certification: I Certify That Inpatient Hospital Services Are Medically Necessary For Greater Than 2 Midnights

## 2024-01-04 NOTE — PLAN OF CARE
Problem: Malnutrition  Goal: Improved Nutritional Intake  Outcome: Ongoing, Progressing     Problem: Oral Intake Inadequate  Goal: Improved Oral Intake  Outcome: Ongoing, Progressing   Goal Outcome Evaluation:         Pt meets criteria for severe malnutrition  NPO at this time  RD to follow

## 2024-01-04 NOTE — PLAN OF CARE
Goal Outcome Evaluation:  Plan of Care Reviewed With: patient           Outcome Evaluation: clinical swallow eval completed. Pt demonstrated no overt s/s aspiration with thin or puree consistencies. Unable to follow directions for phonation, therefor, unable to reliably assess vocal quality. Extraneous movements of head noted. Solids not trialed at this time. REC puree/thin, meds w/ puree. Upright with all po. If clinical s/s noted (ie. coughing) rec to hold po and contact speech dept.

## 2024-01-04 NOTE — THERAPY EVALUATION
Patient Name: Sam Flores  : 1934    MRN: [id number]                              Today's Date: 2024       Admit Date: 1/3/2024    Visit Dx:     ICD-10-CM ICD-9-CM   1. Stroke of unknown etiology  I63.9 434.91   2. Acute renal insufficiency  N28.9 593.9   3. Dementia, unspecified dementia severity, unspecified dementia type, unspecified whether behavioral, psychotic, or mood disturbance or anxiety  F03.90 294.20   4. Acute UTI  N39.0 599.0     Patient Active Problem List   Diagnosis    Lumbar compression fracture    Osteoporosis    Dehydration    Acute on chronic renal failure    Type 2 diabetes mellitus without complication    Benign essential hypertension    Weight loss, abnormal    Trouble swallowing    Hyponatremia    Hypokalemia    Aspiration pneumonia    Chest wall hematoma, right, initial encounter    Dementia    Altered mental status, unspecified altered mental status type    Stroke of unknown etiology    Stroke of unknown cause     Past Medical History:   Diagnosis Date    Breast cancer     Dementia     Diabetes mellitus     Emphysema of lung     Hyperlipidemia     Hypertension     Hypothyroidism     Lumbar compression fracture 2016    Osteoporosis     Parkinson's disease     S/P kyphoplasty 2016    L1 and L4, 2016     Past Surgical History:   Procedure Laterality Date    BREAST BIOPSY       SECTION      KYPHOPLASTY      T12 and L2    KYPHOPLASTY N/A 2016    Procedure: KYPHOPLASTY LUMBAR 1 AND LUMBAR 4;  Surgeon: Triston Marinelli IV, MD;  Location: [facility] ;  Service:     MASTECTOMY Bilateral     SHOULDER SURGERY Right       General Information       Row Name 24 0854          Physical Therapy Time and Intention    Document Type evaluation  -MG     Mode of Treatment co-treatment;physical therapy;occupational therapy;other (see comments)  -MG       Row Name 24 0854          General Information    Patient Profile Reviewed yes  -MG      "Prior Level of Function max assist:;dependent:  Unknown PLOF as pt possibly aphasic, confused and non-conversational. Per RN and chart from past admissions pt is WC bound, requires A with all ADLs, staff tsfs pt to WC to go to dining room and hips/knees at 90-90.  -MG     Existing Precautions/Restrictions fall  -MG     Barriers to Rehab medically complex;previous functional deficit;cognitive status  -MG       Row Name 01/04/24 0854          Living Environment    People in Home facility resident  -MG       Row Name 01/04/24 0854          Home Main Entrance    Number of Stairs, Main Entrance none  -MG       Row Name 01/04/24 0854          Stairs Within Home, Primary    Number of Stairs, Within Home, Primary none  -MG       Row Name 01/04/24 0854          Cognition    Orientation Status (Cognition) oriented to;person;verbal cues/prompts needed for orientation  -MG       Row Name 01/04/24 0854          Safety Issues, Functional Mobility    Safety Issues Affecting Function (Mobility) ability to follow commands;friction/shear risk;insight into deficits/self-awareness  Unable to follow commands. When asked if she goes by [place] or [place] she stated \"[place]\". Answered \"yes\" to two other questions, but does not respond to any others.  -MG     Impairments Affecting Function (Mobility) balance;endurance/activity tolerance;strength;pain;postural/trunk control;muscle tone abnormal;range of motion (ROM);cognition  -MG     Comment, Safety Issues/Impairments (Mobility) Co treatment medically appropriate and necessary due to patient acuity level, activity tolerance and safety of patient and staff. Evaluation established to achieve all goals in POC.  -MG               User Key  (r) = Recorded By, (t) = Taken By, (c) = Cosigned By      Initials Name Provider Type    MG Britt Hayes, PT Physical Therapist                   Mobility       Row Name 01/04/24 0974          Bed Mobility    Bed Mobility rolling left;rolling " "right;scooting/bridging  -MG     Rolling Left [place] (Bed Mobility) dependent (less than 25% patient effort);1 person assist;2 person assist  -MG     Rolling Right [place] (Bed Mobility) dependent (less than 25% patient effort);1 person assist;2 person assist  -MG     Scooting/Bridging [place] (Bed Mobility) dependent (less than 25% patient effort);2 person assist  -MG     Assistive Device (Bed Mobility) bed rails;draw sheet;head of bed elevated  -MG     Comment, (Bed Mobility) Pt rolled L<>R for brief change, clean up and bed linen change. Pt unable to follow commands to assist with rolling. Noted non-purposeful hip/glute activation/mvmt, but unable to extend B hips/knees past 90-90. Unable and/or resistive to attempt extending UEs to reach for bedrails.  -MG               User Key  (r) = Recorded By, (t) = Taken By, (c) = Cosigned By      Initials Name Provider Type    MG Britt Hayes, PT Physical Therapist                   Obj/Interventions       Row Name 01/04/24 0952          Range of Motion Comprehensive    General Range of Motion lower extremity range of motion deficits identified  -MG     Comment, General Range of Motion Appears to have contractures to B hips/knees. When asked pt if this PT could try to straighten her LE she stated \"yes\"; unable to get pts LE extended past 90-90 at hips/knees.  -MG       Row Name 01/04/24 0952          Strength Comprehensive (MMT)    General Manual Muscle Testing (MMT) Assessment lower extremity strength deficits identified  -MG     Comment, General Manual Muscle Testing (MMT) Assessment Grossly weak, but unable to formally assess as pt appears contracted and is unable to follow commands.  -MG       Row Name 01/04/24 0952          Sensory Assessment (Somatosensory)    Sensory Assessment (Somatosensory) LE sensation intact  Pt reacts/states \"yes\" to being able to feel this PT touching her LEs.  -MG               User Key  (r) = Recorded By, (t) = Taken By, " (c) = Cosigned By      Initials Name Provider Type    MG Britt Hayes, PT Physical Therapist                   Goals/Plan    No documentation.                  Clinical Impression       Row Name 01/04/24 0954          Pain    Pain Intervention(s) Medication (See MAR);Repositioned;Rest  -MG       Row Name 01/04/24 0954          Pain Scale: FACES Pre/Post-Treatment    Pain: FACES Scale, Pretreatment 0-->no hurt  -MG     Posttreatment Pain Rating 6-->hurts even more  -MG     Pre/Posttreatment Pain Comment With ROM of UEs.  -MG       Row Name 01/04/24 0954          Plan of Care Review    Plan of Care Reviewed With patient  -MG     Outcome Evaluation Pt is a 88 y/o F who presented to ED from LTC with c/o from staff who noted L gaze deviation, aphasia and hypertension. CT (+) L occipital lobe infarct and increased L PCA pneumbra. Per chart pt is from a LTC, is conversational at baseline, has advanced dementia, is total assist for ADLs, but sometimes able to feed herself and staff transfers her to a  to take her to the dining room. Today, pt presents with a L gaze deviation, is unable to follow commands, has little to no verbalizations, is DepA x1-2 for bed mobility and brief change/clean up and yells out in pain with gentle ROM of her UEs. Pt also appears to have contractures at her BLEs with hips/knees at 90-90. Pt also appears to keep her upper body/trunk straight in supine or rotated to the L and her hips/BLEs are rotated to the R. Pt was unable to hold to follow command to keep her LEs in midline once this PT assisted with placement. End of session pt was positioned with pillows for neutral hip alignment, cervial neutral alignment and a pillow placed between her knees/ankles. Based on chart pt appears at her mobility baseline, but does have visual deficits of the L deviated gaze and speech deficits. Pt is not appropriate for skilled PT services at this time as she is at her mobility baseline and w/ contractures.  PT will sign off at this time. Rec return to LTC at [place].  -MG       Row Name 01/04/24 0954          Therapy Assessment/Plan (PT)    Criteria for Skilled Interventions Met (PT) no;does not meet criteria for skilled intervention  -MG     Therapy Frequency (PT) evaluation only  -MG       Row Name 01/04/24 0954          Vital Signs    O2 Delivery Pre Treatment room air  -MG     O2 Delivery Intra Treatment room air  -MG     O2 Delivery Post Treatment room air  -MG     Pre Patient Position Supine  -MG     Intra Patient Position Side Lying  -MG     Post Patient Position Supine  -MG       Row Name 01/04/24 0954          Positioning and Restraints    Pre-Treatment Position in bed  -MG     Post Treatment Position bed  -MG     In Bed notified nsg;supine;fowlers;call light within reach;encouraged to call for assist;exit alarm on;side rails up x3;with nsg  Pillows under R hip and between knees/ankles. Pt not cogntively aware enough to use call light; RN aware.  -MG               User Key  (r) = Recorded By, (t) = Taken By, (c) = Cosigned By      Initials Name Provider Type    MG Britt Hayes, PT Physical Therapist                   Outcome Measures       Row Name 01/04/24 1003 01/03/24 7090       How much help from another person do you currently need...    Turning from your back to your side while in flat bed without using bedrails? 1  -MG 3  -TT    Moving from lying on back to sitting on the side of a flat bed without bedrails? 1  -MG 2  -TT    Moving to and from a bed to a chair (including a wheelchair)? 1  -MG 2  -TT    Standing up from a chair using your arms (e.g., wheelchair, bedside chair)? 1  -MG 2  -TT    Climbing 3-5 steps with a railing? 1  -MG 2  -TT    To walk in hospital room? 1  -MG 2  -TT    AM-PAC 6 Clicks Score (PT) 6  -MG 13  -TT    Highest Level of Mobility Goal 2 --> Bed activities/dependent transfer  -MG 4 --> Transfer to chair/commode  -TT      Row Name 01/04/24 1003 01/04/24 0859       Modified [place]  Scale    Modified [place] Scale 5 - Severe disability.  Bedridden, incontinent, and requiring constant nursing care and attention.  - 5 - Severe disability.  Bedridden, incontinent, and requiring constant nursing care and attention.  -      Row Name 01/04/24 0859          Functional Assessment    Outcome Measure Options AM-PAC 6 Clicks Daily Activity (OT);Modified [place]  -               User Key  (r) = Recorded By, (t) = Taken By, (c) = Cosigned By      Initials Name Provider Type     Britt Hayes, PT Physical Therapist     Xuan Monteiro, OT Occupational Therapist    TT Evan Ontiveros, RN Registered Nurse                                 Physical Therapy Education       Title: PT OT SLP Therapies (In Progress)       Topic: Physical Therapy (In Progress)       Point: Mobility training (Not Started)       Learner Progress:  Not documented in this visit.              Point: Home exercise program (Not Started)       Learner Progress:  Not documented in this visit.              Point: Body mechanics (Not Started)       Learner Progress:  Not documented in this visit.              Point: Precautions (In Progress)       Learning Progress Summary             Patient Acceptance, E, NL by  at 1/4/2024 1004                                         User Key       Initials Effective Dates Name Provider Type Discipline     05/24/22 -  Britt Hayes, PT Physical Therapist PT                  PT Recommendation and Plan     Plan of Care Reviewed With: patient  Outcome Evaluation: Pt is a 89 y/o F who presented to ED from LTC with c/o from staff who noted L gaze deviation, aphasia and hypertension. CT (+) L occipital lobe infarct and increased L PCA pneumbra. Per chart pt is from a LTC, is conversational at baseline, has advanced dementia, is total assist for ADLs, but sometimes able to feed herself and staff transfers her to a  to take her to the dining room. Today, pt presents with a L gaze deviation, is unable  to follow commands, has little to no verbalizations, is DepA x1-2 for bed mobility and brief change/clean up and yells out in pain with gentle ROM of her UEs. Pt also appears to have contractures at her BLEs with hips/knees at 90-90. Pt also appears to keep her upper body/trunk straight in supine or rotated to the L and her hips/BLEs are rotated to the R. Pt was unable to hold to follow command to keep her LEs in midline once this PT assisted with placement. End of session pt was positioned with pillows for neutral hip alignment, cervial neutral alignment and a pillow placed between her knees/ankles. Based on chart pt appears at her mobility baseline, but does have visual deficits of the L deviated gaze and speech deficits. Pt is not appropriate for skilled PT services at this time as she is at her mobility baseline and w/ contractures. PT will sign off at this time. Rec return to LTC at [place].     Time Calculation:         PT Charges       Row Name 01/04/24 1004             Time Calculation    Start Time 0815  -MG      Stop Time 0832  -MG      Time Calculation (min) 17 min  -MG      PT Received On 01/04/24  -MG         Time Calculation- PT    Total Timed Code Minutes- PT 10 minute(s)  -MG                User Key  (r) = Recorded By, (t) = Taken By, (c) = Cosigned By      Initials Name Provider Type    MG Britt Hayes, PT Physical Therapist                  Therapy Charges for Today       Code Description Service Date Service Provider Modifiers Qty    [id number]  PT EVAL MOD COMPLEXITY 3 1/4/2024 Britt Hayes, PT GP 1    [id number]  PT THERAPEUTIC ACT EA 15 MIN 1/4/2024 Britt Hayes, PT GP 1            PT G-Codes  Outcome Measure Options: AM-PAC 6 Clicks Daily Activity (OT), Modified [place]  AM-PAC 6 Clicks Score (PT): 6  AM-PAC 6 Clicks Score (OT): 6  Modified [place] Scale: 5 - Severe disability.  Bedridden, incontinent, and requiring constant nursing care and attention.  PT Discharge Summary  Anticipated  Discharge Disposition (PT): extended care facility    Britt Hayes, PT  1/4/2024

## 2024-01-04 NOTE — PLAN OF CARE
Problem: Fall Injury Risk  Goal: Absence of Fall and Fall-Related Injury  Outcome: Ongoing, Progressing  Intervention: Identify and Manage Contributors  Description: Develop a fall prevention plan with the patient and caregiver/family.  Provide reorientation, appropriate sensory stimulation and routines with changes in mental status to decrease risk of fall.  Promote use of personal vision and auditory aids.  Assess assistance level required for safe and effective self-care; provide support as needed, such as toileting, mobilization. For age 65 and older, implement timed toileting with assistance.  Encourage physical activity, such as performance of mobility and self-care at highest level of patient ability, multicomponent exercise program and provision of appropriate assistive devices.  If fall occurs, assess the severity of injury; implement fall injury protocol. Determine the cause and revise fall injury prevention plan.  Regularly review medication contribution to fall risk; adjust medication administration times to minimize risk of falling.  Consider risk related to polypharmacy and age.  Balance adequate pain management with potential for oversedation.  Recent Flowsheet Documentation  Taken 1/4/2024 0100 by Xuan Rowe RN  Medication Review/Management: medications reviewed  Intervention: Promote Injury-Free Environment  Description: Provide a safe, barrier-free environment that encourages independent activity.  Keep care area uncluttered and well-lighted.  Determine need for increased observation or monitoring.  Avoid use of devices that minimize mobility, such as restraints or indwelling urinary catheter.  Recent Flowsheet Documentation  Taken 1/4/2024 0600 by Xuan Rowe, RN  Safety Promotion/Fall Prevention:   activity supervised   assistive device/personal items within reach   clutter free environment maintained   fall prevention program maintained   nonskid shoes/slippers when out of bed    room organization consistent   safety round/check completed  Taken 1/4/2024 0424 by Xuan Rowe RN  Safety Promotion/Fall Prevention:   activity supervised   assistive device/personal items within reach   clutter free environment maintained   fall prevention program maintained   room organization consistent   safety round/check completed  Taken 1/4/2024 0300 by Xuan Rowe RN  Safety Promotion/Fall Prevention:   activity supervised   assistive device/personal items within reach   clutter free environment maintained   fall prevention program maintained   room organization consistent   safety round/check completed  Taken 1/4/2024 0100 by Xuan Rowe RN  Safety Promotion/Fall Prevention:   activity supervised   assistive device/personal items within reach   clutter free environment maintained   fall prevention program maintained   nonskid shoes/slippers when out of bed   room organization consistent   safety round/check completed     Problem: Skin Injury Risk Increased  Goal: Skin Health and Integrity  Outcome: Ongoing, Progressing  Intervention: Optimize Skin Protection  Description: Perform a full pressure injury risk assessment, as indicated by screening, upon admission to care unit.  Reassess skin (injury risk, full inspection) frequently (e.g., scheduled interval, with change in condition) to provide optimal early detection and prevention.  Maintain adequate tissue perfusion (e.g., encourage fluid balance; avoid crossing legs, constrictive clothing or devices) to promote tissue oxygenation.  Maintain head of bed at lowest degree of elevation tolerated, considering medical condition and other restrictions.  Avoid positioning onto an area that remains reddened.  Minimize incontinence and moisture (e.g., toileting schedule; moisture-wicking pad, diaper or incontinence collection device; skin moisture barrier).  Cleanse skin promptly and gently when soiled utilizing a pH-balanced cleanser.  Relieve  and redistribute pressure (e.g., scheduled position changes, weight shifts, use of support surface, medical device repositioning, protective dressing application, use of positioning device, microclimate control, use of pressure-injury-monitor  Encourage increased activity, such as sitting in a chair at the bedside or early mobilization, when able to tolerate.  Recent Flowsheet Documentation  Taken 1/4/2024 0600 by Xuan Rowe RN  Head of Bed (HOB) Positioning: HOB at 20-30 degrees  Taken 1/4/2024 0424 by Xuan Rowe RN  Head of Bed (HOB) Positioning: HOB at 20 degrees  Taken 1/4/2024 0300 by Xuan Rowe RN  Pressure Reduction Techniques:   frequent weight shift encouraged   heels elevated off bed   weight shift assistance provided  Head of Bed ([facility]) Positioning: [facility] elevated  Pressure Reduction Devices:   alternating pressure pump (ADD)   pressure-redistributing mattress utilized   positioning supports utilized   heel offloading device utilized  Skin Protection:   adhesive use limited   incontinence pads utilized   tubing/devices free from skin contact  Taken 1/4/2024 0100 by Xuan Rowe RN  Pressure Reduction Techniques:   frequent weight shift encouraged   heels elevated off bed   weight shift assistance provided  Head of Bed ([facility]) Positioning: HOB elevated  Pressure Reduction Devices:   pressure-redistributing mattress utilized   positioning supports utilized  Skin Protection:   adhesive use limited   incontinence pads utilized   tubing/devices free from skin contact     Problem: Adjustment to Illness (Stroke, Ischemic/Transient Ischemic Attack)  Goal: Optimal Coping  Outcome: Ongoing, Progressing     Problem: Bowel Elimination Impaired (Stroke, Ischemic/Transient Ischemic Attack)  Goal: Effective Bowel Elimination  Outcome: Ongoing, Progressing     Problem: Cerebral Tissue Perfusion (Stroke, Ischemic/Transient Ischemic Attack)  Goal: Optimal Cerebral Tissue Perfusion  Outcome: Ongoing,  Progressing     Problem: Cognitive Impairment (Stroke, Ischemic/Transient Ischemic Attack)  Goal: Optimal Cognitive Function  Outcome: Ongoing, Progressing     Problem: Communication Impairment (Stroke, Ischemic/Transient Ischemic Attack)  Goal: Improved Communication Skills  Outcome: Ongoing, Progressing  Intervention: Optimize Communication Skills  Description: Evaluate communication skills and provide interventions to improve auditory comprehension, verbal expression, motor speech, pragmatic language, reading and writing skills.  Establish basic communication method related to patient’s care, needs and preferences, such as an established yes-no reliability, use of symbols or communication board.  Make hearing aids, glasses and dentures available.  Utilize clear communication with simple verbal and gestured commands; provide adequate time for response. Provide choices when needed.  Facilitate family member/conversation partner understanding of aphasia or other communication deficits and strategies to increase communication.  Provide a calm environment without excessive noise or distraction; avoid situations overly frustrating for the patient.  Determine optimal means of communication; support multimodal methods and ensure consistent use.  Consider augmentative and alternative communication devices, technology-based interventions and constraint-induced language therapy.  Recent Flowsheet Documentation  Taken 1/4/2024 0100 by Xuan Rowe RN  Communication Enhancement Strategies:   call light answered in person   communication board used   extra time allowed for response   repetition utilized   one-step directions provided   verbal communication attempts encouraged     Problem: Functional Ability Impaired (Stroke, Ischemic/Transient Ischemic Attack)  Goal: Optimal Functional Ability  Outcome: Ongoing, Progressing     Problem: Respiratory Compromise (Stroke, Ischemic/Transient Ischemic Attack)  Goal: Effective  Oxygenation and Ventilation  Outcome: Ongoing, Progressing  Intervention: Optimize Oxygenation and Ventilation  Description: Assess and monitor airway, breathing and circulation; maintain close surveillance for deterioration.  Maintain normal PaCO2 (partial pressure of carbon dioxide) to minimize risk of increased intracranial pressure and cerebral ischemia.  Maintain patent airway; position to minimize risk of obstruction, aspiration and ventilation-perfusion mismatch.  Provide oxygen therapy judiciously to avoid hyperoxemia; adjust to achieve oxygenation goal.  Encourage pulmonary hygiene and lung expansion therapy, such as deep breathing, suction and ambulation, to minimize respiratory complication risk.  Consider inspiratory muscle training to decrease the risk of pulmonary complications.  Recognize risk for obstructive sleep apnea; anticipate the need for continuous pulse oximetry and noninvasive positive pressure ventilation.  Anticipate the need for intubation and mechanical ventilation for airway protection and respiratory support.  Recent Flowsheet Documentation  Taken 1/4/2024 0600 by Xuan Rowe RN  Head of Bed (HOB) Positioning: HOB at 20-30 degrees  Taken 1/4/2024 0424 by Xuan Rowe RN  Head of Bed (HOB) Positioning: HOB at 20 degrees  Taken 1/4/2024 0300 by Xuan Rowe RN  Head of Bed (HOB) Positioning: HOB elevated  Taken 1/4/2024 0100 by Xuan Rowe RN  Head of Bed (HOB) Positioning: HOB elevated     Problem: Sensorimotor Impairment (Stroke, Ischemic/Transient Ischemic Attack)  Goal: Improved Sensorimotor Function  Outcome: Ongoing, Progressing  Intervention: Optimize Range of Motion, Motor Control and Function  Description: Evaluate passive and active range of motion, motor control, coordination and muscle tone.  Prevent and address shoulder pain and subluxation with interventions, such as careful positioning and handling techniques, ROM (range of motion) in protected ranges  and support devices.  Implement multimodal therapeutic positioning and ROM (range of motion) interventions to minimize contracture risk and improve ROM (range of motion); ensure edema is managed.  Involve patient and parent/caregiver in treatment planning and implementation; consider both neurologic and developmental context in assessment and treatment.  Incorporate individualized, repetitive treatment activities that are meaningful and goal-directed.  Consider interventions to improve motor control and function, such as constraint-induced motor therapy or modified version, robotics, virtual reality or FES (functional electrical stimulation).  If spasticity develops, quantify with a validated scale and implement interventions, such as positioning, range of motion and stretching.  Recent Flowsheet Documentation  Taken 1/4/2024 0600 by Xuan Rowe RN  Positioning/Transfer Devices:   pillows   in use  Taken 1/4/2024 0424 by Xuan Rowe RN  Positioning/Transfer Devices:   pillows   in use  Taken 1/4/2024 0300 by Xuan Rowe RN  Positioning/Transfer Devices:   pillows   in use  Taken 1/4/2024 0100 by Xuan Rowe RN  Positioning/Transfer Devices:   pillows   in use  Intervention: Optimize Sensory and Perceptual Ability  Description: Assess visual and perceptual status and implement interventions to address impairments.  Evaluate somatosensory status to determine type and extent of impairment; consider somatosensory retraining to improve sensory discrimination.  Provide interventions for hemispatial neglect, such as training in scanning of visual field.  Encourage use of vision to compensate for sensory loss, such as frequent visual scanning.  Identify and address any hearing or visual acuity impairments.  Recent Flowsheet Documentation  Taken 1/4/2024 0300 by Xuan Rowe RN  Pressure Reduction Techniques:   frequent weight shift encouraged   heels elevated off bed   weight shift assistance  provided  Pressure Reduction Devices:   alternating pressure pump (ADD)   pressure-redistributing mattress utilized   positioning supports utilized   heel offloading device utilized  Taken 1/4/2024 0100 by Xuan Rowe, RN  Pressure Reduction Techniques:   frequent weight shift encouraged   heels elevated off bed   weight shift assistance provided  Pressure Reduction Devices:   pressure-redistributing mattress utilized   positioning supports utilized     Problem: Swallowing Impairment (Stroke, Ischemic/Transient Ischemic Attack)  Goal: Optimal Eating and Swallowing without Aspiration  Outcome: Ongoing, Progressing     Problem: Urinary Elimination Impaired (Stroke, Ischemic/Transient Ischemic Attack)  Goal: Effective Urinary Elimination  Outcome: Ongoing, Progressing     Problem: Adult Inpatient Plan of Care  Goal: Plan of Care Review  Outcome: Ongoing, Progressing  Flowsheets (Taken 1/4/2024 0635)  Progress: no change  Plan of Care Reviewed With: patient  Goal: Absence of Hospital-Acquired Illness or Injury  Outcome: Ongoing, Progressing  Intervention: Identify and Manage Fall Risk  Description: Perform standard risk assessment on admission using a validated tool or comprehensive approach appropriate to the patient; reassess fall risk frequently, with change in status or transfer to another level of care.  Communicate fall injury risk to interprofessional healthcare team.  Determine need for increased observation, equipment and environmental modification, such as low bed, signage and supportive, nonskid footwear.  Adjust safety measures to individual developmental age, stage and identified risk factors.  Reinforce the importance of safety and physical activity with patient and family.  Perform regular intentional rounding to assess need for position change, pain assessment and personal needs, including assistance with toileting.  Recent Flowsheet Documentation  Taken 1/4/2024 0600 by Xuan Rowe, RN  Safety  Promotion/Fall Prevention:   activity supervised   assistive device/personal items within reach   clutter free environment maintained   fall prevention program maintained   nonskid shoes/slippers when out of bed   room organization consistent   safety round/check completed  Taken 1/4/2024 0424 by Xuan Rowe RN  Safety Promotion/Fall Prevention:   activity supervised   assistive device/personal items within reach   clutter free environment maintained   fall prevention program maintained   room organization consistent   safety round/check completed  Taken 1/4/2024 0300 by Xuan Rowe RN  Safety Promotion/Fall Prevention:   activity supervised   assistive device/personal items within reach   clutter free environment maintained   fall prevention program maintained   room organization consistent   safety round/check completed  Taken 1/4/2024 0100 by Xuan Rowe RN  Safety Promotion/Fall Prevention:   activity supervised   assistive device/personal items within reach   clutter free environment maintained   fall prevention program maintained   nonskid shoes/slippers when out of bed   room organization consistent   safety round/check completed  Intervention: Prevent Skin Injury  Description: Perform a screening for skin injury risk, such as pressure or moisture associated skin damage on admission and at regular intervals throughout hospital stay.  Keep all areas of skin (especially folds) clean and dry.  Maintain adequate skin hydration.  Relieve and redistribute pressure and protect bony prominences; implement measures based on patient-specific risk factors.  Match turning and repositioning schedule to clinical condition.  Encourage weight shift frequently; assist with reposition if unable to complete independently.  Float heels off bed; avoid pressure on the Achilles tendon.  Keep skin free from extended contact with medical devices.  Encourage functional activity and mobility, as early as  tolerated.  Use aids (e.g., slide boards, mechanical lift) during transfer.  Recent Flowsheet Documentation  Taken 1/4/2024 0600 by Xuan Rowe RN  Body Position:   turned   side-lying   left  Taken 1/4/2024 0424 by Xuan Rowe RN  Body Position:   weight shifting   position changed independently  Taken 1/4/2024 0300 by Xuan Rowe RN  Body Position:   side-lying   right  Skin Protection:   adhesive use limited   incontinence pads utilized   tubing/devices free from skin contact  Taken 1/4/2024 0100 by Xuan Rowe RN  Body Position: supine  Skin Protection:   adhesive use limited   incontinence pads utilized   tubing/devices free from skin contact  Goal: Optimal Comfort and Wellbeing  Outcome: Ongoing, Progressing  Intervention: Provide Person-Centered Care  Description: Use a family-focused approach to care.  Develop trust and rapport by proactively providing information, encouraging questions, addressing concerns and offering reassurance.  Acknowledge emotional response to hospitalization.  Recognize and utilize personal coping strategies.  Honor spiritual and cultural preferences.  Recent Flowsheet Documentation  Taken 1/4/2024 0100 by Xuan Rowe RN  Trust Relationship/Rapport:   care explained   emotional support provided   reassurance provided  Goal: Readiness for Transition of Care  Outcome: Ongoing, Progressing  Intervention: Mutually Develop Transition Plan  Description: Identify available resources for support (e.g., family, friends, community).  Identify and address barriers to ongoing treatment and home management (e.g., environmental, financial).  Provide opportunities to practice self-management skills.  Assess and monitor emotional readiness for transition.  Establish or reconnect linkage with outpatient providers or community-based services.  Recent Flowsheet Documentation  Taken 1/4/2024 0243 by Xuan Rowe RN  Transportation Anticipated: (unable to assess) other  (see comments)  Patient/Family Anticipated Services at Transition: (unable to assess) other (see comments)  Patient/Family Anticipates Transition to: long-term care facility  Taken 1/4/2024 0241 by Xuan Rowe, RN  Equipment Currently Used at Home: wheelchair   Goal Outcome Evaluation:  Plan of Care Reviewed With: patient        Progress: no change

## 2024-01-04 NOTE — CONSULTS
Neurology Consult Note    Consult Date: 1/4/2024    Referring MD: Ish Clancy MD    Reason for Consult I have been asked to see the patient in neurological consultation to render advice and opinion regarding stroke.    Sam Flores is a 89 y.o. white female with known diagnosis of dementia, type 2 diabetes, essential hypertension, mixed hyperlipidemia, hypothyroidism, Parkinson disease, bilateral lower extremity weakness wheelchair-bound for more than 8 years presented from the nursing home for strokelike symptoms her last known normal 6 AM when the nursing staff noticed aphasia and left gaze deviation.    At 8: 12 PM yesterday, I was called by Dr. Mosquera the ED physician regarding this 89 years old female who presented with aphasia and left gaze deviation with very high blood pressure 217/161.  I requested Team D imaging with stat CT head, CT angio head and neck and CT perfusion, I am waiting to review the images to give further recommendation.  Keep blood pressure permissive until CT head to rule out brain bleed.     CT head showed no bleed but did show large hypodensity on the left occipital lobe concerning for acute ischemic stroke.     CTA H/N showed left P1 occlusion.     CTP showed penumbra on left PCA but CT head already showing large completed stroke. Discussed with Dr. Anglin the neurointerventionalist, the penumbra felt to be false positive from motion artifact.    EKG showed a flutter with AV block.    Labs showed urinary tract infection.    Past Medical History:   Diagnosis Date    Breast cancer     Dementia     Diabetes mellitus     Emphysema of lung     Hyperlipidemia     Hypertension     Hypothyroidism     Lumbar compression fracture 11/30/2016    Osteoporosis     Parkinson's disease     S/P kyphoplasty 12/05/2016    L1 and L4, 12/2/2016       Exam  /94 (BP Location: Left leg, Patient Position: Lying) Comment: becomes restless with BP check. May affect result  Pulse 69   Temp  "97.5 °F (36.4 °C) (Rectal)   Resp 18   Ht 149.9 cm (59\")   Wt 39.5 kg (87 lb)   SpO2 99%   BMI 17.57 kg/m²   Gen: NAD, vitals reviewed  MS: oriented x3, recent/remote memory intact, normal attention/concentration, language intact, no neglect.  CN: visual acuity grossly normal, PERRL, EOMI, no facial droop, no dysarthria  Motor: 5/5 throughout upper and lower extremities, normal tone      NIH Stroke Scale :    (0_) 1a. Level of consciousness (LOC): 0=alert;1=arousable by minor stimulation;2=obtunded or needs strong stimulation to attend;3=unresponsive or reflex responses only  (2_) 1b. LOC Questions: 0=answers both;1=answers one;2=answers neither  (2_) 1c. LOC Commands: 0=performs both tasks;1=performs one task;2=performs neither task  (2_) 2. Best Gaze: 0=normal;1=partial gaze palsy;2=forced deviation or total gaze paresis  (2_) 3. Visual: 0=normal;1=partial hemianopia;2=complete hemianopia;3=blind  (1_) 4. Facial palsy: 0=normal;1=minor paresis;2=partial paralysis;3=complete paralysis  FOR 5 AND 6 BELOW: 0=normal;1=drifts but maintains in air;2=unable to maintain in air;3=moves but unable to lift against gravity;4=no movement  (0_)0 (x)1 (_)2 (_)3 (_)4 (_)NA 5a. Motor arm-left  (0_)0 (x)1 (_)2 (_)3 (_)4 (_)NA 5b. Motor arm-right  (0_)0 (_)1 (x)2 (_)3 (_)4 (_)NA 6a. Motor leg-left  (0_)0 (_)1 (x)2 (_)3 (_)4 (_)NA 6ba. Motor leg-right  (0_) 7. Limb ataxia:0=absent;1=unilateral;2=bilateral; NA=unable to test  (0_) 8. Sensory: 0=normal;1=mild-moderate loss;2-severe or total loss  (3_) 9. Best language: 0=normal;1=mild-moderate aphasia, some deficits apparent but able to communicate;2=severe aphasia, fragmentary expression only, unable to communicate well;3=global aphasia, mute and no comprehension  (2_)10. Dysarthria: 0=normal;1=mild-moderate, slurs some words;2=severe, speech mostly unintelligible; NA=unable to test (e.g.,intubation)  (0_)11. Extinction/Inattention: 0=normal;1=visual,tactile,auditory or other " extinction to bilateral simultaneous stimulation, but no severe neglect;2=answers neither      NIH Score: Complete  20    DATA:    Lab Results   Component Value Date    GLUCOSE 241 (H) 01/03/2024    CALCIUM 9.5 01/03/2024     01/03/2024    K 4.2 01/03/2024    CO2 22.7 01/03/2024     01/03/2024    BUN 24 (H) 01/03/2024    CREATININE 1.66 (H) 01/03/2024    EGFRIFNONA 42 (L) 03/23/2018    BCR 14.5 01/03/2024    ANIONGAP 14.3 01/03/2024     Lab Results   Component Value Date    WBC 13.67 (H) 01/03/2024    HGB 13.6 01/03/2024    HCT 42.3 01/03/2024    MCV 94.8 01/03/2024     01/03/2024       Lab review: Urinary tract infection, WBC 13.6, BUN 24, creatinine 1.66, A1c 9.4,     Imaging review: I personally reviewed the images from yesterday and discussed with radiologist at the neuro interventionalists Dr. Anglin.    CT head showed no bleed but did show large hypodensity on the left occipital lobe concerning for acute ischemic stroke.     CTA H/N showed left P1 occlusion.     CTP showed penumbra on left PCA but CT head already showing large completed stroke. Discussed with Dr. Anglin the neurointerventionalist, the penumbra felt to be false positive from motion artifact.    Diagnoses:  -Left PCA acute ischemic stroke etiology likely cardioembolic from atrial flutter  -Left P1 occlusion with completed stroke  -Atrial flutter on EKG  -Urinary tract infection  -Mixed hyperlipidemia  -Type 2 diabetes uncontrolled  -Essential hypertension    Pre-stroke MRS: 4-5 wheelchair-bound at baseline with dementia  NIHSS: 20      PLAN:   1.  Swallow evaluation before clear p.o. intake due to severe aphasia  2.  Start aspirin 300 mg rectally and switch to 81 mg p.o. when patient able to tolerate  3.  Start Lipitor 80 mg daily with goal LDL less than 70, currently at 160.  4.  Goal A1c less than 7 currently at 9.4 recommend better glycemic control  5.  Keep blood pressure permissive 120- 180 today  6.  Will  "follow-up on the MRI brain to rule out cerebral amyloid angiopathy giving her severe dementia and to assess the safety of anticoagulation in the future giving her stroke and a flutter.  7.  Treat UTI, [place]  8.  Normal saline 100 mill per hour.  9.  Palliative consult    Patient with severe dementia, wheelchair-bound at baseline now with a new stroke, atrial flutter she is hospice/palliative appropriate.    Discussed with ED physician, radiologist, neurointerventionalist Dr. Anglin.    \"Dictated utilizing Dragon dictation\".          "

## 2024-01-04 NOTE — CASE MANAGEMENT/SOCIAL WORK
Discharge Planning Assessment  [facility]     Patient Name: Sam Flores  MRN: [id number]  Today's Date: 1/4/2024    Admit Date: 1/3/2024    Plan: Return to [facility]   Discharge Needs Assessment       Row Name 01/04/24 0944       Living Environment    People in Home facility resident    Current Living Arrangements extended care facility    Provides Primary Care For no one, unable/limited ability to care for self    Family Caregiver if Needed child(yecenia), adult    Quality of Family Relationships supportive    Able to Return to Prior Arrangements yes       Resource/Environmental Concerns    Resource/Environmental Concerns none       Transition Planning    Patient/Family Anticipates Transition to long-term care facility    Patient/Family Anticipated Services at Transition skilled nursing    Transportation Anticipated health plan transportation       Discharge Needs Assessment    Readmission Within the Last 30 Days no previous admission in last 30 days    Equipment Currently Used at Home wheelchair    Concerns to be Addressed discharge planning    Anticipated Changes Related to Illness none    Equipment Needed After Discharge none    Provided Post Acute Provider List? N/A    Provided Post Acute Provider Quality & Resource List? N/A                   Discharge Plan       Row Name 01/04/24 0944       Plan    Plan Return to [facility]    Plan Comments Pt admitted from [facility].  S/W [facility]/ [facility] - pt is from a LTC bed with a Medicaid hold and can return upon DC.  Can return skilled if has qualifying stay.  CCP spoke w/ pt's son Cesar who confirms pt will return to [facility] upon DC.  Will need ambulance transport.  ............Yaneli CARMEN/ JAYANT                  Continued Care and Services - Admitted Since 1/3/2024       Destination Coordination complete.      Service Provider Request Status Selected Services Address Phone Fax Patient Preferred    [facility]  [facility] [address], [facility] [id number] [phone] --                     Demographic Summary       Row Name 01/04/24 0943       General Information    Admission Type inpatient    Arrived From home    Required Notices Provided Important Message from Medicare    Referral Source admission list    Reason for Consult discharge planning    Preferred Language English                   Functional Status       Row Name 01/04/24 0943       Functional Status    Usual Activity Tolerance fair       Functional Status, IADL    Medications completely dependent    Meal Preparation completely dependent    Housekeeping completely dependent    Laundry completely dependent    Shopping completely dependent       Employment/    Employment Status retired                    Yaneli Foster RN

## 2024-01-04 NOTE — ED PROVIDER NOTES
EMERGENCY DEPARTMENT ENCOUNTER    Room Number:  S520/1  PCP: Reg Rivera MD  Historian: EMS, nursing home records      HPI:  Chief Complaint: Strokelike symptoms  A complete HPI/ROS/PMH/PSH/SH/FH are unobtainable due to: Patient has dementia  Context: Sam Flores is a 89 y.o. female who presents to the ED from the nursing home with reports of strokelike symptoms.  Patient was last known normal at 6 AM today.  Per EMS, nursing home staff reports that the patient was noted to have left gaze deviation and slurred speech when they checked on her this evening.  To the ED, patient was hypertensive.  Glucose was 261.  Patient is not on anticoagulants.  She can reportedly hold a conversation at baseline although she is chronically confused.            PAST MEDICAL HISTORY  Active Ambulatory Problems     Diagnosis Date Noted    Lumbar compression fracture 2016    Osteoporosis 2016    Dehydration 2018    Acute on chronic renal failure 2018    Type 2 diabetes mellitus without complication 2018    Benign essential hypertension 2018    Weight loss, abnormal 2018    Trouble swallowing 2018    Hyponatremia 2018    Hypokalemia 2018    Aspiration pneumonia 03/15/2018    Chest wall hematoma, right, initial encounter 2022    Dementia 2022    Altered mental status, unspecified altered mental status type 2023     Resolved Ambulatory Problems     Diagnosis Date Noted    Fall 2016    Acute kidney injury superimposed on chronic kidney disease 2016    Rhabdomyolysis 2016    Slow transit constipation 2016    S/P kyphoplasty 2016     Past Medical History:   Diagnosis Date    Breast cancer     Diabetes mellitus     Emphysema of lung     Hyperlipidemia     Hypertension     Hypothyroidism     Parkinson's disease          PAST SURGICAL HISTORY  Past Surgical History:   Procedure Laterality Date    BREAST BIOPSY        SECTION      KYPHOPLASTY      T12 and L2    KYPHOPLASTY N/A 12/02/2016    Procedure: KYPHOPLASTY LUMBAR 1 AND LUMBAR 4;  Surgeon: Triston Marinelli IV, MD;  Location: [facility] 18/19;  Service:     MASTECTOMY Bilateral     SHOULDER SURGERY Right          FAMILY HISTORY  No family history on file.      SOCIAL HISTORY  Social History     Socioeconomic History    Marital status:    Tobacco Use    Smoking status: Never    Smokeless tobacco: Never   Vaping Use    Vaping Use: Unknown   Substance and Sexual Activity    Alcohol use: No    Drug use: No    Sexual activity: Defer         ALLERGIES  Penicillins    REVIEW OF SYSTEMS  Unobtainable      PHYSICAL EXAM  ED Triage Vitals [01/03/24 2012]   Temp Heart Rate Resp BP SpO2   -- 110 16 (!) 217/110 95 %      Temp src Heart Rate Source Patient Position BP Location FiO2 (%)   -- Monitor Lying Right arm --       Physical Exam      GENERAL: Awake.  Eyes are open.  Frail elderly female.  Oriented x 0  HENT: NCAT, nares patent  EYES: PERRL, there is left gaze deviation  CV: regular rhythm, normal rate  RESPIRATORY: normal effort, clear to auscultation bilaterally  ABDOMEN: soft, nontender  MUSCULOSKELETAL: Lower extremities are contracted.  NEURO: Patient is aphasic.  No facial droop.  Follows simple commands.  Moves all extremities.  Withdraws all extremities to painful stimuli  PSYCH:  calm, cooperative  SKIN: warm, dry    Vital signs and nursing notes reviewed.          LAB RESULTS  Recent Results (from the past 24 hour(s))   Comprehensive Metabolic Panel    Collection Time: 01/03/24  8:22 PM    Specimen: Blood   Result Value Ref Range    Glucose 241 (H) 65 - 99 mg/dL    BUN 24 (H) 8 - 23 mg/dL    Creatinine 1.66 (H) 0.57 - 1.00 mg/dL    Sodium 138 136 - 145 mmol/L    Potassium 4.2 3.5 - 5.2 mmol/L    Chloride 101 98 - 107 mmol/L    CO2 22.7 22.0 - 29.0 mmol/L    Calcium 9.5 8.6 - 10.5 mg/dL    Total Protein 7.5 6.0 - 8.5 g/dL    Albumin 3.6 3.5 - 5.2 g/dL    ALT  (SGPT) 15 1 - 33 U/L    AST (SGOT) 22 1 - 32 U/L    Alkaline Phosphatase 96 39 - 117 U/L    Total Bilirubin 0.3 0.0 - 1.2 mg/dL    Globulin 3.9 gm/dL    A/G Ratio 0.9 g/dL    BUN/Creatinine Ratio 14.5 7.0 - 25.0    Anion Gap 14.3 5.0 - 15.0 mmol/L    eGFR 29.4 (L) >60.0 mL/min/1.73   Protime-INR    Collection Time: 01/03/24  8:22 PM    Specimen: Blood   Result Value Ref Range    Protime 13.7 11.7 - 14.2 Seconds    INR 1.04 0.90 - 1.10   aPTT    Collection Time: 01/03/24  8:22 PM    Specimen: Blood   Result Value Ref Range    PTT 31.2 22.7 - 35.4 seconds   Single High Sensitivity Troponin T    Collection Time: 01/03/24  8:22 PM    Specimen: Blood   Result Value Ref Range    HS Troponin T 26 (H) <14 ng/L   Green Top (Gel)    Collection Time: 01/03/24  8:22 PM   Result Value Ref Range    Extra Tube Hold for add-ons.    Lavender Top    Collection Time: 01/03/24  8:22 PM   Result Value Ref Range    Extra Tube hold for add-on    Gold Top - SST    Collection Time: 01/03/24  8:22 PM   Result Value Ref Range    Extra Tube Hold for add-ons.    Light Blue Top    Collection Time: 01/03/24  8:22 PM   Result Value Ref Range    Extra Tube Hold for add-ons.    CBC Auto Differential    Collection Time: 01/03/24  8:22 PM    Specimen: Blood   Result Value Ref Range    WBC 13.67 (H) 3.40 - 10.80 10*3/mm3    RBC 4.46 3.77 - 5.28 10*6/mm3    Hemoglobin 13.6 12.0 - 15.9 g/dL    Hematocrit 42.3 34.0 - 46.6 %    MCV 94.8 79.0 - 97.0 fL    MCH 30.5 26.6 - 33.0 pg    MCHC 32.2 31.5 - 35.7 g/dL    RDW 13.0 12.3 - 15.4 %    RDW-SD 45.1 37.0 - 54.0 fl    MPV 9.9 6.0 - 12.0 fL    Platelets 364 140 - 450 10*3/mm3    Neutrophil % 62.0 42.7 - 76.0 %    Lymphocyte % 26.8 19.6 - 45.3 %    Monocyte % 8.6 5.0 - 12.0 %    Eosinophil % 1.9 0.3 - 6.2 %    Basophil % 0.3 0.0 - 1.5 %    Immature Grans % 0.4 0.0 - 0.5 %    Neutrophils, Absolute 8.47 (H) 1.70 - 7.00 10*3/mm3    Lymphocytes, Absolute 3.67 (H) 0.70 - 3.10 10*3/mm3    Monocytes, Absolute 1.18 (H)  0.10 - 0.90 10*3/mm3    Eosinophils, Absolute 0.26 0.00 - 0.40 10*3/mm3    Basophils, Absolute 0.04 0.00 - 0.20 10*3/mm3    Immature Grans, Absolute 0.05 0.00 - 0.05 10*3/mm3    nRBC 0.0 0.0 - 0.2 /100 WBC   ECG 12 Lead Stroke Evaluation    Collection Time: 01/03/24  9:05 PM   Result Value Ref Range    QT Interval 426 ms    QTC Interval 501 ms   POC Glucose Once    Collection Time: 01/03/24  9:11 PM    Specimen: Blood   Result Value Ref Range    Glucose 227 (H) 70 - 130 mg/dL   Urinalysis With Microscopic If Indicated (No Culture) - Urine, Catheter    Collection Time: 01/03/24  9:54 PM    Specimen: Urine, Catheter   Result Value Ref Range    Color, UA Yellow Yellow, Straw    Appearance, UA Turbid (A) Clear    pH, UA 5.5 5.0 - 8.0    Specific Gravity, UA >=1.030 1.005 - 1.030    Glucose,  mg/dL (Trace) (A) Negative    Ketones, UA Negative Negative    Bilirubin, UA Negative Negative    Blood, UA Small (1+) (A) Negative    Protein,  mg/dL (2+) (A) Negative    Leuk Esterase, UA Large (3+) (A) Negative    Nitrite, UA Positive (A) Negative    Urobilinogen, UA 0.2 E.U./dL 0.2 - 1.0 E.U./dL   Urinalysis, Microscopic Only - Urine, Catheter    Collection Time: 01/03/24  9:54 PM    Specimen: Urine, Catheter   Result Value Ref Range    RBC, UA 3-5 (A) None Seen, 0-2 /HPF    WBC, UA Too Numerous to Count (A) None Seen, 0-2 /HPF    Bacteria, UA 3+ (A) None Seen /HPF    Squamous Epithelial Cells, UA 0-2 None Seen, 0-2 /HPF    Hyaline Casts, UA None Seen None Seen /LPF    Methodology Manual Light Microscopy    Type & Screen    Collection Time: 01/03/24  9:58 PM    Specimen: Blood   Result Value Ref Range    ABO Type A     RH type Negative     Antibody Screen Negative     T&S Expiration Date 1/6/2024 11:59:59 PM        Ordered the above labs and reviewed the results.        RADIOLOGY  XR Chest 1 View    Result Date: 1/3/2024  XR CHEST 1 VW-  INDICATION: Stroke protocol  COMPARISON: Chest radiographs dating back to  9/22/2014      Radiograph is significantly limited by exposure. Repeat imaging as clinically indicated.  Within that limitation, no focal consolidation, pleural effusion or pneumothorax. Rotated stable cardiomediastinal silhouette.  This report was finalized on 1/3/2024 10:51 PM by Dr. Virgil Redd M.D on Workstation: [id number]      CT Angiogram Head w AI Analysis of LVO, CT Angiogram Neck, CT CEREBRAL PERFUSION WITH & WITHOUT CONTRAST    Result Date: 1/3/2024  CT ANGIOGRAM HEAD W AI ANALYSIS OF LVO-, CT ANGIOGRAM NECK-, CT CEREBRAL PERFUSION W WO CONTRAST-  CLINICAL HISTORY: Confusion and speech difficulty.  TECHNIQUE: CT angiogram of the head and neck was obtained with 1 mm axial images following the administration of IV contrast. Sagittal, coronal, and 3-dimensional reconstructed images were obtained. Additionally, a CT scan of the head was obtained with 3 mm axial images before and after the administration of IV contrast.  COMPARISON: CT head 1/26/2023  FINDINGS: Perfusion and postcontrast imaging is particularly limited by motion.  Noncontrast CT: Encephalomalacia in the left occipital lobe is new from prior. Age commensurate generalized cortical involution. No intracranial hemorrhage.  No midline shift or mass effect.  No extra-axial collections. Ex-vacuo dilatation of the lateral and third ventricles without hydrocephalus.  Clear paranasal sinuses and mastoid air cells.   CTA NECK: Patent major arterial vasculature.  No dissection or pseudoaneurysm. Calcified predominant plaque in the common carotid arteries, carotid bulbs and proximal cervical internal carotid arteries. Plaque in the proximal right cervical internal carotid artery causes approximately 40% stenosis and plaque in the proximal left cervical internal carotid artery causes approximately 30% stenosis. Streak artifact from dental amalgam transverses the left carotid bulb.  CTA HEAD: Bilateral fetal PCAs. Left PCA occludes at approximately the P2  segment. Focal moderate right A2 stenosis. Calcified left supraclinoid ICA plaque causes moderate stenosis. Distal left V4 is very diminutive and mostly terminates as the left PICA. No aneurysm.   Perfusion: Perfusion data is significantly limited by motion artifact. No core infarct is identified by perfusion which is inconsistent with CT sequences showing chronic infarct in the left occipital lobe. However, RAPID generated data identifies 49 mL of penumbra in the left PCA territory.  RAPID generated data: Tmax > 6 seconds: 49 ml CBF<30%: 0 mL Mismatch volume: 49 mL Mismatch ratio: Infinite      1. Perfusion and postcontrast imaging is particularly limited by motion. 2. 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 3. Perfusion data is significantly limited by motion artifact. No core infarct is identified by perfusion which is inconsistent with CT sequences showing chronic infarct in the left occipital lobe. However, RAPID generated data identifies 49 mL of penumbra in the left PCA territory. 4. Bilateral fetal PCA. The left PCA occludes at approximately the P2 segment. 5. 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   Above findings were discussed with the on-call neurologist at approximately 9:00 p.m. on 1/3/2024.  Radiation dose reduction techniques were utilized, including automated exposure control and exposure modulation based on body size.  This report was finalized on 1/3/2024 9:31 PM by Dr. Virgil Redd M.D on Workstation: [id number]       Ordered the above noted radiological studies. Reviewed by me in PACS.            PROCEDURES  Critical Care    Performed by: Jason Mosquera MD  Authorized by: Jason Mosquera MD    Critical care provider statement:     Critical care time (minutes):  41    Critical care time was exclusive of:  Separately billable procedures and treating other patients    Critical care was necessary to  treat or prevent imminent or life-threatening deterioration of the following conditions:  CNS failure or compromise    Critical care was time spent personally by me on the following activities:  Development of treatment plan with patient or surrogate, discussions with consultants, discussions with primary provider, evaluation of patient's response to treatment, examination of patient, obtaining history from patient or surrogate, ordering and performing treatments and interventions, ordering and review of laboratory studies, ordering and review of radiographic studies, pulse oximetry, re-evaluation of patient's condition and review of old charts    I assumed direction of critical care for this patient from another provider in my specialty: no      Care discussed with: admitting provider                  MEDICATIONS GIVEN IN ER  Medications   sodium chloride 0.9 % flush 10 mL (has no administration in time range)   aspirin suppository 300 mg (has no administration in time range)   sodium chloride 0.9 % flush 10 mL (has no administration in time range)   sodium chloride 0.9 % flush 10 mL (has no administration in time range)   sodium chloride 0.9 % infusion 40 mL (has no administration in time range)   atorvastatin (LIPITOR) tablet 80 mg (has no administration in time range)   sodium chloride 0.9 % infusion (has no administration in time range)   labetalol (NORMODYNE,TRANDATE) injection 10 mg (10 mg Intravenous Given 1/3/24 2025)   iopamidol (ISOVUE-370) 76 % injection 150 mL (150 mL Intravenous Given by Other 1/3/24 2048)                   MEDICAL DECISION MAKING, PROGRESS, and CONSULTS    All labs have been independently reviewed by me.  All radiology studies have been reviewed by me and I have also reviewed the radiology report.   EKG's independently viewed and interpreted by me.  Discussion below represents my analysis of pertinent findings related to patient's condition, differential diagnosis, treatment plan and  final disposition.      Additional sources:  - Discussed/ obtained information from independent historians: EMS, nursing home records    - External (non-ED) record review: Patient was last admitted here in January 2023 for UTI and acute kidney injury.  Per the discharge summary, she has a history of severe dementia, Parkinson's disease, diabetes, hypertension, hyperlipidemia, and chronic stage III kidney disease.    - Chronic or social conditions impacting care: Patient has dementia and lives in a nursing home          Orders placed during this visit:  Orders Placed This Encounter   Procedures    Critical Care    CT Angiogram Head w AI Analysis of LVO    CT Angiogram Neck    CT CEREBRAL PERFUSION WITH & WITHOUT CONTRAST    XR Chest 1 View    MRI Brain Without Contrast    [facility] Draw    Comprehensive Metabolic Panel    Protime-INR    aPTT    Single High Sensitivity Troponin T    CBC Auto Differential    Urinalysis With Microscopic If Indicated (No Culture) - Urine, Catheter    Hemoglobin A1c    Lipid Panel    Urinalysis, Microscopic Only - Urine, Clean Catch    NPO Diet NPO Type: Strict NPO    Initiate Department's Acute Stroke Process (Team D, Code 19, etc.)    Perform NIH Stroke Scale    Measure Actual Weight    Notify MD for SBP < 80 or > 200    Notify Provider for SBP greater than 140 if hemorrhagic stroke    Head of Bed 30 Degrees or Less    Undress and Gown    Vital Signs    Neuro Checks    No Hypotonic Fluids    Nursing Dysphagia Screening (Complete Prior to Giving anything PO)    RN to Place Order SLP Consult (IF swallow screen failed) - Eval & Treat Choosing Reason of RN Dysphagia Screen Failed    Vital Signs    Pulse Oximetry, Continuous    Telemetry - Place Orders & Notify Provider of Results When Patient Experiences Acute Chest Pain, Dysrhythmia or Respiratory Distress    Notify Provider    Nursing Dysphagia Screening (Complete Prior to Giving Anything By Mouth)    RN to Place Order SLP Consult - Eval &  Treat Choosing Reason of RN Dysphagia Screen Failed    Nurse to Call MD or Nutrition Services for Diet if Patient Passes Dysphagia Screen    Intake and Output    Neuro Checks    NIHSS Assessment    Order CT Head Without Contrast for Neurological Decline    Provide Stroke Education Material    Saline Lock & Maintain IV Access    Place Sequential Compression Device    Maintain Sequential Compression Device    Activity - Strict Bed Rest with HOB Flat    Inpatient Neurology Consult Stroke    Inpatient Neurology Consult Stroke    Notify Stroke Coordinator    Inpatient Rehab Admission Consult    Inpatient Case Management  Consult    Inpatient Diabetes Educator Consult    Inpatient Neurology Consult Stroke    LIPPS (on-call MD unless specified)    OT Consult: Eval & Treat    PT Consult: Eval & Treat As Tolerated    Oxygen Therapy- Nasal Cannula; Titrate 1-6 LPM Per SpO2; 90 - 95%    SLP Consult: Eval & Treat Communication Disorder    POC Glucose Once    POC Glucose Q6H    POC Glucose Once    ECG 12 Lead Stroke Evaluation    Adult Transthoracic Echo Complete W/ Cont if Necessary Per Protocol    Type & Screen    Insert Large-Bore Peripheral IV - RIGHT AC Preferred    Insert Peripheral IV    Inpatient Admission    Inpatient Admission    CBC & Differential    Green Top (Gel)    Lavender Top    Gold Top - SST    Light Blue Top         Additional orders considered but not ordered:  MRI brain: Patient be admitted and this can be performed while she is hospitalized        Differential diagnosis:    CVA, TIA, intracranial hemorrhage, cephalopathy, UTI      Independent interpretation of labs, radiology studies, and discussions with consultants:  ED Course as of 01/03/24 2351   Wed Jan 03, 2024 2013 Case discussed with Dr. Adame, stroke neurologist.  Pertinent history and exam findings were discussed with him.  He recommends obtaining stat team D imaging.  She is not a thrombolytic candidate as she was last known  well over 14 hours ago [WH]   2019 BP(!): 217/110  Patient will be given labetalol 10 mg IV [WH]   2100 Glucose(!): 241 [WH]   2100 BUN(!): 24 [WH]   2100 Creatinine(!): 1.66  1.09 eleven months ago [WH]   2100 HS Troponin T(!): 26 [WH]   2100 PTT: 31.2 [WH]   2100 INR: 1.04 [WH]   2100 WBC(!): 13.67 [WH]   2100 Hemoglobin: 13.6 [WH]   2100 Head CT personally interpreted by me.  My personal interpretation is: No intracranial hemorrhage.  No midline shift [WH]   2110 EKG personally interpreted by me at 2109.  My personal interpretation is:         EKG time: 2105  Artifact is present in multiple leads which limits interpretation  Rhythm/Rate: Atrial fibrillation, rate 83  P waves and WA: Irregular, varying  QRS, axis: Normal axis  ST and T waves: Nonspecific ST/T wave changes in the lateral leads    Interpreted Contemporaneously by me, independently viewed  EKG is changed compared to prior EKG done on 1/26/2023.  Sinus rhythm was present at that time   [WH]   2148 Per the radiologist, head CT shows encephalomalacia in the left occipital lobe which is new from prior.  No hemorrhage.  No mass effect.  CTA of the head/neck shows approximately 40% stenosis in the proximal right cervical internal carotid and 30% in the proximal left cervical internal carotid.  The left PCA occludes at the P2 segment.  There is focal moderate right A2 stenosis.  Perfusion study does not show any core infarct but is limited by motion artifact. [WH]   2148 Blood pressure is now 119/73 [WH]   2151 Case discussed with Dr. Adame, stroke neurologist.  Imaging studies show a completed infarct.  He would like for the patient to be admitted to the hospitalist.   [WH]   2227 Leukocytes, UA(!): Large (3+) [WH]   2227 Nitrite, UA(!): Positive [WH]   2229 Patient is sleeping.  Test results and plans for admission were discussed with her son and daughter, who are now at bedside.  Blood pressure is currently 134/95.   [WH]   2233 Case discussed with   Madhuri, hospitalist, and he agrees to admit the patient to a monitored bed.  Pertinent history, exam findings, test results, ED course, and diagnoses were discussed with him []   2348 WBC, UA(!): Too Numerous to Count [WH]   2348 Bacteria, UA(!): 3+ []   2350 MDM: Patient presented to the ED from the nursing home with strokelike symptoms.  She was last known well at 6 AM this morning.  Upon ED arrival, she had left gaze deviation and aphasia.  Case was discussed with stroke neurology and team D protocol was initiated.  Imaging studies showed evidence of a completed left PCA stroke.  She was not a candidate for intervention.  She was initially hypertensive but blood pressure improved with 1 dose of IV labetalol.  Creatinine was mildly elevated.  She was also found to have a UTI.  Case was discussed with the hospitalist.  Critical care was performed on this patient. []      ED Course User Index  [] Jason Mosquera MD               DIAGNOSIS  Final diagnoses:   Stroke of unknown etiology   Acute renal insufficiency   Dementia, unspecified dementia severity, unspecified dementia type, unspecified whether behavioral, psychotic, or mood disturbance or anxiety   Acute UTI         DISPOSITION  ADMISSION    Discussed treatment plan and reason for admission with pt/family and admitting physician.  Pt/family voiced understanding of the plan for admission for further testing/treatment as needed.               Latest Documented Vital Signs:  As of 23:51 EST  BP- 101/71 HR- 82 Temp- 97.5 °F (36.4 °C) (Rectal) O2 sat- 92%              --    Please note that portions of this were completed with a voice recognition program.       Note Disclaimer: At [facility], we believe that sharing information builds trust and better relationships. You are receiving this note because you are receiving care at [facility] or recently visited. It is possible you will see health information before a provider has talked with you  about it. This kind of information can be easy to misunderstand. To help you fully understand what it means for your health, we urge you to discuss this note with your provider.             Jason Mosquera MD  01/03/24 5444

## 2024-01-04 NOTE — H&P
"History and physical    Primary care physician  Dr. Rivera    Chief complaint  Slurred speech    History of present illness  89-year-old white female with history of diabetes mellitus hypertension hyperlipidemia hypothyroidism Parkinson disease who is a nursing home resident for long-term brought to the emergency room with slurred speech started yesterday.  Patient evaluated in ER found to have acute ischemic stroke acute UTI acute kidney injury admitted for management.  Patient is DNR per her wishes.  Patient is unable to give any detailed history most of the history obtained from the family chart nursing staff and old record.    PAST MEDICAL HISTORY   Breast cancer      Diabetes mellitus      Emphysema of lung      Hyperlipidemia      Hypertension      Hypothyroidism      Parkinson's disease        PAST SURGICAL HISTORY              Procedure Laterality Date    BREAST BIOPSY         SECTION        KYPHOPLASTY         T12 and L2    KYPHOPLASTY N/A 2016     Procedure: KYPHOPLASTY LUMBAR 1 AND LUMBAR 4;  Surgeon: Triston Marinelli IV, MD;  Location: [facility] ;  Service:     MASTECTOMY Bilateral      SHOULDER SURGERY Right           FAMILY HISTORY  No family history on file.     SOCIAL HISTORY                Socioeconomic History    Marital status:    Tobacco Use    Smoking status: Never    Smokeless tobacco: Never   Vaping Use    Vaping Use: Unknown   Substance and Sexual Activity    Alcohol use: No    Drug use: No    Sexual activity: Defer         ALLERGIES  Penicillins  Nursing Home medications reviewed     REVIEW OF SYSTEMS  Per history of present illness     PHYSICAL EXAM  Blood pressure 132/94, pulse 69, temperature 97.5 °F (36.4 °C), temperature source Rectal, resp. rate 18, height 149.9 cm (59\"), weight 39.5 kg (87 lb), SpO2 99%.    GENERAL: Awake and alert follow commands but slow to response  HENT: NCAT, nares patent  EYES: PERRL, there is left gaze deviation  CV: regular " rhythm, normal rate  RESPIRATORY: normal effort, clear to auscultation bilaterally  ABDOMEN: soft, nontender nondistended bowel sounds positive  MUSCULOSKELETAL: Lower extremities are contracted.  NEURO: Patient is aphasic.  No facial droop.  Follows simple commands.  Moves all extremities.  Withdraws all extremities to painful stimuli  PSYCH:  calm, cooperative  SKIN: warm, dry     LAB RESULTS  Lab Results (last 24 hours)       Procedure Component Value Units Date/Time    POC Glucose Once [[id number]]  (Abnormal) Collected: 01/04/24 1305    Specimen: Blood Updated: 01/04/24 1306     Glucose 151 mg/dL     Hemoglobin A1c [[id number]]  (Abnormal) Collected: 01/04/24 0522    Specimen: Blood Updated: 01/04/24 0951     Hemoglobin A1C 9.40 %     Narrative:      Hemoglobin A1C Ranges:    Increased Risk for Diabetes  5.7% to 6.4%  Diabetes                     >= 6.5%  Diabetic Goal                < 7.0%    POC Glucose Once [[id number]]  (Abnormal) Collected: 01/04/24 0624    Specimen: Blood Updated: 01/04/24 0627     Glucose 171 mg/dL     Lipid Panel [[id number]]  (Abnormal) Collected: 01/04/24 0522    Specimen: Blood Updated: 01/04/24 0626     Total Cholesterol 225 mg/dL      Triglycerides 125 mg/dL      HDL Cholesterol 42 mg/dL      LDL Cholesterol  160 mg/dL      VLDL Cholesterol 23 mg/dL      LDL/HDL Ratio 3.76    Narrative:      Cholesterol Reference Ranges  (U.S. Department of Health and Human Services ATP III Classifications)    Desirable          <200 mg/dL  Borderline High    200-239 mg/dL  High Risk          >240 mg/dL      Triglyceride Reference Ranges  (U.S. Department of Health and Human Services ATP III Classifications)    Normal           <150 mg/dL  Borderline High  150-199 mg/dL  High             200-499 mg/dL  Very High        >500 mg/dL    HDL Reference Ranges  (U.S. Department of Health and Human Services ATP III Classifications)    Low     <40 mg/dl (major risk factor for CHD)  High    >60 mg/dl ('negative'  risk factor for CHD)        LDL Reference Ranges  (U.S. Department of Health and Human Services ATP III Classifications)    Optimal          <100 mg/dL  Near Optimal     100-129 mg/dL  Borderline High  130-159 mg/dL  High             160-189 mg/dL  Very High        >189 mg/dL    POC Glucose Once [[id number]]  (Abnormal) Collected: 01/04/24 0321    Specimen: Blood Updated: 01/04/24 0323     Glucose 217 mg/dL     Urinalysis, Microscopic Only - Urine, Catheter [[id number]]  (Abnormal) Collected: 01/03/24 2154    Specimen: Urine, Catheter Updated: 01/03/24 2237     RBC, UA 3-5 /HPF      WBC, UA Too Numerous to Count /HPF      Bacteria, UA 3+ /HPF      Squamous Epithelial Cells, UA 0-2 /HPF      Hyaline Casts, UA None Seen /LPF      Methodology Manual Light Microscopy    Urinalysis With Microscopic If Indicated (No Culture) - Urine, Catheter [[id number]]  (Abnormal) Collected: 01/03/24 2154    Specimen: Urine, Catheter Updated: 01/03/24 2222     Color, UA Yellow     Appearance, UA Turbid     pH, UA 5.5     Specific Gravity, UA >=1.030     Glucose,  mg/dL (Trace)     Ketones, UA Negative     Bilirubin, UA Negative     Blood, UA Small (1+)     Protein,  mg/dL (2+)     Leuk Esterase, UA Large (3+)     Nitrite, UA Positive     Urobilinogen, UA 0.2 E.U./dL    [facility] Draw [[id number]] Collected: 01/03/24 2022    Specimen: Blood Updated: 01/03/24 2131    Narrative:      The following orders were created for panel order [facility] Draw.  Procedure                               Abnormality         Status                     ---------                               -----------         ------                     Green Top (Gel)[[id number]]                                  Final result               Lavender Top[[id number]]                                     Final result               Gold Top - SST[[id number]]                                   Final result               Light Blue Top[[id number]]                                    Final result                 Please view results for these tests on the individual orders.    Green Top (Gel) [[id number]] Collected: 01/03/24 2022    Specimen: Blood Updated: 01/03/24 2131     Extra Tube Hold for add-ons.     Comment: Auto resulted.       Lavender Top [[id number]] Collected: 01/03/24 2022    Specimen: Blood Updated: 01/03/24 2131     Extra Tube hold for add-on     Comment: Auto resulted       Gold Top - SST [[id number]] Collected: 01/03/24 2022    Specimen: Blood Updated: 01/03/24 2131     Extra Tube Hold for add-ons.     Comment: Auto resulted.       Light Blue Top [[id number]] Collected: 01/03/24 2022    Specimen: Blood Updated: 01/03/24 2131     Extra Tube Hold for add-ons.     Comment: Auto resulted       POC Glucose Once [[id number]]  (Abnormal) Collected: 01/03/24 2111    Specimen: Blood Updated: 01/03/24 2112     Glucose 227 mg/dL     Comprehensive Metabolic Panel [[id number]]  (Abnormal) Collected: 01/03/24 2022    Specimen: Blood Updated: 01/03/24 2059     Glucose 241 mg/dL      BUN 24 mg/dL      Creatinine 1.66 mg/dL      Sodium 138 mmol/L      Potassium 4.2 mmol/L      Comment: Slight hemolysis detected by analyzer. Result may be falsely elevated.        Chloride 101 mmol/L      CO2 22.7 mmol/L      Calcium 9.5 mg/dL      Total Protein 7.5 g/dL      Albumin 3.6 g/dL      ALT (SGPT) 15 U/L      AST (SGOT) 22 U/L      Alkaline Phosphatase 96 U/L      Total Bilirubin 0.3 mg/dL      Globulin 3.9 gm/dL      A/G Ratio 0.9 g/dL      BUN/Creatinine Ratio 14.5     Anion Gap 14.3 mmol/L      eGFR 29.4 mL/min/1.73     Narrative:      GFR Normal >60  Chronic Kidney Disease <60  Kidney Failure <15    The GFR formula is only valid for adults with stable renal function between ages 18 and 70.    Single High Sensitivity Troponin T [[id number]]  (Abnormal) Collected: 01/03/24 2022    Specimen: Blood Updated: 01/03/24 2058     HS Troponin T 26 ng/L     Narrative:      High Sensitive Troponin T Reference  Range:  <14.0 ng/L- Negative Female for AMI  <22.0 ng/L- Negative Male for AMI  >=14 - Abnormal Female indicating possible myocardial injury.  >=22 - Abnormal Male indicating possible myocardial injury.   Clinicians would have to utilize clinical acumen, EKG, Troponin, and serial changes to determine if it is an Acute Myocardial Infarction or myocardial injury due to an underlying chronic condition.         Protime-INR [[id number]]  (Normal) Collected: 01/03/24 2022    Specimen: Blood Updated: 01/03/24 2053     Protime 13.7 Seconds      INR 1.04    aPTT [[id number]]  (Normal) Collected: 01/03/24 2022    Specimen: Blood Updated: 01/03/24 2053     PTT 31.2 seconds     CBC & Differential [[id number]]  (Abnormal) Collected: 01/03/24 2022    Specimen: Blood Updated: 01/03/24 2041    Narrative:      The following orders were created for panel order CBC & Differential.  Procedure                               Abnormality         Status                     ---------                               -----------         ------                     CBC Auto Differential[[id number]]        Abnormal            Final result                 Please view results for these tests on the individual orders.    CBC Auto Differential [[id number]]  (Abnormal) Collected: 01/03/24 2022    Specimen: Blood Updated: 01/03/24 2041     WBC 13.67 10*3/mm3      RBC 4.46 10*6/mm3      Hemoglobin 13.6 g/dL      Hematocrit 42.3 %      MCV 94.8 fL      MCH 30.5 pg      MCHC 32.2 g/dL      RDW 13.0 %      RDW-SD 45.1 fl      MPV 9.9 fL      Platelets 364 10*3/mm3      Neutrophil % 62.0 %      Lymphocyte % 26.8 %      Monocyte % 8.6 %      Eosinophil % 1.9 %      Basophil % 0.3 %      Immature Grans % 0.4 %      Neutrophils, Absolute 8.47 10*3/mm3      Lymphocytes, Absolute 3.67 10*3/mm3      Monocytes, Absolute 1.18 10*3/mm3      Eosinophils, Absolute 0.26 10*3/mm3      Basophils, Absolute 0.04 10*3/mm3      Immature Grans, Absolute 0.05 10*3/mm3      nRBC 0.0  /100 WBC           Imaging Results (Last 24 Hours)       Procedure Component Value Units Date/Time    XR Chest 1 View [[id number]] Collected: 01/03/24 2248     Updated: 01/03/24 2254    Narrative:      XR CHEST 1 VW-     INDICATION: Stroke protocol     COMPARISON: Chest radiographs dating back to 9/22/2014       Impression:      Radiograph is significantly limited by exposure. Repeat  imaging as clinically indicated.     Within that limitation, no focal consolidation, pleural effusion or  pneumothorax. Rotated stable cardiomediastinal silhouette.     This report was finalized on 1/3/2024 10:51 PM by Dr. Virgil Redd M.D on Workstation: [id number]       CT Angiogram Head w AI Analysis of LVO [[id number]] Collected: 01/03/24 2038     Updated: 01/03/24 2135    Narrative:      CT ANGIOGRAM HEAD W AI ANALYSIS OF LVO-, CT ANGIOGRAM NECK-, CT CEREBRAL  PERFUSION W WO CONTRAST-     CLINICAL HISTORY: Confusion and speech difficulty.     TECHNIQUE: CT angiogram of the head and neck was obtained with 1 mm  axial images following the administration of IV contrast. Sagittal,  coronal, and 3-dimensional reconstructed images were obtained.  Additionally, a CT scan of the head was obtained with 3 mm axial images  before and after the administration of IV contrast.     COMPARISON: CT head 1/26/2023     FINDINGS: Perfusion and postcontrast imaging is particularly limited by  motion.     Noncontrast CT: Encephalomalacia in the left occipital lobe is new from  prior. Age commensurate generalized cortical involution. No intracranial  hemorrhage.  No midline shift or mass effect.  No extra-axial  collections. Ex-vacuo dilatation of the lateral and third ventricles  without hydrocephalus.  Clear paranasal sinuses and mastoid air cells.       CTA NECK: Patent major arterial vasculature.  No dissection or  pseudoaneurysm. Calcified predominant plaque in the common carotid  arteries, carotid bulbs and proximal cervical internal carotid  arteries.  Plaque in the proximal right cervical internal carotid artery causes  approximately 40% stenosis and plaque in the proximal left cervical  internal carotid artery causes approximately 30% stenosis. Streak  artifact from dental amalgam transverses the left carotid bulb.     CTA HEAD: Bilateral fetal PCAs. Left PCA occludes at approximately the  P2 segment. Focal moderate right A2 stenosis. Calcified left  supraclinoid ICA plaque causes moderate stenosis. Distal left V4 is very  diminutive and mostly terminates as the left PICA. No aneurysm.       Perfusion: Perfusion data is significantly limited by motion artifact.  No core infarct is identified by perfusion which is inconsistent with CT  sequences showing chronic infarct in the left occipital lobe. However,  RAPID generated data identifies 49 mL of penumbra in the left PCA  territory.     RAPID generated data:  Tmax > 6 seconds: 49 ml  CBF<30%: 0 mL  Mismatch volume: 49 mL  Mismatch ratio: Infinite       Impression:      1. Perfusion and postcontrast imaging is particularly limited by motion.  2. Chronic appearing infarct in the left occipital lobe is new from  January 2023 imaging.  3. Perfusion data is significantly limited by motion artifact. No core  infarct is identified by perfusion which is inconsistent with CT  sequences showing chronic infarct in the left occipital lobe. However,  RAPID generated data identifies 49 mL of penumbra in the left PCA  territory.  4. Bilateral fetal PCA. The left PCA occludes at approximately the P2  segment.  5. Intracranial and extracranial arterial atherosclerotic disease as  detailed above.        Above findings were discussed with the on-call neurologist at  approximately 9:00 p.m. on 1/3/2024.     Radiation dose reduction techniques were utilized, including automated  exposure control and exposure modulation based on body size.     This report was finalized on 1/3/2024 9:31 PM by Dr. Virigl Redd M.D on  Workstation: [id number]       CT Angiogram Neck [[id number]] Collected: 01/03/24 2038     Updated: 01/03/24 2135    Narrative:      CT ANGIOGRAM HEAD W AI ANALYSIS OF LVO-, CT ANGIOGRAM NECK-, CT CEREBRAL  PERFUSION W WO CONTRAST-     CLINICAL HISTORY: Confusion and speech difficulty.     TECHNIQUE: CT angiogram of the head and neck was obtained with 1 mm  axial images following the administration of IV contrast. Sagittal,  coronal, and 3-dimensional reconstructed images were obtained.  Additionally, a CT scan of the head was obtained with 3 mm axial images  before and after the administration of IV contrast.     COMPARISON: CT head 1/26/2023     FINDINGS: Perfusion and postcontrast imaging is particularly limited by  motion.     Noncontrast CT: Encephalomalacia in the left occipital lobe is new from  prior. Age commensurate generalized cortical involution. No intracranial  hemorrhage.  No midline shift or mass effect.  No extra-axial  collections. Ex-vacuo dilatation of the lateral and third ventricles  without hydrocephalus.  Clear paranasal sinuses and mastoid air cells.       CTA NECK: Patent major arterial vasculature.  No dissection or  pseudoaneurysm. Calcified predominant plaque in the common carotid  arteries, carotid bulbs and proximal cervical internal carotid arteries.  Plaque in the proximal right cervical internal carotid artery causes  approximately 40% stenosis and plaque in the proximal left cervical  internal carotid artery causes approximately 30% stenosis. Streak  artifact from dental amalgam transverses the left carotid bulb.     CTA HEAD: Bilateral fetal PCAs. Left PCA occludes at approximately the  P2 segment. Focal moderate right A2 stenosis. Calcified left  supraclinoid ICA plaque causes moderate stenosis. Distal left V4 is very  diminutive and mostly terminates as the left PICA. No aneurysm.       Perfusion: Perfusion data is significantly limited by motion artifact.  No core infarct is  identified by perfusion which is inconsistent with CT  sequences showing chronic infarct in the left occipital lobe. However,  RAPID generated data identifies 49 mL of penumbra in the left PCA  territory.     RAPID generated data:  Tmax > 6 seconds: 49 ml  CBF<30%: 0 mL  Mismatch volume: 49 mL  Mismatch ratio: Infinite       Impression:      1. Perfusion and postcontrast imaging is particularly limited by motion.  2. Chronic appearing infarct in the left occipital lobe is new from  January 2023 imaging.  3. Perfusion data is significantly limited by motion artifact. No core  infarct is identified by perfusion which is inconsistent with CT  sequences showing chronic infarct in the left occipital lobe. However,  RAPID generated data identifies 49 mL of penumbra in the left PCA  territory.  4. Bilateral fetal PCA. The left PCA occludes at approximately the P2  segment.  5. Intracranial and extracranial arterial atherosclerotic disease as  detailed above.        Above findings were discussed with the on-call neurologist at  approximately 9:00 p.m. on 1/3/2024.     Radiation dose reduction techniques were utilized, including automated  exposure control and exposure modulation based on body size.     This report was finalized on 1/3/2024 9:31 PM by Dr. Virgil Redd M.D on Workstation: [id number]       CT CEREBRAL PERFUSION WITH & WITHOUT CONTRAST [[id number]] Collected: 01/03/24 2038     Updated: 01/03/24 2135    Narrative:      CT ANGIOGRAM HEAD W AI ANALYSIS OF LVO-, CT ANGIOGRAM NECK-, CT CEREBRAL  PERFUSION W WO CONTRAST-     CLINICAL HISTORY: Confusion and speech difficulty.     TECHNIQUE: CT angiogram of the head and neck was obtained with 1 mm  axial images following the administration of IV contrast. Sagittal,  coronal, and 3-dimensional reconstructed images were obtained.  Additionally, a CT scan of the head was obtained with 3 mm axial images  before and after the administration of IV contrast.     COMPARISON:  CT head 1/26/2023     FINDINGS: Perfusion and postcontrast imaging is particularly limited by  motion.     Noncontrast CT: Encephalomalacia in the left occipital lobe is new from  prior. Age commensurate generalized cortical involution. No intracranial  hemorrhage.  No midline shift or mass effect.  No extra-axial  collections. Ex-vacuo dilatation of the lateral and third ventricles  without hydrocephalus.  Clear paranasal sinuses and mastoid air cells.       CTA NECK: Patent major arterial vasculature.  No dissection or  pseudoaneurysm. Calcified predominant plaque in the common carotid  arteries, carotid bulbs and proximal cervical internal carotid arteries.  Plaque in the proximal right cervical internal carotid artery causes  approximately 40% stenosis and plaque in the proximal left cervical  internal carotid artery causes approximately 30% stenosis. Streak  artifact from dental amalgam transverses the left carotid bulb.     CTA HEAD: Bilateral fetal PCAs. Left PCA occludes at approximately the  P2 segment. Focal moderate right A2 stenosis. Calcified left  supraclinoid ICA plaque causes moderate stenosis. Distal left V4 is very  diminutive and mostly terminates as the left PICA. No aneurysm.       Perfusion: Perfusion data is significantly limited by motion artifact.  No core infarct is identified by perfusion which is inconsistent with CT  sequences showing chronic infarct in the left occipital lobe. However,  RAPID generated data identifies 49 mL of penumbra in the left PCA  territory.     RAPID generated data:  Tmax > 6 seconds: 49 ml  CBF<30%: 0 mL  Mismatch volume: 49 mL  Mismatch ratio: Infinite       Impression:      1. Perfusion and postcontrast imaging is particularly limited by motion.  2. Chronic appearing infarct in the left occipital lobe is new from  January 2023 imaging.  3. Perfusion data is significantly limited by motion artifact. No core  infarct is identified by perfusion which is  inconsistent with CT  sequences showing chronic infarct in the left occipital lobe. However,  RAPID generated data identifies 49 mL of penumbra in the left PCA  territory.  4. Bilateral fetal PCA. The left PCA occludes at approximately the P2  segment.  5. Intracranial and extracranial arterial atherosclerotic disease as  detailed above.        Above findings were discussed with the on-call neurologist at  approximately 9:00 p.m. on 1/3/2024.     Radiation dose reduction techniques were utilized, including automated  exposure control and exposure modulation based on body size.     This report was finalized on 1/3/2024 9:31 PM by Dr. Virgil Redd M.D on Workstation: [id number]             Results  Scan on 1/3/2024 2106 by [employer], [place]: ECG 12-LEAD         Author: -- Service: -- Author Type: --   Filed: Date of Service: Creation Time:   Status: (Other)   HEART RATE= 83  bpm  RR Interval= 723  ms  UT Interval=   ms  P Horizontal Axis=   deg  P Front Axis=   deg  QRSD Interval= 83  ms  QT Interval= 426  ms  QTcB= 501  ms  QRS Axis= 52  deg  T Wave Axis= 62  deg  - ABNORMAL ECG -  Atrial flutter with predominant 3:1 AV block  When compared with ECG of 26-Jan-2023 22:54:44,  Atrial flutter has replaced sinus rhythm          Current Facility-Administered Medications:     aspirin chewable tablet 81 mg, 81 mg, Oral, Daily, Ang Dhaliwal MD    atorvastatin (LIPITOR) tablet 80 mg, 80 mg, Oral, Nightly, Ish Clancy MD    cefTRIAXone (ROCEPHIN) 1,000 mg in sodium chloride 0.9 % 100 mL IVPB-VTB, 1,000 mg, Intravenous, Q24H, Ang Dhaliwal MD, Last Rate: 200 mL/hr at 01/04/24 0835, 1,000 mg at 01/04/24 0835    cholecalciferol (VITAMIN D3) tablet 1,000 Units, 1,000 Units, Oral, Daily, Ang Dhaliwal MD    dextrose (D50W) (25 g/50 mL) IV injection 25 g, 25 g, Intravenous, Q15 Min PRN, Ang Dhaliwal MD    dextrose (GLUTOSE) oral gel 15 g, 15 g, Oral, Q15 Min PRN, Ang Dhaliwal MD    famotidine (PEPCID) tablet 20 mg,  20 mg, Oral, BID, Kory Dhaliwal MD    glucagon (GLUCAGEN) injection 1 mg, 1 mg, Intramuscular, Q15 Min PRN, Kory Dhaliwal MD    insulin lispro (HUMALOG/ADMELOG) injection 2-7 Units, 2-7 Units, Subcutaneous, 4x Daily AC & at Bedtime, Kory Dhaliwal MD    polyethylene glycol (MIRALAX) packet 17 g, 17 g, Oral, BID, Kory Dhaliwal MD    sennosides-docusate (PERICOLACE) 8.6-50 MG per tablet 2 tablet, 2 tablet, Oral, BID, Kory Dhaliwal MD    sodium chloride 0.9 % flush 10 mL, 10 mL, Intravenous, PRN, Ish Clancy MD    sodium chloride 0.9 % infusion, 75 mL/hr, Intravenous, Continuous, Kory Dhaliwal MD, Last Rate: 75 mL/hr at 01/04/24 0750, 75 mL/hr at 01/04/24 0750     ASSESSMENT  Acute left occipital lobe infarct  New onset atrial flutter  Acute kidney injury  Acute UTI  Diabetes mellitus  Hypertension  Hyperlipidemia  Dementia  Gastroesophageal reflux disease    PLAN  Admit  IVF  Aspirin Lipitor  IV antibiotics  Stroke workup with MRI of the brain and 2D echo  Neurology consult  Cardiology to follow patient  Adjust nursing home medications  Stress ulcer DVT prophylaxis  Supportive care  PT OT and speech therapy eval and treatment  DNR  Discussed with family nursing staff  Follow closely further recommendation current hospital course    KORY DHALIWAL MD

## 2024-01-04 NOTE — PROGRESS NOTES
[facility] Acute Inpt Rehab Note     Referral received via stroke order set.  Please note this is a screening only, rehab admissions will not actively be evaluating this patient.  If felt patient is appropriate for our services once therapies begin, please call our office at [phone], to initiate a full referral.    Thank you,   Digna Galarza RN   Rehab Admission Nurse

## 2024-01-04 NOTE — PLAN OF CARE
Goal Outcome Evaluation:  Plan of Care Reviewed With: patient           Outcome Evaluation: Pt is a 88 y/o F who presented to ED from LTC with c/o from staff who noted L gaze deviation, aphasia and hypertension. CT (+) L occipital lobe infarct and increased L PCA pneumbra. Per chart pt is from a LTC, is conversational at baseline, has advanced dementia, is total assist for ADLs, but sometimes able to feed herself and staff transfers her to a  to take her to the dining room. Today, pt presents with a L gaze deviation, is unable to follow commands, has little to no verbalizations, is DepA x1-2 for bed mobility and brief change/clean up and yells out in pain with gentle ROM of her UEs. Pt also appears to have contractures at her BLEs with hips/knees at 90-90. Pt also appears to keep her upper body/trunk straight in supine or rotated to the L and her hips/BLEs are rotated to the R. Pt was unable to hold to follow command to keep her LEs in midline once this PT assisted with placement. End of session pt was positioned with pillows for neutral hip alignment, cervial neutral alignment and a pillow placed between her knees/ankles. Based on chart pt appears at her mobility baseline, but does have visual deficits of the L deviated gaze and speech deficits. Pt is not appropriate for skilled PT services at this time as she is at her mobility baseline and w/ contractures. PT will sign off at this time. Rec return to LTC at [place].      Anticipated Discharge Disposition (PT): extended care facility

## 2024-01-04 NOTE — CONSULTS
[facility]  Cardiology Consult Note    Patient Identification:  Name: Sam Flores  Age: 89 y.o.  Sex: female  :  1934  MRN: [id number]             Requesting Physician:  Dr. Dhaliwal    Reason for Consultation / Chief Complaint:  atrial flutter    History of Present Illness:   Shawn Flores is a 89-year-old female who lives in a nursing facility who presented to [facility] with a left gaze deviation and slurred speech as well as being hypertensive.  She has a history of dementia, diabetes mellitus, hypertension, hyperlipidemia, hypothyroidism, Parkinson's disease and history of breast cancer.    CT showed chronic appearing infarct in the left occipital lobe which is new.  See full report as below.  More testing to be completed.  In the emergency room she was given labetalol, and atorvastatin.  Vital signs 217/110, glucose 241, creatinine 1.66, troponin 26 and WBC 13.67.  EKG showed atrial flutter with 3-1 AV block with heart rate of 83.      Echo in  at [facility] showed normal LV function.  Abnormal LV diastolic filling is observed consistent with impaired LV relaxation.  Trace to mild MV regurgitation.  Trivial to mild TV regurgitation.    Past Medical History:  Past Medical History:   Diagnosis Date    Breast cancer     Dementia     Diabetes mellitus     Emphysema of lung     Hyperlipidemia     Hypertension     Hypothyroidism     Lumbar compression fracture 2016    Osteoporosis     Parkinson's disease     S/P kyphoplasty 2016    L1 and L4, 2016     Past Surgical History:  Past Surgical History:   Procedure Laterality Date    BREAST BIOPSY       SECTION      KYPHOPLASTY      T12 and L2    KYPHOPLASTY N/A 2016    Procedure: KYPHOPLASTY LUMBAR 1 AND LUMBAR 4;  Surgeon: Triston Marinelli IV, MD;  Location: [facility] ;  Service:     MASTECTOMY Bilateral     SHOULDER SURGERY Right       Allergies:  Allergies   Allergen Reactions     Penicillins      Home Meds:  Medications Prior to Admission   Medication Sig Dispense Refill Last Dose    acetaminophen (TYLENOL) 325 MG tablet Take 2 tablets by mouth Every 8 (Eight) Hours As Needed for Mild Pain.       Cholecalciferol (Vitamin D3) 1.25 MG (17995 UT) tablet Take 1 tablet by mouth 1 (One) Time Per Week. Give 1 tablet by mouth one time a day every Friday for supplement       famotidine (PEPCID) 20 MG tablet Take 1 tablet by mouth 2 (Two) Times a Day.       glucagon (GLUCAGEN) 1 MG injection Inject 1 mg into the appropriate muscle as directed by prescriber Every 15 (Fifteen) Minutes As Needed for Low Blood Sugar. Inject 1 mg/ml intramuscularly every 15 minutes as needed       HYDROcodone-acetaminophen (NORCO) 7.5-325 MG per tablet Take 1 tablet by mouth Every 4 (Four) Hours As Needed for Moderate Pain or Severe Pain.       Insulin Glargine (Lantus SoloStar) 100 UNIT/ML injection pen Inject 14 Units under the skin into the appropriate area as directed Every Night.       levothyroxine (SYNTHROID, LEVOTHROID) 88 MCG tablet Take 1 tablet by mouth Every Morning.       linaclotide (LINZESS) 145 MCG capsule capsule Take 1 capsule by mouth Daily.       memantine (NAMENDA) 10 MG tablet Take 1 tablet by mouth Daily.       menthol-zinc oxide (CALMOSEPTINE) 0.44-20.625 % ointment ointment Apply 1 application  topically to the appropriate area as directed 2 (Two) Times a Day As Needed (Apply to bilateral buttocks topically as needed every shift and after each incontinent episode).       mirtazapine (REMERON) 7.5 MG half tablet Take 1 half tablet by mouth Every Night.       polyethylene glycol (MIRALAX) 17 g packet Take 17 g by mouth 2 (Two) Times a Day. For constipation       senna (SENOKOT) 8.6 MG tablet Take 2 tablets by mouth Every Night.       sennosides-docusate (PERICOLACE) 8.6-50 MG per tablet Take 2 tablets by mouth 2 (Two) Times a Day. Related to constipation, unspecified       Zinc Oxide 10 % cream Apply  "1 application  topically 2 (Two) Times a Day. Apply to buttock/periarea/groin topically as needed for redness, excoriation. Reapply as needed with each episode of incontinence.        Current Meds:   [unfilled]  Social History:   Social History     Tobacco Use    Smoking status: Never    Smokeless tobacco: Never   Substance Use Topics    Alcohol use: No      Family History:  History reviewed. No pertinent family history.     Review of Systems unable to obtain        /73 (BP Location: Left leg, Patient Position: Lying)   Pulse 87   Temp 98.4 °F (36.9 °C)   Resp 18   Ht 149.9 cm (59\")   Wt 39.5 kg (87 lb)   SpO2 99%   BMI 17.57 kg/m²   General appearance: Confused  Neck: Trachea midline; NECK, supple, no thyromegaly or lymphadenopathy   Lungs: Normal size and shape, normal breath sounds, equal distribution of air, no rales and rhonchi   CV: S1-S2 regular, no murmurs, no rub, no gallop   Abdomen: Soft, nontender; no masses , no abnormal abdominal sounds   Extremities: No deformity , normal color , no peripheral edema   Skin: Normal temperature, turgor and texture; no rash, ulcers                  Cardiographics  ECG:        Comparison ecg        Echocardiogram:   2012 at [facility]     Conclusions:     There is normal left ventricular function.     Abnormal left ventricular diastolic filling is observed, consistent with     impaired LV relaxation.     There is a trace of mitral regurgitation.     There is trivial to mild tricuspid regurgitation observed.     Imaging  Chest X-ray:   Narrative & Impression   XR CHEST 1 VW-     INDICATION: Stroke protocol     COMPARISON: Chest radiographs dating back to 9/22/2014     IMPRESSION:  Radiograph is significantly limited by exposure. Repeat  imaging as clinically indicated.     Within that limitation, no focal consolidation, pleural effusion or  pneumothorax. Rotated stable cardiomediastinal silhouette.     This report was finalized on 1/3/2024 10:51 PM by Dr. Herman " KARELY Redd on Workstation: [id number]            IMPRESSION:  1. Perfusion and postcontrast imaging is particularly limited by motion.  2. Chronic appearing infarct in the left occipital lobe is new from  January 2023 imaging.  3. Perfusion data is significantly limited by motion artifact. No core  infarct is identified by perfusion which is inconsistent with CT  sequences showing chronic infarct in the left occipital lobe. However,  RAPID generated data identifies 49 mL of penumbra in the left PCA  territory.  4. Bilateral fetal PCA. The left PCA occludes at approximately the P2  segment.  5. Intracranial and extracranial arterial atherosclerotic disease as  detailed above.        Above findings were discussed with the on-call neurologist at  approximately 9:00 p.m. on 1/3/2024.     Radiation dose reduction techniques were utilized, including automated  exposure control and exposure modulation based on body size.     This report was finalized on 1/3/2024 9:31 PM by Dr. Virgil Redd M.D on Workstation: [id number]     Lab Review   Results from last 7 days   Lab Units 01/03/24 2022   HSTROP T ng/L 26*         Results from last 7 days   Lab Units 01/03/24 2022   SODIUM mmol/L 138   POTASSIUM mmol/L 4.2   BUN mg/dL 24*   CREATININE mg/dL 1.66*   CALCIUM mg/dL 9.5     @[id number]@  Results from last 7 days   Lab Units 01/03/24 2022   WBC 10*3/mm3 13.67*   HEMOGLOBIN g/dL 13.6   HEMATOCRIT % 42.3   PLATELETS 10*3/mm3 364     Results from last 7 days   Lab Units 01/03/24 2022   INR  1.04   APTT seconds 31.2     CHADS-VASc Risk Assessment              5 Total Score    1 Hypertension    2 Age >/= 75    1 DM    1 Sex: Female        Criteria that do not apply:    CHF    PRIOR STROKE/TIA/THROMBO    Vascular Disease    Age 65-74              Assessment:     New onset Atrial flutter   Left PCA acute ischemic stroke   Left P1 occlusion with completed stroke  UTI  [place]  Hypertension  Hyperlipidemia  Dementia  Parkinson's  disease  Diabetes mellitus type 2    Recommendations / Plan:   This is an 80-year-old female who is new to our service.  She presented to [facility] from her nursing facility after being noted to have left gaze deviation and slurred speech.  She has a history to include dementia, diabetes mellitus, hypertension and Parkinson's.  Neurology following.  Vital signs on admission 217/110 with glucose of 241.  EKG showed atrial flutter with 3-1 AV block.  Heart rate controlled.  Blood pressure now improved.  Echo in 2012 at [facility] revealed normal LV function, abnormal LV diastolic filling with impaired relaxation.  Trace to mild regurgitation and trace to mild TV regurgitation.  Discussed with son and nurse in detail.  She is probably not a candidate for ischemic workup.  We will do EKG, troponin and echo.  Further recommendations once echo has been completed.    Please add anticoagulation once okay with all.      Tamera Bass, APRN  1/4/2024, 13:53 EST    Patient has been seen and examined patient is not a great candidate for any cardiac intervention    Patient personally interviewed and above subjective findings personally confirmed during a face to face contact with patient today  All findings of physical examination confirmed  All pertinent and performed labs, cardiac procedures ,  radiographs of the last 24 hours personally reviewed  Impression and plans discussed/elaborated and implemented jointly as described above     Lauro Alvarenga MD          EMR Dragon/Transcription:   Dictated utilizing Dragon dictation

## 2024-01-04 NOTE — PROGRESS NOTES
"Nutrition Services    Patient Name:  Sam Flores  YOB: 1934  MRN: [id number]  Admit Date:  1/3/2024  Assessment Date:  24    Summary: Nutrition screen completed for low BMI  This is a 89 yo female admitted with aphasia and left gaze deviation, HTN.   CT of head concerning for acute ischemic stroke.   Labs glu 171, chol 225, A1C 9.4  BMI 17.57. Wt hx shows 13lb wt loss(13%)  Pt currently NPO    Patient meets ASPEN/AND criteria for nutrition diagnosis of severe malnutrition of chronic illness based on: inadequate po intake, wt loss, and NFPE results.     Plan/Recommendation  Will follow for advance of diet as medical conditions allow.  RD to follow    CLINICAL NUTRITION ASSESSMENT      Reason for Assessment BMI, Reduced Oral Intake Over The Last Month, Unintentional Weight Loss      Diagnosis/Problem   Aphasia, HTN, acute ischemic stroke   Medical/Surgical History Past Medical History:   Diagnosis Date    Breast cancer     Dementia     Diabetes mellitus     Emphysema of lung     Hyperlipidemia     Hypertension     Hypothyroidism     Lumbar compression fracture 2016    Osteoporosis     Parkinson's disease     S/P kyphoplasty 2016    L1 and L4, 2016       Past Surgical History:   Procedure Laterality Date    BREAST BIOPSY       SECTION      KYPHOPLASTY      T12 and L2    KYPHOPLASTY N/A 2016    Procedure: KYPHOPLASTY LUMBAR 1 AND LUMBAR 4;  Surgeon: Triston Marinelli IV, MD;  Location: [facility] ;  Service:     MASTECTOMY Bilateral     SHOULDER SURGERY Right         Anthropometrics        Current Height  Current Weight  BMI kg/m2 Height: 149.9 cm (59\")  Weight: 39.5 kg (87 lb) (24)  Body mass index is 17.57 kg/m².   Adjusted BMI (if applicable)    BMI Category Underweight (18.4 or below)   Ideal Body Weight (IBW) 107lb   Usual Body Weight (UBW) 100lb   Weight Trend Loss   Weight History Wt Readings from Last 30 Encounters:   24 " 39.5 kg (87 lb)   01/27/23 0839 39.5 kg (87 lb 1.3 oz)   08/02/22 1117 45 kg (99 lb 3.3 oz)   07/30/22 1242 45 kg (99 lb 3.2 oz)   03/20/18 0451 44.2 kg (97 lb 7.8 oz)   03/19/18 0604 44.2 kg (97 lb 8 oz)   03/13/18 0541 41.7 kg (91 lb 14.4 oz)   03/12/18 1305 40.8 kg (90 lb)   11/30/16 2238 42.3 kg (93 lb 4.8 oz)   11/30/16 1539 45.4 kg (100 lb)   12/01/16 1801 42.2 kg (93 lb)      --  Labs       Pertinent Labs    Results from last 7 days   Lab Units 01/03/24 2022   SODIUM mmol/L 138   POTASSIUM mmol/L 4.2   CHLORIDE mmol/L 101   CO2 mmol/L 22.7   BUN mg/dL 24*   CREATININE mg/dL 1.66*   CALCIUM mg/dL 9.5   BILIRUBIN mg/dL 0.3   ALK PHOS U/L 96   ALT (SGPT) U/L 15   AST (SGOT) U/L 22   GLUCOSE mg/dL 241*     Results from last 7 days   Lab Units 01/04/24  0522 01/03/24 2022   HEMOGLOBIN g/dL  --  13.6   HEMATOCRIT %  --  42.3   WBC 10*3/mm3  --  13.67*   TRIGLYCERIDES mg/dL 125  --    ALBUMIN g/dL  --  3.6     Results from last 7 days   Lab Units 01/03/24 2022   INR  1.04   APTT seconds 31.2   PLATELETS 10*3/mm3 364     COVID19   Date Value Ref Range Status   08/02/2022 Not Detected Not Detected - Ref. Range Final     Lab Results   Component Value Date    HGBA1C 9.40 (H) 01/04/2024          Medications           Scheduled Medications aspirin, 300 mg, Rectal, Daily  atorvastatin, 80 mg, Oral, Nightly  cefTRIAXone, 1,000 mg, Intravenous, Q24H  sodium chloride, 10 mL, Intravenous, Q12H       Infusions sodium chloride, 75 mL/hr, Last Rate: 75 mL/hr (01/04/24 0750)       PRN Medications   sodium chloride    sodium chloride    sodium chloride     Physical Findings          General Findings disoriented, loss of muscle mass, loss of subcutaneous fat, underweight   Oral/Mouth Cavity tooth or teeth missing   Edema  no edema   Gastrointestinal last bowel movement: 1/4   Skin  bruising   Tubes/Drains/Lines none   NFPE See Malnutrition Severity Assessment, Date Completed: 1/4   --  Malnutrition Severity Assessment       Patient meets criteria for : Severe Malnutrition  Malnutrition Type (last 8 hours)       Malnutrition Severity Assessment       Row Name 01/04/24 1134       Malnutrition Severity Assessment    Malnutrition Type Chronic Disease - Related Malnutrition      Row Name 01/04/24 1134       Insufficient Energy Intake     Insufficient Energy Intake Findings Severe    Insufficient Energy Intake  < or equal to 50% of est. energy requirement for > or equal to 5d)      Row Name 01/04/24 1134       Unintentional Weight Loss     Unintentional Weight Loss Findings Severe    Unintentional Weight Loss  Weight loss greater than 7.5% in three months      Row Name 01/04/24 1134       Muscle Loss    [place] Severe - deep hollowing/scooping, lack of muscle to touch, facial bones well defined    Clavicle Bone Region Moderate - some protrusion in females, visible in males    Acromion Bone Region Severe - squared shoulders, bones, and acromion process protrusion prominent    Dorsal Hand Region Severe - prominent depression      Row Name 01/04/24 1134       Fat Loss    Orbital Region  Moderate -  somewhat hollowness, slightly dark circles    Upper Arm Region Moderate - some fat tissue, not ample      Row Name 01/04/24 1134       Declining Functional Status    Declining Functional Status Findings Measurably Reduced      Row Name 01/04/24 1134       Criteria Met (Must meet criteria for severity in at least 2 of these categories: M Wasting, Fat Loss, Fluid, Secondary Signs, Wt. Status, Intake)    Patient meets criteria for  Severe Malnutrition                       Estimated/Assessed Needs        Current Weight  Weight: 39.5 kg (87 lb) (01/03/24 2012)       Energy Requirements    Weight for Calculation 39.5 kg   Method for Estimation  30-35 kcal/kg   EST Needs (kcal/day) 5984-7952       Protein Requirements    Weight for Calculation 39.5 kg   EST Protein Needs (g/kg) 1.2 gm/kg   EST Daily Needs (g/day) 47       Fluid Requirements      Method for Estimation 25 mL/kg    EST Needs (mL/day) 1000     Current Nutrition Orders & Evaluation of Intake       Oral Nutrition     Food Allergies NKFA   Current PO Diet NPO Diet NPO Type: Strict NPO   Supplement n/a   PO Evaluation     % PO Intake NPO    Factors Affecting Intake: altered mental status, decreased appetite   --  PES STATEMENT / NUTRITION DIAGNOSIS      Nutrition Dx Problem  Problem: Underweight, Malnutrition (severe), and Inadequate Oral Intake  Etiology: Medical Diagnosis - stroke    Signs/Symptoms: NPO, NFPE Results, BMI, Unintended Weight Change, and Report/Observation     NUTRITION INTERVENTION / PLAN OF CARE      Intervention Goal(s) Reduce/improve symptoms, Disease management/therapy, Initiate feeding/diet, Establish PO intake, Tolerate PO , Maintain weight, and PO intake goal %: 75+         RD Intervention/Action Await initiation/advancement of PO diet and Continue to monitor   --      Prescription/Orders:       PO Diet       Supplements       Enteral Nutrition       Parenteral Nutrition    New Prescription Ordered? No changes at this time   --      Monitor/Evaluation Per protocol   Discharge Plan/Needs Pending clinical course   --    RD to follow per protocol.      Electronically signed by:  Romina Jones RD  01/04/24 10:56 EST

## 2024-01-04 NOTE — ED NOTES
"Nursing report ED to floor  Sam Flores  89 y.o.  female    HPI (triage note):   Chief Complaint   Patient presents with    Speech Problem       Admitting doctor:   No admitting provider for patient encounter.    Admitting diagnosis:   There were no encounter diagnoses.    Code status:   Current Code Status       Date Active Code Status Order ID Comments User Context       Prior            Allergies:   Penicillins    Past Medical History:  Past Medical History:   Diagnosis Date    Breast cancer     Dementia     Diabetes mellitus     Emphysema of lung     Hyperlipidemia     Hypertension     Hypothyroidism     Lumbar compression fracture 11/30/2016    Osteoporosis     Parkinson's disease     S/P kyphoplasty 12/05/2016    L1 and L4, 12/2/2016        Weight:       01/03/24 2012   Weight: 39.5 kg (87 lb)       Most recent vitals:   Vitals:    01/03/24 2012 01/03/24 2025 01/03/24 2102   BP: (!) 217/110 (!) 161/135    BP Location: Right arm     Patient Position: Lying     Pulse: 110     Resp: 16     Temp:   98.2 °F (36.8 °C)   TempSrc:   Tympanic   SpO2: 95%     Weight: 39.5 kg (87 lb)     Height: 149.9 cm (59\")         Active LDAs/IV Access:   Lines, Drains & Airways       Active LDAs       Name Placement date Placement time Site Days    Peripheral IV 01/28/23 1914 Right Forearm 01/28/23 1914  Forearm  340    Peripheral IV 01/03/24 2012 Anterior;Left;Proximal Forearm 01/03/24 2012  Forearm  less than 1    Peripheral IV 01/03/24 2022 Right Antecubital 01/03/24 2022  Antecubital  less than 1    External Urinary Catheter 01/29/23  0930  --  339                    Labs (abnormal labs have a star):   Labs Reviewed   COMPREHENSIVE METABOLIC PANEL - Abnormal; Notable for the following components:       Result Value    Glucose 241 (*)     BUN 24 (*)     Creatinine 1.66 (*)     eGFR 29.4 (*)     All other components within normal limits    Narrative:     GFR Normal >60  Chronic Kidney Disease <60  Kidney Failure <15    The " GFR formula is only valid for adults with stable renal function between ages 18 and 70.   SINGLE HSTROPONIN T - Abnormal; Notable for the following components:    HS Troponin T 26 (*)     All other components within normal limits    Narrative:     High Sensitive Troponin T Reference Range:  <14.0 ng/L- Negative Female for AMI  <22.0 ng/L- Negative Male for AMI  >=14 - Abnormal Female indicating possible myocardial injury.  >=22 - Abnormal Male indicating possible myocardial injury.   Clinicians would have to utilize clinical acumen, EKG, Troponin, and serial changes to determine if it is an Acute Myocardial Infarction or myocardial injury due to an underlying chronic condition.        CBC WITH AUTO DIFFERENTIAL - Abnormal; Notable for the following components:    WBC 13.67 (*)     Neutrophils, Absolute 8.47 (*)     Lymphocytes, Absolute 3.67 (*)     Monocytes, Absolute 1.18 (*)     All other components within normal limits   POCT GLUCOSE FINGERSTICK - Abnormal; Notable for the following components:    Glucose 227 (*)     All other components within normal limits   PROTIME-INR - Normal   APTT - Normal   RAINBOW DRAW    Narrative:     The following orders were created for panel order [place] Draw.  Procedure                               Abnormality         Status                     ---------                               -----------         ------                     Green Top (Gel)[[id number]]                                  Final result               Lavender Top[[id number]]                                     Final result               Gold Top - SST[[id number]]                                   Final result               Light Blue Top[[id number]]                                   Final result                 Please view results for these tests on the individual orders.   URINALYSIS W/ MICROSCOPIC IF INDICATED (NO CULTURE)   POCT GLUCOSE FINGERSTICK   POCT GLUCOSE FINGERSTICK   POCT GLUCOSE FINGERSTICK   POCT  GLUCOSE FINGERSTICK   POCT GLUCOSE FINGERSTICK   TYPE AND SCREEN   CBC AND DIFFERENTIAL    Narrative:     The following orders were created for panel order CBC & Differential.  Procedure                               Abnormality         Status                     ---------                               -----------         ------                     CBC Auto Differential[[id number]]        Abnormal            Final result                 Please view results for these tests on the individual orders.   GREEN TOP   LAVENDER TOP   GOLD TOP - SST   LIGHT BLUE TOP       EKG:   ECG 12 Lead Stroke Evaluation   Preliminary Result   HEART RATE= 83  bpm   RR Interval= 723  ms   RI Interval=   ms   P Horizontal Axis=   deg   P Front Axis=   deg   QRSD Interval= 83  ms   QT Interval= 426  ms   QTcB= 501  ms   QRS Axis= 52  deg   T Wave Axis= 62  deg   - ABNORMAL ECG -   Atrial flutter with predominant 3:1 AV block   Abnormal R-wave progression, early transition   Prolonged QT interval   Baseline wander in lead(s) V2   Electronically Signed By:    Date and Time of Study: 2024-01-03 21:05:08          Meds given in ED:   Medications   sodium chloride 0.9 % flush 10 mL (has no administration in time range)   aspirin suppository 300 mg (has no administration in time range)   sodium chloride 0.9 % flush 10 mL (has no administration in time range)   sodium chloride 0.9 % flush 10 mL (has no administration in time range)   sodium chloride 0.9 % infusion 40 mL (has no administration in time range)   atorvastatin (LIPITOR) tablet 80 mg (has no administration in time range)   sodium chloride 0.9 % infusion (has no administration in time range)   labetalol (NORMODYNE,TRANDATE) injection 10 mg (10 mg Intravenous Given 1/3/24 2025)   iopamidol (ISOVUE-370) 76 % injection 150 mL (150 mL Intravenous Given by Other 1/3/24 2048)       Imaging results:  CT Angiogram Head w AI Analysis of LVO    Result Date: 1/3/2024  1. Perfusion and postcontrast  imaging is particularly limited by motion. 2. 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 3. Perfusion data is significantly limited by motion artifact. No core infarct is identified by perfusion which is inconsistent with CT sequences showing chronic infarct in the left occipital lobe. However, RAPID generated data identifies 49 mL of penumbra in the left PCA territory. 4. Bilateral fetal PCA. The left PCA occludes at approximately the P2 segment. 5. 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   Above findings were discussed with the on-call neurologist at approximately 9:00 p.m. on 1/3/2024.  Radiation dose reduction techniques were utilized, including automated exposure control and exposure modulation based on body size.  This report was finalized on 1/3/2024 9:31 PM by Dr. Virgil Redd M.D on Workstation: [employer]      CT Angiogram Neck    Result Date: 1/3/2024  1. Perfusion and postcontrast imaging is particularly limited by motion. 2. 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 3. Perfusion data is significantly limited by motion artifact. No core infarct is identified by perfusion which is inconsistent with CT sequences showing chronic infarct in the left occipital lobe. However, RAPID generated data identifies 49 mL of penumbra in the left PCA territory. 4. Bilateral fetal PCA. The left PCA occludes at approximately the P2 segment. 5. 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   Above findings were discussed with the on-call neurologist at approximately 9:00 p.m. on 1/3/2024.  Radiation dose reduction techniques were utilized, including automated exposure control and exposure modulation based on body size.  This report was finalized on 1/3/2024 9:31 PM by Dr. Virgil Redd M.D on Workstation: [id number]      CT CEREBRAL PERFUSION WITH &  WITHOUT CONTRAST    Result Date: 1/3/2024  1. Perfusion and postcontrast imaging is particularly limited by motion. 2. 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 3. Perfusion data is significantly limited by motion artifact. No core infarct is identified by perfusion which is inconsistent with CT sequences showing chronic infarct in the left occipital lobe. However, RAPID generated data identifies 49 mL of penumbra in the left PCA territory. 4. Bilateral fetal PCA. The left PCA occludes at approximately the P2 segment. 5. 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   Above findings were discussed with the on-call neurologist at approximately 9:00 p.m. on 1/3/2024.  Radiation dose reduction techniques were utilized, including automated exposure control and exposure modulation based on body size.  This report was finalized on 1/3/2024 9:31 PM by Dr. Virgil Redd M.D on Workstation: [id number]       Ambulatory status:   - br    Social issues:   Social History     Socioeconomic History    Marital status:    Tobacco Use    Smoking status: Never    Smokeless tobacco: Never   Vaping Use    Vaping Use: Unknown   Substance and Sexual Activity    Alcohol use: No    Drug use: No    Sexual activity: Defer          NIH Stroke Scale:  Interval: baseline      Velasquez Oliveira RN  01/03/24 21:46 EST

## 2024-01-04 NOTE — CASE MANAGEMENT/SOCIAL WORK
"Physicians Statement of Medical Necessity for  Ambulance Transportation    GENERAL INFORMATION     Name: Sam Flores  YOB: 1934    Medicare #: [id number]   Transport Date:    (Valid for round trips this date, or for scheduled repetitive trips for 60 days from the date signed below.)  Origin: [facility]    Destination: [address]  Is the Patient's stay covered under Medicare Part A (PPS/DRG?)Yes  Closest appropriate facility? Yes  If this a hosp-hosp transfer? No  Is this a hospice patient? No    MEDICAL NECESSITY QUESTIONAIRE    Ambulance Transportation is medically necessary only if other means of transportation are contraindicated or would be potentially harmful to the patient.  To meet this requirement, the patient must be either \"bed confined\" or suffer from a condition such that transport by means other than an ambulance is contraindicated by the patient's condition.  The following questions must be answered by the healthcare professional signing below for this form to be valid:     1) Describe the MEDICAL CONDITION (physical and/or mental) of this patient AT THE TIME OF AMBULANCE TRANSPORT that requires the patient to be transported in an ambulance, and why transport by other means is contraindicated by the patient's condition:     Acute left occipital lobe infarct  New onset atrial flutter  Acute kidney injury  Acute UTI  Diabetes mellitus  Dementia    Past Medical History:   Diagnosis Date    Breast cancer     Dementia     Diabetes mellitus     Emphysema of lung     Hyperlipidemia     Hypertension     Hypothyroidism     Lumbar compression fracture 2016    Osteoporosis     Parkinson's disease     S/P kyphoplasty 2016    L1 and L4, 2016      Past Surgical History:   Procedure Laterality Date    BREAST BIOPSY       SECTION      KYPHOPLASTY      T12 and L2    KYPHOPLASTY N/A 2016    Procedure: KYPHOPLASTY " "LUMBAR 1 AND LUMBAR 4;  Surgeon: Triston Marinelli IV, MD;  [facility] 18/19;  Service:     MASTECTOMY Bilateral     SHOULDER SURGERY Right       2) Is this patient \"bed confined\" as defined below?Yes   To be \"bed confined\" the patient must satisfy all three of the following criteria:  (1) unable to get up from bed without assistance; AND (2) unable to ambulate;  AND (3) unable to sit in a chair or wheelchair.  3) Can this patient safely be transported by car or wheelchair van (I.e., may safely sit during transport, without an attendant or monitoring?)No   4. In addition to completing questions 1-3 above, please check any of the following conditions that apply*:          *Note: supporting documentation for any boxes checked must be maintained in the patient's medical records Patient is confused and Unable to tolerate seated position for time needed to transport      SIGNATURE OF PHYSICIAN OR OTHER AUTHORIZED HEALTHCARE PROFESSIONAL    I certify that the above information is true and correct based on my evaluation of this patient, and represent that the patient requires transport by ambulance and that other forms of transport are contraindicated.  I understand that this information will be used by the Centers for Medicare and Medicaid Services (CMS) to support the determiniation of medical necessity for ambulance services, and I represent that I have personal knowledge of the patient's condition at the time of transport.       If this box is checked, I also certify that the patient is physically or mentally incapable of signing the ambulance service's claim form and that the institution with which I am affiliated has furnished care, services or assistance to the patient.  My signature below is made on behalf of the patient pursuant to 42 .36(b)(4). In accordance with 42 .37, the specific reason(s) that the patient is physically or mentally incapable of signing the claim for is as " follows:     Signature of Physician or Healthcare Professional  Date/Time:        (For Scheduled repetitive transport, this form is not valid for transports performed more than 60 days after this date).                                                                                                                                            --------------------------------------------------------------------------------------------  Printed Name and Credentials of Physician or Authorized Healthcare Professional     *Form must be signed by patient's attending physician for scheduled, repetitive transports,.  For non-repetitive ambulance transports, if unable to obtain the signature of the attending physician, any of the following may sign (please select below):     Physician  Clinical Nurse Specialist  Registered Nurse     Physician Assistant  Discharge Planner  Licensed Practical Nurse     Nurse Practitioner

## 2024-01-04 NOTE — THERAPY EVALUATION
Patient Name: Sam Flores  : 1934    MRN: [id number]                              Today's Date: 2024       Admit Date: 1/3/2024    Visit Dx:     ICD-10-CM ICD-9-CM   1. Stroke of unknown etiology  I63.9 434.91   2. Acute renal insufficiency  N28.9 593.9   3. Dementia, unspecified dementia severity, unspecified dementia type, unspecified whether behavioral, psychotic, or mood disturbance or anxiety  F03.90 294.20   4. Acute UTI  N39.0 599.0     Patient Active Problem List   Diagnosis    Lumbar compression fracture    Osteoporosis    Dehydration    Acute on chronic renal failure    Type 2 diabetes mellitus without complication    Benign essential hypertension    Weight loss, abnormal    Trouble swallowing    Hyponatremia    Hypokalemia    Aspiration pneumonia    Chest wall hematoma, right, initial encounter    Dementia    Altered mental status, unspecified altered mental status type    Stroke of unknown etiology    Stroke of unknown cause     Past Medical History:   Diagnosis Date    Breast cancer     Dementia     Diabetes mellitus     Emphysema of lung     Hyperlipidemia     Hypertension     Hypothyroidism     Lumbar compression fracture 2016    Osteoporosis     Parkinson's disease     S/P kyphoplasty 2016    L1 and L4, 2016     Past Surgical History:   Procedure Laterality Date    BREAST BIOPSY       SECTION      KYPHOPLASTY      T12 and L2    KYPHOPLASTY N/A 2016    Procedure: KYPHOPLASTY LUMBAR 1 AND LUMBAR 4;  Surgeon: Triston Marinelli IV, MD;  Location: [facility] ;  Service:     MASTECTOMY Bilateral     SHOULDER SURGERY Right       General Information       Row Name 24 0839          OT Time and Intention    Document Type evaluation  -     Mode of Treatment occupational therapy;physical therapy;co-treatment  -       Row Name 24 0839          General Information    Patient Profile Reviewed yes  -     Prior Level of Function --  Unknown  PLOF, lives in LTC uses a wc at baseline  -     Existing Precautions/Restrictions fall  -     Barriers to Rehab cognitive status;previous functional deficit;medically complex;visual deficit  -       Row Name 01/04/24 0839          Living Environment    People in Home facility resident  -       Row Name 01/04/24 0839          Cognition    Orientation Status (Cognition) oriented to;person;verbal cues/prompts needed for orientation  -       Row Name 01/04/24 0839          Safety Issues, Functional Mobility    Safety Issues Affecting Function (Mobility) ability to follow commands;friction/shear risk;insight into deficits/self-awareness;safety precaution awareness;safety precautions follow-through/compliance  Patient unable to follow commands today, answers yes to two questions, says her name.  -     Impairments Affecting Function (Mobility) balance;endurance/activity tolerance;strength;pain;postural/trunk control;muscle tone abnormal;range of motion (ROM);cognition  -     Cognitive Impairments, Mobility Safety/Performance attention;awareness, need for assistance;insight into deficits/self-awareness;problem-solving/reasoning;safety precaution awareness;safety precaution follow-through  -     Comment, Safety Issues/Impairments (Mobility) PT/OT cotreatment medically appropriate and necessary due to patient acuity level, to maximize therapeutic benefit due to impaired act tolerance, and for safety of patient and staff. Treatment focused on progression of care and goals established in POC.  -               User Key  (r) = Recorded By, (t) = Taken By, (c) = Cosigned By      Initials Name Provider Type     Xuan Monteiro OT Occupational Therapist                     Mobility/ADL's       Row Name 01/04/24 0859          Bed Mobility    Bed Mobility bed mobility (all) activities  -     All Activities, [place] (Bed Mobility) dependent (less than 25% patient effort)  -     Bed Mobility, Safety Issues  cognitive deficits limit understanding;decreased use of arms for pushing/pulling;decreased use of legs for bridging/pushing  -     Assistive Device (Bed Mobility) bed rails;draw sheet;head of bed elevated  -[facility] Name 01/04/24 0841          Functional Mobility    Functional Mobility- Ind. Level unable to perform  -     Patient was able to Ambulate no, other medical factors prevent ambulation  -     Reason Patient was unable to Ambulate Cognitive Deficit/Severe Dementia;Non-Ambulatory at Baseline  -[facility] Name 01/04/24 0841          Activities of Daily Living    BADL Assessment/Intervention toileting  -LH       Row Name 01/04/24 0841          Toileting Assessment/Training    [place] Level (Toileting) dependent (less than 25% patient effort)  -     Position (Toileting) supine  -               User Key  (r) = Recorded By, (t) = Taken By, (c) = Cosigned By      Initials Name Provider Type     Xuan Monteiro OT Occupational Therapist                   Obj/Interventions       [facility] Name 01/04/24 0842          Vision Assessment/Intervention    Visual Impairment/Limitations --  Patient unable to track visual stimuli, L eye gaze, patient unable to verbalize any deficits or visual fields regarding vision  -     Vision Assessment Comment Unable to fully assess, patient only holds gaze to L, unable to track visual stimuli  -LH       Row Name 01/04/24 0842          Range of Motion Comprehensive    Comment, General Range of Motion Unable to assess, patient possibly with UE contractures, resistant to ROM assessment, contractures noted by PT at bilateral knees, hips  -[facility] Name 01/04/24 0842          Strength Comprehensive (MMT)    Comment, General Manual Muscle Testing (MMT) Assessment NT  -LH       Row Name 01/04/24 0842          Motor Skills    Motor Skills functional endurance  -     Functional Endurance Poor  -LH       Row Name 01/04/24 0842          Balance    Sit to Stand Dynamic  Balance unable to assess  -               User Key  (r) = Recorded By, (t) = Taken By, (c) = Cosigned By      Initials Name Provider Type     Xuan Monteiro OT Occupational Therapist                   Goals/Plan       Row Name 01/04/24 0858          Problem Specific Goal 1 (OT)    Problem Specific Goal 1 (OT) Patient will tolerate positioning in bed in order to reduce progression of contractures in extremities/pelvis.  -     Time Frame (Problem Specific Goal 1, OT) short term goal (STG);1 day  -     Progress/Outcome (Problem Specific Goal 1, OT) goal met  -               User Key  (r) = Recorded By, (t) = Taken By, (c) = Cosigned By      Initials Name Provider Type     Xuan Monteiro OT Occupational Therapist                   Clinical Impression       [facility] Name 01/04/24 0846          Pain Assessment    Pre/Posttreatment Pain Comment Reports pain with ROM of extremities  -     Additional Documentation Pain Scale: FACES Pre/Post-Treatment (Group)  -       Row Name 01/04/24 0846          Pain Scale: FACES Pre/Post-Treatment    Pain: FACES Scale, Pretreatment 2-->hurts little bit  -     Posttreatment Pain Rating 6-->hurts even more  -     Pre/Posttreatment Pain Comment Extremities with ROM  -       Row Name 01/04/24 0846          Plan of Care Review    Plan of Care Reviewed With patient  -     Outcome Evaluation Patient is an 89 year old female admitted to [facility] with acute ischemic stroke in occipital lobe. Patient has advanced dementia at baseline and lives in LTC, patient is oriented to first name only, unable to verbalize anything regarding baseline function but per previous hospital notes patient uses a wc for mobility when out of bed, and requires total assist with ADLs, sometimes able to feed self. Patient is unable to follow commands today, is dependent with bed mobility, dependent with toileting, and yells out in pain with gentle ROM of extremities. Patient positioned in bed with support at R  hip/LE for comfort due to hips windswept posture  to R when upper body supine, head and eye gaze to L. Patient is not appropriate for skilled OT at this time, as she is unable to follow commands and requires total assistance with self care at baseline. OT to sign off at this time, and anticipate LTC at [place].  -       Row Name 01/04/24 0846          Therapy Assessment/Plan (OT)    Rehab Potential (OT) good, to achieve stated therapy goals  -     Criteria for Skilled Therapeutic Interventions Met (OT) no  -     Therapy Frequency (OT) evaluation only  -       Row Name 01/04/24 0846          Therapy Plan Review/Discharge Plan (OT)    Anticipated Discharge Disposition (OT) extended care facility  -       Row Name 01/04/24 0846          Positioning and Restraints    Pre-Treatment Position in bed  -     Post Treatment Position bed  -LH     In Bed supine;call light within reach;encouraged to call for assist;exit alarm on  -               User Key  (r) = Recorded By, (t) = Taken By, (c) = Cosigned By      Initials Name Provider Type     Xuan Monteiro, OT Occupational Therapist                   Outcome Measures       Row Name 01/04/24 0859          How much help from another is currently needed...    Putting on and taking off regular lower body clothing? 1  -LH     Bathing (including washing, rinsing, and drying) 1  -LH     Toileting (which includes using toilet bed pan or urinal) 1  -LH     Putting on and taking off regular upper body clothing 1  -LH     Taking care of personal grooming (such as brushing teeth) 1  -LH     Eating meals 1  -     AM-PAC 6 Clicks Score (OT) 6  -       Row Name 01/03/24 9185          How much help from another person do you currently need...    Turning from your back to your side while in flat bed without using bedrails? 3  -TT     Moving from lying on back to sitting on the side of a flat bed without bedrails? 2  -TT     Moving to and from a bed to a chair (including a  wheelchair)? 2  -TT     Standing up from a chair using your arms (e.g., wheelchair, bedside chair)? 2  -TT     Climbing 3-5 steps with a railing? 2  -TT     To walk in hospital room? 2  -TT     AM-PAC 6 Clicks Score (PT) 13  -TT     Highest Level of Mobility Goal 4 --> Transfer to chair/commode  -       Row Name 01/04/24 0859          Modified [place] Scale    Modified Leeanne Scale 5 - Severe disability.  Bedridden, incontinent, and requiring constant nursing care and attention.  -       Row Name 01/04/24 0859          Functional Assessment    Outcome Measure Options AM-PAC 6 Clicks Daily Activity (OT);Modified Leeanne  -               User Key  (r) = Recorded By, (t) = Taken By, (c) = Cosigned By      Initials Name Provider Type     Xuan Monteiro OT Occupational Therapist    TT Evan Ontiveros, RN Registered Nurse                    Occupational Therapy Education       Title: PT OT SLP Therapies (In Progress)       Topic: Occupational Therapy (Not Started)       Point: ADL training (Done)       Description:   Instruct learner(s) on proper safety adaptation and remediation techniques during self care or transfers.   Instruct in proper use of assistive devices.                  Learning Progress Summary             Patient Acceptance, E,TB, VU,DU by  at 1/4/2024 0859    Comment: Instructed caregiver in positioning techniques to reduce progression of contractures at extremities, pelvis  whie in bed   Caregiver Acceptance, E,TB, VU,DU by  at 1/4/2024 0859    Comment: Instructed caregiver in positioning techniques to reduce progression of contractures at extremities, pelvis  whie in bed                         Point: Home exercise program (Not Started)       Description:   Instruct learner(s) on appropriate technique for monitoring, assisting and/or progressing therapeutic exercises/activities.                  Learner Progress:  Not documented in this visit.                              User Key        Initials Effective Dates Name Provider Type Discipline     08/31/23 -  Xuan Monteiro, OT Occupational Therapist OT                  OT Recommendation and Plan  Therapy Frequency (OT): evaluation only  Plan of Care Review  Plan of Care Reviewed With: patient  Outcome Evaluation: Patient is an 89 year old female admitted to [facility] with acute ischemic stroke in occipital lobe. Patient has advanced dementia at baseline and lives in LTC, patient is oriented to first name only, unable to verbalize anything regarding baseline function but per previous hospital notes patient uses a wc for mobility when out of bed, and requires total assist with ADLs, sometimes able to feed self. Patient is unable to follow commands today, is dependent with bed mobility, dependent with toileting, and yells out in pain with gentle ROM of extremities. Patient positioned in bed with support at R hip/LE for comfort due to hips windswept posture  to R when upper body supine, head and eye gaze to L. Patient is not appropriate for skilled OT at this time, as she is unable to follow commands and requires total assistance with self care at baseline. OT to sign off at this time, and anticipate LTC at [place].     Time Calculation:   Evaluation Complexity (OT)  Review Occupational Profile/Medical/Therapy History Complexity: brief/low complexity  Assessment, Occupational Performance/Identification of Deficit Complexity: 1-3 performance deficits  Clinical Decision Making Complexity (OT): problem focused assessment/low complexity  Overall Complexity of Evaluation (OT): low complexity     Time Calculation- OT       Row Name 01/04/24 0901             Time Calculation- OT    OT Start Time 0815  -      OT Stop Time 0833  -      OT Time Calculation (min) 18 min  -      Total Timed Code Minutes- OT 10 minute(s)  -      OT Non-Billable Time (min) 8 min  -      OT Received On 01/04/24  -         Timed Charges    94992 - OT Self Care/Mgmt Minutes 10  -          Untimed Charges    OT Eval/Re-eval Minutes 8  -LH         Total Minutes    Timed Charges Total Minutes 10  -LH      Untimed Charges Total Minutes 8  -LH       Total Minutes 18  -LH                User Key  (r) = Recorded By, (t) = Taken By, (c) = Cosigned By      Initials Name Provider Type     Xuan Monteiro OT Occupational Therapist                  Therapy Charges for Today       Code Description Service Date Service Provider Modifiers Qty    94117782299  OT SELF CARE/MGMT/TRAIN EA 15 MIN 1/4/2024 Xuan Monteiro OT GO 1    [id number]  OT EVAL LOW COMPLEXITY 3 1/4/2024 Xuan Monteiro OT GO 1                 Xuan Monteiro OT  1/4/2024

## 2024-01-04 NOTE — DISCHARGE PLACEMENT REQUEST
"Lisa Flores (89 y.o. Female)       Date of Birth   1934    Social Security Number       Address   [place] [address]    Home Phone   [phone]    MRN   [id number]       Voodoo   None    Marital Status                               Admission Date   1/3/24    Admission Type   Emergency    Admitting Provider   Ang Dhaliwal MD    Attending Provider   Ang Dhaliwal MD    Department, Room/Bed   [address], S520/1       Discharge Date       Discharge Disposition       Discharge Destination                                 Attending Provider: Ang Dhaliwal MD    Allergies: Penicillins    Isolation: None   Infection: None   Code Status: Prior    Ht: 149.9 cm (59\")   Wt: 39.5 kg (87 lb)    Admission Cmt: None   Principal Problem: Stroke of unknown etiology [I63.9]                   Active Insurance as of 1/3/2024       Primary Coverage       Payor Plan Insurance Group Employer/Plan Group    MEDICARE MEDICARE A & B        Payor Plan Address Payor Plan Phone Number Payor Plan Fax Number Effective Dates    [address] [phone]  10/1/1999 - None Entered    [facility] [id number]         Subscriber Name Subscriber Birth Date Member ID       LISA FLORES 1934 [id number]               Secondary Coverage       Payor Plan Insurance Group Employer/Plan Group    [employer]HEM BLUE CROSS AN[employer]M BLUE CROSS BLUE SHIELD PPO [id number]       Payor Plan Address Payor Plan Phone Number Payor Plan Fax Number Effective Dates    [address] [phone]  10/1/2015 - None Entered    [facility] [id number]         Subscriber Name Subscriber Birth Date Member ID       LISA LFORES 1934 [id number]                     Emergency Contacts        (Rel.) Home Phone Work Phone Mobile Phone    Trevon Flores (Son) [phone] -- [phone]    MarkCesar thacker (Son) (Power of ) [phone] -- --    Mariela Tucker (Daughter) (Power of " ) [phone] -- [phone]

## 2024-01-04 NOTE — PLAN OF CARE
Goal Outcome Evaluation:  Plan of Care Reviewed With: patient           Outcome Evaluation: Patient is an 89 year old female admitted to [facility] with acute ischemic stroke in occipital lobe. Patient has advanced dementia at baseline and lives in LTC, patient is oriented to first name only, unable to verbalize anything regarding baseline function but per previous hospital notes patient uses a wc for mobility when out of bed, and requires total assist with ADLs, sometimes able to feed self. Patient is unable to follow commands today, is dependent with bed mobility, dependent with toileting, and yells out in pain with gentle ROM of extremities. Patient positioned in bed with support at R hip/LE for comfort due to hips windswept posture  to R when upper body supine, head and eye gaze to L. Patient is not appropriate for skilled OT at this time, as she is unable to follow commands and requires total assistance with self care at baseline. OT to sign off at this time, and anticipate LTC at [place].      Anticipated Discharge Disposition (OT): extended care facility

## 2024-01-04 NOTE — PROGRESS NOTES
At 8: 12 PM I was called by Dr. Mosquera the ED physician regarding this 89 years old female who presented with aphasia and left gaze deviation with very high blood pressure 217/161.  I requested Team D imaging with stat CT head, CT angio head and neck and CT perfusion, I am waiting to review the images to give further recommendation.  Keep blood pressure permissive until CT head to rule out brain bleed.    CT head showed no bleed but did show large hypodensity on the left occipital lobe concerning for acute ischemic stroke.    CTA H/N showed left P1 occlusion.    CTP showed penumbra on left PCA but CT head already showing large completed stroke. Discussed with Dr. Anglin the neurointerventionalist, the penumbra felt to be false positive from motion artifact.    Plan:  Give  mg rectally  Keep permissive systolic blood pressure 120-200   2D echo  Admit to the hospital PCU for further work up  Stroke labs  PT/OT/Speech   Official swallow eval due to the aphasia  MRI brain WO

## 2024-01-04 NOTE — THERAPY EVALUATION
Acute Care - Speech Language Pathology   Swallow Initial Evaluation [facility]     Patient Name: Sam Flores  : 1934  MRN: [id number]  Today's Date: 2024               Admit Date: 1/3/2024    Visit Dx:     ICD-10-CM ICD-9-CM   1. Stroke of unknown etiology  I63.9 434.91   2. Acute renal insufficiency  N28.9 593.9   3. Dementia, unspecified dementia severity, unspecified dementia type, unspecified whether behavioral, psychotic, or mood disturbance or anxiety  F03.90 294.20   4. Acute UTI  N39.0 599.0     Patient Active Problem List   Diagnosis    Lumbar compression fracture    Osteoporosis    Dehydration    Acute on chronic renal failure    Type 2 diabetes mellitus without complication    Benign essential hypertension    Weight loss, abnormal    Trouble swallowing    Hyponatremia    Hypokalemia    Aspiration pneumonia    Chest wall hematoma, right, initial encounter    Dementia    Altered mental status, unspecified altered mental status type    Stroke of unknown etiology    Stroke of unknown cause     Past Medical History:   Diagnosis Date    Breast cancer     Dementia     Diabetes mellitus     Emphysema of lung     Hyperlipidemia     Hypertension     Hypothyroidism     Lumbar compression fracture 2016    Osteoporosis     Parkinson's disease     S/P kyphoplasty 2016    L1 and L4, 2016     Past Surgical History:   Procedure Laterality Date    BREAST BIOPSY       SECTION      KYPHOPLASTY      T12 and L2    KYPHOPLASTY N/A 2016    Procedure: KYPHOPLASTY LUMBAR 1 AND LUMBAR 4;  Surgeon: Triston Marinelli IV, MD;  Location: [facility] OR ;  Service:     MASTECTOMY Bilateral     SHOULDER SURGERY Right        SLP Recommendation and Plan  SLP Swallowing Diagnosis: mild (24 1000)  SLP Diet Recommendation: puree, thin liquids, other (see comments) (RN clarified no cardiac or consistent carb modifiers required) (24 1000)  Recommended Precautions and  Strategies: upright posture during/after eating, small bites of food and sips of liquid (01/04/24 1000)  SLP Rec. for Method of Medication Administration: with puree (01/04/24 1000)     Monitor for Signs of Aspiration: notify SLP if any concerns (01/04/24 1000)     Swallow Criteria for Skilled Therapeutic Interventions Met: demonstrates skilled criteria (01/04/24 1000)     Rehab Potential/Prognosis, Swallowing: good, to achieve stated therapy goals (01/04/24 1000)  Therapy Frequency (Swallow): PRN (01/04/24 1000)  Predicted Duration Therapy Intervention (Days): until discharge (01/04/24 1000)  Oral Care Recommendations: Oral Care BID/PRN (01/04/24 1000)                                      Oral Care Recommendations: Oral Care BID/PRN (01/04/24 1000)    Plan of Care Reviewed With: patient  Outcome Evaluation: clinical swallow eval completed. Pt demonstrated no overt s/s aspiration with thin or puree consistencies. Unable to follow directions for phonation, therefor, unable to reliably assess vocal quality. Extraneous movements of head noted. Solids not trialed at this time. REC puree/thin, meds w/ puree. Upright with all po. If clinical s/s noted (ie. coughing) rec to hold po and contact speech dept.      SWALLOW EVALUATION (last 72 hours)       SLP Adult Swallow Evaluation       Row Name 01/04/24 1000                   Rehab Evaluation    Document Type evaluation  -SA        Subjective Information no complaints  -SA        Patient Observations alert;cooperative  -SA        Patient Effort good  -SA        Symptoms Noted During/After Treatment none  -SA           General Information    Patient Profile Reviewed yes  -SA        Pertinent History Of Current Problem stroke of unknown etiology  -SA        Current Method of Nutrition NPO  -SA        Precautions/Limitations, Vision other (see comments)  deviation head/eye gaze to left  -SA        Precautions/Limitations, Hearing difficult to assess  -SA        Prior Level of  Function-Communication cognitive-linguistic impairment;motor speech impairment;expressive language impairment;receptive language impairment  -SA        Prior Level of Function-Swallowing unknown  -        Plans/Goals Discussed with patient  -SA        Barriers to Rehab previous functional deficit;cognitive status  -        Patient's Goals for Discharge patient could not state  -           Clinical Swallow Eval    Clinical Swallow Evaluation Summary clinical swallow eval completed.  Pt demonstrated no overt s/s aspiration with thin or puree consistencies.  Unable to follow directions for phonation, therefor, unable to reliably assess vocal quality.  Extraneous movements of head noted.  Solids not trialed at this time.  REC puree/thin, meds w/ puree.  Upright with all po.  If clinical s/s noted (ie. coughing) rec to hold po and contact speech dept.  -SA           SLP Evaluation Clinical Impression    SLP Swallowing Diagnosis mild  -SA        Functional Impact risk of aspiration/pneumonia  -SA        Rehab Potential/Prognosis, Swallowing good, to achieve stated therapy goals  -        Swallow Criteria for Skilled Therapeutic Interventions Met demonstrates skilled criteria  -SA           Recommendations    Therapy Frequency (Swallow) PRN  -SA        Predicted Duration Therapy Intervention (Days) until discharge  -SA        SLP Diet Recommendation puree;thin liquids;other (see comments)  RN clarified no cardiac or consistent carb modifiers required  -SA        Recommended Precautions and Strategies upright posture during/after eating;small bites of food and sips of liquid  -SA        Oral Care Recommendations Oral Care BID/PRN  -SA        SLP Rec. for Method of Medication Administration with puree  -SA        Monitor for Signs of Aspiration notify SLP if any concerns  -SA           Swallow Goals (SLP)    Swallow LTGs Swallow Long Term Goal (free text)  -        Swallow STGs diet tolerance goal selection (SLP)   -SA        Diet Tolerance Goal Selection (SLP) Swallow Short Term Goal 1  -SA           (LTG) Swallow    (LTG) Swallow Pt will tolerate least restrictive diet  -SA           (STG) Swallow 1    (STG) Swallow 1 Pt will tolerate puree diet with thin liquids  -SA                  User Key  (r) = Recorded By, (t) = Taken By, (c) = Cosigned By      Initials Name Effective Dates    Yaneli Pedraza MS CCC-SLP 07/11/23 -                     EDUCATION  The patient has been educated in the following areas:   Dysphagia (Swallowing Impairment) Oral Care/Hydration Modified Diet Instruction.        SLP GOALS       Row Name 01/04/24 1000             (LTG) Swallow    (LTG) Swallow Pt will tolerate least restrictive diet  -SA         (STG) Swallow 1    (STG) Swallow 1 Pt will tolerate puree diet with thin liquids  -SA                User Key  (r) = Recorded By, (t) = Taken By, (c) = Cosigned By      Initials Name Provider Type    Yaneli Pedraza MS CCC-SLP Speech and Language Pathologist                       Time Calculation:    Time Calculation- SLP       Row Name 01/04/24 1351             Time Calculation- SLP    SLP Start Time 1000  -      SLP Received On 01/04/24  -                User Key  (r) = Recorded By, (t) = Taken By, (c) = Cosigned By      Initials Name Provider Type    Yaneli Pedraza MS CCC-SLP Speech and Language Pathologist                    Therapy Charges for Today       Code Description Service Date Service Provider Modifiers Qty    [id number]  ST EVAL ORAL PHARYNG SWALLOW 4 1/4/2024 Yaneli Hatfield MS CCC-SLP GN 1                 Yaneli Hatfield MS CCC-ROMAN  1/4/2024

## 2024-01-05 PROBLEM — E43 SEVERE MALNUTRITION: Status: ACTIVE | Noted: 2024-01-01

## 2024-01-05 NOTE — PROGRESS NOTES
Enter Query Response Below      Query Response:     Hypertensive urgency Electronically signed by Ang Dhaliwal MD, 24, 11:48 AM EST.           If applicable, please update the problem list.     Patient: Sam Flores        : 1934  Account: [id number]           Admit Date:         How to Respond to this query:       a. Click New Note     b. Answer query within the yellow box.                c. Update the Problem List, if applicable.      If you have any questions about this query contact me at: [email or web address]    Dr. Dhaliwal,     Patient admitted with CVA, history of hypertension.  Upon admission blood pressure 217/110, 161/135 and treated with IV labetolol.  Per neuro allow blood pressures 120-200.      Please clarify diagnosis treated/monitored:  Hypertensive urgency  Hypertensive emergency  Other- specify _______  Unable to determine     By submitting this query, we are merely seeking further clarification of documentation to accurately reflect all conditions that you are monitoring, evaluating, treating or that extend the hospitalization or utilize additional resources of care. Please utilize your independent clinical judgment when addressing the question(s) above.     This query and your response, once completed, will be entered into the legal medical record.    Sincerely,  Lydia Mcmullen RN BSN  Clinical Documentation Integrity Program

## 2024-01-05 NOTE — PLAN OF CARE
Goal Outcome Evaluation:  Plan of Care Reviewed With: patient, son        Progress: declining  Outcome Evaluation: Patient is alert but confused. FC placed for retention and comfort. Medicating with 1 versed. Pt appears to be resting comfortably. Updated son at beside. Will cont comfort measures.

## 2024-01-05 NOTE — PROGRESS NOTES
Enter Query Response Below      Query Response:     Diabetes mellitus fair control Electronically signed by Ang Dhaliwal MD, 24, 11:47 AM EST.           If applicable, please update the problem list.     Patient: Sam Flores        : 1934  Account: [id number]           Admit Date:         How to Respond to this query:       a. Click New Note     b. Answer query within the yellow box.                c. Update the Problem List, if applicable.      If you have any questions about this query contact me at: [email or web address]    Dr. Dhaliwal,     Patient admitted with CVA.  Type 2 diabetes uncontrolled documented in notes.  Treatment includes insulin.  Glucose 241, 227, 217, 171, 151, 215, 90, 75, 78.     Please clarify the following:  Diabetes mellitus with hyperglycemia  Other- specify_____________  Unable to determine    By submitting this query, we are merely seeking further clarification of documentation to accurately reflect all conditions that you are monitoring, evaluating, treating or that extend the hospitalization or utilize additional resources of care. Please utilize your independent clinical judgment when addressing the question(s) above.     This query and your response, once completed, will be entered into the legal medical record.    Sincerely,  Lydia Mcmullen RN BSN  Clinical Documentation Integrity Program

## 2024-01-05 NOTE — PROGRESS NOTES
"[facility]  Cardiology Progress Note    Patient Identification:  Name: Sam Flores  Age: 89 y.o.  Sex: female  :  1934  MRN: [id number]                 Follow Up / Chief Complaint: Follow-up for new onset atrial flutter    Interval History: Noted goals of care have changed to comfort measures only       Subjective:      Objective:    Past Medical History:  Past Medical History:   Diagnosis Date    Breast cancer     Dementia     Diabetes mellitus     Emphysema of lung     Hyperlipidemia     Hypertension     Hypothyroidism     Lumbar compression fracture 2016    Osteoporosis     Parkinson's disease     S/P kyphoplasty 2016    L1 and L4, 2016     Past Surgical History:  Past Surgical History:   Procedure Laterality Date    BREAST BIOPSY       SECTION      KYPHOPLASTY      T12 and L2    KYPHOPLASTY N/A 2016    Procedure: KYPHOPLASTY LUMBAR 1 AND LUMBAR 4;  Surgeon: Triston Marinelli IV, MD;  Location: [facility] ;  Service:     MASTECTOMY Bilateral     SHOULDER SURGERY Right         Social History:   Social History     Tobacco Use    Smoking status: Never    Smokeless tobacco: Never   Substance Use Topics    Alcohol use: No      Family History:  History reviewed. No pertinent family history.       Allergies:  Allergies   Allergen Reactions    Penicillins      Scheduled Meds:           INTAKE AND OUTPUT:    Intake/Output Summary (Last 24 hours) at 2024 1432  Last data filed at 2024 0300  Gross per 24 hour   Intake 1492 ml   Output --   Net 1492 ml       Review of Systems: Unobtainable  GI:  Cardiac:  Pulmonary:    Constitutional:  Temp:  [97.6 °F (36.4 °C)-98.1 °F (36.7 °C)] 97.9 °F (36.6 °C)  Heart Rate:  [69-86] 86  Resp:  [18] 18  BP: (126-163)/(78-99) 148/99    Physical Exam:  /99   Pulse 86   Temp 97.9 °F (36.6 °C) (Axillary)   Resp 18   Ht 149.9 cm (59\")   Wt 39.5 kg (87 lb)   SpO2 94%   BMI 17.57 kg/m²   General appearance: " "No acute changes   Neck: Trachea midline; NECK, supple, no thyromegaly or lymphadenopathy   Lungs: Normal size and shape, normal breath sounds, equal distribution of air, no rales and rhonchi   CV: S1-S2 regular, no murmurs, no rub, no gallop   Abdomen: Soft, nontender; no masses , no abnormal abdominal sounds   Extremities: No deformity , normal color , no peripheral edema   Skin: Normal temperature, turgor and texture; no rash, ulcers            Cardiographics    Lab Review   Results from last 7 days   Lab Units 01/03/24 2022   HSTROP T ng/L 26*         Results from last 7 days   Lab Units 01/03/24 2022   SODIUM mmol/L 138   POTASSIUM mmol/L 4.2   BUN mg/dL 24*   CREATININE mg/dL 1.66*   CALCIUM mg/dL 9.5     @[id number]@  Results from last 7 days   Lab Units 01/03/24 2022   WBC 10*3/mm3 13.67*   HEMOGLOBIN g/dL 13.6   HEMATOCRIT % 42.3   PLATELETS 10*3/mm3 364     Results from last 7 days   Lab Units 01/03/24 2022   INR  1.04   APTT seconds 31.2         Assessment:  New onset atrial flutter  Acute ischemic stroke  Hypertension  Hyperlipidemia  Dementia  Parkinson's disease  Diabetes mellitus      Plan:  BP and heart rate stable  Not a candidate for ischemic workup and noted Decision has been made to change goals of care to keep patient comfortable only.  We will sign off  Lauro Alvarenga MD      )1/5/2024        EMR Dragon/Transcription:   \"Dictated utilizing Dragon dictation\".     "

## 2024-01-05 NOTE — PROGRESS NOTES
"Daily progress note    Primary care physician  Dr. Rivera    Subjective  Remains pleasantly confused and family at bedside.  Patient in no respiratory distress.    History of present illness  89-year-old white female with history of diabetes mellitus hypertension hyperlipidemia hypothyroidism Parkinson disease who is a nursing home resident for long-term brought to the emergency room with slurred speech started yesterday.  Patient evaluated in ER found to have acute ischemic stroke acute UTI acute kidney injury admitted for management.  Patient is DNR per her wishes.  Patient is unable to give any detailed history most of the history obtained from the family chart nursing staff and old record.     REVIEW OF SYSTEMS  Per history of present illness     PHYSICAL EXAM  Blood pressure 148/99, pulse 86, temperature 97.9 °F (36.6 °C), temperature source Axillary, resp. rate 18, height 149.9 cm (59\"), weight 39.5 kg (87 lb), SpO2 94%.    GENERAL: Awake and alert follow commands but slow to response  HENT: NCAT, nares patent  EYES: PERRL, there is left gaze deviation  CV: regular rhythm, normal rate  RESPIRATORY: normal effort, clear to auscultation bilaterally  ABDOMEN: soft, nontender nondistended bowel sounds positive  MUSCULOSKELETAL: Lower extremities are contracted.  NEURO: Patient is aphasic.  No facial droop.  Follows simple commands.  Moves all extremities.  Withdraws all extremities to painful stimuli  PSYCH:  calm, cooperative  SKIN: warm, dry     LAB RESULTS  Lab Results (last 24 hours)       Procedure Component Value Units Date/Time    POC Glucose Once [[id number]]  (Normal) Collected: 01/05/24 1115    Specimen: Blood Updated: 01/05/24 1117     Glucose 87 mg/dL     Urine Culture - Urine, Urine, Catheter [[id number]]  (Abnormal) Collected: 01/03/24 2154    Specimen: Urine, Catheter Updated: 01/05/24 0948     Urine Culture >100,000 CFU/mL Gram Negative Bacilli    Narrative:      Colonization of the urinary tract " without infection is common. Treatment is discouraged unless the patient is symptomatic, pregnant, or undergoing an invasive urologic procedure.    Comprehensive Metabolic Panel [[id number]] Updated: 01/05/24 0917    Specimen: Blood     Lipid Panel [[id number]] Updated: 01/05/24 0917    Specimen: Blood     High Sensitivity Troponin T [[id number]] Updated: 01/05/24 0917    Specimen: Blood     TSH [[id number]]  (Abnormal) Collected: 01/05/24 0830    Specimen: Blood Updated: 01/05/24 0904     TSH 50.800 uIU/mL     CBC & Differential [[id number]] Updated: 01/05/24 0855    Specimen: Blood     Narrative:      The following orders were created for panel order CBC & Differential.  Procedure                               Abnormality         Status                     ---------                               -----------         ------                     CBC Auto Differential[[id number]]                                                         Please view results for these tests on the individual orders.    CBC Auto Differential [[id number]] Updated: 01/05/24 0855    Specimen: Blood     POC Glucose Once [[id number]]  (Normal) Collected: 01/05/24 0706    Specimen: Blood Updated: 01/05/24 0708     Glucose 78 mg/dL     POC Glucose Once [[id number]]  (Normal) Collected: 01/05/24 0308    Specimen: Blood Updated: 01/05/24 0310     Glucose 75 mg/dL     POC Glucose Once [[id number]]  (Normal) Collected: 01/04/24 2229    Specimen: Blood Updated: 01/04/24 2231     Glucose 90 mg/dL     POC Glucose Once [[id number]]  (Abnormal) Collected: 01/04/24 1725    Specimen: Blood Updated: 01/04/24 1726     Glucose 215 mg/dL     POC Glucose Once [[id number]]  (Abnormal) Collected: 01/04/24 1305    Specimen: Blood Updated: 01/04/24 1306     Glucose 151 mg/dL           Imaging Results (Last 24 Hours)       Procedure Component Value Units Date/Time    MRI Brain Without Contrast [[id number]] Resulted: 01/04/24 1519     Updated: 01/04/24 1556          Results  Scan  on 1/3/2024 2106 by [facility], [place]: ECG 12-LEAD         Author: -- Service: -- Author Type: --   Filed: Date of Service: Creation Time:   Status: (Other)   HEART RATE= 83  bpm  RR Interval= 723  ms  SD Interval=   ms  P Horizontal Axis=   deg  P Front Axis=   deg  QRSD Interval= 83  ms  QT Interval= 426  ms  QTcB= 501  ms  QRS Axis= 52  deg  T Wave Axis= 62  deg  - ABNORMAL ECG -  Atrial flutter with predominant 3:1 AV block  When compared with ECG of 26-Jan-2023 22:54:44,  Atrial flutter has replaced sinus rhythm          Current Facility-Administered Medications:     acetaminophen (TYLENOL) tablet 650 mg, 650 mg, Oral, Q4H PRN **OR** acetaminophen (TYLENOL) 160 MG/5ML oral solution 650 mg, 650 mg, Oral, Q4H PRN **OR** acetaminophen (TYLENOL) suppository 650 mg, 650 mg, Rectal, Q4H PRN, Ang Dhaliwal MD    aspirin chewable tablet 81 mg, 81 mg, Oral, Daily, Ang Dhaliwal MD, 81 mg at 01/05/24 0812    atorvastatin (LIPITOR) tablet 80 mg, 80 mg, Oral, Nightly, Ish Clancy MD, 80 mg at 01/04/24 2249    cefTRIAXone (ROCEPHIN) 1,000 mg in sodium chloride 0.9 % 100 mL IVPB-VTB, 1,000 mg, Intravenous, Q24H, Ang Dhaliwal MD, Last Rate: 200 mL/hr at 01/05/24 0910, 1,000 mg at 01/05/24 0910    cholecalciferol (VITAMIN D3) tablet 1,000 Units, 1,000 Units, Oral, Daily, Ang Dhaliwal MD, 1,000 Units at 01/05/24 0812    dextrose (D50W) (25 g/50 mL) IV injection 25 g, 25 g, Intravenous, Q15 Min PRN, Ang Dhaliwal MD    dextrose (GLUTOSE) oral gel 15 g, 15 g, Oral, Q15 Min PRN, Ang Dhaliwal MD    diphenoxylate-atropine (LOMOTIL) 2.5-0.025 MG per tablet 1 tablet, 1 tablet, Oral, Q2H PRN, Ang Dhaliwal MD    famotidine (PEPCID) tablet 20 mg, 20 mg, Oral, BID, Ang Dhaliwal MD, 20 mg at 01/05/24 0812    glucagon (GLUCAGEN) injection 1 mg, 1 mg, Intramuscular, Q15 Min PRN, Ang Dhaliwal MD    morphine injection 2 mg, 2 mg, Intravenous, Q1H PRN **OR** morphine concentrated solution 5 mg, 5 mg, Sublingual, Q1H PRN **OR**  HYDROmorphone (DILAUDID) injection 0.5 mg, 0.5 mg, Intravenous, Q1H PRN, Ang Dhaliwal MD    morphine injection 4 mg, 4 mg, Intravenous, Q1H PRN **OR** morphine concentrated solution 10 mg, 10 mg, Sublingual, Q1H PRN **OR** HYDROmorphone (DILAUDID) injection 1 mg, 1 mg, Intravenous, Q1H PRN, Ang Dhaliwal MD    Morphine sulfate (PF) injection 6 mg, 6 mg, Intravenous, Q1H PRN **OR** morphine concentrated solution 20 mg, 20 mg, Sublingual, Q1H PRN **OR** HYDROmorphone (DILAUDID) injection 1.5 mg, 1.5 mg, Intravenous, Q1H PRN, Ang Dhaliwal MD    insulin glargine (LANTUS, SEMGLEE) injection 10 Units, 10 Units, Subcutaneous, Nightly, Ang Dhaliwal MD, 10 Units at 01/04/24 2249    insulin lispro (HUMALOG/ADMELOG) injection 2-7 Units, 2-7 Units, Subcutaneous, 4x Daily AC & at Bedtime, Ang Dhaliwal MD, 3 Units at 01/04/24 1744    ketorolac (TORADOL) injection 15 mg, 15 mg, Intravenous, Q6H PRN, Ang Dhaliwal MD    LORazepam (ATIVAN) tablet 0.5 mg, 0.5 mg, Oral, Q1H PRN **OR** midazolam (VERSED) injection 1 mg, 1 mg, Intravenous, Q1H PRN, 1 mg at 01/05/24 1221 **OR** midazolam (VERSED) injection 1 mg, 1 mg, Intramuscular, Q1H PRN **OR** midazolam (VERSED) injection 1 mg, 1 mg, Subcutaneous, Q1H PRN **OR** LORazepam (ATIVAN) 2 MG/ML concentrated solution 0.5 mg, 0.5 mg, Oral, Q1H PRN **OR** LORazepam (ATIVAN) 2 MG/ML concentrated solution 0.5 mg, 0.5 mg, Sublingual, Q1H PRN, Ang Dhaliwal MD    LORazepam (ATIVAN) tablet 1 mg, 1 mg, Oral, Q1H PRN **OR** midazolam (VERSED) injection 2 mg, 2 mg, Intravenous, Q1H PRN **OR** midazolam (VERSED) injection 2 mg, 2 mg, Intramuscular, Q1H PRN **OR** midazolam (VERSED) injection 2 mg, 2 mg, Subcutaneous, Q1H PRN **OR** LORazepam (ATIVAN) 2 MG/ML concentrated solution 1 mg, 1 mg, Oral, Q1H PRN **OR** LORazepam (ATIVAN) 2 MG/ML concentrated solution 1 mg, 1 mg, Sublingual, Q1H PRN, Ang Dhaliwal MD    LORazepam (ATIVAN) tablet 2 mg, 2 mg, Oral, Q1H PRN **OR** midazolam (VERSED)  injection 4 mg, 4 mg, Intravenous, Q1H PRN **OR** midazolam (VERSED) injection 4 mg, 4 mg, Intramuscular, Q1H PRN **OR** midazolam (VERSED) injection 4 mg, 4 mg, Subcutaneous, Q1H PRN **OR** LORazepam (ATIVAN) 2 MG/ML concentrated solution 2 mg, 2 mg, Oral, Q1H PRN **OR** LORazepam (ATIVAN) 2 MG/ML concentrated solution 2 mg, 2 mg, Sublingual, Q1H PRN, Kory Dhaliwal MD    polyethylene glycol (MIRALAX) packet 17 g, 17 g, Oral, BID, Kory Dhaliwal MD    polyethylene glycol 1% (ARTIFICIAL TEARS) ophthalmic solution, 1 drop, Both Eyes, Q1H PRN, Kory Dhaliwal MD    sennosides-docusate (PERICOLACE) 8.6-50 MG per tablet 2 tablet, 2 tablet, Oral, BID, Kory Dhaliwal MD, 2 tablet at 01/05/24 0812    sodium chloride 0.9 % flush 10 mL, 10 mL, Intravenous, PRN, Ish Clancy MD    sodium chloride 0.9 % infusion, 75 mL/hr, Intravenous, Continuous, Kory Dhaliwal MD, Last Rate: 75 mL/hr at 01/04/24 1922, 75 mL/hr at 01/04/24 1922     ASSESSMENT  Acute left occipital lobe infarct  New onset atrial flutter  Acute kidney injury  Acute UTI  Diabetes mellitus  Hypertension  Hyperlipidemia  Dementia  Gastroesophageal reflux disease    PLAN  Long discussion with patient family including POA about her diagnosis prognosis and possible outcome and they decided to withdraw the care and keep patient comfortable in palliative care unit and allow natural death.  POA also agreed to discontinue all scheduled medication and use routine palliative care orders for comfort care.  I agree with their decision and will comply.    KORY DHALIWAL MD    Copied text in this note has been reviewed and is accurate as of 01/05/24

## 2024-01-05 NOTE — CONSULTS
Purpose of the visit was to evaluate for: goals of care/advanced care planning, support for patient/family, and comfort care. Spoke with MD, RN, and CCP as well as family and discussed palliative care, goals of care, care options, resuscitation status, and [facility].      Assessment:  Patient is palliative care appropriate given (list diagnosis/symptoms):  History of dementia, Parkinson's, diabetes mellitus type 2, wheelchair bound at baseline. Patient admitted with slurred speech and left deviated gaze, found to have ischemic stroke likely cardioembolic per neurology. Upon assessment, patient is minimally responsive. PPS 20%      Recommendations/Plan: Per daughter goal at this time is comfort. Transfer to palliative care unit for potential symptom management or discharge to facility with hospice to follow. Of note, patient has had hospice services in the past. Recommend continued conversation with family.  Discussed with MD.    Other Comments: Spoke with patient's daughter via phone regarding goals of care. She verbalized good understanding of patient condition and health history and stated family goal for patient at this time is comfort. Discussed discharge back to facility with hospice if MD felt patient was appropriate to discharge versus transfer to palliative care unit for symptom management/discharge planning. Daughter in favor of transfer, stated her brother will be at hospital later to see patient and would like to talk with MD. Palliative care will attempt to speak with patient's son as well. Discussed conversation with MD.
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
## 2024-01-05 NOTE — PROGRESS NOTES
"Enter Query Response Below      Query Response:       Unable to determine Electronically signed by Ang Dhaliwal MD, 24, 11:46 AM EST.         If applicable, please update the problem list.     Patient: Sam Flores        : 1934  Account: [id number]           Admit Date:         How to Respond to this query:       a. Click New Note     b. Answer query within the yellow box.                c. Update the Problem List, if applicable.      If you have any questions about this query contact me at: [email or web address]    Dr. Dhaliwal,     Patient admitted with CVA.  Per dietician note 24 \"Patient meets ASPEN/AND criteria for nutrition diagnosis of severe malnutrition of chronic illness based on: inadequate po intake, wt loss, and NFPE results.\"  Patient NPO at time of evaluation, passed swallow evaluation for puree/thin diet.    Please clarify diagnosis treated/monitored:  Chronic illness related severe protein calorie malnutrition  Other- specify __________  Unable to determine     By submitting this query, we are merely seeking further clarification of documentation to accurately reflect all conditions that you are monitoring, evaluating, treating or that extend the hospitalization or utilize additional resources of care. Please utilize your independent clinical judgment when addressing the question(s) above.     This query and your response, once completed, will be entered into the legal medical record.    Sincerely,  Lydia Mcmullen RN BSN  Clinical Documentation Integrity Program   "

## 2024-01-06 NOTE — PLAN OF CARE
Goal Outcome Evaluation:  Plan of Care Reviewed With: patient      Patient is alert to person and confused. 1 mg of versed given for anxiety. No complaints of pain. Will continue to monitor,

## 2024-01-06 NOTE — PROGRESS NOTES
"Daily progress note    Primary care physician  Dr. Rivera    Subjective  Comfortable with no new issues and family at bedside    History of present illness  89-year-old white female with history of diabetes mellitus hypertension hyperlipidemia hypothyroidism Parkinson disease who is a nursing home resident for long-term brought to the emergency room with slurred speech started yesterday.  Patient evaluated in ER found to have acute ischemic stroke acute UTI acute kidney injury admitted for management.  Patient is DNR per her wishes.  Patient is unable to give any detailed history most of the history obtained from the family chart nursing staff and old record.     REVIEW OF SYSTEMS  Unable to obtain     PHYSICAL EXAM  Blood pressure (!) 186/72, pulse 91, temperature 97.1 °F (36.2 °C), temperature source Axillary, resp. rate 16, height 149.9 cm (59\"), weight 39.5 kg (87 lb), SpO2 94%.    Unchanged     LAB RESULTS  Lab Results (last 24 hours)       Procedure Component Value Units Date/Time    Urine Culture - Urine, Urine, Catheter [[id number]]  (Abnormal)  (Susceptibility) Collected: 01/03/24 2154    Specimen: Urine, Catheter Updated: 01/06/24 1023     Urine Culture >100,000 CFU/mL Escherichia coli ESBL     Comment:   Consider infectious disease consult.  Susceptibility results may not correlate to clinical outcomes.       Narrative:      Colonization of the urinary tract without infection is common. Treatment is discouraged unless the patient is symptomatic, pregnant, or undergoing an invasive urologic procedure.  Recent outcomes data supports the use of pip/tazo in the treatment of susceptible ESBL infections for uncomplicated UTI. Consider use of pip/tazo as a carbapenem-sparing regimen in applicable patients.    Susceptibility        Escherichia coli ESBL      GLORIA      Ertapenem Susceptible      Gentamicin Susceptible      Levofloxacin Resistant      Meropenem Susceptible      Nitrofurantoin Susceptible      " Piperacillin + Tazobactam Susceptible      Trimethoprim + Sulfamethoxazole Resistant                                 Imaging Results (Last 24 Hours)       ** No results found for the last 24 hours. **          Results  Scan on 1/3/2024 2106 by [employer], [place]: ECG 12-LEAD         Author: -- Service: -- Author Type: --   Filed: Date of Service: Creation Time:   Status: (Other)   HEART RATE= 83  bpm  RR Interval= 723  ms  AK Interval=   ms  P Horizontal Axis=   deg  P Front Axis=   deg  QRSD Interval= 83  ms  QT Interval= 426  ms  QTcB= 501  ms  QRS Axis= 52  deg  T Wave Axis= 62  deg  - ABNORMAL ECG -  Atrial flutter with predominant 3:1 AV block  When compared with ECG of 26-Jan-2023 22:54:44,  Atrial flutter has replaced sinus rhythm          Current Facility-Administered Medications:     morphine injection 2 mg, 2 mg, Intravenous, Q1H PRN **OR** morphine concentrated solution 5 mg, 5 mg, Sublingual, Q1H PRN **OR** HYDROmorphone (DILAUDID) injection 0.5 mg, 0.5 mg, Intravenous, Q1H PRN, Ang Dhaliwal MD    morphine injection 4 mg, 4 mg, Intravenous, Q1H PRN **OR** morphine concentrated solution 10 mg, 10 mg, Sublingual, Q1H PRN **OR** HYDROmorphone (DILAUDID) injection 1 mg, 1 mg, Intravenous, Q1H PRN, Ang Dhaliwal MD    Morphine sulfate (PF) injection 6 mg, 6 mg, Intravenous, Q1H PRN **OR** morphine concentrated solution 20 mg, 20 mg, Sublingual, Q1H PRN **OR** HYDROmorphone (DILAUDID) injection 1.5 mg, 1.5 mg, Intravenous, Q1H PRN, Ang Dhaliwal MD    LORazepam (ATIVAN) tablet 0.5 mg, 0.5 mg, Oral, Q1H PRN **OR** midazolam (VERSED) injection 1 mg, 1 mg, Intravenous, Q1H PRN, 1 mg at 01/06/24 1227 **OR** midazolam (VERSED) injection 1 mg, 1 mg, Intramuscular, Q1H PRN **OR** midazolam (VERSED) injection 1 mg, 1 mg, Subcutaneous, Q1H PRN **OR** LORazepam (ATIVAN) 2 MG/ML concentrated solution 0.5 mg, 0.5 mg, Oral, Q1H PRN **OR** LORazepam (ATIVAN) 2 MG/ML concentrated solution 0.5 mg, 0.5 mg, Sublingual, Q1H  PRN, Kory Dhaliwal MD    LORazepam (ATIVAN) tablet 1 mg, 1 mg, Oral, Q1H PRN **OR** midazolam (VERSED) injection 2 mg, 2 mg, Intravenous, Q1H PRN **OR** midazolam (VERSED) injection 2 mg, 2 mg, Intramuscular, Q1H PRN **OR** midazolam (VERSED) injection 2 mg, 2 mg, Subcutaneous, Q1H PRN **OR** LORazepam (ATIVAN) 2 MG/ML concentrated solution 1 mg, 1 mg, Oral, Q1H PRN **OR** LORazepam (ATIVAN) 2 MG/ML concentrated solution 1 mg, 1 mg, Sublingual, Q1H PRN, Kory Dhaliwal MD    LORazepam (ATIVAN) tablet 2 mg, 2 mg, Oral, Q1H PRN **OR** midazolam (VERSED) injection 4 mg, 4 mg, Intravenous, Q1H PRN **OR** midazolam (VERSED) injection 4 mg, 4 mg, Intramuscular, Q1H PRN **OR** midazolam (VERSED) injection 4 mg, 4 mg, Subcutaneous, Q1H PRN **OR** LORazepam (ATIVAN) 2 MG/ML concentrated solution 2 mg, 2 mg, Oral, Q1H PRN **OR** LORazepam (ATIVAN) 2 MG/ML concentrated solution 2 mg, 2 mg, Sublingual, Q1H PRMadhuri CARDOSO Aftab, MD    polyethylene glycol 1% (ARTIFICIAL TEARS) ophthalmic solution, 1 drop, Both Eyes, Q1H PRN, Kory Dhaliwal MD    sodium chloride 0.9 % flush 10 mL, 10 mL, Intravenous, PRN, Ish Clancy MD     ASSESSMENT  Acute left occipital lobe infarct  New onset atrial flutter  Acute kidney injury  Acute ESBL E. coli UTI  Diabetes mellitus  Hypertension  Hyperlipidemia  Dementia  Gastroesophageal reflux disease    PLAN  Comfort care only  Allow natural death  Routine palliative care orders  Discussed with family  Discussed with nursing staff    KORY DHALIWAL MD    Copied text in this note has been reviewed and is accurate as of 01/06/24

## 2024-01-07 NOTE — PROGRESS NOTES
"Daily progress note    Primary care physician  Dr. Rivera    Subjective  Comfortable with no new issues     History of present illness  89-year-old white female with history of diabetes mellitus hypertension hyperlipidemia hypothyroidism Parkinson disease who is a nursing home resident for long-term brought to the emergency room with slurred speech started yesterday.  Patient evaluated in ER found to have acute ischemic stroke acute UTI acute kidney injury admitted for management.  Patient is DNR per her wishes.  Patient is unable to give any detailed history most of the history obtained from the family chart nursing staff and old record.     REVIEW OF SYSTEMS  Unable to obtain     PHYSICAL EXAM  Blood pressure 117/63, pulse 62, temperature 98.2 °F (36.8 °C), temperature source Oral, resp. rate 16, height 149.9 cm (59\"), weight 39.5 kg (87 lb), SpO2 96%.    Unchanged     LAB RESULTS  Lab Results (last 24 hours)       ** No results found for the last 24 hours. **          Imaging Results (Last 24 Hours)       ** No results found for the last 24 hours. **          Results  Scan on 1/3/2024 2106 by [employer], [place]: ECG 12-LEAD         Author: -- Service: -- Author Type: --   Filed: Date of Service: Creation Time:   Status: (Other)   HEART RATE= 83  bpm  RR Interval= 723  ms  CO Interval=   ms  P Horizontal Axis=   deg  P Front Axis=   deg  QRSD Interval= 83  ms  QT Interval= 426  ms  QTcB= 501  ms  QRS Axis= 52  deg  T Wave Axis= 62  deg  - ABNORMAL ECG -  Atrial flutter with predominant 3:1 AV block  When compared with ECG of 26-Jan-2023 22:54:44,  Atrial flutter has replaced sinus rhythm          Current Facility-Administered Medications:     morphine injection 2 mg, 2 mg, Intravenous, Q1H PRN **OR** morphine concentrated solution 5 mg, 5 mg, Sublingual, Q1H PRN **OR** HYDROmorphone (DILAUDID) injection 0.5 mg, 0.5 mg, Intravenous, Q1H PRN, Ang Dhaliwal MD    morphine injection 4 mg, 4 mg, Intravenous, Q1H PRN " **OR** morphine concentrated solution 10 mg, 10 mg, Sublingual, Q1H PRN **OR** HYDROmorphone (DILAUDID) injection 1 mg, 1 mg, Intravenous, Q1H PRN, Ang Dhaliwal MD    Morphine sulfate (PF) injection 6 mg, 6 mg, Intravenous, Q1H PRN **OR** morphine concentrated solution 20 mg, 20 mg, Sublingual, Q1H PRN **OR** HYDROmorphone (DILAUDID) injection 1.5 mg, 1.5 mg, Intravenous, Q1H PRN, Ang Dhaliwal MD    LORazepam (ATIVAN) tablet 0.5 mg, 0.5 mg, Oral, Q1H PRN **OR** midazolam (VERSED) injection 1 mg, 1 mg, Intravenous, Q1H PRN, 1 mg at 01/07/24 1223 **OR** midazolam (VERSED) injection 1 mg, 1 mg, Intramuscular, Q1H PRN **OR** midazolam (VERSED) injection 1 mg, 1 mg, Subcutaneous, Q1H PRN **OR** LORazepam (ATIVAN) 2 MG/ML concentrated solution 0.5 mg, 0.5 mg, Oral, Q1H PRN **OR** LORazepam (ATIVAN) 2 MG/ML concentrated solution 0.5 mg, 0.5 mg, Sublingual, Q1H PRN, Ang Dhaliwal MD    LORazepam (ATIVAN) tablet 1 mg, 1 mg, Oral, Q1H PRN **OR** midazolam (VERSED) injection 2 mg, 2 mg, Intravenous, Q1H PRN **OR** midazolam (VERSED) injection 2 mg, 2 mg, Intramuscular, Q1H PRN **OR** midazolam (VERSED) injection 2 mg, 2 mg, Subcutaneous, Q1H PRN **OR** LORazepam (ATIVAN) 2 MG/ML concentrated solution 1 mg, 1 mg, Oral, Q1H PRN, 1 mg at 01/06/24 1817 **OR** LORazepam (ATIVAN) 2 MG/ML concentrated solution 1 mg, 1 mg, Sublingual, Q1H PRN, Ang Dhaliwal MD    LORazepam (ATIVAN) tablet 2 mg, 2 mg, Oral, Q1H PRN **OR** midazolam (VERSED) injection 4 mg, 4 mg, Intravenous, Q1H PRN **OR** midazolam (VERSED) injection 4 mg, 4 mg, Intramuscular, Q1H PRN **OR** midazolam (VERSED) injection 4 mg, 4 mg, Subcutaneous, Q1H PRN **OR** LORazepam (ATIVAN) 2 MG/ML concentrated solution 2 mg, 2 mg, Oral, Q1H PRN **OR** LORazepam (ATIVAN) 2 MG/ML concentrated solution 2 mg, 2 mg, Sublingual, Q1H PRN, Ang Dhaliwal MD    polyethylene glycol 1% (ARTIFICIAL TEARS) ophthalmic solution, 1 drop, Both Eyes, Q1H PRN, Ang Dhaliwal MD    sodium chloride  0.9 % flush 10 mL, 10 mL, Intravenous, PRN, Ish Clancy MD     ASSESSMENT  Acute left occipital lobe infarct  New onset atrial flutter  Acute kidney injury  Acute ESBL E. coli UTI  Diabetes mellitus  Hypertension  Hyperlipidemia  Dementia  Gastroesophageal reflux disease    PLAN  Comfort care only  Allow natural death  Routine palliative care orders  Discussed with family  Discussed with nursing staff    KORY ELY MD    Copied text in this note has been reviewed and is accurate as of 01/07/24

## 2024-01-07 NOTE — PLAN OF CARE
Goal Outcome Evaluation:  Plan of Care Reviewed With: patient   Patient responds to pain. Turn and reposition every  four hours. 1 mg of versed given for symptom management. Will continue to give comfort care.

## 2024-01-08 NOTE — PROGRESS NOTES
"Daily progress note    Primary care physician  Dr. Rivera    Subjective  Comfortable with no new problems    History of present illness  89-year-old white female with history of diabetes mellitus hypertension hyperlipidemia hypothyroidism Parkinson disease who is a nursing home resident for long-term brought to the emergency room with slurred speech started yesterday.  Patient evaluated in ER found to have acute ischemic stroke acute UTI acute kidney injury admitted for management.  Patient is DNR per her wishes.  Patient is unable to give any detailed history most of the history obtained from the family chart nursing staff and old record.     REVIEW OF SYSTEMS  Unable to obtain     PHYSICAL EXAM  Blood pressure 134/78, pulse 78, temperature 98 °F (36.7 °C), temperature source Axillary, resp. rate 16, height 149.9 cm (59\"), weight 39.5 kg (87 lb), SpO2 96%.    Unchanged     LAB RESULTS  Lab Results (last 24 hours)       Procedure Component Value Units Date/Time    POC Creatinine [[id number]]  (Abnormal) Collected: 01/03/24 2030    Specimen: Blood Updated: 01/08/24 0619     Creatinine 1.90 mg/dL      Comment: Serial Number: [id number]:  [id number]             Imaging Results (Last 24 Hours)       ** No results found for the last 24 hours. **          Results  Scan on 1/3/2024 2106 by [facility], [place]: ECG 12-LEAD         Author: -- Service: -- Author Type: --   Filed: Date of Service: Creation Time:   Status: (Other)   HEART RATE= 83  bpm  RR Interval= 723  ms  AZ Interval=   ms  P Horizontal Axis=   deg  P Front Axis=   deg  QRSD Interval= 83  ms  QT Interval= 426  ms  QTcB= 501  ms  QRS Axis= 52  deg  T Wave Axis= 62  deg  - ABNORMAL ECG -  Atrial flutter with predominant 3:1 AV block  When compared with ECG of 26-Jan-2023 22:54:44,  Atrial flutter has replaced sinus rhythm          Current Facility-Administered Medications:     morphine injection 2 mg, 2 mg, Intravenous, Q1H PRN **OR** morphine concentrated " solution 5 mg, 5 mg, Sublingual, Q1H PRN **OR** HYDROmorphone (DILAUDID) injection 0.5 mg, 0.5 mg, Intravenous, Q1H PRN, Ang Dhaliwal MD    morphine injection 4 mg, 4 mg, Intravenous, Q1H PRN **OR** morphine concentrated solution 10 mg, 10 mg, Sublingual, Q1H PRN **OR** HYDROmorphone (DILAUDID) injection 1 mg, 1 mg, Intravenous, Q1H PRN, Ang Dhaliwal MD    Morphine sulfate (PF) injection 6 mg, 6 mg, Intravenous, Q1H PRN **OR** morphine concentrated solution 20 mg, 20 mg, Sublingual, Q1H PRN **OR** HYDROmorphone (DILAUDID) injection 1.5 mg, 1.5 mg, Intravenous, Q1H PRN, Ang Dhaliwal MD    LORazepam (ATIVAN) tablet 0.5 mg, 0.5 mg, Oral, Q1H PRN **OR** midazolam (VERSED) injection 1 mg, 1 mg, Intravenous, Q1H PRN, 1 mg at 01/08/24 0040 **OR** midazolam (VERSED) injection 1 mg, 1 mg, Intramuscular, Q1H PRN **OR** midazolam (VERSED) injection 1 mg, 1 mg, Subcutaneous, Q1H PRN **OR** LORazepam (ATIVAN) 2 MG/ML concentrated solution 0.5 mg, 0.5 mg, Oral, Q1H PRN **OR** LORazepam (ATIVAN) 2 MG/ML concentrated solution 0.5 mg, 0.5 mg, Sublingual, Q1H PRN, Ang Dhaliwal MD    LORazepam (ATIVAN) tablet 1 mg, 1 mg, Oral, Q1H PRN **OR** midazolam (VERSED) injection 2 mg, 2 mg, Intravenous, Q1H PRN, 2 mg at 01/08/24 0507 **OR** midazolam (VERSED) injection 2 mg, 2 mg, Intramuscular, Q1H PRN **OR** midazolam (VERSED) injection 2 mg, 2 mg, Subcutaneous, Q1H PRN **OR** LORazepam (ATIVAN) 2 MG/ML concentrated solution 1 mg, 1 mg, Oral, Q1H PRN, 1 mg at 01/06/24 1817 **OR** LORazepam (ATIVAN) 2 MG/ML concentrated solution 1 mg, 1 mg, Sublingual, Q1H PRN, Ang Dhaliwal MD    LORazepam (ATIVAN) tablet 2 mg, 2 mg, Oral, Q1H PRN **OR** midazolam (VERSED) injection 4 mg, 4 mg, Intravenous, Q1H PRN **OR** midazolam (VERSED) injection 4 mg, 4 mg, Intramuscular, Q1H PRN **OR** midazolam (VERSED) injection 4 mg, 4 mg, Subcutaneous, Q1H PRN **OR** LORazepam (ATIVAN) 2 MG/ML concentrated solution 2 mg, 2 mg, Oral, Q1H PRN **OR** LORazepam  (ATIVAN) 2 MG/ML concentrated solution 2 mg, 2 mg, Sublingual, Q1H PRN, Kory Dhaliwal MD    polyethylene glycol 1% (ARTIFICIAL TEARS) ophthalmic solution, 1 drop, Both Eyes, Q1H PRN, Kory Dhaliwal MD    sodium chloride 0.9 % flush 10 mL, 10 mL, Intravenous, PRN, Ish Clancy MD     ASSESSMENT  Acute left occipital lobe infarct  New onset atrial flutter  Acute kidney injury  Acute ESBL E. coli UTI  Diabetes mellitus  Hypertension  Hyperlipidemia  Dementia  Gastroesophageal reflux disease    PLAN  Comfort care only  Allow natural death  Routine palliative care orders  Discussed with family  Discussed with nursing staff    KORY DHALIWAL MD    Copied text in this note has been reviewed and is accurate as of 01/08/24

## 2024-01-08 NOTE — PROGRESS NOTES
Discharge Planning Assessment  [facility]     Patient Name: Sam Flores  MRN: [id number]  Today's Date: 1/8/2024    Admit Date: 1/3/2024    Plan: Return to [facility]   Discharge Needs Assessment    No documentation.                  Discharge Plan       Row Name 01/08/24 1604       Plan    Plan Comments The patient transferred to [facility] from [facility] on 1/5/24. The patient is palliative. [facility] to evaluate. MAGALY Lance RN, CCP                  Continued Care and Services - Admitted Since 1/3/2024       Destination Coordination complete.      Service Provider Request Status Selected Services Address Phone Fax Patient Preferred    [facility]  Selected Intermediate Care [address] [facility] [address] [phone] --                  Expected Discharge Date and Time       Expected Discharge Date Expected Discharge Time    Jan 6, 2024            Demographic Summary    No documentation.                  Functional Status    No documentation.                  Psychosocial    No documentation.                  Abuse/Neglect    No documentation.                  Legal    No documentation.                  Substance Abuse    No documentation.                  Patient Forms    No documentation.                     Eulalia Lance RN

## 2024-01-08 NOTE — NURSING NOTE
Pt evaluated at bedside. Met with dtr, Mariela, in [facility] area. She confirms wishes for comfort measures only and would like to pursue SB admission. Pt SB eligible per Dr Farmer. Consents signed and admission pack reviewed.    Dr Dhaliwal unable to DC to SB until AM. Updated RN (Eileen) and CM (Darya). Had updated PC that I was admitting SB, but they were gone for the day to notify that I am not Sbing her today. Corrie will complete admission tomorrow.    Thank you for this referral. We will complete admission tomorrow.    Liz Mcpherson RN  [facility] Referral and Admissions Coordinator  [phone]

## 2024-01-08 NOTE — PLAN OF CARE
Goal Outcome Evaluation:  Plan of Care Reviewed With: patient   Patient responds to pain and movement. 2 mg of versed given for her anxiety. Verdugo to bedside drainage with dark yellow urine. Will monitor.

## 2024-01-08 NOTE — PROGRESS NOTES
SW spoke with patient's daughter, Mariela, over the phone. Explained role and purpose of visit for psychosocial support. Assisted in answering Mariela's questions regarding hospice and their coverage while in the hospital. She is scheduled to meet with hospice later this afternoon. Support provided. Mariela reports no additional needs at this time and is aware of SW availability moving forward.

## 2024-01-08 NOTE — PLAN OF CARE
Palliative care patient pps 20% resting in bed. Medicated x1 this shift with 2mg morphine for signs of pain. Pt is responsive to touch, minimally verbal, tolerating small amount of po intake. Currently resting in bed with eyes closed, breathing even and unlabored, free from signs or reports of distress.

## 2024-01-09 PROBLEM — I63.9 CVA (CEREBRAL VASCULAR ACCIDENT): Status: ACTIVE | Noted: 2024-01-01

## 2024-01-09 NOTE — PROGRESS NOTES
"Daily progress note    Primary care physician  Dr. Rivera    Subjective  Comfortable with no new problems    History of present illness  89-year-old white female with history of diabetes mellitus hypertension hyperlipidemia hypothyroidism Parkinson disease who is a nursing home resident for long-term brought to the emergency room with slurred speech started yesterday.  Patient evaluated in ER found to have acute ischemic stroke acute UTI acute kidney injury admitted for management.  Patient is DNR per her wishes.  Patient is unable to give any detailed history most of the history obtained from the family chart nursing staff and old record.     REVIEW OF SYSTEMS  Unable to obtain     PHYSICAL EXAM  Blood pressure 129/67, pulse 69, temperature 97.4 °F (36.3 °C), resp. rate 16, height 149.9 cm (59\"), weight 39.5 kg (87 lb), SpO2 93%.    Unchanged     LAB RESULTS  Lab Results (last 24 hours)       ** No results found for the last 24 hours. **          Imaging Results (Last 24 Hours)       ** No results found for the last 24 hours. **          Results  Scan on 1/3/2024 2106 by [facility]: ECG 12-LEAD         Author: -- Service: -- Author Type: --   Filed: Date of Service: Creation Time:   Status: (Other)   HEART RATE= 83  bpm  RR Interval= 723  ms  IL Interval=   ms  P Horizontal Axis=   deg  P Front Axis=   deg  QRSD Interval= 83  ms  QT Interval= 426  ms  QTcB= 501  ms  QRS Axis= 52  deg  T Wave Axis= 62  deg  - ABNORMAL ECG -  Atrial flutter with predominant 3:1 AV block  When compared with ECG of 26-Jan-2023 22:54:44,  Atrial flutter has replaced sinus rhythm          Current Facility-Administered Medications:     morphine injection 2 mg, 2 mg, Intravenous, Q1H PRN, 2 mg at 01/09/24 0844 **OR** morphine concentrated solution 5 mg, 5 mg, Sublingual, Q1H PRN **OR** HYDROmorphone (DILAUDID) injection 0.5 mg, 0.5 mg, Intravenous, Q1H PRN, Ang Dhaliwal MD    morphine injection 4 mg, 4 mg, Intravenous, Q1H PRN, 4 " mg at 01/09/24 1259 **OR** morphine concentrated solution 10 mg, 10 mg, Sublingual, Q1H PRN **OR** HYDROmorphone (DILAUDID) injection 1 mg, 1 mg, Intravenous, Q1H PRN, Ang Dhaliwal MD    Morphine sulfate (PF) injection 6 mg, 6 mg, Intravenous, Q1H PRN **OR** morphine concentrated solution 20 mg, 20 mg, Sublingual, Q1H PRN **OR** HYDROmorphone (DILAUDID) injection 1.5 mg, 1.5 mg, Intravenous, Q1H PRN, Ang Dhaliwal MD    LORazepam (ATIVAN) tablet 0.5 mg, 0.5 mg, Oral, Q1H PRN **OR** midazolam (VERSED) injection 1 mg, 1 mg, Intravenous, Q1H PRN, 1 mg at 01/09/24 0826 **OR** midazolam (VERSED) injection 1 mg, 1 mg, Intramuscular, Q1H PRN **OR** midazolam (VERSED) injection 1 mg, 1 mg, Subcutaneous, Q1H PRN **OR** LORazepam (ATIVAN) 2 MG/ML concentrated solution 0.5 mg, 0.5 mg, Oral, Q1H PRN **OR** LORazepam (ATIVAN) 2 MG/ML concentrated solution 0.5 mg, 0.5 mg, Sublingual, Q1H PRN, Ang Dhaliwal MD    LORazepam (ATIVAN) tablet 1 mg, 1 mg, Oral, Q1H PRN **OR** midazolam (VERSED) injection 2 mg, 2 mg, Intravenous, Q1H PRN, 2 mg at 01/09/24 1258 **OR** midazolam (VERSED) injection 2 mg, 2 mg, Intramuscular, Q1H PRN **OR** midazolam (VERSED) injection 2 mg, 2 mg, Subcutaneous, Q1H PRN **OR** LORazepam (ATIVAN) 2 MG/ML concentrated solution 1 mg, 1 mg, Oral, Q1H PRN, 1 mg at 01/06/24 1817 **OR** LORazepam (ATIVAN) 2 MG/ML concentrated solution 1 mg, 1 mg, Sublingual, Q1H PRN, Ang Dhaliwal MD    LORazepam (ATIVAN) tablet 2 mg, 2 mg, Oral, Q1H PRN **OR** midazolam (VERSED) injection 4 mg, 4 mg, Intravenous, Q1H PRN **OR** midazolam (VERSED) injection 4 mg, 4 mg, Intramuscular, Q1H PRN **OR** midazolam (VERSED) injection 4 mg, 4 mg, Subcutaneous, Q1H PRN **OR** LORazepam (ATIVAN) 2 MG/ML concentrated solution 2 mg, 2 mg, Oral, Q1H PRN **OR** LORazepam (ATIVAN) 2 MG/ML concentrated solution 2 mg, 2 mg, Sublingual, Q1H PRN, Ang Dhaliwal MD    polyethylene glycol 1% (ARTIFICIAL TEARS) ophthalmic solution, 1 drop, Both Eyes,  Q1H PRN, Kory Dhaliwal MD    sodium chloride 0.9 % flush 10 mL, 10 mL, Intravenous, PRN, Ish Clancy MD     ASSESSMENT  Acute left occipital lobe infarct  New onset atrial flutter  Acute kidney injury  Acute ESBL E. coli UTI  Diabetes mellitus  Hypertension  Hyperlipidemia  Dementia  Gastroesophageal reflux disease    PLAN  Transfer to hospice scattered bed  Comfort care only  Allow natural death  Routine palliative care orders  Discussed with family  Discussed with nursing staff  Combined discharge summary and history and physical dictated    KORY DHALIWAL MD    Copied text in this note has been reviewed and is accurate as of 01/09/24

## 2024-01-09 NOTE — PLAN OF CARE
Goal Outcome Evaluation:  Plan of Care Reviewed With: patient   Patient is confused and arouses to touch. 2 mg of morphine given twice this shift for signs of pain.Verdugo catheter to bedside drainage with dark yellow urine.                   19:13

## 2024-01-09 NOTE — CONSULTS
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 Explanation of services provided with a focus on HSB ([facility] Scattered Bed). Answered all questions, discussed medications, symptom management needs, and goals of care. [facility]. [facility] consent forms completed and signed by POA. [facility] information provided and encouraged to reach out with any needs.    Benefit change completed and copy left with Eulalia Lance CCP. [facility] daily visits will begin tomorrow 1/10/24.    Please call with any questions, concerns, or change in patient status. Thank you for allowing us the opportunity to participate in the care of this patient/family.    Corrie Eng, RN, BSN  [facility] Admissions  [phone]

## 2024-01-09 NOTE — PROGRESS NOTES
Case Management Discharge Note      Final Note: admitted to a [facility] scattered bed on 1/9/24. MAGALY galvan Rn, CCP    Provided Post Acute Provider List?: N/A  Provided Post Acute Provider Quality & Resource List?: N/A    Selected Continued Care - Admitted Since 1/3/2024       Destination Coordination complete.      Service Provider Selected Services Address Phone Fax Patient Preferred    [facility] [address], [facility] [address] [phone] --              Durable Medical Equipment    No services have been selected for the patient.                Dialysis/Infusion    No services have been selected for the patient.                Home Medical Care    No services have been selected for the patient.                Therapy    No services have been selected for the patient.                Community Resources    No services have been selected for the patient.                Community & DME    No services have been selected for the patient.                         Final Discharge Disposition Code: 51 - hospice medical facility

## 2024-01-09 NOTE — DISCHARGE SUMMARY
Discharge summary    Date of admission 1/3/2024  Date of discharge 1/9/2024  Date of admission to hospice [facility] bed 1/9/2024    Final diagnosis  Acute left occipital lobe infarct  New onset atrial flutter  Acute kidney injury  Acute ESBL E. coli UTI  Diabetes mellitus  Hypertension  Hyperlipidemia  Dementia  Gastroesophageal reflux disease    Discharge medications    Current Facility-Administered Medications:     morphine injection 2 mg, 2 mg, Intravenous, Q1H PRN, 2 mg at 01/09/24 0826 **OR** morphine concentrated solution 5 mg, 5 mg, Sublingual, Q1H PRN **OR** HYDROmorphone (DILAUDID) injection 0.5 mg, 0.5 mg, Intravenous, Q1H PRN, Ang Dhaliwal MD    morphine injection 4 mg, 4 mg, Intravenous, Q1H PRN, 4 mg at 01/09/24 1259 **OR** morphine concentrated solution 10 mg, 10 mg, Sublingual, Q1H PRN **OR** HYDROmorphone (DILAUDID) injection 1 mg, 1 mg, Intravenous, Q1H PRN, Ang Dhaliwal MD    Morphine sulfate (PF) injection 6 mg, 6 mg, Intravenous, Q1H PRN **OR** morphine concentrated solution 20 mg, 20 mg, Sublingual, Q1H PRN **OR** HYDROmorphone (DILAUDID) injection 1.5 mg, 1.5 mg, Intravenous, Q1H PRN, Ang Dhaliwal MD    LORazepam (ATIVAN) tablet 0.5 mg, 0.5 mg, Oral, Q1H PRN **OR** midazolam (VERSED) injection 1 mg, 1 mg, Intravenous, Q1H PRN, 1 mg at 01/09/24 0826 **OR** midazolam (VERSED) injection 1 mg, 1 mg, Intramuscular, Q1H PRN **OR** midazolam (VERSED) injection 1 mg, 1 mg, Subcutaneous, Q1H PRN **OR** LORazepam (ATIVAN) 2 MG/ML concentrated solution 0.5 mg, 0.5 mg, Oral, Q1H PRN **OR** LORazepam (ATIVAN) 2 MG/ML concentrated solution 0.5 mg, 0.5 mg, Sublingual, Q1H PRN, Ang Dhaliwal MD    LORazepam (ATIVAN) tablet 1 mg, 1 mg, Oral, Q1H PRN **OR** midazolam (VERSED) injection 2 mg, 2 mg, Intravenous, Q1H PRN, 2 mg at 01/09/24 1258 **OR** midazolam (VERSED) injection 2 mg, 2 mg, Intramuscular, Q1H PRN **OR** midazolam (VERSED) injection 2 mg, 2 mg, Subcutaneous, Q1H PRN **OR** LORazepam (ATIVAN) 2  "MG/ML concentrated solution 1 mg, 1 mg, Oral, Q1H PRN, 1 mg at 01/06/24 1817 **OR** LORazepam (ATIVAN) 2 MG/ML concentrated solution 1 mg, 1 mg, Sublingual, Q1H PRN, Ang Dhaliwal MD    LORazepam (ATIVAN) tablet 2 mg, 2 mg, Oral, Q1H PRN **OR** midazolam (VERSED) injection 4 mg, 4 mg, Intravenous, Q1H PRN **OR** midazolam (VERSED) injection 4 mg, 4 mg, Intramuscular, Q1H PRN **OR** midazolam (VERSED) injection 4 mg, 4 mg, Subcutaneous, Q1H PRN **OR** LORazepam (ATIVAN) 2 MG/ML concentrated solution 2 mg, 2 mg, Oral, Q1H PRN **OR** LORazepam (ATIVAN) 2 MG/ML concentrated solution 2 mg, 2 mg, Sublingual, Q1H PRN, Ang Dhaliwal MD    polyethylene glycol 1% (ARTIFICIAL TEARS) ophthalmic solution, 1 drop, Both Eyes, Q1H PRN, Ang Dhaliwal MD    sodium chloride 0.9 % flush 10 mL, 10 mL, Intravenous, PRN, Ish Clancy MD     Consults obtained  Neurology  Cardiology  Palliative care  Spiritual care  Hospice    Procedures  None    Hospital course  89-year-old white female with very complex past medical history including diabetes hypertension hyperlipidemia hypothyroidism Parkinson disease who was a nursing home resident admitted to emergency room with slurred speech.  Patient admitted for further workup and her workup revealed acute left occipital lobe infarct with new onset atrial flutter acute kidney injury and also found to have ESBL E. coli UTI.  Patient treated for infection heart rate control received fluids and remain on IV fluid aspirin Lipitor and followed by neurology and cardiology.  Patient was DNR with poor prognosis and after discussion with palliative care service family decided to pursue comfort care.  Patient remained comfortable at palliative care unit and today she qualified for hospice scattered bed for comfort care.    Physical examination  Comfortable in no distress  Blood pressure 129/67, pulse 69, temperature 97.4 °F (36.3 °C), resp. rate 16, height 149.9 cm (59\"), weight 39.5 kg (87 lb), " SpO2 93%.    Lungs moving air bilaterally  Heart S1-S2  Abdomen soft hypoactive bowel sounds  Extremities trace edema    LABS  Lab Results (last 24 hours)       ** No results found for the last 24 hours. **          Imaging Results (Last 24 Hours)       ** No results found for the last 24 hours. **          PLAN  Hospice scattered bed  Comfort care only  Allow natural death  Routine palliative care orders  Discussed with family  Discussed with nursing staff    KORY ELY MD

## 2024-01-10 NOTE — PROGRESS NOTES
[facility] Visit [facility]    Sam Flores  [id number]  1/10/2024    Admission R/T [facility] Dx: yes    Reason for [facility] Admission: cerebral infarction due to unspecified occlusion or stenosis of left PCA    Review of Visit: Patient is an 89yo female with a primary [facility] diagnosis of cerebral infarction due to unspecified occlusion or stenosis of left PCA. Admitted to [facility] for GIP for EOL care and symptom management of pain, dyspnea, restlessness and congestion.    PPS: 10%    VS: 101.2, 77, 12, 127/62, 93% on room air.     Medications in 24 hours:  -IV Morphine 4mg x6  -IV Versed 2mg x6    Recommendations:  Continue to monitor for signs of decline and provide comfort measures. Contact [facility] at [phone] with any questions or concerns and at TOD.     Assessment:  Patient is bed bound, oral care only, unresponsive, PPS 10%, actively dying. Respirations are unlabored with open mouth breathing, periods of apnea and audible congestion. Lung sounds with rhonchi throughout, moved into recovery position during visit. Abdomen is soft with hypoactive bowel sounds, no PO intake. Hands are warm to touch, feet are cool to touch. Redness to the soles of the feet and palms, dusky nailbeds, scattered bruising to BUE. Verdugo catheter to bedside drainage with diminished urine output, 400mL documented in 24 hours.     Collaboration:  Spoke with pts daughter, Mariela, via phone with condition update and support provided. Training provided regarding assessment findings, disease progression, symptom management, Scattered Bed Team roles and prognosis. Family v/u of pts condition and all questions were answered. Collaborated with [facility] RNNay and Eulalia SABA about pts condition.    Disposition:  Patient meets GIP criteria d/t frequent administration and continued titration of IV medications to achieve and maintain symptom management. Patient appears to be unsafe for transport at this time, requiring increasing symptom  management and frequent RN assessment. If patient were to stabilize she would require LTC placement d/t increased daily care needs. Will continue [facility] RN visits to monitor for changes, assess needs and provide support.       Rachel Segovia RN  [facility] Health Visit Nurse  Scattered Bed Team

## 2024-01-10 NOTE — PROGRESS NOTES
SB team SW met with patient to explain role of SB team SW and assess for needs. Patient was lying in her hospital bed, unresponsive with no signs of pain or discomfort. No family members were at the bedside. SW sat with patient to provide supportive presence.   Patient is currently a 10% PPS and is unresponsive, therefore SW unable to complete PHQ9.  Family would like for patient to remain in a SB for EOL care due to her imminent prognosis.Discussed  planning and family has selected [facility]  Home. SW spoke with daughter as well as son. SW assisted son with [place] paperwork.SW will visit on a weekly and PRN basis to offer EOL support and assi st wit h needs.

## 2024-01-10 NOTE — PROGRESS NOTES
"Daily progress note    Primary care physician  Dr. Rivera    Subjective  Comfortable with no new problems    History of present illness  89-year-old white female with history of diabetes mellitus hypertension hyperlipidemia hypothyroidism Parkinson disease who is a nursing home resident for long-term brought to the emergency room with slurred speech started yesterday.  Patient evaluated in ER found to have acute ischemic stroke acute UTI acute kidney injury admitted for management.  Patient is DNR per her wishes.  Patient is unable to give any detailed history most of the history obtained from the family chart nursing staff and old record.     REVIEW OF SYSTEMS  Unable to obtain     PHYSICAL EXAM  Blood pressure 127/62, pulse 77, temperature (!) 101.2 °F (38.4 °C), temperature source Oral, resp. rate 12, height 149.9 cm (59\"), weight 39.5 kg (87 lb), SpO2 93%.    Unchanged     LAB RESULTS  Lab Results (last 24 hours)       ** No results found for the last 24 hours. **          Imaging Results (Last 24 Hours)       ** No results found for the last 24 hours. **          Results  Scan on 1/3/2024 2106 by [employer], [place]: ECG 12-LEAD         Author: -- Service: -- Author Type: --   Filed: Date of Service: Creation Time:   Status: (Other)   HEART RATE= 83  bpm  RR Interval= 723  ms  WA Interval=   ms  P Horizontal Axis=   deg  P Front Axis=   deg  QRSD Interval= 83  ms  QT Interval= 426  ms  QTcB= 501  ms  QRS Axis= 52  deg  T Wave Axis= 62  deg  - ABNORMAL ECG -  Atrial flutter with predominant 3:1 AV block  When compared with ECG of 26-Jan-2023 22:54:44,  Atrial flutter has replaced sinus rhythm          Current Facility-Administered Medications:     morphine injection 2 mg, 2 mg, Intravenous, Q1H PRN **OR** morphine concentrated solution 5 mg, 5 mg, Sublingual, Q1H PRN **OR** HYDROmorphone (DILAUDID) injection 0.5 mg, 0.5 mg, Intravenous, Q1H PRN, Ang Dhaliwal MD    Morphine sulfate (PF) injection 4 mg, 4 mg, " Intravenous, Q1H PRN, 4 mg at 01/10/24 1228 **OR** morphine concentrated solution 10 mg, 10 mg, Sublingual, Q1H PRN **OR** HYDROmorphone (DILAUDID) injection 1 mg, 1 mg, Intravenous, Q1H PRN, Ang Dhaliwal MD    Morphine injection 6 mg, 6 mg, Intravenous, Q1H PRN **OR** morphine concentrated solution 20 mg, 20 mg, Sublingual, Q1H PRN **OR** HYDROmorphone (DILAUDID) injection 1.5 mg, 1.5 mg, Intravenous, Q1H PRN, Ang Dhaliwal MD    LORazepam (ATIVAN) tablet 0.5 mg, 0.5 mg, Oral, Q1H PRN **OR** midazolam (VERSED) injection 1 mg, 1 mg, Intravenous, Q1H PRN **OR** midazolam (VERSED) injection 1 mg, 1 mg, Intramuscular, Q1H PRN **OR** midazolam (VERSED) injection 1 mg, 1 mg, Subcutaneous, Q1H PRN **OR** LORazepam (ATIVAN) 2 MG/ML concentrated solution 0.5 mg, 0.5 mg, Oral, Q1H PRN **OR** LORazepam (ATIVAN) 2 MG/ML concentrated solution 0.5 mg, 0.5 mg, Sublingual, Q1H PRN, Ang Dhaliwal MD    LORazepam (ATIVAN) tablet 1 mg, 1 mg, Oral, Q1H PRN **OR** midazolam (VERSED) injection 2 mg, 2 mg, Intravenous, Q1H PRN, 2 mg at 01/10/24 1228 **OR** midazolam (VERSED) injection 2 mg, 2 mg, Intramuscular, Q1H PRN **OR** midazolam (VERSED) injection 2 mg, 2 mg, Subcutaneous, Q1H PRN **OR** LORazepam (ATIVAN) 2 MG/ML concentrated solution 1 mg, 1 mg, Oral, Q1H PRN **OR** LORazepam (ATIVAN) 2 MG/ML concentrated solution 1 mg, 1 mg, Sublingual, Q1H PRN, Ang Dhaliwal MD    LORazepam (ATIVAN) tablet 2 mg, 2 mg, Oral, Q1H PRN **OR** midazolam (VERSED) injection 4 mg, 4 mg, Intravenous, Q1H PRN **OR** midazolam (VERSED) injection 4 mg, 4 mg, Intramuscular, Q1H PRN **OR** midazolam (VERSED) injection 4 mg, 4 mg, Subcutaneous, Q1H PRN **OR** LORazepam (ATIVAN) 2 MG/ML concentrated solution 2 mg, 2 mg, Oral, Q1H PRN **OR** LORazepam (ATIVAN) 2 MG/ML concentrated solution 2 mg, 2 mg, Sublingual, Q1H PRN, Ang Dhaliwal MD    polyethylene glycol 1% (ARTIFICIAL TEARS) ophthalmic solution, 1 drop, Both Eyes, Q1H PRN, Ang Dhaliwal MD    sodium  chloride 0.9 % flush 10 mL, 10 mL, Intravenous, PRN, Kory Dhaliwal MD     ASSESSMENT  Acute left occipital lobe infarct  New onset atrial flutter  Acute kidney injury  Acute ESBL E. coli UTI  Diabetes mellitus  Hypertension  Hyperlipidemia  Dementia  Gastroesophageal reflux disease    PLAN  Hospice scattered bed  Comfort care only  Allow natural death  Routine palliative care orders  Discussed with family  Discussed with nursing staff  Use my discharge summary as history and physical    KORY DHALIWAL MD    Copied text in this note has been reviewed and is accurate as of 01/10/24

## 2024-01-10 NOTE — PLAN OF CARE
Goal Outcome Evaluation:  Plan of Care Reviewed With: patient, family        Progress: declining  Outcome Evaluation: PPS 10%. Pt medicated with 4mg of morphine and 4mg of versed prior to turns. New IV placed in LFA. Pt tolerating well and resting comfortably. Family at bedside this afternoon. No needs at this time.

## 2024-01-10 NOTE — PLAN OF CARE
Goal Outcome Evaluation:   [place]well coma scale score of 9. Access and drains include: IV and jameson catheter. Premedication PRN: morphine 4mg IV, versed 2mg IV. PPS 10%. Plan of care ongoing.         Progress: declining

## 2024-01-10 NOTE — PROGRESS NOTES
Pt was unresponsive and appeared comfortable.  No family present.   contacted daughter per phone.  Pt is Rastafarian.  Daughter expressed acceptance of pt's prognosis and POC and stated she has desired spiritual support.  Edgard  decined at this time.

## 2024-01-11 NOTE — PLAN OF CARE
Problem: Adult Inpatient Plan of Care  Goal: Plan of Care Review  Outcome: Ongoing, Progressing  Flowsheets (Taken 1/11/2024 1517)  Progress: declining  Plan of Care Reviewed With: family  Outcome Evaluation: PPS 10%. Premed with Dilaudid 1mg and Versed 4mg. F/C. Shallow breathing. Contact precautions for ESBL urine. Family at bedside updated.   Goal Outcome Evaluation:  Plan of Care Reviewed With: family        Progress: declining  Outcome Evaluation: PPS 10%. Premed with Dilaudid 1mg and Versed 4mg. F/C. Shallow breathing. Contact precautions for ESBL urine. Family at bedside updated.

## 2024-01-11 NOTE — PLAN OF CARE
Goal Outcome Evaluation:   [place] coma scale score of 3. Access and drains include: IV and jameson catheter. Premedication PRN: morphine 4mg IV, versed 2mg IV. PPS 10%. Plan of care ongoing.             Progress: declining

## 2024-01-11 NOTE — PROGRESS NOTES
[facility] Visit Report      Admission R/T [facility] Dx: Yes    Reason for [facility] Admission: Cerebral Infarction      Review of Visit:      PPS: 10    VS: BP-66/43  P- 81  Temp- 97.0  RR- 14  O2- 78 RA    Medications in 24 hours:   IV Morphine 4mg PRN x6  IV Versed 2mg x6    Recommendations:  Continue to monitor for signs of decline and provide comfort measures. Contact [facility] at [phone] with any questions or concerns and at TOD.     Assessment: Pt seen this day for routine SB visit. Pt seen laying in bed in recovery position on left side. Extremities cold to the touch and pulses not palpable. Skin pale with cyanotic tips of fingers and toes. Breathing is shallow and unlabored. No terminal secretions audible at this time. No s/s of pain or distress noted. Several family members at bedside. Education provided and comfort given. Will continue to follow daily and as needed.     Disposition: Pt to remain at facility until EOL. Should condition stabilize pt will require SNF for increased titrations and administration of medications to remain comfortable.       Joellen Longo LPN  [facility] Visit Nurse  Scattered Bed Team

## 2024-01-11 NOTE — PROGRESS NOTES
"Daily progress note    Primary care physician  Dr. Rivera    Subjective  Comfortable with no new problems and family at bedside    History of present illness  89-year-old white female with history of diabetes mellitus hypertension hyperlipidemia hypothyroidism Parkinson disease who is a nursing home resident for long-term brought to the emergency room with slurred speech started yesterday.  Patient evaluated in ER found to have acute ischemic stroke acute UTI acute kidney injury admitted for management.  Patient is DNR per her wishes.  Patient is unable to give any detailed history most of the history obtained from the family chart nursing staff and old record.     REVIEW OF SYSTEMS  Unable to obtain     PHYSICAL EXAM  Blood pressure (!) 66/43, pulse 81, temperature 97 °F (36.1 °C), temperature source Oral, resp. rate 12, height 149.9 cm (59\"), weight 39.5 kg (87 lb), SpO2 (!) 78%.    Unchanged     LAB RESULTS  Lab Results (last 24 hours)       ** No results found for the last 24 hours. **          Imaging Results (Last 24 Hours)       ** No results found for the last 24 hours. **          Results  Scan on 1/3/2024 2106 by [employer], [place]: ECG 12-LEAD         Author: -- Service: -- Author Type: --   Filed: Date of Service: Creation Time:   Status: (Other)   HEART RATE= 83  bpm  RR Interval= 723  ms  MD Interval=   ms  P Horizontal Axis=   deg  P Front Axis=   deg  QRSD Interval= 83  ms  QT Interval= 426  ms  QTcB= 501  ms  QRS Axis= 52  deg  T Wave Axis= 62  deg  - ABNORMAL ECG -  Atrial flutter with predominant 3:1 AV block  When compared with ECG of 26-Jan-2023 22:54:44,  Atrial flutter has replaced sinus rhythm          Current Facility-Administered Medications:     morphine injection 2 mg, 2 mg, Intravenous, Q1H PRN **OR** morphine concentrated solution 5 mg, 5 mg, Sublingual, Q1H PRN **OR** HYDROmorphone (DILAUDID) injection 0.5 mg, 0.5 mg, Intravenous, Q1H PRN, Ang Dhaliwal MD    Morphine sulfate (PF) " injection 4 mg, 4 mg, Intravenous, Q1H PRN, 4 mg at 01/11/24 0832 **OR** morphine concentrated solution 10 mg, 10 mg, Sublingual, Q1H PRN **OR** HYDROmorphone (DILAUDID) injection 1 mg, 1 mg, Intravenous, Q1H PRN, Ang Dhaliwal MD, 1 mg at 01/11/24 1236    Morphine injection 6 mg, 6 mg, Intravenous, Q1H PRN **OR** morphine concentrated solution 20 mg, 20 mg, Sublingual, Q1H PRN **OR** HYDROmorphone (DILAUDID) injection 1.5 mg, 1.5 mg, Intravenous, Q1H PRN, Ang Dhaliwal MD    LORazepam (ATIVAN) tablet 0.5 mg, 0.5 mg, Oral, Q1H PRN **OR** midazolam (VERSED) injection 1 mg, 1 mg, Intravenous, Q1H PRN **OR** midazolam (VERSED) injection 1 mg, 1 mg, Intramuscular, Q1H PRN **OR** midazolam (VERSED) injection 1 mg, 1 mg, Subcutaneous, Q1H PRN **OR** LORazepam (ATIVAN) 2 MG/ML concentrated solution 0.5 mg, 0.5 mg, Oral, Q1H PRN **OR** LORazepam (ATIVAN) 2 MG/ML concentrated solution 0.5 mg, 0.5 mg, Sublingual, Q1H PRN, Ang Dhaliwal MD    LORazepam (ATIVAN) tablet 1 mg, 1 mg, Oral, Q1H PRN **OR** midazolam (VERSED) injection 2 mg, 2 mg, Intravenous, Q1H PRN, 2 mg at 01/11/24 0832 **OR** midazolam (VERSED) injection 2 mg, 2 mg, Intramuscular, Q1H PRN **OR** midazolam (VERSED) injection 2 mg, 2 mg, Subcutaneous, Q1H PRN **OR** LORazepam (ATIVAN) 2 MG/ML concentrated solution 1 mg, 1 mg, Oral, Q1H PRN **OR** LORazepam (ATIVAN) 2 MG/ML concentrated solution 1 mg, 1 mg, Sublingual, Q1H PRN, Ang Dhaliwal MD    LORazepam (ATIVAN) tablet 2 mg, 2 mg, Oral, Q1H PRN **OR** midazolam (VERSED) injection 4 mg, 4 mg, Intravenous, Q1H PRN, 4 mg at 01/11/24 1236 **OR** midazolam (VERSED) injection 4 mg, 4 mg, Intramuscular, Q1H PRN **OR** midazolam (VERSED) injection 4 mg, 4 mg, Subcutaneous, Q1H PRN **OR** LORazepam (ATIVAN) 2 MG/ML concentrated solution 2 mg, 2 mg, Oral, Q1H PRN **OR** LORazepam (ATIVAN) 2 MG/ML concentrated solution 2 mg, 2 mg, Sublingual, Q1H PRN, Ang Dhaliwal MD    polyethylene glycol 1% (ARTIFICIAL TEARS)  ophthalmic solution, 1 drop, Both Eyes, Q1H PRN, Kory Dhaliwal MD    sodium chloride 0.9 % flush 10 mL, 10 mL, Intravenous, PRN, Kory Dhaliwal MD     ASSESSMENT  Acute left occipital lobe infarct  New onset atrial flutter  Acute kidney injury  Acute ESBL E. coli UTI  Diabetes mellitus  Hypertension  Hyperlipidemia  Dementia  Gastroesophageal reflux disease    PLAN  Hospice scattered bed  Comfort care only  Allow natural death  Routine palliative care orders  Discussed with family  Discussed with nursing staff    KORY DHALIWAL MD    Copied text in this note has been reviewed and is accurate as of 01/11/24

## 2024-01-12 NOTE — PROGRESS NOTES
[facility] Visit Report      Admission R/T [facility] Dx: Yes    Reason for [facility] Admission: Cerebral Infarction      Review of Visit:      PPS: 10    VS: BP-  P- 78  Temp- 97.8  RR- 8  O2- 71 RA    Comfort Medications in 24 hours:   IV Versed 4mg PRN x6  IV Dilaudid 1mg PRN x6    Recommendations:  Continue to monitor for signs of decline and provide comfort measures. Contact [facility] at [phone] with any questions or concerns and at TOD.     Assessment: Pt seen this day for routine SB visit. Pt seen laying in bed in recovery position on left side. Extremities cold to the touch and pulses not palpable. Skin pale with cyanotic tips of fingers and toes. Breathing is shallow and unlabored. Breathing apneic with some long periods noted. No terminal secretions audible at this time. No s/s of pain or distress noted. Several family members at bedside. Education provided and comfort given. Will continue to follow daily and as needed.     Disposition: Pt to remain at facility until EOL. Pt is requiring frequent RN assessments as well as IV medication administration and titration. Should condition stabilize pt will require SNF for increased titrations and administration of medications to remain comfortable.       Joellen Longo LPN  [facility] Visit Nurse  Scattered Bed Team

## 2024-01-12 NOTE — PLAN OF CARE
Goal Outcome Evaluation:   Gustavus coma scale score of 3. Access and drains include: IV and jameson catheter. Premedication PRN: dilaudid 1mg IV, versed 4mg IV. PPS 10%. Plan of care ongoing.             Progress: declining

## 2024-01-12 NOTE — PLAN OF CARE
Problem: Adult Inpatient Plan of Care  Goal: Plan of Care Review  Outcome: Ongoing, Progressing  Flowsheets  Taken 1/12/2024 1517 by Zuleyka Meléndez, RN  Outcome Evaluation: PPS 10%. Premed with Dilaudid 1mg and Versed 4mg. F/C. Shallow, apeneic breathing. BLE cool and mottled. Contact precautions for ESBL urine. Family at bedside updated.  Taken 1/12/2024 0508 by Lorin Lopez RN  Progress: declining  Taken 1/11/2024 1517 by Zuleyka Meléndez, RN  Plan of Care Reviewed With: family   Goal Outcome Evaluation:  Plan of Care Reviewed With: family        Progress: declining  Outcome Evaluation: PPS 10%. Premed with Dilaudid 1mg and Versed 4mg. F/C. Shallow, apeneic breathing. BLE cool and mottled. Contact precautions for ESBL urine. Family at bedside updated.

## 2024-01-12 NOTE — PROGRESS NOTES
"Daily progress note    Primary care physician  Dr. Rivera    Subjective  Comfortable with no new problems and family at bedside    History of present illness  89-year-old white female with history of diabetes mellitus hypertension hyperlipidemia hypothyroidism Parkinson disease who is a nursing home resident for long-term brought to the emergency room with slurred speech started yesterday.  Patient evaluated in ER found to have acute ischemic stroke acute UTI acute kidney injury admitted for management.  Patient is DNR per her wishes.  Patient is unable to give any detailed history most of the history obtained from the family chart nursing staff and old record.     REVIEW OF SYSTEMS  Unable to obtain     PHYSICAL EXAM  Blood pressure (!) 66/43, pulse 78, temperature 97.8 °F (36.6 °C), temperature source Axillary, resp. rate 8, height 149.9 cm (59\"), weight 39.5 kg (87 lb), SpO2 (!) 71%.    Unchanged     LAB RESULTS  Lab Results (last 24 hours)       ** No results found for the last 24 hours. **          Imaging Results (Last 24 Hours)       ** No results found for the last 24 hours. **          Results  Scan on 1/3/2024 2106 by [employer], [place]: ECG 12-LEAD         Author: -- Service: -- Author Type: --   Filed: Date of Service: Creation Time:   Status: (Other)   HEART RATE= 83  bpm  RR Interval= 723  ms  MA Interval=   ms  P Horizontal Axis=   deg  P Front Axis=   deg  QRSD Interval= 83  ms  QT Interval= 426  ms  QTcB= 501  ms  QRS Axis= 52  deg  T Wave Axis= 62  deg  - ABNORMAL ECG -  Atrial flutter with predominant 3:1 AV block  When compared with ECG of 26-Jan-2023 22:54:44,  Atrial flutter has replaced sinus rhythm          Current Facility-Administered Medications:     morphine injection 2 mg, 2 mg, Intravenous, Q1H PRN **OR** morphine concentrated solution 5 mg, 5 mg, Sublingual, Q1H PRN **OR** HYDROmorphone (DILAUDID) injection 0.5 mg, 0.5 mg, Intravenous, Q1H PRN, Ang Dhaliwal MD    Morphine sulfate " (PF) injection 4 mg, 4 mg, Intravenous, Q1H PRN, 4 mg at 01/11/24 0832 **OR** morphine concentrated solution 10 mg, 10 mg, Sublingual, Q1H PRN **OR** HYDROmorphone (DILAUDID) injection 1 mg, 1 mg, Intravenous, Q1H PRN, Ang Dhaliwal MD, 1 mg at 01/12/24 1236    Morphine injection 6 mg, 6 mg, Intravenous, Q1H PRN **OR** morphine concentrated solution 20 mg, 20 mg, Sublingual, Q1H PRN **OR** HYDROmorphone (DILAUDID) injection 1.5 mg, 1.5 mg, Intravenous, Q1H PRN, Ang Dhaliwal MD    LORazepam (ATIVAN) tablet 0.5 mg, 0.5 mg, Oral, Q1H PRN **OR** midazolam (VERSED) injection 1 mg, 1 mg, Intravenous, Q1H PRN **OR** midazolam (VERSED) injection 1 mg, 1 mg, Intramuscular, Q1H PRN **OR** midazolam (VERSED) injection 1 mg, 1 mg, Subcutaneous, Q1H PRN **OR** LORazepam (ATIVAN) 2 MG/ML concentrated solution 0.5 mg, 0.5 mg, Oral, Q1H PRN **OR** LORazepam (ATIVAN) 2 MG/ML concentrated solution 0.5 mg, 0.5 mg, Sublingual, Q1H PRN, Ang Dhaliwal MD    LORazepam (ATIVAN) tablet 1 mg, 1 mg, Oral, Q1H PRN **OR** midazolam (VERSED) injection 2 mg, 2 mg, Intravenous, Q1H PRN **OR** midazolam (VERSED) injection 2 mg, 2 mg, Intramuscular, Q1H PRN **OR** midazolam (VERSED) injection 2 mg, 2 mg, Subcutaneous, Q1H PRN **OR** LORazepam (ATIVAN) 2 MG/ML concentrated solution 1 mg, 1 mg, Oral, Q1H PRN **OR** LORazepam (ATIVAN) 2 MG/ML concentrated solution 1 mg, 1 mg, Sublingual, Q1H PRN, Ang Dhaliwal MD    LORazepam (ATIVAN) tablet 2 mg, 2 mg, Oral, Q1H PRN **OR** midazolam (VERSED) injection 4 mg, 4 mg, Intravenous, Q1H PRN, 4 mg at 01/12/24 1236 **OR** midazolam (VERSED) injection 4 mg, 4 mg, Intramuscular, Q1H PRN **OR** midazolam (VERSED) injection 4 mg, 4 mg, Subcutaneous, Q1H PRN **OR** LORazepam (ATIVAN) 2 MG/ML concentrated solution 2 mg, 2 mg, Oral, Q1H PRN **OR** LORazepam (ATIVAN) 2 MG/ML concentrated solution 2 mg, 2 mg, Sublingual, Q1H PRN, Ang Dhaliwal MD    polyethylene glycol 1% (ARTIFICIAL TEARS) ophthalmic solution, 1  drop, Both Eyes, Q1H PRN, Kory Dhaliwal MD    sodium chloride 0.9 % flush 10 mL, 10 mL, Intravenous, PRN, Kory Dhaliwal MD     ASSESSMENT  Acute left occipital lobe infarct  New onset atrial flutter  Acute kidney injury  Acute ESBL E. coli UTI  Diabetes mellitus  Hypertension  Hyperlipidemia  Dementia  Gastroesophageal reflux disease    PLAN  Hospice scattered bed  Comfort care only  Allow natural death  Routine palliative care orders  Discussed with family  Discussed with nursing staff    KORY DHALIWAL MD    Copied text in this note has been reviewed and is accurate as of 01/12/24

## 2024-01-13 NOTE — DISCHARGE SUMMARY
Discharge summary    Date of admission 1/3/2024  Date of death 2024    Discussion  89-year-old white female with multiple medical problems including Parkinson disease diabetes hypertension hyperlipidemia and hypothyroidism who was a nursing home resident admitted to emergency room with slurred speech.  Patient workup revealed acute left occipital infarct and new onset atrial flutter acute kidney injury and ESBL E. coli UTI.  Patient treated with aspirin Lipitor IV fluid and also received antibiotics and followed by cardiology and neurology.  Patient was given ample time without any significant improvement and was DNR per her wishes and family decided to keep her comfortable in palliative care unit.  Patient remained comfortable and then qualified for hospice scattered bed for comfort care.  Patient  this morning and her body released with family.  For details see my discharge summary from acute care.    Cause of death cardiopulmonary failure    KORY ELY MD

## 2024-01-15 LAB
QT INTERVAL: 432 MS
QTC INTERVAL: 453 MS

## 2024-01-15 NOTE — CASE MANAGEMENT/SOCIAL WORK
Case Management Discharge Note      Final Note: Patient passed away on 2024.         Selected Continued Care - Discharged on 2024 Admission date: 2024 - Discharge disposition:       Destination Coordination complete.      Service Provider Selected Services Address Phone Fax Patient Preferred    [facility] [address], [facility] [id number] [phone] --              Durable Medical Equipment    No services have been selected for the patient.                Dialysis/Infusion    No services have been selected for the patient.                Home Medical Care    No services have been selected for the patient.                Therapy    No services have been selected for the patient.                Community Resources    No services have been selected for the patient.                Community & DME    No services have been selected for the patient.                    Selected Continued Care - Prior Encounters Includes continued care and service providers with selected services from prior encounters from 10/11/2023 to 2024      Discharged on 2024 Admission date: 1/3/2024 - Discharge disposition: Hospice/Medical Facility ([facility])      Destination       Service Provider Selected Services Address Phone Fax Patient Preferred    [facility] [address], [facility] [id number] [phone] --                               Final Discharge Disposition Code: 41 -  in medical facility